# Patient Record
Sex: FEMALE | Race: WHITE | NOT HISPANIC OR LATINO | Employment: OTHER | URBAN - NONMETROPOLITAN AREA
[De-identification: names, ages, dates, MRNs, and addresses within clinical notes are randomized per-mention and may not be internally consistent; named-entity substitution may affect disease eponyms.]

---

## 2017-01-04 ENCOUNTER — HOSPITAL ENCOUNTER (OUTPATIENT)
Dept: EDUCATION SERVICES | Facility: HOSPITAL | Age: 82
Discharge: HOME | End: 2017-01-04
Attending: FAMILY MEDICINE | Admitting: FAMILY MEDICINE
Payer: MEDICARE

## 2017-01-04 VITALS
HEIGHT: 63 IN | WEIGHT: 171.2 LBS | DIASTOLIC BLOOD PRESSURE: 70 MMHG | BODY MASS INDEX: 30.33 KG/M2 | SYSTOLIC BLOOD PRESSURE: 116 MMHG | OXYGEN SATURATION: 97 % | HEART RATE: 96 BPM

## 2017-01-04 DIAGNOSIS — Z71.89 ACP (ADVANCE CARE PLANNING): Primary | Chronic | ICD-10-CM

## 2017-01-04 DIAGNOSIS — E11.9 TYPE 2 DIABETES MELLITUS WITHOUT COMPLICATION, WITHOUT LONG-TERM CURRENT USE OF INSULIN (H): ICD-10-CM

## 2017-01-04 PROCEDURE — 97803 MED NUTRITION INDIV SUBSEQ: CPT | Performed by: DIETITIAN, REGISTERED

## 2017-01-04 ASSESSMENT — PAIN SCALES - GENERAL: PAINLEVEL: NO PAIN (0)

## 2017-01-04 NOTE — IP AVS SNAPSHOT
MRN:[id number]                      After Visit Summary   1/4/2017    Annie Arguello    MRN: [id number]           Thank you!     Thank you for choosing [facility] for your care. Our goal is always to provide you with excellent care. Hearing back from our patients is one way we can continue to improve our services. Please take a few minutes to complete the written survey that you may receive in the mail after you visit with us. Thank you!        Patient Information     Date Of Birth          11/14/1933        About your hospital stay     You were admitted on:  January 4, 2017 You last received care in the:  [facility]    You were discharged on:  January 4, 2017       Who to Call     For medical emergencies, please call 911.  For non-urgent questions about your medical care, please call your primary care provider or clinic, [phone]          Attending Provider     Provider    DOC Cox MD       Primary Care Provider Office Phone # Fax #    DOC Cox [phone]       [facility] [place] [address] [place] [address]        Your next 10 appointments already scheduled     Dec 08, 2017 10:00 AM   Hearing Eval with JORDAN Escalera   [facility] ([facility])    [address] [place] [address] [phone]              Further instructions from your care team       -Keep trying to limit your portion sizes.    -Continue using your treadmill - work at adding more steps.    -Test 1x/day - alternate times like you have been.   -Target levels are fasting and before meals , 2 hours after meal less than 180.   -Weight today was 171.2# which is down 5#.    -Follow up in one month.  -Call with any questions - ROSEANNE Stewart, [phone]    Pending Results     No orders found from 1/3/2017 to 1/5/2017.            Admission Information        Provider Department Dept Phone    1/4/2017 DOC Cox MD [facility]  "Ed [phone]      Your Vitals Were     Blood Pressure Pulse Height Weight BMI (Body Mass Index) Pulse Oximetry    116/70 mmHg 96 1.588 m (5' 2.5\") 77.656 kg (171 lb 3.2 oz) 30.79 kg/m2 97%      MyChart Information     [facility] lets you send messages to your doctor, view your test results, renew your prescriptions, schedule appointments and more. To sign up, go to [email or web address] . Click on \"Log in\" on the left side of the screen, which will take you to the Welcome page. Then click on \"Sign up Now\" on the right side of the page.     You will be asked to enter the access code listed below, as well as some personal information. Please follow the directions to create your username and password.     Your access code is: [email or web address]  Expires: 2017  9:44 AM     Your access code will  in 90 days. If you need help or a new code, please call your [facility] clinic or [phone].        Care EveryWhere ID     This is your Care EveryWhere ID. This could be used by other organizations to access your [facility] medical records  [id number]           Review of your medicines      UNREVIEWED medicines. Ask your doctor about these medicines        Dose / Directions    alendronate 70 MG tablet   Commonly known as:  FOSAMAX   Used for:  Osteoarthritis        TAKE AS INSTRUCTED BY YOUR PRESCRIBER   Quantity:  12 tablet   Refills:  2       ALEVE 220 MG tablet   Generic drug:  naproxen sodium        Take one tablet (220mg) by oral route every 12 hours as needed   Refills:  0       aspirin 325 MG tablet        Take one tablet (325mg) by oral route every day   Refills:  0       DAILY MULTIVITAMIN PO        Take one tablet by oral route every day with food   Refills:  0       digoxin 125 MCG tablet   Commonly known as:  LANOXIN   Used for:  Chronic atrial fibrillation (H)        TAKE 1 TABLET DAILY IN THE EVENING   Quantity:  90 tablet   Refills:  1       fish oil-omega-3 fatty acids 1000 MG capsule        Take one by " oral route one times every day   Refills:  0       fluticasone-salmeterol 250-50 MCG/DOSE diskus inhaler   Commonly known as:  ADVAIR   Used for:  Dyspnea on exertion        Dose:  1 puff   Inhale 1 puff into the lungs 2 times daily   Quantity:  1 Inhaler   Refills:  3       metoprolol 25 MG 24 hr tablet   Commonly known as:  TOPROL-XL   Used for:  Atrial fibrillation (H)        TAKE 1 TABLET DAILY   Quantity:  90 tablet   Refills:  2       TUMS PO        Refills:  0         CONTINUE these medicines which have NOT CHANGED        Dose / Directions    blood glucose monitoring lancets   Used for:  Type 2 diabetes mellitus without complication, without long-term current use of insulin (H)        Use to test blood sugar 1 times daily.   Quantity:  1 Box   Refills:  3       blood glucose monitoring test strip   Commonly known as:  ONE TOUCH VERIO IQ   Used for:  Type 2 diabetes mellitus without complication, without long-term current use of insulin (H)        Use to test blood sugar 1 times daily.   Quantity:  100 each   Refills:  3                Protect others around you: Learn how to safely use, store and throw away your medicines at www.disposemymeds.org.             Medication List: This is a list of all your medications and when to take them. Check marks below indicate your daily home schedule. Keep this list as a reference.      Medications           Morning Afternoon Evening Bedtime As Needed    alendronate 70 MG tablet   Commonly known as:  FOSAMAX   TAKE AS INSTRUCTED BY YOUR PRESCRIBER                                ALEVE 220 MG tablet   Take one tablet (220mg) by oral route every 12 hours as needed   Generic drug:  naproxen sodium                                aspirin 325 MG tablet   Take one tablet (325mg) by oral route every day                                blood glucose monitoring lancets   Use to test blood sugar 1 times daily.                                blood glucose monitoring test strip    Commonly known as:  ONE TOUCH VERIO IQ   Use to test blood sugar 1 times daily.                                DAILY MULTIVITAMIN PO   Take one tablet by oral route every day with food                                digoxin 125 MCG tablet   Commonly known as:  LANOXIN   TAKE 1 TABLET DAILY IN THE EVENING                                fish oil-omega-3 fatty acids 1000 MG capsule   Take one by oral route one times every day                                fluticasone-salmeterol 250-50 MCG/DOSE diskus inhaler   Commonly known as:  ADVAIR   Inhale 1 puff into the lungs 2 times daily                                metoprolol 25 MG 24 hr tablet   Commonly known as:  TOPROL-XL   TAKE 1 TABLET DAILY                                TUMS PO

## 2017-01-04 NOTE — DISCHARGE INSTRUCTIONS
-Keep trying to limit your portion sizes.    -Continue using your treadmill - work at adding more steps.    -Test 1x/day - alternate times like you have been.   -Target levels are fasting and before meals , 2 hours after meal less than 180.   -Weight today was 171.2# which is down 5#.    -Follow up in one month.  -Call with any questions - ROSEANNE Stewart, -[phone]

## 2017-01-04 NOTE — IP AVS SNAPSHOT
[facility]    [address] [place] [address]    Phone:  [phone]    Fax:  [phone]                                       After Visit Summary   1/4/2017    Annie Arguello    MRN: [id number]           After Visit Summary Signature Page     I have received my discharge instructions, and my questions have been answered. I have discussed any challenges I see with this plan with the nurse or doctor.    ..........................................................................................................................................  Patient/Patient Representative Signature      ..........................................................................................................................................  Patient Representative Print Name and Relationship to Patient    ..................................................               ................................................  Date                                            Time    ..........................................................................................................................................  Reviewed by Signature/Title    ...................................................              ..............................................  Date                                                            Time

## 2017-01-05 ASSESSMENT — PATIENT HEALTH QUESTIONNAIRE - PHQ9: SUM OF ALL RESPONSES TO PHQ QUESTIONS 1-9: 0

## 2017-02-01 ENCOUNTER — HOSPITAL ENCOUNTER (OUTPATIENT)
Dept: EDUCATION SERVICES | Facility: HOSPITAL | Age: 82
Discharge: HOME | End: 2017-02-01
Attending: FAMILY MEDICINE | Admitting: FAMILY MEDICINE
Payer: MEDICARE

## 2017-02-01 VITALS
BODY MASS INDEX: 29.77 KG/M2 | HEART RATE: 73 BPM | WEIGHT: 168 LBS | SYSTOLIC BLOOD PRESSURE: 110 MMHG | DIASTOLIC BLOOD PRESSURE: 70 MMHG | OXYGEN SATURATION: 97 % | HEIGHT: 63 IN

## 2017-02-01 DIAGNOSIS — E11.65 TYPE 2 DIABETES MELLITUS WITH HYPERGLYCEMIA, WITHOUT LONG-TERM CURRENT USE OF INSULIN (H): Primary | ICD-10-CM

## 2017-02-01 PROCEDURE — G0108 DIAB MANAGE TRN  PER INDIV: HCPCS | Performed by: DIETITIAN, REGISTERED

## 2017-02-01 ASSESSMENT — PAIN SCALES - GENERAL: PAINLEVEL: NO PAIN (0)

## 2017-02-01 NOTE — IP AVS SNAPSHOT
[facility]    [address] [place] [address]    Phone:  [phone]    Fax:  [phone]                                       After Visit Summary   2/1/2017    Annie Arguello    MRN: [id number]           After Visit Summary Signature Page     I have received my discharge instructions, and my questions have been answered. I have discussed any challenges I see with this plan with the nurse or doctor.    ..........................................................................................................................................  Patient/Patient Representative Signature      ..........................................................................................................................................  Patient Representative Print Name and Relationship to Patient    ..................................................               ................................................  Date                                            Time    ..........................................................................................................................................  Reviewed by Signature/Title    ...................................................              ..............................................  Date                                                            Time

## 2017-02-01 NOTE — DISCHARGE INSTRUCTIONS
-Keep trying to limit carbohydrates in your diet.    -Keep walking on your treadmill as much as able.    -Test glucose 1x/day - alternate times - fasting, before supper, 2 hours after supper.    -Target levels - fasting and before meals , 2 hours after meal less than 180.   -Weight today is 168# which is down 8# from initial visit.    -We will do your A1c at next visit - you DO NOT need to be fasting.   -Call with any questions - ROSEANNE Stewart, -[phone]

## 2017-02-01 NOTE — IP AVS SNAPSHOT
MRN:[id number]                      After Visit Summary   2/1/2017    Annie Arguello    MRN: [id number]           Thank you!     Thank you for choosing [facility] for your care. Our goal is always to provide you with excellent care. Hearing back from our patients is one way we can continue to improve our services. Please take a few minutes to complete the written survey that you may receive in the mail after you visit with us. Thank you!        Patient Information     Date Of Birth          11/14/1933        About your hospital stay     You were admitted on:  February 1, 2017 You last received care in the:  [facility]    You were discharged on:  February 1, 2017       Who to Call     For medical emergencies, please call 911.  For non-urgent questions about your medical care, please call your primary care provider or clinic, [phone]          Attending Provider     Provider    DOC Cox MD       Primary Care Provider Office Phone # Fax #    DOC Cox [phone]       [facility] [place] [address] [place] [address]        Your next 10 appointments already scheduled     Dec 08, 2017 10:00 AM   Hearing Eval with JORDAN Escalera   [facility] [place] ([facility])    [address] [place] [address] [phone]              Further instructions from your care team       -Keep trying to limit carbohydrates in your diet.    -Keep walking on your treadmill as much as able.    -Test glucose 1x/day - alternate times - fasting, before supper, 2 hours after supper.    -Target levels - fasting and before meals , 2 hours after meal less than 180.   -Weight today is 168# which is down 8# from initial visit.    -We will do your A1c at next visit - you DO NOT need to be fasting.   -Call with any questions - ROSEANNE Stewart, -[phone]    Pending Results     No orders found from 1/31/2017 to 2/2/2017.           "  Admission Information        Provider Department Dept Phone    2017 DOC Cox MD [facility] [phone]      Your Vitals Were     Blood Pressure Pulse Height Weight BMI (Body Mass Index) Pulse Oximetry    110/70 mmHg 73 1.588 m (5' 2.5\") 76.204 kg (168 lb) 30.22 kg/m2 97%      MyChart Information     MyChart lets you send messages to your doctor, view your test results, renew your prescriptions, schedule appointments and more. To sign up, go to [email or web address] . Click on \"Log in\" on the left side of the screen, which will take you to the Welcome page. Then click on \"Sign up Now\" on the right side of the page.     You will be asked to enter the access code listed below, as well as some personal information. Please follow the directions to create your username and password.     Your access code is: [email or web address]  Expires: 2017  9:44 AM     Your access code will  in 90 days. If you need help or a new code, please call your [facility] clinic or [phone].        Care EveryWhere ID     This is your Care EveryWhere ID. This could be used by other organizations to access your [facility] medical records  [id number]           Review of your medicines      UNREVIEWED medicines. Ask your doctor about these medicines        Dose / Directions    alendronate 70 MG tablet   Commonly known as:  FOSAMAX   Used for:  Osteoarthritis        TAKE AS INSTRUCTED BY YOUR PRESCRIBER   Quantity:  12 tablet   Refills:  2       ALEVE 220 MG tablet   Generic drug:  naproxen sodium        Take one tablet (220mg) by oral route every 12 hours as needed   Refills:  0       aspirin 325 MG tablet        Take one tablet (325mg) by oral route every day   Refills:  0       DAILY MULTIVITAMIN PO        Take one tablet by oral route every day with food   Refills:  0       digoxin 125 MCG tablet   Commonly known as:  LANOXIN   Used for:  Chronic atrial fibrillation (H)        TAKE 1 TABLET DAILY IN THE EVENING   Quantity: "  90 tablet   Refills:  1       fish oil-omega-3 fatty acids 1000 MG capsule        Take one by oral route one times every day   Refills:  0       fluticasone-salmeterol 250-50 MCG/DOSE diskus inhaler   Commonly known as:  ADVAIR   Used for:  Dyspnea on exertion        Dose:  1 puff   Inhale 1 puff into the lungs 2 times daily   Quantity:  1 Inhaler   Refills:  3       metoprolol 25 MG 24 hr tablet   Commonly known as:  TOPROL-XL   Used for:  Atrial fibrillation (H)        TAKE 1 TABLET DAILY   Quantity:  90 tablet   Refills:  2       TUMS PO        Refills:  0         CONTINUE these medicines which have NOT CHANGED        Dose / Directions    blood glucose monitoring lancets   Used for:  Type 2 diabetes mellitus without complication, without long-term current use of insulin (H)        Use to test blood sugar 1 times daily.   Quantity:  1 Box   Refills:  3       blood glucose monitoring test strip   Commonly known as:  ONE TOUCH VERIO IQ   Used for:  Type 2 diabetes mellitus without complication, without long-term current use of insulin (H)        Use to test blood sugar 1 times daily.   Quantity:  100 each   Refills:  3                Protect others around you: Learn how to safely use, store and throw away your medicines at www.disposemymeds.org.             Medication List: This is a list of all your medications and when to take them. Check marks below indicate your daily home schedule. Keep this list as a reference.      Medications           Morning Afternoon Evening Bedtime As Needed    alendronate 70 MG tablet   Commonly known as:  FOSAMAX   TAKE AS INSTRUCTED BY YOUR PRESCRIBER                                ALEVE 220 MG tablet   Take one tablet (220mg) by oral route every 12 hours as needed   Generic drug:  naproxen sodium                                aspirin 325 MG tablet   Take one tablet (325mg) by oral route every day                                blood glucose monitoring lancets   Use to test blood  sugar 1 times daily.                                blood glucose monitoring test strip   Commonly known as:  ONE TOUCH VERIO IQ   Use to test blood sugar 1 times daily.                                DAILY MULTIVITAMIN PO   Take one tablet by oral route every day with food                                digoxin 125 MCG tablet   Commonly known as:  LANOXIN   TAKE 1 TABLET DAILY IN THE EVENING                                fish oil-omega-3 fatty acids 1000 MG capsule   Take one by oral route one times every day                                fluticasone-salmeterol 250-50 MCG/DOSE diskus inhaler   Commonly known as:  ADVAIR   Inhale 1 puff into the lungs 2 times daily                                metoprolol 25 MG 24 hr tablet   Commonly known as:  TOPROL-XL   TAKE 1 TABLET DAILY                                TUMS PO

## 2017-02-01 NOTE — PROGRESS NOTES
"Pt here for session 3.      BG readings as follows:   [id number]  Before [id number] with one at 191  2 hours after [id number] with one at 299 when tested too soon  Overall average is 122.     Blood pressure 110/70, pulse 73, height 1.588 m (5' 2.5\"), weight 76.204 kg (168 lb), SpO2 97 %.  Weight is down 3.2# from last visit and down 8# from initial visit.      Current diabetes medications: none.     Readiness to learn: willing.     Barriers to learning: none.      Pt continues to eat smaller portions and limit intake of sweets.  She is still walking 100 steps per day on her treadmill - hesitant to increase r/t back/hip pain.     Last noted lipid levels were done in 2015.  Pt is not on a statin.      Pt actively participated and demonstrated adequate understanding of topics discussed.  Topics reviewed include: BG self-test and A1c connection, evaluating records to better understand highs and lows, how diabetes and therapies will change over time, expectations of future primary care visits, prevention of complications, foot care skills, risk factors for heart disease,  importance of choosing unsaturated fats, blood pressure and lipid targets, and heart healthy guidelines.     Plan: Continue current meal planning and exercise efforts.  Test 1x/day at alternating times.  A1c at next visit.     Follow up: Session 4.     Total time spent with patient: 60 minutes.     ROSEANNE Stewart, CDE        "

## 2017-02-13 DIAGNOSIS — Z01.812 PRE-OPERATIVE LABORATORY EXAMINATION: Primary | ICD-10-CM

## 2017-02-13 DIAGNOSIS — Z01.812 BLOOD TESTS PRIOR TO TREATMENT OR PROCEDURE: Primary | ICD-10-CM

## 2017-02-14 ENCOUNTER — HOSPITAL ENCOUNTER (OUTPATIENT)
Dept: CT IMAGING | Facility: HOSPITAL | Age: 82
Discharge: HOME | End: 2017-02-14
Attending: FAMILY MEDICINE | Admitting: FAMILY MEDICINE
Payer: MEDICARE

## 2017-02-14 ENCOUNTER — TELEPHONE (OUTPATIENT)
Dept: FAMILY MEDICINE | Facility: OTHER | Age: 82
End: 2017-02-14

## 2017-02-14 DIAGNOSIS — I10 ESSENTIAL HYPERTENSION: Primary | ICD-10-CM

## 2017-02-14 DIAGNOSIS — I10 ESSENTIAL HYPERTENSION: ICD-10-CM

## 2017-02-14 LAB
CHOLEST SERPL-MCNC: 183 MG/DL
HDLC SERPL-MCNC: 56 MG/DL
LDLC SERPL CALC-MCNC: 107 MG/DL
NONHDLC SERPL-MCNC: 127 MG/DL
TRIGL SERPL-MCNC: 101 MG/DL

## 2017-02-14 PROCEDURE — 71260 CT THORAX DX C+: CPT | Mod: TC | Performed by: RADIOLOGY

## 2017-02-14 PROCEDURE — 80061 LIPID PANEL: CPT | Performed by: FAMILY MEDICINE

## 2017-02-14 PROCEDURE — 82565 ASSAY OF CREATININE: CPT | Performed by: FAMILY MEDICINE

## 2017-02-14 PROCEDURE — 36415 COLL VENOUS BLD VENIPUNCTURE: CPT | Performed by: FAMILY MEDICINE

## 2017-02-14 RX ORDER — IOPAMIDOL 612 MG/ML
100 INJECTION, SOLUTION INTRAVASCULAR ONCE
Status: COMPLETED | OUTPATIENT
Start: 2017-02-14 | End: 2017-02-14

## 2017-02-14 RX ADMIN — IOPAMIDOL 100 ML: 612 INJECTION, SOLUTION INTRAVASCULAR at 08:33

## 2017-02-14 NOTE — TELEPHONE ENCOUNTER
This patient just had a Creatinine drawn for a scan to be completed and she is requesting an order for lipids to be drawn as well since she is fasting. This is pended if you wish to order.

## 2017-02-21 LAB
CREAT SERPL-MCNC: 0.74 MG/DL (ref 0.52–1.04)
GFR SERPL CREATININE-BSD FRML MDRD: 74 ML/MIN/1.7M2

## 2017-02-28 ENCOUNTER — OFFICE VISIT (OUTPATIENT)
Dept: CHIROPRACTIC MEDICINE | Facility: OTHER | Age: 82
End: 2017-02-28
Attending: CHIROPRACTOR
Payer: MEDICARE

## 2017-02-28 DIAGNOSIS — M99.01 SEGMENTAL AND SOMATIC DYSFUNCTION OF CERVICAL REGION: ICD-10-CM

## 2017-02-28 DIAGNOSIS — M99.02 SEGMENTAL AND SOMATIC DYSFUNCTION OF THORACIC REGION: ICD-10-CM

## 2017-02-28 DIAGNOSIS — M54.50 ACUTE BILATERAL LOW BACK PAIN WITHOUT SCIATICA: ICD-10-CM

## 2017-02-28 DIAGNOSIS — M99.03 SEGMENTAL AND SOMATIC DYSFUNCTION OF LUMBAR REGION: Primary | ICD-10-CM

## 2017-02-28 PROCEDURE — 98941 CHIROPRACT MANJ 3-4 REGIONS: CPT | Mod: AT | Performed by: CHIROPRACTOR

## 2017-02-28 NOTE — MR AVS SNAPSHOT
After Visit Summary   2/28/2017    Annie Arguello    MRN: [id number]           Patient Information     Date Of Birth          11/14/1933        Visit Information        Provider Department      2/28/2017 9:20 AM Rashid Arboleda DC  [facility]        Today's Diagnoses     Segmental and somatic dysfunction of lumbar region    -  1    Acute bilateral low back pain without sciatica        Segmental and somatic dysfunction of thoracic region        Segmental and somatic dysfunction of cervical region           Follow-ups after your visit        Your next 10 appointments already scheduled     Mar 14, 2017 10:30 AM CDT   (Arrive by 10:15 AM)   Diabetic Education with Hayley Stone RD,  [facility]  [facility]   [facility] ([facility] )    [address] [place] [address] [phone]            Dec 08, 2017 10:00 AM CST   Hearing Eval with Monica Escalera   [facility] ([facility])    [address] [place] [address] [phone]              Who to contact     If you have questions or need follow up information about today's clinic visit or your schedule please contact  [facility] directly at [phone].  Normal or non-critical lab and imaging results will be communicated to you by MyChart, letter or phone within 4 business days after the clinic has received the results. If you do not hear from us within 7 days, please contact the clinic through MyChart or phone. If you have a critical or abnormal lab result, we will notify you by phone as soon as possible.  Submit refill requests through [email or web address] or call your pharmacy and they will forward the refill request to us. Please allow 3 business days for your refill to be completed.          Additional Information About Your Visit        MyChart Information     [email or web address] lets you send messages to your doctor, view your test results, renew your prescriptions, schedule  "appointments and more. To sign up, go to [email or web address] . Click on \"Log in\" on the left side of the screen, which will take you to the Welcome page. Then click on \"Sign up Now\" on the right side of the page.     You will be asked to enter the access code listed below, as well as some personal information. Please follow the directions to create your username and password.     Your access code is: [email or web address]  Expires: 2017 11:03 AM     Your access code will  in 90 days. If you need help or a new code, please call your [facility] clinic or [phone].        Care EveryWhere ID     This is your Care EveryWhere ID. This could be used by other organizations to access your [facility] medical records  [id number]         Blood Pressure from Last 3 Encounters:   17 110/70   17 116/70   16 132/86    Weight from Last 3 Encounters:   17 168 lb (76.2 kg)   17 171 lb 3.2 oz (77.7 kg)   16 176 lb 1.6 oz (79.9 kg)              We Performed the Following     CHIROPRAC MANIP,SPINAL,3-4 REGIONS        Primary Care Provider Office Phone # Fax #    R Oleksandr Cox [phone]       [place] [address]        Thank you!     [facility] you [facility]  [facility] your [facility]. Our goal is always to provide you with excellent care. Hearing back from our patients is one way we can continue to improve our services. Please take a few minutes to complete the written survey that you may receive in the mail after your visit with us. Thank you!             Your Updated Medication List - Protect others around you: Learn how to safely use, store and throw away your medicines at www.disposemymeds.org.          This list is accurate as of: 17 11:59 PM.  Always use your most recent med list.                   Brand Name Dispense Instructions for use    alendronate 70 MG tablet    FOSAMAX    12 tablet    TAKE AS INSTRUCTED " BY YOUR PRESCRIBER       ALEVE 220 MG tablet   Generic drug:  naproxen sodium      Take one tablet (220mg) by oral route every 12 hours as needed       aspirin 325 MG tablet      Take one tablet (325mg) by oral route every day       blood glucose monitoring lancets     1 Box    Use to test blood sugar 1 times daily.       blood glucose monitoring test strip    ONE TOUCH VERIO IQ    100 each    Use to test blood sugar 1 times daily.       DAILY MULTIVITAMIN PO      Take one tablet by oral route every day with food       digoxin 125 MCG tablet    LANOXIN    90 tablet    TAKE 1 TABLET DAILY IN THE EVENING       fish oil-omega-3 fatty acids 1000 MG capsule      Take one by oral route one times every day       fluticasone-salmeterol 250-50 MCG/DOSE diskus inhaler    ADVAIR    1 Inhaler    Inhale 1 puff into the lungs 2 times daily       metoprolol 25 MG 24 hr tablet    TOPROL-XL    90 tablet    TAKE 1 TABLET DAILY       TUMS PO

## 2017-03-01 NOTE — PROGRESS NOTES
Subjective Finding:    Chief compalint: Patient presents with:  Back Pain  Neck Pain  , Pain Scale: 6/10, Intensity: sharp and dull, Duration: 5 days, Change since last visit: same, Radiating: no.    Date of injury:     Activities that the pain restricts:   Home/household activities: no.  Work duties: no.  Hobbies/social: no.  Sleep: no.  Makes symptoms better: ice  and rest.  Makes symptoms worse: activity, cervical extension and cervical flexion.  Have you seen anyone else for the symptoms? No.  Work related: no.  Automobile related injury: no.    Objective and Assessment:    Posture Analysis:   High shoulder: right.  Head tilt: right.  High iliac crest: .  Head carriage: forward.  Thoracic Kyphosis: neutral.  Lumbar Lordosis: neutral.    Lumbar Range of Motion: flexion decreased.  Cervical Range of Motion: flexion decreased and extension decreased.  Thoracic Range of Motion: right lateral flexion decreased.  Extremity Range of Motion: .    Palpation:   T paraspinals: ache and dull pain, no  Traps: ache and dull pain, referred pain: no    Segmental dysfunction pre-treatment: c5-6  T3.  L4-5    Assessment post-treatment:  Cervical: ROM increased.  Thoracic: ROM increased.  Lumbar: ROM increased.    Comments: .      Complicating Factors: .    Plan / Procedure:    Expected release date: .  Treatment plan: PRN.  Instructed patient: ice 20 minutes every other hour as needed and rest.  Short term goals: reduce pain.  Long term goals: restore normal function.  Prognosis: excellent.

## 2017-03-03 ENCOUNTER — OFFICE VISIT (OUTPATIENT)
Dept: CHIROPRACTIC MEDICINE | Facility: OTHER | Age: 82
End: 2017-03-03
Attending: CHIROPRACTOR
Payer: MEDICARE

## 2017-03-03 DIAGNOSIS — M99.01 SEGMENTAL AND SOMATIC DYSFUNCTION OF CERVICAL REGION: ICD-10-CM

## 2017-03-03 DIAGNOSIS — M99.03 SEGMENTAL AND SOMATIC DYSFUNCTION OF LUMBAR REGION: Primary | ICD-10-CM

## 2017-03-03 DIAGNOSIS — M54.50 ACUTE BILATERAL LOW BACK PAIN WITHOUT SCIATICA: ICD-10-CM

## 2017-03-03 DIAGNOSIS — M99.02 SEGMENTAL AND SOMATIC DYSFUNCTION OF THORACIC REGION: ICD-10-CM

## 2017-03-03 PROCEDURE — 98941 CHIROPRACT MANJ 3-4 REGIONS: CPT | Mod: AT | Performed by: CHIROPRACTOR

## 2017-03-03 NOTE — MR AVS SNAPSHOT
After Visit Summary   3/3/2017    Annie Arguello    MRN: [id number]           Patient Information     Date Of Birth          11/14/1933        Visit Information        Provider Department      3/3/2017 10:40 AM Rashid Arboleda DC  [facility]        Today's Diagnoses     Segmental and somatic dysfunction of lumbar region    -  1    Acute bilateral low back pain without sciatica        Segmental and somatic dysfunction of thoracic region        Segmental and somatic dysfunction of cervical region           Follow-ups after your visit        Your next 10 appointments already scheduled     Mar 14, 2017 10:30 AM CDT   (Arrive by 10:15 AM)   Diabetic Education with Hayley Stone RD,  C[facility]  [address]   [facility] ([facility] )    [address] [place] [id number] [phone]            Dec 08, 2017 10:00 AM CST   Hearing Eval with Monica Escalera   [facility] ([facility])    [address] [place] [address] [phone]              Who to contact     If you have questions or need follow up information about today's clinic visit or your schedule please contact  [facility] directly at [phone].  Normal or non-critical lab and imaging results will be communicated to you by MyChart, letter or phone within 4 business days after the clinic has received the results. If you do not hear from us within 7 days, please contact the clinic through MyChart or phone. If you have a critical or abnormal lab result, we will notify you by phone as soon as possible.  Submit refill requests through [employer] or call your pharmacy and they will forward the refill request to us. Please allow 3 business days for your refill to be completed.          Additional Information About Your Visit        MyChart Information     [employer] lets you send messages to your doctor, view your test results, renew your prescriptions, schedule  "appointments and more. To sign up, go to [email or web address] . Click on \"Log in\" on the left side of the screen, which will take you to the Welcome page. Then click on \"Sign up Now\" on the right side of the page.     You will be asked to enter the access code listed below, as well as some personal information. Please follow the directions to create your username and password.     Your access code is: [email or web address]  Expires: 2017 11:03 AM     Your access code will  in 90 days. If you need help or a new code, please call your [place] clinic or [phone].        Care EveryWhere ID     This is your Care EveryWhere ID. This could be used by other organizations to access your [place] medical records  [id number]         Blood Pressure from Last 3 Encounters:   17 110/70   17 116/70   16 132/86    Weight from Last 3 Encounters:   17 168 lb (76.2 kg)   17 171 lb 3.2 oz (77.7 kg)   16 176 lb 1.6 oz (79.9 kg)              We Performed the Following     CHIROPRAC MANIP,SPINAL,3-4 REGIONS        Primary Care Provider Office Phone # Fax #    R Oleksandr Cox [phone]       [place] [address]        Thank you!     [facility] you [facility]  [facility] your [facility]. Our goal is always to provide you with excellent care. Hearing back from our patients is one way we can continue to improve our services. Please take a few minutes to complete the written survey that you may receive in the mail after your visit with us. Thank you!             Your Updated Medication List - Protect others around you: Learn how to safely use, store and throw away your medicines at www.disposemymeds.org.          This list is accurate as of: 3/3/17 11:59 PM.  Always use your most recent med list.                   Brand Name Dispense Instructions for use    alendronate 70 MG tablet    FOSAMAX    12 tablet    TAKE AS INSTRUCTED " BY YOUR PRESCRIBER       ALEVE 220 MG tablet   Generic drug:  naproxen sodium      Take one tablet (220mg) by oral route every 12 hours as needed       aspirin 325 MG tablet      Take one tablet (325mg) by oral route every day       blood glucose monitoring lancets     1 Box    Use to test blood sugar 1 times daily.       blood glucose monitoring test strip    ONE TOUCH VERIO IQ    100 each    Use to test blood sugar 1 times daily.       DAILY MULTIVITAMIN PO      Take one tablet by oral route every day with food       digoxin 125 MCG tablet    LANOXIN    90 tablet    TAKE 1 TABLET DAILY IN THE EVENING       fish oil-omega-3 fatty acids 1000 MG capsule      Take one by oral route one times every day       fluticasone-salmeterol 250-50 MCG/DOSE diskus inhaler    ADVAIR    1 Inhaler    Inhale 1 puff into the lungs 2 times daily       metoprolol 25 MG 24 hr tablet    TOPROL-XL    90 tablet    TAKE 1 TABLET DAILY       TUMS PO

## 2017-03-14 ENCOUNTER — HOSPITAL ENCOUNTER (OUTPATIENT)
Dept: EDUCATION SERVICES | Facility: HOSPITAL | Age: 82
Discharge: HOME | End: 2017-03-14
Attending: NURSE PRACTITIONER | Admitting: FAMILY MEDICINE
Payer: MEDICARE

## 2017-03-14 VITALS — WEIGHT: 166.9 LBS | DIASTOLIC BLOOD PRESSURE: 76 MMHG | SYSTOLIC BLOOD PRESSURE: 120 MMHG | BODY MASS INDEX: 30.04 KG/M2

## 2017-03-14 DIAGNOSIS — E11.9 TYPE 2 DIABETES MELLITUS WITHOUT COMPLICATION, WITHOUT LONG-TERM CURRENT USE OF INSULIN (H): Primary | ICD-10-CM

## 2017-03-14 LAB — HBA1C MFR BLD: 6.6 % (ref 0–5.7)

## 2017-03-14 PROCEDURE — 36416 COLLJ CAPILLARY BLOOD SPEC: CPT | Performed by: DIETITIAN, REGISTERED

## 2017-03-14 PROCEDURE — G0108 DIAB MANAGE TRN  PER INDIV: HCPCS | Performed by: DIETITIAN, REGISTERED

## 2017-03-14 PROCEDURE — 83036 HEMOGLOBIN GLYCOSYLATED A1C: CPT | Performed by: DIETITIAN, REGISTERED

## 2017-03-14 NOTE — IP AVS SNAPSHOT
[facility]    [address] [place] [address]    Phone:  [phone]    Fax:  [phone]                                       After Visit Summary   3/14/2017    Annie Arguello    MRN: [id number]           After Visit Summary Signature Page     I have received my discharge instructions, and my questions have been answered. I have discussed any challenges I see with this plan with the nurse or doctor.    ..........................................................................................................................................  Patient/Patient Representative Signature      ..........................................................................................................................................  Patient Representative Print Name and Relationship to Patient    ..................................................               ................................................  Date                                            Time    ..........................................................................................................................................  Reviewed by Signature/Title    ...................................................              ..............................................  Date                                                            Time

## 2017-03-14 NOTE — PROGRESS NOTES
Pt here for Session 4.     BG readings as follows:   [id number]  Before [id number]- with one at 181  After [id number]  Overall average is 128    Blood pressure 120/76, weight 75.7 kg (166 lb 14.4 oz).  Weight is down 9# from initial visit in December.     Current diabetes medications: none.    Readiness to learn: very willing.     Barriers to learning: none.      Pt reports that she has reduced portion sizes much and is trying to stay away from sweets.  She continues to walk on her treadmill 100-140 steps per day.      Pt actively participated in the session and continues to demonstrate motivation.     Topics covered: diabetes success plan, behavior change goal review, meal planning and importance of long term compliance, developing problem solving skills, stress and how it affects blood sugar, relationship between diabetes and depression along with symptoms of depression and how they affect diabetes self-care. Rationale for consistent self-care and regular diabetes care visits, identifying medical tests/exams needed for regular diabetes care and community resources for ongoing education and support.     Pts A1c tested today 6.6% improved from 7.5%.    PHQ-9 completed with score of 0.    Pt goals updated.      03/14/17 1000   OTHER   Date of initial Diabetes Education visit 12/06/16   Education Completed? Yes   BEHAVIORAL OUTCOMES / GOALS   GOAL: Healthy Eating Not chosen   GOAL: Physical Activity 100  (Met goal of exercise more often. )   GOAL: Monitoring Not chosen   GOAL: Medication Taking Not chosen   GOAL: Problem Solving Not chosen   GOAL: Reducing Risks 100  (Met goal of lose weight - down 9#. )   GOAL: Healthy Coping Not chosen   EXAMS   Dilated eye exam completed within past 12 months Yes   Date of Eye Exam 10/2016   Foot exam completed within past 12 months No   Dental exam completed within past 12 months Yes   Date of dental exam 4/2016   Additional Therapies   Do you smoke? No   Are you on ASA  therapy? Yes       Will follow annually and prn.     Total visit time: 30 minutes.      ROSEANNE Stewart, CDE

## 2017-03-14 NOTE — IP AVS SNAPSHOT
MRN:[id number]                      After Visit Summary   3/14/2017    Annie Arguello    MRN: [id number]           Thank you!     Thank you for choosing [facility] for your care. Our goal is always to provide you with excellent care. Hearing back from our patients is one way we can continue to improve our services. Please take a few minutes to complete the written survey that you may receive in the mail after you visit with us. Thank you!        Patient Information     Date Of Birth          11/14/1933        About your hospital stay     You were admitted on:  March 14, 2017 You last received care in the:  [facility]    You were discharged on:  March 14, 2017       Who to Call     For medical emergencies, please call 911.  For non-urgent questions about your medical care, please call your primary care provider or clinic, [phone]          Attending Provider     Provider Specialty    Linda Krause NP --       Primary Care Provider Office Phone # Fax #    R Oleksandr Cox [phone]       [facility] [place] [address] [place] [address]        Your next 10 appointments already scheduled     Dec 08, 2017 10:00 AM CST   Hearing Eval with Monica Escalera   [facility] ([facility])    [address] [place] [address] [phone]              Further instructions from your care team       -Weight today was 166.9# which is down 9# from initial visit in December.   -A1c today was 6.6% which is down from 7.5% - great job!  -Keep testing glucose levels 1x/day - alternate times - fasting, before supper 2 hours after supper.   -Target levels are fasting and before supper , 2 hours after supper less than 180.   -Keep up with walking for exercise.    -We will call you in one year for annual review.   -Call with any concerns - ROSEANNE Stewart, [phone]    Pending Results     No orders found from 3/12/2017 to  "3/15/2017.            Admission Information     Date & Time Provider Department Dept. Phone    3/14/2017 Linda Krause NP [facility] [phone]      [employer] Information     [employer] lets you send messages to your doctor, view your test results, renew your prescriptions, schedule appointments and more. To sign up, go to [email or web address] . Click on \"Log in\" on the left side of the screen, which will take you to the Welcome page. Then click on \"Sign up Now\" on the right side of the page.     You will be asked to enter the access code listed below, as well as some personal information. Please follow the directions to create your username and password.     Your access code is: [email or web address]  Expires: 2017 12:03 PM     Your access code will  in 90 days. If you need help or a new code, please call your [place] clinic or [phone].        Care EveryWhere ID     This is your Care EveryWhere ID. This could be used by other organizations to access your [place] medical records  [id number]           Review of your medicines      UNREVIEWED medicines. Ask your doctor about these medicines        Dose / Directions    alendronate 70 MG tablet   Commonly known as:  FOSAMAX   Used for:  Osteoarthritis        TAKE AS INSTRUCTED BY YOUR PRESCRIBER   Quantity:  12 tablet   Refills:  2       ALEVE 220 MG tablet   Generic drug:  naproxen sodium        Take one tablet (220mg) by oral route every 12 hours as needed   Refills:  0       aspirin 325 MG tablet        Take one tablet (325mg) by oral route every day   Refills:  0       DAILY MULTIVITAMIN PO        Take one tablet by oral route every day with food   Refills:  0       digoxin 125 MCG tablet   Commonly known as:  LANOXIN   Used for:  Chronic atrial fibrillation (H)        TAKE 1 TABLET DAILY IN THE EVENING   Quantity:  90 tablet   Refills:  1       fish oil-omega-3 fatty acids 1000 MG capsule        Take one by oral route one times every day "   Refills:  0       metoprolol 25 MG 24 hr tablet   Commonly known as:  TOPROL-XL   Used for:  Atrial fibrillation (H)        TAKE 1 TABLET DAILY   Quantity:  90 tablet   Refills:  2       TUMS PO        Refills:  0         CONTINUE these medicines which have NOT CHANGED        Dose / Directions    blood glucose monitoring lancets   Used for:  Type 2 diabetes mellitus without complication, without long-term current use of insulin (H)        Use to test blood sugar 1 times daily.   Quantity:  1 Box   Refills:  3       blood glucose monitoring test strip   Commonly known as:  ONE TOUCH VERIO IQ   Used for:  Type 2 diabetes mellitus without complication, without long-term current use of insulin (H)        Use to test blood sugar 1 times daily.   Quantity:  100 each   Refills:  3                Protect others around you: Learn how to safely use, store and throw away your medicines at www.disposemymeds.org.             Medication List: This is a list of all your medications and when to take them. Check marks below indicate your daily home schedule. Keep this list as a reference.      Medications           Morning Afternoon Evening Bedtime As Needed    alendronate 70 MG tablet   Commonly known as:  FOSAMAX   TAKE AS INSTRUCTED BY YOUR PRESCRIBER                                ALEVE 220 MG tablet   Take one tablet (220mg) by oral route every 12 hours as needed   Generic drug:  naproxen sodium                                aspirin 325 MG tablet   Take one tablet (325mg) by oral route every day                                blood glucose monitoring lancets   Use to test blood sugar 1 times daily.                                blood glucose monitoring test strip   Commonly known as:  ONE TOUCH VERIO IQ   Use to test blood sugar 1 times daily.                                DAILY MULTIVITAMIN PO   Take one tablet by oral route every day with food                                digoxin 125 MCG tablet   Commonly known as:   LANOXIN   TAKE 1 TABLET DAILY IN THE EVENING                                fish oil-omega-3 fatty acids 1000 MG capsule   Take one by oral route one times every day                                metoprolol 25 MG 24 hr tablet   Commonly known as:  TOPROL-XL   TAKE 1 TABLET DAILY                                TUMS PO

## 2017-03-14 NOTE — DISCHARGE INSTRUCTIONS
-Weight today was 166.9# which is down 9# from initial visit in December.   -A1c today was 6.6% which is down from 7.5% - great job!  -Keep testing glucose levels 1x/day - alternate times - fasting, before supper 2 hours after supper.   -Target levels are fasting and before supper , 2 hours after supper less than 180.   -Keep up with walking for exercise.    -We will call you in one year for annual review.   -Call with any concerns - ROSEANNE Stewart, -[phone]

## 2017-03-25 DIAGNOSIS — I48.91 ATRIAL FIBRILLATION (H): ICD-10-CM

## 2017-03-27 RX ORDER — METOPROLOL SUCCINATE 25 MG/1
TABLET, EXTENDED RELEASE ORAL
Qty: 90 TABLET | Refills: 1 | Status: SHIPPED | OUTPATIENT
Start: 2017-03-27 | End: 2017-09-22

## 2017-06-07 DIAGNOSIS — I48.20 CHRONIC ATRIAL FIBRILLATION (H): ICD-10-CM

## 2017-06-09 RX ORDER — DIGOXIN 125 MCG
TABLET ORAL
Qty: 90 TABLET | Refills: 0 | Status: SHIPPED | OUTPATIENT
Start: 2017-06-09 | End: 2017-09-06

## 2017-06-21 ENCOUNTER — OFFICE VISIT (OUTPATIENT)
Dept: CHIROPRACTIC MEDICINE | Facility: OTHER | Age: 82
End: 2017-06-21
Attending: CHIROPRACTOR
Payer: MEDICARE

## 2017-06-21 DIAGNOSIS — M99.02 SEGMENTAL AND SOMATIC DYSFUNCTION OF THORACIC REGION: ICD-10-CM

## 2017-06-21 DIAGNOSIS — M54.50 ACUTE BILATERAL LOW BACK PAIN WITHOUT SCIATICA: ICD-10-CM

## 2017-06-21 DIAGNOSIS — M99.01 SEGMENTAL AND SOMATIC DYSFUNCTION OF CERVICAL REGION: ICD-10-CM

## 2017-06-21 DIAGNOSIS — M99.03 SEGMENTAL AND SOMATIC DYSFUNCTION OF LUMBAR REGION: Primary | ICD-10-CM

## 2017-06-21 PROCEDURE — 98941 CHIROPRACT MANJ 3-4 REGIONS: CPT | Mod: AT | Performed by: CHIROPRACTOR

## 2017-06-21 NOTE — MR AVS SNAPSHOT
"              After Visit Summary   6/21/2017    Annie Arguello    MRN: [id number]           Patient Information     Date Of Birth          11/14/1933        Visit Information        Provider Department      6/21/2017 9:30 AM Rashid Arboleda DC  [facility]        Today's Diagnoses     Segmental and somatic dysfunction of lumbar region    -  1    Acute bilateral low back pain without sciatica        Segmental and somatic dysfunction of thoracic region        Segmental and somatic dysfunction of cervical region           Follow-ups after your visit        Your next 10 appointments already scheduled     Dec 08, 2017 10:00 AM CST   Hearing Eval with Monica Escalera   [facility] ([facility] )    [address]  [facility] [address]   [phone]              Who to contact     If you have questions or need follow up information about today's clinic visit or your schedule please contact  [facility] directly at [phone].  Normal or non-critical lab and imaging results will be communicated to you by MyChart, letter or phone within 4 business days after the clinic has received the results. If you do not hear from us within 7 days, please contact the clinic through MyChart or phone. If you have a critical or abnormal lab result, we will notify you by phone as soon as possible.  Submit refill requests through [employer] or call your pharmacy and they will forward the refill request to us. Please allow 3 business days for your refill to be completed.          Additional Information About Your Visit        [employer] Information     [employer] lets you send messages to your doctor, view your test results, renew your prescriptions, schedule appointments and more. To sign up, go to [email or web address] . Click on \"Log in\" on the left side of the screen, which will take you to the Welcome page. Then click on \"Sign up Now\" on the right side of the page.     You will " be asked to enter the access code listed below, as well as some personal information. Please follow the directions to create your username and password.     Your access code is: [email or web address]  Expires: 2017  9:00 AM     Your access code will  in 90 days. If you need help or a new code, please call your [place] clinic or [phone].        Care EveryWhere ID     This is your Care EveryWhere ID. This could be used by other organizations to access your [place] medical records  [id number]         Blood Pressure from Last 3 Encounters:   17 120/76   17 110/70   17 116/70    Weight from Last 3 Encounters:   17 166 lb 14.4 oz (75.7 kg)   17 168 lb (76.2 kg)   17 171 lb 3.2 oz (77.7 kg)              We Performed the Following     CHIROPRAC MANIP,SPINAL,3-4 REGIONS        Primary Care Provider Office Phone # Fax #    R Oleksandr Cox [phone]       [facility] [place] [address] [place] [address]        Equal Access to Services     [facility] BERNIE : Hadii aad ku hadasho Soomaali, waaxda luqadaha, qaybta kaalmada adeegyada, waxay holly razo . So [facility] [phone].    ATENCIÓN: Si habla español, tiene a bethea disposición servicios gratuitos de asistencia lingüística. [facility] [phone].    We comply with applicable federal civil rights laws and [place] laws. We do not discriminate on the basis of race, color, national origin, age, disability sex, sexual orientation or gender identity.            Thank you!     Thank you for choosing  [facility]  [facility] your care. Our goal is always to provide you with excellent care. Hearing back from our patients is one way we can continue to improve our services. Please take a few minutes to complete the written survey that you may receive in the mail after your visit with us. Thank you!             Your Updated Medication List - Protect others around you: Learn how to safely  use, store and throw away your medicines at www.disposemymeds.org.          This list is accurate as of: 6/21/17 11:59 PM.  Always use your most recent med list.                   Brand Name Dispense Instructions for use Diagnosis    alendronate 70 MG tablet    FOSAMAX    12 tablet    TAKE AS INSTRUCTED BY YOUR PRESCRIBER    Osteoarthritis       ALEVE 220 MG tablet   Generic drug:  naproxen sodium      Take one tablet (220mg) by oral route every 12 hours as needed        aspirin 325 MG tablet      Take one tablet (325mg) by oral route every day        blood glucose monitoring lancets     1 Box    Use to test blood sugar 1 times daily.    Type 2 diabetes mellitus without complication, without long-term current use of insulin (H)       blood glucose monitoring test strip    ONE TOUCH VERIO IQ    100 each    Use to test blood sugar 1 times daily.    Type 2 diabetes mellitus without complication, without long-term current use of insulin (H)       DAILY MULTIVITAMIN PO      Take one tablet by oral route every day with food        digoxin 125 MCG tablet    LANOXIN    90 tablet    TAKE 1 TABLET DAILY IN THE EVENING    Chronic atrial fibrillation (H)       fish oil-omega-3 fatty acids 1000 MG capsule      Take one by oral route one times every day        metoprolol 25 MG 24 hr tablet    TOPROL-XL    90 tablet    TAKE 1 TABLET DAILY    Atrial fibrillation (H)       TUMS PO

## 2017-07-11 DIAGNOSIS — M19.90 OSTEOARTHRITIS: ICD-10-CM

## 2017-07-13 RX ORDER — ALENDRONATE SODIUM 70 MG/1
TABLET ORAL
Qty: 12 TABLET | Refills: 1 | Status: SHIPPED | OUTPATIENT
Start: 2017-07-13 | End: 2017-12-12

## 2017-07-24 ENCOUNTER — OFFICE VISIT (OUTPATIENT)
Dept: CHIROPRACTIC MEDICINE | Facility: OTHER | Age: 82
End: 2017-07-24
Attending: CHIROPRACTOR
Payer: MEDICARE

## 2017-07-24 DIAGNOSIS — M99.03 SEGMENTAL AND SOMATIC DYSFUNCTION OF LUMBAR REGION: Primary | ICD-10-CM

## 2017-07-24 DIAGNOSIS — M54.50 ACUTE BILATERAL LOW BACK PAIN WITHOUT SCIATICA: ICD-10-CM

## 2017-07-24 DIAGNOSIS — M99.01 SEGMENTAL AND SOMATIC DYSFUNCTION OF CERVICAL REGION: ICD-10-CM

## 2017-07-24 DIAGNOSIS — M99.02 SEGMENTAL AND SOMATIC DYSFUNCTION OF THORACIC REGION: ICD-10-CM

## 2017-07-24 PROCEDURE — 98941 CHIROPRACT MANJ 3-4 REGIONS: CPT | Mod: AT | Performed by: CHIROPRACTOR

## 2017-07-24 NOTE — MR AVS SNAPSHOT
"              After Visit Summary   7/24/2017    Annie Arguello    MRN: [id number]           Patient Information     Date Of Birth          11/14/1933        Visit Information        Provider Department      7/24/2017 2:40 PM Rashid Arboleda DC  [facility] Kevin Cerda        Today's Diagnoses     Segmental and somatic dysfunction of lumbar region    -  1    Acute bilateral low back pain without sciatica        Segmental and somatic dysfunction of thoracic region        Segmental and somatic dysfunction of cervical region           Follow-ups after your visit        Your next 10 appointments already scheduled     Dec 08, 2017 10:00 AM CST   Hearing Eval with Monica Escalera   [facility] ([facility] )    [address]  [facility] [address]   [phone]              Who to contact     If you have questions or need follow up information about today's clinic visit or your schedule please contact  [facility] directly at [phone].  Normal or non-critical lab and imaging results will be communicated to you by MyChart, letter or phone within 4 business days after the clinic has received the results. If you do not hear from us within 7 days, please contact the clinic through MyChart or phone. If you have a critical or abnormal lab result, we will notify you by phone as soon as possible.  Submit refill requests through Angle or call your pharmacy and they will forward the refill request to us. Please allow 3 business days for your refill to be completed.          Additional Information About Your Visit        Lucid ColloidsharMobileHandshake Information     Angle lets you send messages to your doctor, view your test results, renew your prescriptions, schedule appointments and more. To sign up, go to [email or web address] . Click on \"Log in\" on the left side of the screen, which will take you to the Welcome page. Then click on \"Sign up Now\" on the right side of the page.     You will " be asked to enter the access code listed below, as well as some personal information. Please follow the directions to create your username and password.     Your access code is: [email or web address]  Expires: 2017  9:00 AM     Your access code will  in 90 days. If you need help or a new code, please call your [facility] clinic or [phone].        Care EveryWhere ID     This is your Care EveryWhere ID. This could be used by other organizations to access your [facility] medical records  [id number]         Blood Pressure from Last 3 Encounters:   17 120/76   17 110/70   17 116/70    Weight from Last 3 Encounters:   17 166 lb 14.4 oz (75.7 kg)   17 168 lb (76.2 kg)   17 171 lb 3.2 oz (77.7 kg)              We Performed the Following     CHIROPRAC MANIP,SPINAL,3-4 REGIONS        Primary Care Provider Office Phone # Fax #    R Oleksandr Cox [phone]       [facility] [place] [address] [place] [address]        Equal Access to Services     [facility] BERNIE : Hadii aad ku hadasho Soomaali, waaxda luqadaha, qaybta kaalmada adeegyada, waxay holly razo . So [facility] [phone].    ATENCIÓN: Si habla español, tiene a bethea disposición servicios gratuitos de asistencia lingüística. [facility] [phone].    We comply with applicable federal civil rights laws and [place] laws. We do not discriminate on the basis of race, color, national origin, age, disability sex, sexual orientation or gender identity.            Thank you!     [facility] you [facility]  [facility]  [facility] your [facility]. Our goal is always to provide you with excellent care. Hearing back from our patients is one way we can continue to improve our services. Please take a few minutes to complete the written survey that you may receive in the mail after your visit with us. Thank you!             Your Updated Medication List - Protect others around you: Learn how to safely  use, store and throw away your medicines at www.disposemymeds.org.          This list is accurate as of: 7/24/17 11:59 PM.  Always use your most recent med list.                   Brand Name Dispense Instructions for use Diagnosis    alendronate 70 MG tablet    FOSAMAX    12 tablet    TAKE AS INSTRUCTED BY YOUR PRESCRIBER    Osteoarthritis       ALEVE 220 MG tablet   Generic drug:  naproxen sodium      Take one tablet (220mg) by oral route every 12 hours as needed        aspirin 325 MG tablet      Take one tablet (325mg) by oral route every day        blood glucose monitoring lancets     1 Box    Use to test blood sugar 1 times daily.    Type 2 diabetes mellitus without complication, without long-term current use of insulin (H)       blood glucose monitoring test strip    ONE TOUCH VERIO IQ    100 each    Use to test blood sugar 1 times daily.    Type 2 diabetes mellitus without complication, without long-term current use of insulin (H)       DAILY MULTIVITAMIN PO      Take one tablet by oral route every day with food        digoxin 125 MCG tablet    LANOXIN    90 tablet    TAKE 1 TABLET DAILY IN THE EVENING    Chronic atrial fibrillation (H)       fish oil-omega-3 fatty acids 1000 MG capsule      Take one by oral route one times every day        metoprolol 25 MG 24 hr tablet    TOPROL-XL    90 tablet    TAKE 1 TABLET DAILY    Atrial fibrillation (H)       TUMS PO

## 2017-09-06 DIAGNOSIS — I48.20 CHRONIC ATRIAL FIBRILLATION (H): ICD-10-CM

## 2017-09-06 DIAGNOSIS — E11.9 TYPE 2 DIABETES MELLITUS WITHOUT COMPLICATION, WITHOUT LONG-TERM CURRENT USE OF INSULIN (H): ICD-10-CM

## 2017-09-06 NOTE — LETTER
[facility]  [address]   [place] [address]  [phone]          Annie Arguello  [address]  [facility] [address]          September 8, 2017       Dear Annie Arguello,    APPOINTMENT REMINDER:   Our records indicates that it is time for you to be seen for an annual preventative exam and medication follow up.     Your current medication request for Digoxin will be approved for one refill but you will need to be seen before any additional refills can be approved.  Taking care of your health is important to us, and ongoing visits with your provider are vital to your care.    We look forward to seeing you in the near future.  You may call our office at [phone] to schedule a visit.     Please disregard this notice if you have already made an appointment.    Sincerely,    Anshul Cox MD  Family Practice

## 2017-09-07 RX ORDER — BLOOD SUGAR DIAGNOSTIC
STRIP MISCELLANEOUS
Qty: 100 STRIP | Refills: 2 | Status: SHIPPED | OUTPATIENT
Start: 2017-09-07 | End: 2018-07-06

## 2017-09-07 NOTE — TELEPHONE ENCOUNTER
One touch verio  Last Written Prescription Date: 12/6/16  Last Fill Quantity: 100, # refills: 3  Last Office Visit with G, [facility] or [facility] prescribing provider:  3/14/17        BP Readings from Last 3 Encounters:   03/14/17 120/76   02/01/17 110/70   01/04/17 116/70     No results found for: MICROL  No results found for: UMALCR  Creatinine   Date Value Ref Range Status   02/14/2017 0.74 0.52 - 1.04 mg/dL Final   ]  GFR Estimate   Date Value Ref Range Status   02/14/2017 74 >60 mL/min/1.7m2 Final     Comment:     Non  GFR Calc   11/25/2016 72 >60 mL/min/1.7m2 Final     Comment:     Non  GFR Calc   05/14/2016 81 >60 mL/min/1.7m2 Final     Comment:     Non  GFR Calc     GFR Estimate If Black   Date Value Ref Range Status   02/14/2017 >90   GFR Calc   >60 mL/min/1.7m2 Final   11/25/2016 88 >60 mL/min/1.7m2 Final     Comment:      GFR Calc   05/14/2016 >90   GFR Calc   >60 mL/min/1.7m2 Final     Lab Results   Component Value Date    CHOL 183 02/14/2017     Lab Results   Component Value Date    HDL 56 02/14/2017     Lab Results   Component Value Date     02/14/2017     Lab Results   Component Value Date    TRIG 101 02/14/2017     Lab Results   Component Value Date    CHOLHDLRATIO 3.3 06/30/2015     Lab Results   Component Value Date    AST 31 11/25/2016     Lab Results   Component Value Date    ALT 40 11/25/2016     Lab Results   Component Value Date    A1C 6.6 03/14/2017    A1C 7.5 12/06/2016     Potassium   Date Value Ref Range Status   11/25/2016 4.2 3.4 - 5.3 mmol/L Final

## 2017-09-07 NOTE — TELEPHONE ENCOUNTER
Digoxin      Last Written Prescription Date: 6/9/17  Last Fill Quantity: 90, # refills: 0  Last Office Visit with [facility] or [facility] prescribing provider:  11/25/16        Creatinine   Date Value Ref Range Status   02/14/2017 0.74 0.52 - 1.04 mg/dL Final   ]    Digoxin Level:    Lab Results   Component Value Date    DIGOXIN 0.4 05/14/2016

## 2017-09-08 RX ORDER — DIGOXIN 125 MCG
TABLET ORAL
Qty: 30 TABLET | Refills: 0 | Status: SHIPPED | OUTPATIENT
Start: 2017-09-08 | End: 2017-11-10

## 2017-09-13 DIAGNOSIS — Z12.31 VISIT FOR SCREENING MAMMOGRAM: Primary | ICD-10-CM

## 2017-09-22 DIAGNOSIS — I48.91 ATRIAL FIBRILLATION (H): ICD-10-CM

## 2017-09-25 RX ORDER — METOPROLOL SUCCINATE 25 MG/1
TABLET, EXTENDED RELEASE ORAL
Qty: 90 TABLET | Refills: 0 | Status: SHIPPED | OUTPATIENT
Start: 2017-09-25 | End: 2017-12-24

## 2017-10-02 ENCOUNTER — TRANSFERRED RECORDS (OUTPATIENT)
Dept: HEALTH INFORMATION MANAGEMENT | Facility: HOSPITAL | Age: 82
End: 2017-10-02

## 2017-10-13 DIAGNOSIS — Z12.31 ENCOUNTER FOR SCREENING MAMMOGRAM FOR BREAST CANCER: Primary | ICD-10-CM

## 2017-10-17 ENCOUNTER — RADIANT APPOINTMENT (OUTPATIENT)
Dept: MAMMOGRAPHY | Facility: OTHER | Age: 82
End: 2017-10-17
Attending: FAMILY MEDICINE
Payer: MEDICARE

## 2017-10-17 DIAGNOSIS — Z12.31 ENCOUNTER FOR SCREENING MAMMOGRAM FOR BREAST CANCER: ICD-10-CM

## 2017-10-17 PROCEDURE — G0202 SCR MAMMO BI INCL CAD: HCPCS | Mod: TC

## 2017-10-24 ENCOUNTER — OFFICE VISIT (OUTPATIENT)
Dept: FAMILY MEDICINE | Facility: OTHER | Age: 82
End: 2017-10-24
Attending: FAMILY MEDICINE
Payer: MEDICARE

## 2017-10-24 VITALS
HEART RATE: 58 BPM | BODY MASS INDEX: 28.83 KG/M2 | WEIGHT: 160.2 LBS | DIASTOLIC BLOOD PRESSURE: 72 MMHG | OXYGEN SATURATION: 97 % | SYSTOLIC BLOOD PRESSURE: 122 MMHG | TEMPERATURE: 97.8 F

## 2017-10-24 DIAGNOSIS — R93.89 ABNORMAL CHEST CT: ICD-10-CM

## 2017-10-24 DIAGNOSIS — E11.9 CONTROLLED TYPE 2 DIABETES MELLITUS WITHOUT COMPLICATION, WITHOUT LONG-TERM CURRENT USE OF INSULIN (H): Primary | ICD-10-CM

## 2017-10-24 DIAGNOSIS — Z12.39 SCREENING BREAST EXAMINATION: ICD-10-CM

## 2017-10-24 DIAGNOSIS — I48.21 PERMANENT ATRIAL FIBRILLATION (H): ICD-10-CM

## 2017-10-24 LAB
ANION GAP SERPL CALCULATED.3IONS-SCNC: 8 MMOL/L (ref 3–14)
BASOPHILS # BLD AUTO: 0.1 10E9/L (ref 0–0.2)
BASOPHILS NFR BLD AUTO: 0.7 %
BUN SERPL-MCNC: 15 MG/DL (ref 7–30)
CALCIUM SERPL-MCNC: 9.1 MG/DL (ref 8.5–10.1)
CHLORIDE SERPL-SCNC: 107 MMOL/L (ref 94–109)
CHOLEST SERPL-MCNC: 193 MG/DL
CO2 SERPL-SCNC: 25 MMOL/L (ref 20–32)
CREAT SERPL-MCNC: 0.7 MG/DL (ref 0.52–1.04)
DIFFERENTIAL METHOD BLD: NORMAL
DIGOXIN SERPL-MCNC: 0.6 UG/L (ref 0.5–2)
EOSINOPHIL # BLD AUTO: 0.3 10E9/L (ref 0–0.7)
EOSINOPHIL NFR BLD AUTO: 3 %
ERYTHROCYTE [DISTWIDTH] IN BLOOD BY AUTOMATED COUNT: 13.9 % (ref 10–15)
EST. AVERAGE GLUCOSE BLD GHB EST-MCNC: 128 MG/DL
GFR SERPL CREATININE-BSD FRML MDRD: 79 ML/MIN/1.7M2
GLUCOSE SERPL-MCNC: 115 MG/DL (ref 70–99)
HBA1C MFR BLD: 6.1 % (ref 4.3–6)
HCT VFR BLD AUTO: 43.9 % (ref 35–47)
HDLC SERPL-MCNC: 56 MG/DL
HGB BLD-MCNC: 15.2 G/DL (ref 11.7–15.7)
IMM GRANULOCYTES # BLD: 0 10E9/L (ref 0–0.4)
IMM GRANULOCYTES NFR BLD: 0.3 %
LDLC SERPL CALC-MCNC: 115 MG/DL
LYMPHOCYTES # BLD AUTO: 2.7 10E9/L (ref 0.8–5.3)
LYMPHOCYTES NFR BLD AUTO: 27.6 %
MCH RBC QN AUTO: 29.7 PG (ref 26.5–33)
MCHC RBC AUTO-ENTMCNC: 34.6 G/DL (ref 31.5–36.5)
MCV RBC AUTO: 86 FL (ref 78–100)
MONOCYTES # BLD AUTO: 0.6 10E9/L (ref 0–1.3)
MONOCYTES NFR BLD AUTO: 6.2 %
NEUTROPHILS # BLD AUTO: 6 10E9/L (ref 1.6–8.3)
NEUTROPHILS NFR BLD AUTO: 62.2 %
NONHDLC SERPL-MCNC: 137 MG/DL
NRBC # BLD AUTO: 0 10*3/UL
NRBC BLD AUTO-RTO: 0 /100
PLATELET # BLD AUTO: 198 10E9/L (ref 150–450)
POTASSIUM SERPL-SCNC: 4.2 MMOL/L (ref 3.4–5.3)
RBC # BLD AUTO: 5.11 10E12/L (ref 3.8–5.2)
SODIUM SERPL-SCNC: 140 MMOL/L (ref 133–144)
TRIGL SERPL-MCNC: 112 MG/DL
WBC # BLD AUTO: 9.7 10E9/L (ref 4–11)

## 2017-10-24 PROCEDURE — 99214 OFFICE O/P EST MOD 30 MIN: CPT | Performed by: FAMILY MEDICINE

## 2017-10-24 PROCEDURE — 80061 LIPID PANEL: CPT | Mod: ZL | Performed by: FAMILY MEDICINE

## 2017-10-24 PROCEDURE — 80162 ASSAY OF DIGOXIN TOTAL: CPT | Mod: ZL | Performed by: FAMILY MEDICINE

## 2017-10-24 PROCEDURE — 83036 HEMOGLOBIN GLYCOSYLATED A1C: CPT | Mod: ZL | Performed by: FAMILY MEDICINE

## 2017-10-24 PROCEDURE — 40000788 ZZHCL STATISTIC ESTIMATED AVERAGE GLUCOSE: Mod: ZL | Performed by: FAMILY MEDICINE

## 2017-10-24 PROCEDURE — 99212 OFFICE O/P EST SF 10 MIN: CPT

## 2017-10-24 PROCEDURE — 85025 COMPLETE CBC W/AUTO DIFF WBC: CPT | Mod: ZL | Performed by: FAMILY MEDICINE

## 2017-10-24 PROCEDURE — 80048 BASIC METABOLIC PNL TOTAL CA: CPT | Mod: ZL | Performed by: FAMILY MEDICINE

## 2017-10-24 PROCEDURE — 36415 COLL VENOUS BLD VENIPUNCTURE: CPT | Mod: ZL | Performed by: FAMILY MEDICINE

## 2017-10-24 ASSESSMENT — ANXIETY QUESTIONNAIRES
5. BEING SO RESTLESS THAT IT IS HARD TO SIT STILL: NOT AT ALL
4. TROUBLE RELAXING: NOT AT ALL
1. FEELING NERVOUS, ANXIOUS, OR ON EDGE: NOT AT ALL
3. WORRYING TOO MUCH ABOUT DIFFERENT THINGS: NOT AT ALL
2. NOT BEING ABLE TO STOP OR CONTROL WORRYING: NOT AT ALL
IF YOU CHECKED OFF ANY PROBLEMS ON THIS QUESTIONNAIRE, HOW DIFFICULT HAVE THESE PROBLEMS MADE IT FOR YOU TO DO YOUR WORK, TAKE CARE OF THINGS AT HOME, OR GET ALONG WITH OTHER PEOPLE: SOMEWHAT DIFFICULT
6. BECOMING EASILY ANNOYED OR IRRITABLE: NOT AT ALL
GAD7 TOTAL SCORE: 0
7. FEELING AFRAID AS IF SOMETHING AWFUL MIGHT HAPPEN: NOT AT ALL

## 2017-10-24 ASSESSMENT — PAIN SCALES - GENERAL: PAINLEVEL: NO PAIN (0)

## 2017-10-24 ASSESSMENT — PATIENT HEALTH QUESTIONNAIRE - PHQ9: SUM OF ALL RESPONSES TO PHQ QUESTIONS 1-9: 2

## 2017-10-24 NOTE — NURSING NOTE
"Chief Complaint   Patient presents with     Atrial Fib     medication follow up        Initial /72 (BP Location: Left arm, Patient Position: Chair, Cuff Size: Adult Large)  Pulse 58  Temp 97.8  F (36.6  C) (Tympanic)  Wt 160 lb 3.2 oz (72.7 kg)  SpO2 97%  Breastfeeding? No  BMI 28.83 kg/m2 Estimated body mass index is 28.83 kg/(m^2) as calculated from the following:    Height as of 2/1/17: 5' 2.5\" (1.588 m).    Weight as of this encounter: 160 lb 3.2 oz (72.7 kg).  Medication Reconciliation: complete   Yessenia Lu CMA(AAMA)   "

## 2017-10-24 NOTE — PROGRESS NOTES
SUBJECTIVE:   Annie Arguello is a 83 year old female who presents to clinic today for the following health issues:        Artrial Fib follow up       Chest pain or pressure, left side neck or arm pain: No    Shortness of breath/increased sweats/nausea with exertion: No    Pain in calves walking 1-2 blocks: No    Worsened or new symptoms since last visit: No    Nitroglycerin use: no    Daily aspirin use: Yes        Amount of exercise or physical activity: 2-3 days/week for an average of 30-45 minutes    Problems taking medications regularly: No    Medication side effects: none    Diet: regular (no restrictions)        Patient just had a mammogram recently.  Needs a breast exam.  She had an abnormal chest CT about a year ago, needs a follow up  related to that abnormal CT.  She denies any chest pain or shortness of breath or cough.    Problem list and histories reviewed & adjusted, as indicated.  Additional history: as documented    Patient Active Problem List   Diagnosis     Advanced care planning/counseling discussion     Sciatica     Diverticulosis of large intestine     Osteoporosis     Obesity     Congenital cystic kidney disease     Myalgia and myositis     Calculus of kidney     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     ACP (advance care planning)     Controlled type 2 diabetes mellitus without complication, without long-term current use of insulin (H)     Permanent atrial fibrillation (H)     Past Surgical History:   Procedure Laterality Date     Breast biopsy      LT, benign      SECTION       CHOLECYSTECTOMY      lap     COLONOSCOPY       LUCILLE / BSO         Social History   Substance Use Topics     Smoking status: Never Smoker     Smokeless tobacco: Never Used      Comment: no passive exposure     Alcohol use No     Family History   Problem Relation Age of Onset     Other - See Comments Daughter      fibromyalgia     Thyroid Disease Sister      hypo     Thyroid Disease Sister       hypo     Thyroid Disease Sister      hypo     Myocardial Infarction Brother 63     myocardial infarction, cause of death     Myocardial Infarction Mother 59     myocardial infarction, cause of death         Current Outpatient Prescriptions   Medication Sig Dispense Refill     metoprolol (TOPROL-XL) 25 MG 24 hr tablet TAKE 1 TABLET DAILY 90 tablet 0     digoxin (LANOXIN) 125 MCG tablet TAKE 1 TABLET DAILY IN THE EVENING 30 tablet 0     ONE TOUCH VERIO IQ test strip USE TO TEST BLOOD SUGARS ONCE DAILY 100 strip 2     alendronate (FOSAMAX) 70 MG tablet TAKE AS INSTRUCTED BY YOUR PRESCRIBER 12 tablet 1     blood glucose monitoring (ONE TOUCH DELICA) lancets Use to test blood sugar 1 times daily. 1 Box 3     Calcium Carbonate Antacid (TUMS PO)        naproxen sodium (ALEVE) 220 MG tablet Take 220 mg by mouth 2 times daily (with meals) Take one tablet (220mg) by oral route every 12 hours as needed         aspirin 325 MG tablet Take 325 mg by mouth daily Take one tablet (325mg) by oral route every day        fish oil-omega-3 fatty acids (FISH OIL) 1000 MG capsule Take 1,000 mg by mouth daily Take one by oral route one times every day         Multiple Vitamin (DAILY MULTIVITAMIN PO) Take by mouth daily Take one tablet by oral route every day with food       Allergies   Allergen Reactions     Ampicillin      Desvenlafaxine Other (See Comments)     Desvenlafaxine Succinate  Pristiq       Sertraline Hcl Other (See Comments) and Diarrhea     Zoloft       Sulfa Drugs Other (See Comments)     Sulfa(Sulfonamide Antibiotics)     Recent Labs   Lab Test 03/14/17 02/14/17   0805 12/06/16 11/25/16   0947  05/23/16   0850  05/14/16   1325  06/30/15   1014  08/28/14   0936   A1C  6.6   --   7.5   --    --    --    --    --    LDL   --   107*   --    --    --    --   101  100   HDL   --   56   --    --    --    --   53  52   TRIG   --   101   --    --    --    --   105  80   ALT   --    --    --   40   --   36   --    --    CR   --    0.74   --   0.76   --   0.69  0.74  0.74   GFRESTIMATED   --   74   --   72   --   81  76  75   GFRESTBLACK   --   >90   GFR Calc     --   88   --   >90   GFR Calc    >90   GFR Calc    >90   GFR Calc     POTASSIUM   --    --    --   4.2   --   4.1  4.4  4.3   TSH   --    --    --    --   3.77   --   3.19  3.76            Reviewed and updated as needed this visit by clinical staffTobacco  Allergies  Meds       Reviewed and updated as needed this visit by Provider         ROS:  Constitutional, HEENT, cardiovascular, pulmonary, GI, , musculoskeletal, neuro, skin, endocrine and psych systems are negative, except as otherwise noted.      OBJECTIVE:   /72 (BP Location: Left arm, Patient Position: Chair, Cuff Size: Adult Large)  Pulse 58  Temp 97.8  F (36.6  C) (Tympanic)  Wt 160 lb 3.2 oz (72.7 kg)  SpO2 97%  Breastfeeding? No  BMI 28.83 kg/m2  Body mass index is 28.83 kg/(m^2).  GENERAL: healthy, alert and no distress  EYES: Eyes grossly normal to inspection, PERRL and conjunctivae and sclerae normal  HENT:nose and mouth without ulcers or lesions  NECK: no adenopathy, no asymmetry, masses, or scars and thyroid normal to palpation  RESP: lungs clear to auscultation - no rales, rhonchi or wheezes  BREAST: normal without masses, tenderness or nipple discharge and no palpable axillary masses or adenopathy  CV:irregularly irregular, normal S1 S2, no S3 or S4, no murmur, click or rub, no peripheral edema and peripheral pulses strong  ABDOMEN: soft, nontender, no hepatosplenomegaly, no masses and bowel sounds normal  MS: no gross musculoskeletal defects noted, no edema  SKIN: no suspicious lesions or rashes  NEURO: Normal strength and tone, mentation intact and speech normal  PSYCH: mentation appears normal, affect normal  FOOT: good pulses normal monofilament no foot sores    Diagnostic Test Results:pending  Date Exam Time Accession # Performing  Department Results    10/17/17  9:43 AM [id number] [facility] Mammography    Assessment Category   Negative [1]   [employer] Interactive Report and InfoRx   View the interactive report   PACS Images   Show images for MA Screen Bilateral w/Gibson   Study Result   EXAM: MA SCREENING BILATERAL W/ GIBSON     INDICATION: 10/11/2016     HISTORY: Screening mammogram.     COMPARISON: 11 2016, 10/5/2008 9/30/2014.     FINDINGS:  The breasts are almost entirely fatty. No new architectural  distortion, dominant masses or suspicious microcalcifications are  identified in either breast.         IMPRESSION:  No radiographic evidence of malignancy in either breast.     BI-RADS CODE:  1-Negative     RECOMMENDATION: Recommend continued routine mammographic screening in  one year.     Analysis by [employer] computer detection software version 9.3  was done.             ASSESSMENT/PLAN:               ICD-10-CM    1. Controlled type 2 diabetes mellitus without complication, without long-term current use of insulin (H) E11.9 Hemoglobin A1c, BMP, lipid CBC done.  We'll call with results.     Basic metabolic panel     Lipid Profile     CBC with platelets differential   2. Permanent atrial fibrillation (H) I48.2 Digoxin level will be drawn.  Her atrial fibrillation is controlled   3. Abnormal chest CT R93.8 CT Chest w Contrast will be ordered will call her with results   4. Screening breast examination Z12.31 Mammogram on  Oct 17 of this Year was normal           DOC Cox MD  [facility]

## 2017-10-24 NOTE — MR AVS SNAPSHOT
After Visit Summary   10/24/2017    Annie Arguello    MRN: [id number]           Patient Information     Date Of Birth          11/14/1933        Visit Information        Provider Department      10/24/2017 8:45 AM DOC Cox MD [facility]        Today's Diagnoses     Controlled type 2 diabetes mellitus without complication, without long-term current use of insulin (H)    -  1    Permanent atrial fibrillation (H)        Abnormal chest CT          Care Instructions    We will call with the labs.            Follow-ups after your visit        Your next 10 appointments already scheduled     Oct 24, 2017  8:45 AM CDT   (Arrive by 8:30 AM)   Office Visit with DOC Cox MD   [facility] ([facility]    [address]  [place] [address]   [phone]           Bring a current list of meds and any records pertaining to this visit.  For Physicals, please bring immunization records and any forms needing to be filled out.  Please arrive 15 minutes early to complete paperwork and register.            Dec 08, 2017 10:00 AM CST   Hearing Eval with Monica Escalera   [facility]    [address]  [place] [address]   [phone]              Future tests that were ordered for you today     Open Future Orders        Priority Expected Expires Ordered    CT Chest w Contrast Routine  10/24/2018 10/24/2017            Who to contact     If you have questions or need follow up information about today's clinic visit or your schedule please contact [facility] directly at [phone].  Normal or non-critical lab and imaging results will be communicated to you by MyChart, letter or phone within 4 business days after the clinic has received the results. If you do not hear from us within 7 days, please contact the clinic through MyChart or phone. If you have a critical or abnormal lab result, we  will notify you by phone as soon as possible.  Submit refill requests through Yamsafer or call your pharmacy and they will forward the refill request to us. Please allow 3 business days for your refill to be completed.          Additional Information About Your Visit        Care EveryWhere ID     This is your Care EveryWhere ID. This could be used by other organizations to access your [facility] medical records  [id number]        Your Vitals Were     Pulse Temperature Pulse Oximetry Breastfeeding? BMI (Body Mass Index)       58 97.8  F (36.6  C) (Tympanic) 97% No 28.83 kg/m2        Blood Pressure from Last 3 Encounters:   10/24/17 122/72   03/14/17 120/76   02/01/17 110/70    Weight from Last 3 Encounters:   10/24/17 160 lb 3.2 oz (72.7 kg)   03/14/17 166 lb 14.4 oz (75.7 kg)   02/01/17 168 lb (76.2 kg)              We Performed the Following     Basic metabolic panel     CBC with platelets differential     Digoxin level     Hemoglobin A1c     Lipid Profile        Primary Care Provider Office Phone # Fax #    R Oleksandr Cox [phone]       [facility] [place] [address] [place] [address]        Equal Access to Services     GABRIELLA GIBBS AH: Hadii aad ku hadasho Soomaali, waaxda luqadaha, qaybta kaalmada adeegyada, abhay mccauley. So [facility] [phone].    ATENCIÓN: Si habla español, tiene a bethea disposición servicios gratuitos de asistencia lingüística. [facility]ame al [phone].    We comply with applicable federal civil rights laws and [place] laws. We do not discriminate on the basis of race, color, national origin, age, disability, sex, sexual orientation, or gender identity.            Thank you!     Thank you for choosing [facility]  for your care. Our goal is always to provide you with excellent care. Hearing back from our patients is one way we can continue to improve our services. Please take a few minutes to complete the written survey that  you may receive in the mail after your visit with us. Thank you!             Your Updated Medication List - Protect others around you: Learn how to safely use, store and throw away your medicines at www.disposemymeds.org.          This list is accurate as of: 10/24/17  8:43 AM.  Always use your most recent med list.                   Brand Name Dispense Instructions for use Diagnosis    alendronate 70 MG tablet    FOSAMAX    12 tablet    TAKE AS INSTRUCTED BY YOUR PRESCRIBER    Osteoarthritis       ALEVE 220 MG tablet   Generic drug:  naproxen sodium      Take 220 mg by mouth 2 times daily (with meals) Take one tablet (220mg) by oral route every 12 hours as needed        aspirin 325 MG tablet      Take 325 mg by mouth daily Take one tablet (325mg) by oral route every day        blood glucose monitoring lancets     1 Box    Use to test blood sugar 1 times daily.    Type 2 diabetes mellitus without complication, without long-term current use of insulin (H)       DAILY MULTIVITAMIN PO      Take by mouth daily Take one tablet by oral route every day with food        digoxin 125 MCG tablet    LANOXIN    30 tablet    TAKE 1 TABLET DAILY IN THE EVENING    Chronic atrial fibrillation (H)       fish oil-omega-3 fatty acids 1000 MG capsule      Take 1,000 mg by mouth daily Take one by oral route one times every day        metoprolol 25 MG 24 hr tablet    TOPROL-XL    90 tablet    TAKE 1 TABLET DAILY    Atrial fibrillation (H)       ONE TOUCH VERIO IQ test strip   Generic drug:  blood glucose monitoring     100 strip    USE TO TEST BLOOD SUGARS ONCE DAILY    Type 2 diabetes mellitus without complication, without long-term current use of insulin (H)       TUMS PO

## 2017-10-25 ENCOUNTER — HOSPITAL ENCOUNTER (OUTPATIENT)
Dept: CT IMAGING | Facility: HOSPITAL | Age: 82
Discharge: HOME | End: 2017-10-25
Attending: FAMILY MEDICINE | Admitting: FAMILY MEDICINE
Payer: MEDICARE

## 2017-10-25 DIAGNOSIS — R93.89 ABNORMAL CHEST CT: ICD-10-CM

## 2017-10-25 PROCEDURE — 71250 CT THORAX DX C-: CPT | Mod: TC

## 2017-10-25 ASSESSMENT — ANXIETY QUESTIONNAIRES: GAD7 TOTAL SCORE: 0

## 2017-10-26 ENCOUNTER — TELEPHONE (OUTPATIENT)
Dept: FAMILY MEDICINE | Facility: OTHER | Age: 82
End: 2017-10-26

## 2017-10-26 DIAGNOSIS — R93.89 ABNORMAL CT OF THE CHEST: Primary | ICD-10-CM

## 2017-11-07 DIAGNOSIS — H91.93 DECREASED HEARING OF BOTH EARS: Primary | ICD-10-CM

## 2017-11-10 DIAGNOSIS — I48.20 CHRONIC ATRIAL FIBRILLATION (H): ICD-10-CM

## 2017-11-13 NOTE — TELEPHONE ENCOUNTER
Digoxin      Last Written Prescription Date: 9/8/17  Last Fill Quantity: 30,  # refills: 0   Last Office Visit with G, P or [facility] prescribing provider: 10/24/17

## 2017-11-15 RX ORDER — DIGOXIN 125 MCG
TABLET ORAL
Qty: 30 TABLET | Refills: 1 | Status: SHIPPED | OUTPATIENT
Start: 2017-11-15 | End: 2018-01-30

## 2017-12-07 DIAGNOSIS — E11.9 TYPE 2 DIABETES MELLITUS WITHOUT COMPLICATION, WITHOUT LONG-TERM CURRENT USE OF INSULIN (H): ICD-10-CM

## 2017-12-07 NOTE — TELEPHONE ENCOUNTER
Carlos Nelson      Last Written Prescription Date: 12/6/16  Last Fill Quantity: 1box,  # refills: 3   Last Office Visit with G, P or [facility] prescribing provider: 10/24/17

## 2017-12-08 ENCOUNTER — OFFICE VISIT (OUTPATIENT)
Dept: AUDIOLOGY | Facility: OTHER | Age: 82
End: 2017-12-08
Attending: AUDIOLOGIST
Payer: MEDICARE

## 2017-12-08 DIAGNOSIS — H90.3 SENSORINEURAL HEARING LOSS, BILATERAL: Primary | ICD-10-CM

## 2017-12-08 PROCEDURE — 92556 SPEECH AUDIOMETRY COMPLETE: CPT | Performed by: AUDIOLOGIST

## 2017-12-08 PROCEDURE — 92552 PURE TONE AUDIOMETRY AIR: CPT | Performed by: AUDIOLOGIST

## 2017-12-08 NOTE — PROGRESS NOTES
Audiology Evaluation Completed. Please refer SCANNED AUDIOGRAM and/or TYMPANOGRAM for BACKGROUND, RESULTS, RECOMMENDATIONS.    Savanna Iyer M.S., [facility]  Audiologist #[id number]

## 2017-12-08 NOTE — MR AVS SNAPSHOT
After Visit Summary   12/8/2017    Annie Arguello    MRN: [id number]           Patient Information     Date Of Birth          11/14/1933        Visit Information        Provider Department      12/8/2017 10:00 AM Savanna Iyer AuD [facility]        Today's Diagnoses     Sensorineural hearing loss, bilateral    -  1       Follow-ups after your visit        Who to contact     If you have questions or need follow up information about today's clinic visit or your schedule please contact [facility] directly at [phone].  Normal or non-critical lab and imaging results will be communicated to you by MyChart, letter or phone within 4 business days after the clinic has received the results. If you do not hear from us within 7 days, please contact the clinic through MyChart or phone. If you have a critical or abnormal lab result, we will notify you by phone as soon as possible.  Submit refill requests through [employer] or call your pharmacy and they will forward the refill request to us. Please allow 3 business days for your refill to be completed.          Additional Information About Your Visit        Care EveryWhere ID     This is your Care EveryWhere ID. This could be used by other organizations to access your [place] medical records  [id number]         Blood Pressure from Last 3 Encounters:   10/24/17 122/72   03/14/17 120/76   02/01/17 110/70    Weight from Last 3 Encounters:   10/24/17 160 lb 3.2 oz (72.7 kg)   03/14/17 166 lb 14.4 oz (75.7 kg)   02/01/17 168 lb (76.2 kg)              We Performed the Following     AUDIOGRAM/TYMPANOGRAM - INTERFACE        Primary Care Provider Office Phone # Fax #    R Oleksandr Cox [phone]       [facility] [place] [address] [place], [place] [address] [address]        Equal Access to Services     GABRIELLA GIBBS AH: Mary Keller, tammi green, lucio hammond, abhay bautista  dae jimenezaagiana ah. So [facility] [phone].    ATENCIÓN: Si selma burgess, tiene a bethea disposición servicios gratuitos de asistencia lingüística. Avtar álvarez [phone].    We comply with applicable federal civil rights laws and [place] laws. We do not discriminate on the basis of race, color, national origin, age, disability, sex, sexual orientation, or gender identity.            Thank you!     Thank you for choosing [facility] [place] [facility]  for your care. Our goal is always to provide you with excellent care. Hearing back from our patients is one way we can continue to improve our services. Please take a few minutes to complete the written survey that you may receive in the mail after your visit with us. Thank you!             Your Updated Medication List - Protect others around you: Learn how to safely use, store and throw away your medicines at www.disposemymeds.org.          This list is accurate as of: 12/8/17 10:08 AM.  Always use your most recent med list.                   Brand Name Dispense Instructions for use Diagnosis    alendronate 70 MG tablet    FOSAMAX    12 tablet    TAKE AS INSTRUCTED BY YOUR PRESCRIBER    Osteoarthritis       ALEVE 220 MG tablet   Generic drug:  naproxen sodium      Take 220 mg by mouth 2 times daily (with meals) Take one tablet (220mg) by oral route every 12 hours as needed        aspirin 325 MG tablet      Take 325 mg by mouth daily Take one tablet (325mg) by oral route every day        blood glucose monitoring lancets     1 Box    Use to test blood sugar 1 times daily.    Type 2 diabetes mellitus without complication, without long-term current use of insulin (H)       DAILY MULTIVITAMIN PO      Take by mouth daily Take one tablet by oral route every day with food        digoxin 125 MCG tablet    LANOXIN    30 tablet    TAKE 1 TABLET DAILY IN THE EVENING (DUE FOR OFFICE VISIT)    Chronic atrial fibrillation (H)       fish oil-omega-3 fatty acids 1000 MG capsule      Take 1,000  mg by mouth daily Take one by oral route one times every day        metoprolol 25 MG 24 hr tablet    TOPROL-XL    90 tablet    TAKE 1 TABLET DAILY    Atrial fibrillation (H)       ONETOUCH VERIO IQ test strip   Generic drug:  blood glucose monitoring     100 strip    USE TO TEST BLOOD SUGARS ONCE DAILY    Type 2 diabetes mellitus without complication, without long-term current use of insulin (H)       TUMS PO

## 2017-12-12 DIAGNOSIS — M19.90 OSTEOARTHRITIS, UNSPECIFIED OSTEOARTHRITIS TYPE, UNSPECIFIED SITE: Primary | ICD-10-CM

## 2017-12-12 DIAGNOSIS — M19.90 OSTEOARTHRITIS: ICD-10-CM

## 2017-12-13 NOTE — TELEPHONE ENCOUNTER
fosamax  Last Written Prescription Date: 7/13/17  Last Fill Quantity: 12,  # refills: 1   Last Office Visit with [facility], [facility] or [facility] prescribing provider: 10/24/17

## 2017-12-15 RX ORDER — ALENDRONATE SODIUM 70 MG/1
TABLET ORAL
Qty: 12 TABLET | Refills: 1 | Status: SHIPPED | OUTPATIENT
Start: 2017-12-15 | End: 2018-05-31

## 2018-01-23 ENCOUNTER — OFFICE VISIT (OUTPATIENT)
Dept: FAMILY MEDICINE | Facility: OTHER | Age: 83
End: 2018-01-23
Attending: FAMILY MEDICINE
Payer: MEDICARE

## 2018-01-23 VITALS
HEART RATE: 69 BPM | DIASTOLIC BLOOD PRESSURE: 72 MMHG | SYSTOLIC BLOOD PRESSURE: 122 MMHG | WEIGHT: 160 LBS | TEMPERATURE: 97.3 F | OXYGEN SATURATION: 99 % | BODY MASS INDEX: 28.8 KG/M2

## 2018-01-23 DIAGNOSIS — Z23 NEED FOR VACCINATION: ICD-10-CM

## 2018-01-23 DIAGNOSIS — I48.21 PERMANENT ATRIAL FIBRILLATION (H): ICD-10-CM

## 2018-01-23 DIAGNOSIS — K13.70 LESION OF BUCCAL MUCOSA: ICD-10-CM

## 2018-01-23 DIAGNOSIS — R60.9 EDEMA, UNSPECIFIED TYPE: Primary | ICD-10-CM

## 2018-01-23 DIAGNOSIS — E11.9 CONTROLLED TYPE 2 DIABETES MELLITUS WITHOUT COMPLICATION, WITHOUT LONG-TERM CURRENT USE OF INSULIN (H): ICD-10-CM

## 2018-01-23 LAB
CREAT UR-MCNC: 71 MG/DL
MICROALBUMIN UR-MCNC: 10 MG/L
MICROALBUMIN/CREAT UR: 14.25 MG/G CR (ref 0–25)
TSH SERPL DL<=0.005 MIU/L-ACNC: 2.66 MU/L (ref 0.4–4)

## 2018-01-23 PROCEDURE — 90670 PCV13 VACCINE IM: CPT | Performed by: FAMILY MEDICINE

## 2018-01-23 PROCEDURE — 99214 OFFICE O/P EST MOD 30 MIN: CPT | Performed by: FAMILY MEDICINE

## 2018-01-23 PROCEDURE — 90472 IMMUNIZATION ADMIN EACH ADD: CPT | Performed by: FAMILY MEDICINE

## 2018-01-23 PROCEDURE — G0009 ADMIN PNEUMOCOCCAL VACCINE: HCPCS | Performed by: FAMILY MEDICINE

## 2018-01-23 PROCEDURE — 84443 ASSAY THYROID STIM HORMONE: CPT | Mod: ZL | Performed by: FAMILY MEDICINE

## 2018-01-23 PROCEDURE — 36415 COLL VENOUS BLD VENIPUNCTURE: CPT | Mod: ZL | Performed by: FAMILY MEDICINE

## 2018-01-23 PROCEDURE — 82043 UR ALBUMIN QUANTITATIVE: CPT | Mod: ZL | Performed by: FAMILY MEDICINE

## 2018-01-23 PROCEDURE — G0463 HOSPITAL OUTPT CLINIC VISIT: HCPCS | Mod: 25

## 2018-01-23 ASSESSMENT — ANXIETY QUESTIONNAIRES
7. FEELING AFRAID AS IF SOMETHING AWFUL MIGHT HAPPEN: NOT AT ALL
1. FEELING NERVOUS, ANXIOUS, OR ON EDGE: NOT AT ALL
4. TROUBLE RELAXING: NOT AT ALL
GAD7 TOTAL SCORE: 0
5. BEING SO RESTLESS THAT IT IS HARD TO SIT STILL: NOT AT ALL
3. WORRYING TOO MUCH ABOUT DIFFERENT THINGS: NOT AT ALL
6. BECOMING EASILY ANNOYED OR IRRITABLE: NOT AT ALL
2. NOT BEING ABLE TO STOP OR CONTROL WORRYING: NOT AT ALL

## 2018-01-23 ASSESSMENT — PAIN SCALES - GENERAL: PAINLEVEL: NO PAIN (0)

## 2018-01-23 ASSESSMENT — PATIENT HEALTH QUESTIONNAIRE - PHQ9: SUM OF ALL RESPONSES TO PHQ QUESTIONS 1-9: 3

## 2018-01-23 NOTE — PROGRESS NOTES
SUBJECTIVE:                                                    Annie Arguello is a 84 year old female who presents to clinic today for the following health issues:    Establish Care  Pt previously seen by Dr. Cox. Would like a female provider. 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    Eye(s) Problem      Duration: 12-30-17    Description:  Location: bilateral  Pain: no   Redness: YES- at the time but not anymore  Discharge: no     Accompanying signs and symptoms: very red and blood shot and underneath was swollen.    History (Trauma, foreign body exposure,): None    Precipitating or alleviating factors (contact use): None    Therapies tried and outcome: went to the eye doctor and he mentioned possibility of thyroid issue. Wants thyroid checked.    Swelling resolved, but would like TSH rechecked as suggested by Optho. She has no other skin, hair or nail changes. Has intermittent constipation that is chronic. Has lost some weight over the past couple of years.     Scratchy throat      Duration: since around 12-30-17    Description (location/character/radiation): throat    Intensity:  mild    Accompanying signs and symptoms: raspy , dry throat. Not painful at all.    History (similar episodes/previous evaluation): None    Precipitating or alleviating factors: None    Therapies tried and outcome: None     Wonders if this is related to a thyroid issue as well. No cough, stomach pain, sour taste in her mouth. Not worse at certain times a day.     Sore in mouth      Duration: ? A year    Description (location/character/radiation): inside of mouth    Intensity:  mild    Accompanying signs and symptoms: none    History (similar episodes/previous evaluation): None    Precipitating or alleviating factors: None    Therapies tried and outcome: None     First noted after she bit the side of her mouth. Now feels like a  callus. Continues to bite the area as it is swollen. Wonders if she needs a mouth guard. Has been seen by her Dentist, but she did not mention this and he made no mention of it either.     Problem list and histories reviewed & adjusted, as indicated.  Additional history: as documented    Labs reviewed in EPIC    ROS:  Constitutional, HEENT, cardiovascular, pulmonary, gi and gu systems are negative, except as otherwise noted.      OBJECTIVE:   /72  Pulse 69  Temp 97.3  F (36.3  C) (Tympanic)  Wt 160 lb (72.6 kg)  SpO2 99%  BMI 28.8 kg/m2  Body mass index is 28.8 kg/(m^2).  GENERAL: healthy, alert and no distress  EYES: Eyes grossly normal to inspection, PERRL and conjunctivae and sclerae normal  NECK: no adenopathy, no asymmetry, masses, or scars and thyroid normal to palpation  MOUTH: There is a small 3mm area of thickened tissue in the left buccal mucosa. No surrounding erythema. Small 2mm ulcer just anterior to that as well, non tender. No other lesions. Dentition is adequate.   RESP: lungs clear to auscultation - no rales, rhonchi or wheezes  CV: regular rate and rhythm, normal S1 S2, no S3 or S4, no murmur, click or rub, no peripheral edema and peripheral pulses strong  ABDOMEN: soft, nontender, no hepatosplenomegaly, no masses and bowel sounds normal  MS: no gross musculoskeletal defects noted, no edema    Diagnostic Test Results:  Results for orders placed or performed in visit on 01/23/18 (from the past 24 hour(s))   TSH with free T4 reflex   Result Value Ref Range    TSH 2.66 0.40 - 4.00 mU/L   Albumin Random Urine Quantitative with Creat Ratio   Result Value Ref Range    Creatinine Urine 71 mg/dL    Albumin Urine mg/L 10 mg/L    Albumin Urine mg/g Cr 14.25 0 - 25 mg/g Cr       ASSESSMENT/PLAN:     1. Edema, unspecified type  Facial, resolved now. TSH per request given Optho concern. No other symptoms to suggest this in an issue.   - TSH with free T4 reflex    2. Controlled type 2 diabetes  mellitus without complication, without long-term current use of insulin (H)  Update urine screen. Needs foot exam, will do at physical/follow up.   - Albumin Random Urine Quantitative with Creat Ratio    3. Need for vaccination  Needs tdap still per our record. Will check into [facility].   - Pneumococcal vaccine 13 valent PCV13 IM (Prevnar) [18886]  - ADMIN PNEUMOCOCCAL VACCINE (For MEDICARE Patients ONLY) []  - Each additional admin.  (Right click and add QUANTITY)  [[id number]]    4. Permanent atrial fibrillation (H)  Rate controlled. Tolerating digoxin. CHADSVASC score is 4. Not on anticoagulation. Unclear on reasons why. Will review chart and discuss further at follow up.     5. Lesion of Buccal Mucosa  Chronic. Not enlarging. Started as a bite potentially. Could be simply a callus. Advised to review with Dentist again at her follow up appt. If persisting, will refer to ENT for eval and possible biopsy.       Radha Murray MD  [facility]

## 2018-01-23 NOTE — NURSING NOTE
"Chief Complaint   Patient presents with     Establish Care       Initial /72  Pulse 69  Temp 97.3  F (36.3  C) (Tympanic)  Wt 160 lb (72.6 kg)  SpO2 99%  BMI 28.8 kg/m2 Estimated body mass index is 28.8 kg/(m^2) as calculated from the following:    Height as of 2/1/17: 5' 2.5\" (1.588 m).    Weight as of this encounter: 160 lb (72.6 kg).  Medication Reconciliation: complete     Elisabeth Rodriguez    "

## 2018-01-23 NOTE — MR AVS SNAPSHOT
"              After Visit Summary   1/23/2018    Annie Arguello    MRN: [id number]           Patient Information     Date Of Birth          11/14/1933        Visit Information        Provider Department      1/23/2018 9:00 AM Radha Murray MD [facility]        Today's Diagnoses     Edema, unspecified type    -  1    Controlled type 2 diabetes mellitus without complication, without long-term current use of insulin (H)        Need for vaccination        Permanent atrial fibrillation (H)           Follow-ups after your visit        Follow-up notes from your care team     Return in about 2 months (around 3/23/2018).      Your next 10 appointments already scheduled     Dec 14, 2018 10:15 AM CST   (Arrive by 10:00 AM)   Hearing Eval with Monica sEcalera   [facility] ([facility] [place] [facility] )    [address] [place] [address]   [phone]              Who to contact     If you have questions or need follow up information about today's clinic visit or your schedule please contact [facility] directly at [phone].  Normal or non-critical lab and imaging results will be communicated to you by MyChart, letter or phone within 4 business days after the clinic has received the results. If you do not hear from us within 7 days, please contact the clinic through [employer] or phone. If you have a critical or abnormal lab result, we will notify you by phone as soon as possible.  Submit refill requests through [employer] or call your pharmacy and they will forward the refill request to us. Please allow 3 business days for your refill to be completed.          Additional Information About Your Visit        MyChart Information     [employer] lets you send messages to your doctor, view your test results, renew your prescriptions, schedule appointments and more. To sign up, go to [email or web address] . Click on \"Log in\" on the left side of the screen, which will take you " "to the Welcome page. Then click on \"Sign up Now\" on the right side of the page.     You will be asked to enter the access code listed below, as well as some personal information. Please follow the directions to create your username and password.     Your access code is: [email or web address]  Expires: 2018  9:23 AM     Your access code will  in 90 days. If you need help or a new code, please call your [place] clinic or [phone].        Care EveryWhere ID     This is your Care EveryWhere ID. This could be used by other organizations to access your [place] medical records  [id number]        Your Vitals Were     Pulse Temperature Pulse Oximetry BMI (Body Mass Index)          69 97.3  F (36.3  C) (Tympanic) 99% 28.8 kg/m2         Blood Pressure from Last 3 Encounters:   18 122/72   10/24/17 122/72   17 120/76    Weight from Last 3 Encounters:   18 160 lb (72.6 kg)   10/24/17 160 lb 3.2 oz (72.7 kg)   17 166 lb 14.4 oz (75.7 kg)              We Performed the Following     ADMIN PNEUMOCOCCAL VACCINE (For MEDICARE Patients ONLY) []     Albumin Random Urine Quantitative with Creat Ratio     Each additional admin.  (Right click and add QUANTITY)  [[id number]]     Pneumococcal vaccine 13 valent PCV13 IM (Prevnar) [[id number]]     TSH with free T4 reflex        Primary Care Provider Office Phone # Fax #    R Oleksandr Cox [phone]       [facility] [place] [address] [place] [address]  [place] [address]        Equal Access to Services     [facility] : Hadii nova Keller, tammi green, abhay tolentino. So [facility] [phone].    ATENCIÓN: Si habla español, tiene a bethea disposición servicios gratuitos de asistencia lingüística. Avtar al [phone].    We comply with applicable federal civil rights laws and [place] laws. We do not discriminate on the basis of race, color, national origin, age, disability, sex, sexual " orientation, or gender identity.            Thank you!     Thank you for choosing [facility] [place]  for your care. Our goal is always to provide you with excellent care. Hearing back from our patients is one way we can continue to improve our services. Please take a few minutes to complete the written survey that you may receive in the mail after your visit with us. Thank you!             Your Updated Medication List - Protect others around you: Learn how to safely use, store and throw away your medicines at www.disposemymeds.org.          This list is accurate as of: 1/23/18  9:23 AM.  Always use your most recent med list.                   Brand Name Dispense Instructions for use Diagnosis    alendronate 70 MG tablet    FOSAMAX    12 tablet    TAKE AS DIRECTED BY PRESCRIBER    Osteoarthritis, unspecified osteoarthritis type, unspecified site       ALEVE 220 MG tablet   Generic drug:  naproxen sodium      Take 220 mg by mouth 2 times daily (with meals) Take one tablet (220mg) by oral route every 12 hours as needed        aspirin 325 MG tablet      Take 325 mg by mouth daily Take one tablet (325mg) by oral route every day        blood glucose monitoring lancets     100 each    USE TO TEST BLOOD SUGARS ONCE A DAY    Type 2 diabetes mellitus without complication, without long-term current use of insulin (H)       DAILY MULTIVITAMIN PO      Take by mouth daily Take one tablet by oral route every day with food        digoxin 125 MCG tablet    LANOXIN    30 tablet    TAKE 1 TABLET DAILY IN THE EVENING (DUE FOR OFFICE VISIT)    Chronic atrial fibrillation (H)       fish oil-omega-3 fatty acids 1000 MG capsule      Take 1,000 mg by mouth daily Take one by oral route one times every day        metoprolol succinate 25 MG 24 hr tablet    TOPROL-XL    90 tablet    TAKE 1 TABLET DAILY    Atrial fibrillation (H)       ONETOUCH VERIO IQ test strip   Generic drug:  blood glucose monitoring     100 strip    USE TO TEST  BLOOD SUGARS ONCE DAILY    Type 2 diabetes mellitus without complication, without long-term current use of insulin (H)       TUMS PO

## 2018-01-24 ASSESSMENT — ANXIETY QUESTIONNAIRES: GAD7 TOTAL SCORE: 0

## 2018-01-30 DIAGNOSIS — I48.20 CHRONIC ATRIAL FIBRILLATION (H): ICD-10-CM

## 2018-01-31 ENCOUNTER — TELEPHONE (OUTPATIENT)
Dept: FAMILY MEDICINE | Facility: OTHER | Age: 83
End: 2018-01-31

## 2018-01-31 RX ORDER — DIGOXIN 125 MCG
TABLET ORAL
Qty: 30 TABLET | Refills: 4 | Status: SHIPPED | OUTPATIENT
Start: 2018-01-31 | End: 2018-06-29

## 2018-01-31 NOTE — TELEPHONE ENCOUNTER
8:38 AM    Reason for Call: Phone Call    Description: Pt called and wanted to clarify that Dr MARGARETTE Cox will still be her PCP, but she was going to Dr Murray for female/pelvic stuff. Dr Murray is listed as her new PCP. Pt would like to discuss this. Please call her back at [phone]    Was an appointment offered for this call? No  If yes : Appointment type              Date    Preferred method for responding to this message: Telephone Call  What is your phone number ?    If we cannot reach you directly, may we leave a detailed response at the number you provided? Yes    Can this message wait until your PCP/provider returns, if available today? YES, aware provider out today    Belén Garcia

## 2018-01-31 NOTE — TELEPHONE ENCOUNTER
Digoxin       Last Written Prescription Date:  11/15/2017  Last Fill Quantity: 30,   # refills: 1  Last Office Visit: 1/23/2018  Future Office visit:

## 2018-03-23 ENCOUNTER — OFFICE VISIT (OUTPATIENT)
Dept: FAMILY MEDICINE | Facility: OTHER | Age: 83
End: 2018-03-23
Attending: FAMILY MEDICINE
Payer: MEDICARE

## 2018-03-23 ENCOUNTER — TELEPHONE (OUTPATIENT)
Dept: FAMILY MEDICINE | Facility: OTHER | Age: 83
End: 2018-03-23

## 2018-03-23 VITALS
RESPIRATION RATE: 16 BRPM | BODY MASS INDEX: 30.21 KG/M2 | WEIGHT: 160 LBS | SYSTOLIC BLOOD PRESSURE: 120 MMHG | TEMPERATURE: 98.2 F | HEART RATE: 91 BPM | HEIGHT: 61 IN | OXYGEN SATURATION: 100 % | DIASTOLIC BLOOD PRESSURE: 70 MMHG

## 2018-03-23 DIAGNOSIS — J31.0 CHRONIC RHINITIS, UNSPECIFIED TYPE: ICD-10-CM

## 2018-03-23 DIAGNOSIS — N39.41 URGE INCONTINENCE: ICD-10-CM

## 2018-03-23 DIAGNOSIS — Q61.9 CONGENITAL CYSTIC KIDNEY DISEASE: ICD-10-CM

## 2018-03-23 DIAGNOSIS — E11.9 CONTROLLED TYPE 2 DIABETES MELLITUS WITHOUT COMPLICATION, WITHOUT LONG-TERM CURRENT USE OF INSULIN (H): ICD-10-CM

## 2018-03-23 DIAGNOSIS — I48.21 PERMANENT ATRIAL FIBRILLATION (H): Primary | ICD-10-CM

## 2018-03-23 LAB
ANION GAP SERPL CALCULATED.3IONS-SCNC: 4 MMOL/L (ref 3–14)
BUN SERPL-MCNC: 20 MG/DL (ref 7–30)
CALCIUM SERPL-MCNC: 9.2 MG/DL (ref 8.5–10.1)
CHLORIDE SERPL-SCNC: 106 MMOL/L (ref 94–109)
CO2 SERPL-SCNC: 30 MMOL/L (ref 20–32)
CREAT SERPL-MCNC: 0.81 MG/DL (ref 0.52–1.04)
DIGOXIN SERPL-MCNC: 0.5 UG/L (ref 0.5–2)
GFR SERPL CREATININE-BSD FRML MDRD: 68 ML/MIN/1.7M2
GLUCOSE SERPL-MCNC: 107 MG/DL (ref 70–99)
POTASSIUM SERPL-SCNC: 4.1 MMOL/L (ref 3.4–5.3)
SODIUM SERPL-SCNC: 140 MMOL/L (ref 133–144)

## 2018-03-23 PROCEDURE — G0463 HOSPITAL OUTPT CLINIC VISIT: HCPCS

## 2018-03-23 PROCEDURE — 80048 BASIC METABOLIC PNL TOTAL CA: CPT | Mod: ZL | Performed by: FAMILY MEDICINE

## 2018-03-23 PROCEDURE — 80162 ASSAY OF DIGOXIN TOTAL: CPT | Mod: ZL | Performed by: FAMILY MEDICINE

## 2018-03-23 PROCEDURE — 99214 OFFICE O/P EST MOD 30 MIN: CPT | Performed by: FAMILY MEDICINE

## 2018-03-23 PROCEDURE — 36415 COLL VENOUS BLD VENIPUNCTURE: CPT | Mod: ZL | Performed by: FAMILY MEDICINE

## 2018-03-23 RX ORDER — FLUTICASONE PROPIONATE 50 MCG
1-2 SPRAY, SUSPENSION (ML) NASAL DAILY
Qty: 1 BOTTLE | Refills: 11 | Status: SHIPPED | OUTPATIENT
Start: 2018-03-23 | End: 2018-10-30

## 2018-03-23 ASSESSMENT — PAIN SCALES - GENERAL: PAINLEVEL: NO PAIN (0)

## 2018-03-23 NOTE — PROGRESS NOTES
SUBJECTIVE:   Annie Arguello is a 84 year old female who presents to clinic today for the following health issues:      Scratchy throat      Duration: 12/30/17    Description (location/character/radiation): throat    Intensity:  mild    Accompanying signs and symptoms: dry throat    History (similar episodes/previous evaluation): None    Precipitating or alleviating factors: None    Therapies tried and outcome: None    Has been present through the winter. Seems to be improving. Occurs only when talking for a long period of time. Associated with rhinnorhea that is fairly mild. No fever, congestion, headache. Denies abd pain, sour/acid taste, bloating, burping, throat clearing. Throat is not sore. No cough.        Sore in mouth      Duration: a year    Description (location/character/radiation): inside of mouth, left side of cheek    Intensity:  mild    Accompanying signs and symptoms: none    History (similar episodes/previous evaluation): None    Precipitating or alleviating factors: None    Therapies tried and outcome: None    Improved. Noted at our last visit. Thinks she bit the side of her mouth. Not noted now.      Atrial Fibrillation  Wonders if there is labwork or testing that needs to be done for this today. Denies palpitations, chest pain, le swelling. No abd pain, nausea, bloating.     Bladder symptoms  Notes occasional leakage of urine when she needs to go to the bathroom. Occasionally the sensation to go is very intense. Notes some dysuria that is infrequent and currently not present as well. No fevers, chills, abd pain.     Diabetes Follow up  Last A1C was 6.1 in October of this year. She is not currently on any glucose medications. Takes ASA daily. BP is controlled. Not taking a statin, inclined not to if she has a choice. No numbness, tingling in her LE.       Problem list and histories reviewed & adjusted, as indicated.  Additional history: as documented    Labs reviewed in EPIC    Reviewed and  "updated as needed this visit by clinical staff  Tobacco  Allergies  Meds  Problems  Med Hx  Surg Hx  Fam Hx  Soc Hx        Reviewed and updated as needed this visit by Provider  Tobacco  Allergies  Meds  Problems  Med Hx  Fam Hx  Soc Hx        ROS:  Constitutional, HEENT, cardiovascular, pulmonary, gi and gu systems are negative, except as otherwise noted.    OBJECTIVE:     /70 (BP Location: Right arm, Patient Position: Chair, Cuff Size: Adult Regular)  Pulse 91  Temp 98.2  F (36.8  C) (Tympanic)  Resp 16  Ht 5' 0.5\" (1.537 m)  Wt 160 lb (72.6 kg)  SpO2 100%  BMI 30.73 kg/m2  Body mass index is 30.73 kg/(m^2).  GENERAL: healthy, alert and no distress  NECK: no adenopathy, no asymmetry, masses, or scars and thyroid normal to palpation  RESP: lungs clear to auscultation - no rales, rhonchi or wheezes  CV: regular rate and rhythm, normal S1 S2, no S3 or S4, no murmur, click or rub, no peripheral edema and peripheral pulses strong  ABDOMEN: soft, nontender, no hepatosplenomegaly, no masses and bowel sounds normal   (female): Labia resorbed, mucosa pale without evidence of sclerosis, mild erythema around urethra without pain on exam, gentle digital exam without obvious prolapse, cysto/rectocele.   MS: no gross musculoskeletal defects noted, no edema  FOOT: DP pulses are diminished, skin warm and well perfused. Mild callus formation. Monofilament testing wnl.     Diagnostic Test Results:  Results for orders placed or performed in visit on 03/23/18   Digoxin level   Result Value Ref Range    Digoxin Level 0.5 0.5 - 2.0 ug/L   Basic metabolic panel   Result Value Ref Range    Sodium 140 133 - 144 mmol/L    Potassium 4.1 3.4 - 5.3 mmol/L    Chloride 106 94 - 109 mmol/L    Carbon Dioxide 30 20 - 32 mmol/L    Anion Gap 4 3 - 14 mmol/L    Glucose 107 (H) 70 - 99 mg/dL    Urea Nitrogen 20 7 - 30 mg/dL    Creatinine 0.81 0.52 - 1.04 mg/dL    GFR Estimate 68 >60 mL/min/1.7m2    GFR Estimate If Black 82 " >60 mL/min/1.7m2    Calcium 9.2 8.5 - 10.1 mg/dL       ASSESSMENT/PLAN:       Permanent atrial fibrillation (H)  Rate controlled on bblker and digoxin. No obvious SE of the dig. She has been at a stable dose. Dig level checked and normal.   - Digoxin level    Controlled type 2 diabetes mellitus without complication, without long-term current use of insulin (H) / Congenital cystic kidney disease  Diet controlled. Not on statin. Reviewed cholesterol results with her and risk benefit of statin. She declines, which I think is reasonable. Foot exam done today without any neuropathy.   - Basic metabolic panel    Chronic rhinitis, unspecified type  Potentially the cause of the throat symptoms. Trial of flonase. If clearly not improving over the summer months, recommend follow up. She is agreeable.   - fluticasone (FLONASE) 50 MCG/ACT spray; Spray 1-2 sprays into both nostrils daily  Dispense: 1 Bottle; Refill: 11    Urge incontinence  Discussed symptoms, they are mild and intermittent. Kegel and behavioral strategies/bladder training for managing urgency reviewed.     Radha Murray MD  [facility]

## 2018-03-23 NOTE — TELEPHONE ENCOUNTER
1:29 PM    Reason for Call: Phone Call    Description: PT called and states that she would like a call back regarding her A1C levels on her bloodwork    Was an appointment offered for this call? No  If yes : Appointment type              Date    Preferred method for responding to this message: Telephone Call  What is your phone number ?[phone]    If we cannot reach you directly, may we leave a detailed response at the number you provided? Yes    Can this message wait until your PCP/provider returns, if available today? N/A    Shaina Sims

## 2018-03-23 NOTE — MR AVS SNAPSHOT
After Visit Summary   3/23/2018    Annie Arguello    MRN: [id number]           Patient Information     Date Of Birth          11/14/1933        Visit Information        Provider Department      3/23/2018 9:00 AM Radha Murray MD [facility]        Today's Diagnoses     Permanent atrial fibrillation (H)    -  1    Controlled type 2 diabetes mellitus without complication, without long-term current use of insulin (H)        Chronic rhinitis, unspecified type        Congenital cystic kidney disease        Urge incontinence          Care Instructions    Kegel Exercises  What are Kegel exercises?  Kegels are exercises that tighten and release the pelvic muscles. These muscles wrap around both the anus and urethra (the tube that carries urine out of the body).  To find these muscles, try to stop and start the flow of urine while using the toilet.  Kegel exercises may:    Give you greater bladder control (stop or prevent urine from leaking).    Give you greater bowel control.    Increase pleasure during sex.    Ease childbirth for pregnant women.    Help men regain bladder control after prostate cancer treatment.  How can I test my muscle strength?  As you urinate (pass water), try to stop your stream of urine: Tighten the muscle around your urethra. If you can stop the stream, then you have good muscle control.  To maintain good control, you need to exercise these muscles regularly.  If you cannot stop your stream, Kegels can help you improve your muscle control.  How do I do Kegel exercises?  1. Squeeze and lift the muscles around the urethra. (You should feel the muscles lift near the urethra or tighten in your rectum.)  2. Hold them tight as you count to 5 or 10.  3. Then, slowly relax these muscles as you count to 5 or 10.  4. Repeat five times.  Do these exercises three times a day: Five times in the morning, five times in the afternoon and five times at night.  Where to exercise  You  may do the exercises anywhere and anytime. For instance:    At red traffic lights.    During TV commercials.    During coffee breaks.    While waiting for the bus.    At the grocery store.    When brushing your teeth.    During sex (for women).  Common mistakes  Don't use the muscles in your stomach, legs or buttocks. Your leg and buttock muscles should not move during these exercises.  To check your stomach muscles, put your hand on your stomach when you do your Kegels. If you feel your stomach move, then you are using the wrong muscles.  Can these exercises hurt me?  No, these exercises cannot harm you in any way. You should find them relaxing and easy.  Back or stomach pain may mean you are using your stomach muscles.  If you get headaches and your chest muscles are tense, you are likely holding your breath. Try to breathe normally during your Kegels.  When will I notice a change?  If you have weak bladder control, you will notice a change after four to six weeks of daily exercise. After three months, you will see an even bigger difference.  Make these exercises a habit: Tighten the muscles when you walk, before you cough, as you stand up and on the way to the bathroom.  For informational purposes only. Not to replace the advice of your health care provider. Copyright   1981, 2009 [facility]. All rights reserved. [employer] 401206 - REV 06/16.            Follow-ups after your visit        Your next 10 appointments already scheduled     Dec 14, 2018 10:15 AM CST   (Arrive by 10:00 AM)   Hearing Eval with Monica Escalera   [facility] ([facility] [place] )    [address] [place] [address]   [phone]              Who to contact     If you have questions or need follow up information about today's clinic visit or your schedule please contact [facility] directly at [phone].  Normal or non-critical lab and imaging results will be communicated to  "you by MyChart, letter or phone within 4 business days after the clinic has received the results. If you do not hear from us within 7 days, please contact the clinic through [employer] or phone. If you have a critical or abnormal lab result, we will notify you by phone as soon as possible.  Submit refill requests through [employer] or call your pharmacy and they will forward the refill request to us. Please allow 3 business days for your refill to be completed.          Additional Information About Your Visit        [employer] Information     [employer] lets you send messages to your doctor, view your test results, renew your prescriptions, schedule appointments and more. To sign up, go to [email or web address] . Click on \"Log in\" on the left side of the screen, which will take you to the Welcome page. Then click on \"Sign up Now\" on the right side of the page.     You will be asked to enter the access code listed below, as well as some personal information. Please follow the directions to create your username and password.     Your access code is: [email or web address]  Expires: 2018 10:23 AM     Your access code will  in 90 days. If you need help or a new code, please call your [place] clinic or [phone].        Care EveryWhere ID     This is your Care EveryWhere ID. This could be used by other organizations to access your [place] medical records  [id number]        Your Vitals Were     Pulse Temperature Respirations Height Pulse Oximetry BMI (Body Mass Index)    91 98.2  F (36.8  C) (Tympanic) 16 5' 0.5\" (1.537 m) 100% 30.73 kg/m2       Blood Pressure from Last 3 Encounters:   18 120/70   18 122/72   10/24/17 122/72    Weight from Last 3 Encounters:   18 160 lb (72.6 kg)   18 160 lb (72.6 kg)   10/24/17 160 lb 3.2 oz (72.7 kg)              We Performed the Following     Basic metabolic panel     Digoxin level          Today's Medication Changes          These changes are accurate as of " 3/23/18  9:42 AM.  If you have any questions, ask your nurse or doctor.               Start taking these medicines.        Dose/Directions    fluticasone 50 MCG/ACT spray   Commonly known as:  FLONASE   Used for:  Chronic rhinitis, unspecified type   Started by:  Radha Murray MD        Dose:  1-2 spray   Spray 1-2 sprays into both nostrils daily   Quantity:  1 Bottle   Refills:  11            Where to get your medicines      These medications were sent to [facility] - [address] [address]     Phone:  [phone]     fluticasone 50 MCG/ACT spray                Primary Care Provider Office Phone # Fax #    R Oleksandr Cox [phone]       [facility] [address]        Equal Access to Services     [facility]: Hadii nova taylor hadasho Soomaali, waaxda luqadaha, qaybta kaalmada adeegyada, abhay barrera haybolivar razo . So [facility] [phone].    ATENCIÓN: Si habla español, tiene a bethea disposición servicios gratuitos de asistencia lingüística. Llame al [phone].    We comply with applicable federal civil rights laws and [place] laws. We do not discriminate on the basis of race, color, national origin, age, disability, sex, sexual orientation, or gender identity.            Thank you!     Thank you for choosing [facility]  for your care. Our goal is always to provide you with excellent care. Hearing back from our patients is one way we can continue to improve our services. Please take a few minutes to complete the written survey that you may receive in the mail after your visit with us. Thank you!             Your Updated Medication List - Protect others around you: Learn how to safely use, store and throw away your medicines at www.disposemymeds.org.          This list is accurate as of 3/23/18  9:42 AM.  Always use your most recent med list.                   Brand Name Dispense Instructions for  use Diagnosis    alendronate 70 MG tablet    FOSAMAX    12 tablet    TAKE AS DIRECTED BY PRESCRIBER    Osteoarthritis, unspecified osteoarthritis type, unspecified site       ALEVE 220 MG tablet   Generic drug:  naproxen sodium      Take 220 mg by mouth 2 times daily (with meals) Take one tablet (220mg) by oral route every 12 hours as needed        aspirin 325 MG tablet      Take 325 mg by mouth daily Take one tablet (325mg) by oral route every day        blood glucose monitoring lancets     100 each    USE TO TEST BLOOD SUGARS ONCE A DAY    Type 2 diabetes mellitus without complication, without long-term current use of insulin (H)       DAILY MULTIVITAMIN PO      Take by mouth daily Take one tablet by oral route every day with food        digoxin 125 MCG tablet    LANOXIN    30 tablet    TAKE 1 TABLET DAILY IN THE EVENING (DUE FOR OFFICE VISIT)    Chronic atrial fibrillation (H)       fish oil-omega-3 fatty acids 1000 MG capsule      Take 1,000 mg by mouth daily Take one by oral route one times every day        fluticasone 50 MCG/ACT spray    FLONASE    1 Bottle    Spray 1-2 sprays into both nostrils daily    Chronic rhinitis, unspecified type       metoprolol succinate 25 MG 24 hr tablet    TOPROL-XL    90 tablet    TAKE 1 TABLET DAILY    Atrial fibrillation (H)       ONETOUCH VERIO IQ test strip   Generic drug:  blood glucose monitoring     100 strip    USE TO TEST BLOOD SUGARS ONCE DAILY    Type 2 diabetes mellitus without complication, without long-term current use of insulin (H)       TUMS PO

## 2018-03-23 NOTE — PATIENT INSTRUCTIONS
Kegel Exercises  What are Kegel exercises?  Kegels are exercises that tighten and release the pelvic muscles. These muscles wrap around both the anus and urethra (the tube that carries urine out of the body).  To find these muscles, try to stop and start the flow of urine while using the toilet.  Kegel exercises may:    Give you greater bladder control (stop or prevent urine from leaking).    Give you greater bowel control.    Increase pleasure during sex.    Ease childbirth for pregnant women.    Help men regain bladder control after prostate cancer treatment.  How can I test my muscle strength?  As you urinate (pass water), try to stop your stream of urine: Tighten the muscle around your urethra. If you can stop the stream, then you have good muscle control.  To maintain good control, you need to exercise these muscles regularly.  If you cannot stop your stream, Kegels can help you improve your muscle control.  How do I do Kegel exercises?  1. Squeeze and lift the muscles around the urethra. (You should feel the muscles lift near the urethra or tighten in your rectum.)  2. Hold them tight as you count to 5 or 10.  3. Then, slowly relax these muscles as you count to 5 or 10.  4. Repeat five times.  Do these exercises three times a day: Five times in the morning, five times in the afternoon and five times at night.  Where to exercise  You may do the exercises anywhere and anytime. For instance:    At red traffic lights.    During TV commercials.    During coffee breaks.    While waiting for the bus.    At the grocery store.    When brushing your teeth.    During sex (for women).  Common mistakes  Don't use the muscles in your stomach, legs or buttocks. Your leg and buttock muscles should not move during these exercises.  To check your stomach muscles, put your hand on your stomach when you do your Kegels. If you feel your stomach move, then you are using the wrong muscles.  Can these exercises hurt me?  No, these  exercises cannot harm you in any way. You should find them relaxing and easy.  Back or stomach pain may mean you are using your stomach muscles.  If you get headaches and your chest muscles are tense, you are likely holding your breath. Try to breathe normally during your Kegels.  When will I notice a change?  If you have weak bladder control, you will notice a change after four to six weeks of daily exercise. After three months, you will see an even bigger difference.  Make these exercises a habit: Tighten the muscles when you walk, before you cough, as you stand up and on the way to the bathroom.  For informational purposes only. Not to replace the advice of your health care provider. Copyright   1981, 2009 [facility]. All rights reserved. "[employer]" 601933   REV 06/16.

## 2018-03-24 ASSESSMENT — PATIENT HEALTH QUESTIONNAIRE - PHQ9: SUM OF ALL RESPONSES TO PHQ QUESTIONS 1-9: 0

## 2018-04-13 ENCOUNTER — ALLIED HEALTH/NURSE VISIT (OUTPATIENT)
Dept: FAMILY MEDICINE | Facility: OTHER | Age: 83
End: 2018-04-13
Attending: FAMILY MEDICINE
Payer: MEDICARE

## 2018-04-13 DIAGNOSIS — M81.0 OSTEOPOROSIS: Primary | ICD-10-CM

## 2018-04-13 NOTE — PROGRESS NOTES
Patient here for nurse only for a medical clearance for exercise program. Paper filled out and signed by Dr. Murray.   DANETTE HEARN

## 2018-04-13 NOTE — MR AVS SNAPSHOT
"              After Visit Summary   4/13/2018    Annie Arguello    MRN: [id number]           Patient Information     Date Of Birth          11/14/1933        Visit Information        Provider Department      4/13/2018 9:30 AM HC FP NURSE [facility]        Today's Diagnoses     Osteoporosis    -  1       Follow-ups after your visit        Your next 10 appointments already scheduled     Dec 14, 2018 10:15 AM CST   (Arrive by 10:00 AM)   Hearing Eval with Monica Escalera   [facility] [place] ([facility] [place] )    [address]  [place] [address]   [phone]              Who to contact     If you have questions or need follow up information about today's clinic visit or your schedule please contact [facility] directly at [phone].  Normal or non-critical lab and imaging results will be communicated to you by MyChart, letter or phone within 4 business days after the clinic has received the results. If you do not hear from us within 7 days, please contact the clinic through MyChart or phone. If you have a critical or abnormal lab result, we will notify you by phone as soon as possible.  Submit refill requests through [employer] or call your pharmacy and they will forward the refill request to us. Please allow 3 business days for your refill to be completed.          Additional Information About Your Visit        MyChart Information     [employer] lets you send messages to your doctor, view your test results, renew your prescriptions, schedule appointments and more. To sign up, go to [email or web address] . Click on \"Log in\" on the left side of the screen, which will take you to the Welcome page. Then click on \"Sign up Now\" on the right side of the page.     You will be asked to enter the access code listed below, as well as some personal information. Please follow the directions to create your username and password.     Your access code is: " [email or web address]  Expires: 2018 10:23 AM     Your access code will  in 90 days. If you need help or a new code, please call your [place] clinic or [phone].        Care EveryWhere ID     This is your Care EveryWhere ID. This could be used by other organizations to access your [place] medical records  [id number]         Blood Pressure from Last 3 Encounters:   18 120/70   18 122/72   10/24/17 122/72    Weight from Last 3 Encounters:   18 160 lb (72.6 kg)   18 160 lb (72.6 kg)   10/24/17 160 lb 3.2 oz (72.7 kg)              Today, you had the following     No orders found for display       Primary Care Provider Office Phone # Fax #    R Oleksandr Cox [phone]       [facility] [place] [address]        Equal Access to Services     [facility] : Hadii aad ku hadasho Soomaali, waaxda luqadaha, qaybta kaalmada adeegyada, waxay idiin hayshelln michele razo . So [facility] [phone].    ATENCIÓN: Si habla español, tiene a bethea disposición servicios gratuitos de asistencia lingüística. Llame al [phone].    We comply with applicable federal civil rights laws and [place] laws. We do not discriminate on the basis of race, color, national origin, age, disability, sex, sexual orientation, or gender identity.            Thank you!     Thank you for choosing [facility]  for your care. Our goal is always to provide you with excellent care. Hearing back from our patients is one way we can continue to improve our services. Please take a few minutes to complete the written survey that you may receive in the mail after your visit with us. Thank you!             Your Updated Medication List - Protect others around you: Learn how to safely use, store and throw away your medicines at www.disposemymeds.org.          This list is accurate as of 18  9:50 AM.  Always use your most recent med list.                   Brand Name Dispense Instructions  for use Diagnosis    alendronate 70 MG tablet    FOSAMAX    12 tablet    TAKE AS DIRECTED BY PRESCRIBER    Osteoarthritis, unspecified osteoarthritis type, unspecified site       ALEVE 220 MG tablet   Generic drug:  naproxen sodium      Take 220 mg by mouth 2 times daily (with meals) Take one tablet (220mg) by oral route every 12 hours as needed        aspirin 325 MG tablet      Take 325 mg by mouth daily Take one tablet (325mg) by oral route every day        blood glucose monitoring lancets     100 each    USE TO TEST BLOOD SUGARS ONCE A DAY    Type 2 diabetes mellitus without complication, without long-term current use of insulin (H)       DAILY MULTIVITAMIN PO      Take by mouth daily Take one tablet by oral route every day with food        digoxin 125 MCG tablet    LANOXIN    30 tablet    TAKE 1 TABLET DAILY IN THE EVENING (DUE FOR OFFICE VISIT)    Chronic atrial fibrillation (H)       fish oil-omega-3 fatty acids 1000 MG capsule      Take 1,000 mg by mouth daily Take one by oral route one times every day        fluticasone 50 MCG/ACT spray    FLONASE    1 Bottle    Spray 1-2 sprays into both nostrils daily    Chronic rhinitis, unspecified type       metoprolol succinate 25 MG 24 hr tablet    TOPROL-XL    90 tablet    TAKE 1 TABLET DAILY    Atrial fibrillation (H)       ONETOUCH VERIO IQ test strip   Generic drug:  blood glucose monitoring     100 strip    USE TO TEST BLOOD SUGARS ONCE DAILY    Type 2 diabetes mellitus without complication, without long-term current use of insulin (H)       TUMS PO

## 2018-05-31 DIAGNOSIS — M19.90 OSTEOARTHRITIS, UNSPECIFIED OSTEOARTHRITIS TYPE, UNSPECIFIED SITE: ICD-10-CM

## 2018-06-01 NOTE — TELEPHONE ENCOUNTER
ALENDRONATE SOD TABS 4'S 70MG    Last Written Prescription Date:  12/15/17  Last Fill Quantity: 12,   # refills: 1  Last Office Visit: 10/24/17  Future Office visit:

## 2018-06-04 RX ORDER — ALENDRONATE SODIUM 70 MG/1
TABLET ORAL
Qty: 12 TABLET | Refills: 2 | Status: SHIPPED | OUTPATIENT
Start: 2018-06-04 | End: 2019-02-13

## 2018-06-04 NOTE — TELEPHONE ENCOUNTER
Left message for the patient to return phone call back to clinic at earliest convenience.  Yessenia Lu CMA([facility])

## 2018-06-29 DIAGNOSIS — I48.20 CHRONIC ATRIAL FIBRILLATION (H): ICD-10-CM

## 2018-07-02 RX ORDER — DIGOXIN 125 UG/1
TABLET ORAL
Qty: 30 TABLET | Refills: 3 | Status: SHIPPED | OUTPATIENT
Start: 2018-07-02 | End: 2018-10-13

## 2018-07-13 ENCOUNTER — OFFICE VISIT (OUTPATIENT)
Dept: CHIROPRACTIC MEDICINE | Facility: OTHER | Age: 83
End: 2018-07-13
Attending: CHIROPRACTOR
Payer: MEDICARE

## 2018-07-13 DIAGNOSIS — M99.03 SEGMENTAL AND SOMATIC DYSFUNCTION OF LUMBAR REGION: ICD-10-CM

## 2018-07-13 DIAGNOSIS — M54.2 CERVICALGIA: ICD-10-CM

## 2018-07-13 DIAGNOSIS — M99.01 SEGMENTAL AND SOMATIC DYSFUNCTION OF CERVICAL REGION: Primary | ICD-10-CM

## 2018-07-13 DIAGNOSIS — M99.02 SEGMENTAL AND SOMATIC DYSFUNCTION OF THORACIC REGION: ICD-10-CM

## 2018-07-13 PROCEDURE — 98941 CHIROPRACT MANJ 3-4 REGIONS: CPT | Mod: AT | Performed by: CHIROPRACTOR

## 2018-07-13 NOTE — MR AVS SNAPSHOT
"              After Visit Summary   7/13/2018    Annie Arguello    MRN: [id number]           Patient Information     Date Of Birth          11/14/1933        Visit Information        Provider Department      7/13/2018 11:40 AM Rashid Arboleda DC  [facility] Kevin Cerda        Today's Diagnoses     Segmental and somatic dysfunction of cervical region    -  1    Cervicalgia        Segmental and somatic dysfunction of lumbar region        Segmental and somatic dysfunction of thoracic region           Follow-ups after your visit        Your next 10 appointments already scheduled     Dec 14, 2018 10:15 AM CST   (Arrive by 10:00 AM)   Hearing Eval with Monica [facility]era   [facility] )    [address]  [facility] [address]   [phone]              Who to contact     If you have questions or need follow up information about today's clinic visit or your schedule please contact  [facility] KEVIN CERDA directly at [phone].  Normal or non-critical lab and imaging results will be communicated to you by MyChart, letter or phone within 4 business days after the clinic has received the results. If you do not hear from us within 7 days, please contact the clinic through TheWraphart or phone. If you have a critical or abnormal lab result, we will notify you by phone as soon as possible.  Submit refill requests through [employer] or call your pharmacy and they will forward the refill request to us. Please allow 3 business days for your refill to be completed.          Additional Information About Your Visit        TheWrapharWonderHowTo Information     [employer] lets you send messages to your doctor, view your test results, renew your prescriptions, schedule appointments and more. To sign up, go to [email or web address] . Click on \"Log in\" on the left side of the screen, which will take you to the Welcome page. Then click on \"Sign up Now\" on the right side of the page.     You will be asked to " enter the access code listed below, as well as some personal information. Please follow the directions to create your username and password.     Your access code is: [email or web address]  Expires: 10/11/2018 11:50 AM     Your access code will  in 90 days. If you need help or a new code, please call your [place] clinic or [phone].        Care EveryWhere ID     This is your Care EveryWhere ID. This could be used by other organizations to access your [place] medical records  [id number]         Blood Pressure from Last 3 Encounters:   18 120/70   18 122/72   10/24/17 122/72    Weight from Last 3 Encounters:   18 160 lb (72.6 kg)   18 160 lb (72.6 kg)   10/24/17 160 lb 3.2 oz (72.7 kg)              We Performed the Following     CHIROPRAC MANIP,SPINAL,3-4 REGIONS        Primary Care Provider Office Phone # Fax #    R Oleksandr Cox [phone]       [facility] [address] [place] [id number]        Equal Access to Services     [facility]: Hadii aad ku hadasho Soomaali, waaxda luqadaha, qaybta kaalmada adeegyada, abhay razo . So [facility] [phone].    ATENCIÓN: Si habla español, tiene a bethea disposición servicios gratuitos de asistencia lingüística. [facility] [phone].    We comply with applicable federal civil rights laws and [place] laws. We do not discriminate on the basis of race, color, national origin, age, disability, sex, sexual orientation, or gender identity.            Thank you!     Thank you for choosing  [facility]  for your care. Our goal is always to provide you with excellent care. Hearing back from our patients is one way we can continue to improve our services. Please take a few minutes to complete the written survey that you may receive in the mail after your visit with us. Thank you!             Your Updated Medication List - Protect others around you: Learn how to safely use, store and throw away your medicines at  www.disposemymeds.org.          This list is accurate as of 7/13/18 11:50 AM.  Always use your most recent med list.                   Brand Name Dispense Instructions for use Diagnosis    alendronate 70 MG tablet    FOSAMAX    12 tablet    TAKE AS DIRECTED BY PRESCRIBER    Osteoarthritis, unspecified osteoarthritis type, unspecified site       ALEVE 220 MG tablet   Generic drug:  naproxen sodium      Take 220 mg by mouth 2 times daily (with meals) Take one tablet (220mg) by oral route every 12 hours as needed        aspirin 325 MG tablet      Take 325 mg by mouth daily Take one tablet (325mg) by oral route every day        blood glucose monitoring lancets     100 each    USE TO TEST BLOOD SUGARS ONCE A DAY    Type 2 diabetes mellitus without complication, without long-term current use of insulin (H)       DAILY MULTIVITAMIN PO      Take by mouth daily Take one tablet by oral route every day with food        DIGOX 125 MCG tablet   Generic drug:  digoxin     30 tablet    TAKE 1 TABLET DAILY IN THE EVENING (DUE FOR OFFICE VISIT)    Chronic atrial fibrillation (H)       fish oil-omega-3 fatty acids 1000 MG capsule      Take 1,000 mg by mouth daily Take one by oral route one times every day        fluticasone 50 MCG/ACT spray    FLONASE    1 Bottle    Spray 1-2 sprays into both nostrils daily    Chronic rhinitis, unspecified type       metoprolol succinate 25 MG 24 hr tablet    TOPROL-XL    90 tablet    TAKE 1 TABLET DAILY    Atrial fibrillation (H)       ONETOUCH VERIO IQ test strip   Generic drug:  blood glucose monitoring     50 strip    USE TO TEST BLOOD SUGARS ONCE DAILY    Type 2 diabetes mellitus without complication, without long-term current use of insulin (H)       TUMS PO

## 2018-08-03 ENCOUNTER — OFFICE VISIT (OUTPATIENT)
Dept: OTOLARYNGOLOGY | Facility: OTHER | Age: 83
End: 2018-08-03
Attending: NURSE PRACTITIONER
Payer: MEDICARE

## 2018-08-03 VITALS
WEIGHT: 160 LBS | SYSTOLIC BLOOD PRESSURE: 128 MMHG | DIASTOLIC BLOOD PRESSURE: 70 MMHG | HEART RATE: 78 BPM | BODY MASS INDEX: 31.41 KG/M2 | HEIGHT: 60 IN | OXYGEN SATURATION: 98 %

## 2018-08-03 DIAGNOSIS — H61.302 EXTERNAL EAR CANAL STENOSIS, ACQUIRED, LEFT: ICD-10-CM

## 2018-08-03 DIAGNOSIS — H61.23 BILATERAL IMPACTED CERUMEN: Primary | ICD-10-CM

## 2018-08-03 DIAGNOSIS — H90.3 SENSORINEURAL HEARING LOSS (SNHL) OF BOTH EARS: ICD-10-CM

## 2018-08-03 DIAGNOSIS — Z97.4 WEARS HEARING AID: ICD-10-CM

## 2018-08-03 PROCEDURE — 99213 OFFICE O/P EST LOW 20 MIN: CPT | Mod: 25 | Performed by: NURSE PRACTITIONER

## 2018-08-03 PROCEDURE — 92504 EAR MICROSCOPY EXAMINATION: CPT | Performed by: NURSE PRACTITIONER

## 2018-08-03 PROCEDURE — G0463 HOSPITAL OUTPT CLINIC VISIT: HCPCS

## 2018-08-03 PROCEDURE — 69210 REMOVE IMPACTED EAR WAX UNI: CPT | Performed by: NURSE PRACTITIONER

## 2018-08-03 RX ORDER — OFLOXACIN 3 MG/ML
10 SOLUTION AURICULAR (OTIC) DAILY
Qty: 1 BOTTLE | Refills: 0 | Status: SHIPPED | OUTPATIENT
Start: 2018-08-03 | End: 2019-02-19

## 2018-08-03 ASSESSMENT — PAIN SCALES - GENERAL: PAINLEVEL: NO PAIN (0)

## 2018-08-03 NOTE — MR AVS SNAPSHOT
After Visit Summary   8/3/2018    Annie Arguello    MRN: [id number]           Patient Information     Date Of Birth          11/14/1933        Visit Information        Provider Department      8/3/2018 9:30 AM Cherri Sanders APRN CNP [facility]        Today's Diagnoses     Bilateral impacted cerumen    -  1      Care Instructions    Complete drops as directed. Follow up with ENT in 1-2 weeks for further ear debridement.  Complete annual audiogram in December as planned.    Thank you for allowing Cherri Sanders CNP and our ENT team to participate in your care.  If your medications are too expensive, please give the nurse a call.  We can possibly change this medication.  If you have a scheduling or an appointment question please contact [facility] Unit Coordinator at their direct line [phone].   ALL nursing questions or concerns can be directed to your ENT nurse at: [phone] - [place]            Follow-ups after your visit        Your next 10 appointments already scheduled     Dec 14, 2018 10:15 AM CST   (Arrive by 10:00 AM)   Hearing Eval with Monica Escalera   [facility] ([facility] )    [address]  [facility] [address]   [phone]              Who to contact     If you have questions or need follow up information about today's clinic visit or your schedule please contact [facility] directly at [phone].  Normal or non-critical lab and imaging results will be communicated to you by MyChart, letter or phone within 4 business days after the clinic has received the results. If you do not hear from us within 7 days, please contact the clinic through MyChart or phone. If you have a critical or abnormal lab result, we will notify you by phone as soon as possible.  Submit refill requests through [employer] or call your pharmacy and they will forward the refill request to us. Please allow 3 business days for your  "refill to be completed.          Additional Information About Your Visit        [facility]way [employer]ah[employer] Information     [employer] lets you send messages to your doctor, view your test results, renew your prescriptions, schedule appointments and more. To sign up, go to [email or web address] . Click on \"Log in\" on the left side of the screen, which will take you to the Welcome page. Then click on \"Sign up Now\" on the right side of the page.     You will be asked to enter the access code listed below, as well as some personal information. Please follow the directions to create your username and password.     Your access code is: [email or web address]  Expires: 10/11/2018 11:50 AM     Your access code will  in 90 days. If you need help or a new code, please call your [place] clinic or [phone].        Care EveryWhere ID     This is your Care EveryWhere ID. This could be used by other organizations to access your [place] medical records  [id number]        Your Vitals Were     Pulse Height Pulse Oximetry BMI (Body Mass Index)          78 5' (1.524 m) 98% 31.25 kg/m2         Blood Pressure from Last 3 Encounters:   18 128/70   18 120/70   18 122/72    Weight from Last 3 Encounters:   18 160 lb (72.6 kg)   18 160 lb (72.6 kg)   18 160 lb (72.6 kg)              Today, you had the following     No orders found for display         Today's Medication Changes          These changes are accurate as of 8/3/18  9:32 AM.  If you have any questions, ask your nurse or doctor.               Start taking these medicines.        Dose/Directions    ofloxacin 0.3 % otic solution   Commonly known as:  FLOXIN   Used for:  Bilateral impacted cerumen   Started by:  Cherri Sanders APRN CNP        Dose:  10 drop   Place 10 drops Into the left ear daily for 7 days   Quantity:  1 Bottle   Refills:  0            Where to get your medicines      These medications were sent to [facility] [address] [facility]  [address] [facility] [address]     Phone:  [phone]     ofloxacin 0.3 % otic solution                Primary Care Provider Office Phone # Fax #    R Oleksandr Cox [phone]       [address] [facility] [address]        Equal Access to Services     [facility] : Hadii aad ku hadasho Soomaali, waaxda luqadaha, qaybta kaalmada adeegyada, waxay idiin hayaan adeeg kharleysh lavidya . So [facility] [phone].    ATENCIÓN: Si habla español, tiene a bethea disposición servicios gratuitos de asistencia lingüística. Llame al [phone].    We comply with applicable federal civil rights laws and [place] laws. We do not discriminate on the basis of race, color, national origin, age, disability, sex, sexual orientation, or gender identity.            Thank you!     Thank you for choosing [facility]  for your care. Our goal is always to provide you with excellent care. Hearing back from our patients is one way we can continue to improve our services. Please take a few minutes to complete the written survey that you may receive in the mail after your visit with us. Thank you!             Your Updated Medication List - Protect others around you: Learn how to safely use, store and throw away your medicines at www.disposemymeds.org.          This list is accurate as of 8/3/18  9:32 AM.  Always use your most recent med list.                   Brand Name Dispense Instructions for use Diagnosis    alendronate 70 MG tablet    FOSAMAX    12 tablet    TAKE AS DIRECTED BY PRESCRIBER    Osteoarthritis, unspecified osteoarthritis type, unspecified site       ALEVE 220 MG tablet   Generic drug:  naproxen sodium      Take 220 mg by mouth 2 times daily (with meals) Take one tablet (220mg) by oral route every 12 hours as needed        aspirin 325 MG tablet      Take 325 mg by mouth daily Take one tablet (325mg) by oral route every day        blood glucose monitoring lancets     100 each     USE TO TEST BLOOD SUGARS ONCE A DAY    Type 2 diabetes mellitus without complication, without long-term current use of insulin (H)       DAILY MULTIVITAMIN PO      Take by mouth daily Take one tablet by oral route every day with food        DIGOX 125 MCG tablet   Generic drug:  digoxin     30 tablet    TAKE 1 TABLET DAILY IN THE EVENING (DUE FOR OFFICE VISIT)    Chronic atrial fibrillation (H)       fish oil-omega-3 fatty acids 1000 MG capsule      Take 1,000 mg by mouth daily Take one by oral route one times every day        fluticasone 50 MCG/ACT spray    FLONASE    1 Bottle    Spray 1-2 sprays into both nostrils daily    Chronic rhinitis, unspecified type       metoprolol succinate 25 MG 24 hr tablet    TOPROL-XL    90 tablet    TAKE 1 TABLET DAILY    Atrial fibrillation (H)       ofloxacin 0.3 % otic solution    FLOXIN    1 Bottle    Place 10 drops Into the left ear daily for 7 days    Bilateral impacted cerumen       ONETOUCH VERIO IQ test strip   Generic drug:  blood glucose monitoring     50 strip    USE TO TEST BLOOD SUGARS ONCE DAILY    Type 2 diabetes mellitus without complication, without long-term current use of insulin (H)       TUMS PO

## 2018-08-03 NOTE — PROGRESS NOTES
Otolaryngology Progress Note           Chief Complaint:     Patient presents with:  Ear Problem: left ear feels swollen, pt declined audio          History of Present Illness:     Annie Arguello is a 84 year old female here with complaints of left ear feeling swollen.     For the past 1 month, her hearing aide has not been fitting well in left ear. Feels like it is swelling. Denies otalgia/otorrrhea.   Denies changes in hearing. Denies fluctuating hearing loss.   Denies tinnitus/vertigo.  Denies facial paresthesias/dysphagia.   No trauma to the area.  She is due for next audiogram this December.     Cold symptoms just started this week.  History of seasonal allergies, feels this has been going on over the last month.     Audiogram 12/8/17: Tympanograms not measured. Thresholds are mild to severe SNHL. SRT 40 dB HL bilaterally. WRS 88% right and 92% left.          Review of Systems:     See HPI         Physical Exam:     /70 (BP Location: Right arm, Patient Position: Chair, Cuff Size: Adult Regular)  Pulse 78  Ht 5' (1.524 m)  Wt 160 lb (72.6 kg)  SpO2 98%  BMI 31.25 kg/m2  General - The patient is well nourished and well developed, and appears to have good nutritional status.  Alert and oriented to person and place, interactive.  Head and Face - Normocephalic and atraumatic, with no gross asymmetry noted of the contour of the facial features.  The facial nerve is intact, with strong symmetric movements.  Neck-no palpable lymphadenopathy or thyroid mass.  Trachea is midline.  Eyes - Extraocular movements intact.   Ears- External ears normal. No mastoid tenderness. Ears examined under microscope. Left EAC stenotic, small amount of cerumen medial canal and unable to remove due to difficulty with EAC stenosis. Cannot view TM. There is no erythema, lesions or otorrhea. No pain with otoscope insertion. Right EAC with cerumen, debrided with cupped forceps, TM intact with no effusion/retraction.   Nose - Nasal  mucosa is pink and moist with no abnormal mucus.  The septum was grossly midline and non-obstructive, turbinates of normal size and position.  No polyps, masses, or purulence noted on examination.  Mouth - Examination of the oral cavity shows pink, healthy, moist mucosa. Dentition in good condition.  No lesions or ulceration noted. The tongue is mobile and midline.    Throat - The walls of the oropharynx were smooth, pink, moist, symmetric, and had no lesions or ulcerations.  The tonsillar pillars and soft palate were symmetric.  The uvula was midline on elevation.  Tonsillar fossa clear.          Assessment and Plan:       ICD-10-CM    1. Bilateral impacted cerumen H61.23 ofloxacin (FLOXIN) 0.3 % otic solution   2. Sensorineural hearing loss (SNHL) of both ears H90.3    3. Wears hearing aid Z97.4    4. 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 Aural hygiene for the ear canals was discussed.  Avoidance of Q-tips was highly encouraged. The patient was told to avoid flushing the ear canal as there is a risk of perforating the ear drum.     Start floxin otic to left ear and return in 1-2 weeks for further debridement of left ear.    Audiogram due in December, sooner with any acute hearing changes.    She may need left hearing aide re-fitted for better adjustment due to left EAC stenosis.    Return sooner as needed.       Cherri Sanders NP  ENT  [facility], [place]  [phone]

## 2018-08-03 NOTE — LETTER
8/3/2018         RE: Annie Arguello  [address]  [place] [address]        Dear Colleague,    Thank you for referring your patient, Annie Arguello, to the [facility]. Please see a copy of my visit note below.    Otolaryngology Progress Note           Chief Complaint:     Patient presents with:  Ear Problem: left ear feels swollen, pt declined audio          History of Present Illness:     Annie Arguello is a 84 year old female here with complaints of left ear feeling swollen.     For the past 1 month, her hearing aide has not been fitting well in left ear. Feels like it is swelling. Denies otalgia/otorrrhea.   Denies changes in hearing. Denies fluctuating hearing loss.   Denies tinnitus/vertigo.  Denies facial paresthesias/dysphagia.   No trauma to the area.  She is due for next audiogram this December.     Cold symptoms just started this week.  History of seasonal allergies, feels this has been going on over the last month.     Audiogram 12/8/17: Tympanograms not measured. Thresholds are mild to severe SNHL. SRT 40 dB HL bilaterally. WRS 88% right and 92% left.          Review of Systems:     See HPI         Physical Exam:     /70 (BP Location: Right arm, Patient Position: Chair, Cuff Size: Adult Regular)  Pulse 78  Ht 5' (1.524 m)  Wt 160 lb (72.6 kg)  SpO2 98%  BMI 31.25 kg/m2  General - The patient is well nourished and well developed, and appears to have good nutritional status.  Alert and oriented to person and place, interactive.  Head and Face - Normocephalic and atraumatic, with no gross asymmetry noted of the contour of the facial features.  The facial nerve is intact, with strong symmetric movements.  Neck-no palpable lymphadenopathy or thyroid mass.  Trachea is midline.  Eyes - Extraocular movements intact.   Ears- External ears normal. No mastoid tenderness. Ears examined under microscope. Left EAC stenotic, small amount of cerumen medial canal and unable to remove due  to difficulty with EAC stenosis. Cannot view TM. There is no erythema, lesions or otorrhea. No pain with otoscope insertion. Right EAC with cerumen, debrided with cupped forceps, TM intact with no effusion/retraction.   Nose - Nasal mucosa is pink and moist with no abnormal mucus.  The septum was grossly midline and non-obstructive, turbinates of normal size and position.  No polyps, masses, or purulence noted on examination.  Mouth - Examination of the oral cavity shows pink, healthy, moist mucosa. Dentition in good condition.  No lesions or ulceration noted. The tongue is mobile and midline.    Throat - The walls of the oropharynx were smooth, pink, moist, symmetric, and had no lesions or ulcerations.  The tonsillar pillars and soft palate were symmetric.  The uvula was midline on elevation.  Tonsillar fossa clear.          Assessment and Plan:       ICD-10-CM    1. Bilateral impacted cerumen H61.23 ofloxacin (FLOXIN) 0.3 % otic solution   2. Sensorineural hearing loss (SNHL) of both ears H90.3    3. Wears hearing aid Z97.4    4. 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 Aural hygiene for the ear canals was discussed.  Avoidance of Q-tips was highly encouraged. The patient was told to avoid flushing the ear canal as there is a risk of perforating the ear drum.     Start floxin otic to left ear and return in 1-2 weeks for further debridement of left ear.    Audiogram due in December, sooner with any acute hearing changes.    She may need left hearing aide re-fitted for better adjustment due to left EAC stenosis.    Return sooner as needed.       Cherri Sanders, NP  ENT  [facility], [place]  [phone]      Again, thank you for allowing me to participate in the care of your patient.        Sincerely,        Cherri Sanders, APRN CNP

## 2018-08-03 NOTE — NURSING NOTE
Chief Complaint   Patient presents with     Ear Problem     left ear feels swollen, pt declined audio        Initial /70 (BP Location: Right arm, Patient Position: Chair, Cuff Size: Adult Regular)  Pulse 78  Ht 5' (1.524 m)  Wt 160 lb (72.6 kg)  SpO2 98%  BMI 31.25 kg/m2 Estimated body mass index is 31.25 kg/(m^2) as calculated from the following:    Height as of this encounter: 5' (1.524 m).    Weight as of this encounter: 160 lb (72.6 kg).  Medication Reconciliation: complete    Sugey Rodriguez LPN

## 2018-08-03 NOTE — PATIENT INSTRUCTIONS
Complete drops as directed. Follow up with ENT in 1-2 weeks for further ear debridement.  Complete annual audiogram in December as planned.    Thank you for allowing Cherri Sanders CNP and our ENT team to participate in your care.  If your medications are too expensive, please give the nurse a call.  We can possibly change this medication.  If you have a scheduling or an appointment question please contact [facility] Unit Coordinator at their direct line [phone].   ALL nursing questions or concerns can be directed to your ENT nurse at: [phone] - [place]

## 2018-08-22 ENCOUNTER — OFFICE VISIT (OUTPATIENT)
Dept: OTOLARYNGOLOGY | Facility: OTHER | Age: 83
End: 2018-08-22
Attending: NURSE PRACTITIONER
Payer: MEDICARE

## 2018-08-22 VITALS
SYSTOLIC BLOOD PRESSURE: 120 MMHG | BODY MASS INDEX: 31.41 KG/M2 | OXYGEN SATURATION: 98 % | WEIGHT: 160 LBS | HEART RATE: 82 BPM | DIASTOLIC BLOOD PRESSURE: 80 MMHG | HEIGHT: 60 IN | TEMPERATURE: 97.7 F

## 2018-08-22 DIAGNOSIS — H61.22 IMPACTED CERUMEN OF LEFT EAR: ICD-10-CM

## 2018-08-22 DIAGNOSIS — Z97.4 WEARS HEARING AID: ICD-10-CM

## 2018-08-22 DIAGNOSIS — H90.3 SENSORINEURAL HEARING LOSS (SNHL) OF BOTH EARS: Primary | ICD-10-CM

## 2018-08-22 DIAGNOSIS — H61.302 EXTERNAL EAR CANAL STENOSIS, ACQUIRED, LEFT: ICD-10-CM

## 2018-08-22 PROCEDURE — 99213 OFFICE O/P EST LOW 20 MIN: CPT | Mod: 25 | Performed by: NURSE PRACTITIONER

## 2018-08-22 PROCEDURE — 92504 EAR MICROSCOPY EXAMINATION: CPT | Performed by: NURSE PRACTITIONER

## 2018-08-22 PROCEDURE — G0463 HOSPITAL OUTPT CLINIC VISIT: HCPCS

## 2018-08-22 PROCEDURE — 69210 REMOVE IMPACTED EAR WAX UNI: CPT | Performed by: NURSE PRACTITIONER

## 2018-08-22 ASSESSMENT — PAIN SCALES - GENERAL: PAINLEVEL: NO PAIN (0)

## 2018-08-22 NOTE — MR AVS SNAPSHOT
After Visit Summary   8/22/2018    Annie Arguello    MRN: [id number]           Patient Information     Date Of Birth          11/14/1933        Visit Information        Provider Department      8/22/2018 8:45 AM Cherri Sanders APRN CNP [facility]        Today's Diagnoses     Sensorineural hearing loss (SNHL) of both ears    -  1    Wears hearing aid        External ear canal stenosis, acquired, left          Care Instructions    Annual audiograms, sooner with any acute hearing changes.    If left hearing aide continues to not feel right, recommend follow up with [facility] for evaluation of hearing aide fit.    Return to ENT as needed    Thank you for allowing Cherri Sanders CNP and our ENT team to participate in your care.  If your medications are too expensive, please give the nurse a call.  We can possibly change this medication.  If you have a scheduling or an appointment question please contact [facility] Coordinator at their direct line [phone].   ALL nursing questions or concerns can be directed to your ENT nurse at: [phone]- Jyjc            Follow-ups after your visit        Follow-up notes from your care team     Return if symptoms worsen or fail to improve.      Your next 10 appointments already scheduled     Aug 22, 2018  8:45 AM CDT   (Arrive by 8:30 AM)   PROCEDURE with NAHUN Pisano CNP   [facility] [place] ([facility] [place] [facility] [place] )    [address]  [place] [address]   [phone]            Dec 14, 2018 10:15 AM CST   (Arrive by 10:00 AM)   Hearing Eval with Monica Escalera   [facility] [place] ([facility] [place] [facility] [place] )    [address]  [place] [address]   [phone]              Who to contact     If you have questions or need follow up information about today's clinic visit or your schedule please contact [facility] directly at [phone].  Normal or non-critical lab and  imaging results will be communicated to you by MyChart, letter or phone within 4 business days after the clinic has received the results. If you do not hear from us within 7 days, please contact the clinic through MyChart or phone. If you have a critical or abnormal lab result, we will notify you by phone as soon as possible.  Submit refill requests through [employer] or call your pharmacy and they will forward the refill request to us. Please allow 3 business days for your refill to be completed.          Additional Information About Your Visit        Care EveryWhere ID     This is your Care EveryWhere ID. This could be used by other organizations to access your [facility] medical records  [id number]        Your Vitals Were     Pulse Temperature Height Pulse Oximetry BMI (Body Mass Index)       82 97.7  F (36.5  C) (Tympanic) 5' (1.524 m) 98% 31.25 kg/m2        Blood Pressure from Last 3 Encounters:   08/22/18 120/80   08/03/18 128/70   03/23/18 120/70    Weight from Last 3 Encounters:   08/22/18 160 lb (72.6 kg)   08/03/18 160 lb (72.6 kg)   03/23/18 160 lb (72.6 kg)              Today, you had the following     No orders found for display       Primary Care Provider Office Phone # Fax #    R Oleksandr Cox [phone]       [facility] [place] [address]        Equal Access to Services     [facility] : Hadii nova taylor hadasho Soagustina, waaxda luqadaha, qaybta kaalmada stephany, abhay razo . So [facility] [phone].    ATENCIÓN: Si habla español, tiene a bethea disposición servicios gratuitos de asistencia lingüística. [facility] al [phone].    We comply with applicable federal civil rights laws and [place] laws. We do not discriminate on the basis of race, color, national origin, age, disability, sex, sexual orientation, or gender identity.            Thank you!     Thank you for choosing [facility]  for your care. Our goal is always to provide you with  excellent care. Hearing back from our patients is one way we can continue to improve our services. Please take a few minutes to complete the written survey that you may receive in the mail after your visit with us. Thank you!             Your Updated Medication List - Protect others around you: Learn how to safely use, store and throw away your medicines at www.disposemymeds.org.          This list is accurate as of 8/22/18  8:42 AM.  Always use your most recent med list.                   Brand Name Dispense Instructions for use Diagnosis    alendronate 70 MG tablet    FOSAMAX    12 tablet    TAKE AS DIRECTED BY PRESCRIBER    Osteoarthritis, unspecified osteoarthritis type, unspecified site       ALEVE 220 MG tablet   Generic drug:  naproxen sodium      Take 220 mg by mouth 2 times daily (with meals) Take one tablet (220mg) by oral route every 12 hours as needed        aspirin 325 MG tablet      Take 325 mg by mouth daily Take one tablet (325mg) by oral route every day        blood glucose monitoring lancets     100 each    USE TO TEST BLOOD SUGARS ONCE A DAY    Type 2 diabetes mellitus without complication, without long-term current use of insulin (H)       DAILY MULTIVITAMIN PO      Take by mouth daily Take one tablet by oral route every day with food        DIGOX 125 MCG tablet   Generic drug:  digoxin     30 tablet    TAKE 1 TABLET DAILY IN THE EVENING (DUE FOR OFFICE VISIT)    Chronic atrial fibrillation (H)       fish oil-omega-3 fatty acids 1000 MG capsule      Take 1,000 mg by mouth daily Take one by oral route one times every day        fluticasone 50 MCG/ACT spray    FLONASE    1 Bottle    Spray 1-2 sprays into both nostrils daily    Chronic rhinitis, unspecified type       metoprolol succinate 25 MG 24 hr tablet    TOPROL-XL    90 tablet    TAKE 1 TABLET DAILY    Atrial fibrillation (H)       ONETOUCH VERIO IQ test strip   Generic drug:  blood glucose monitoring     50 strip    USE TO TEST BLOOD SUGARS  ONCE DAILY    Type 2 diabetes mellitus without complication, without long-term current use of insulin (H)       TUMS PO

## 2018-08-22 NOTE — NURSING NOTE
Chief Complaint   Patient presents with     Ear Problem     bilateral SNHL ear cleaning       Initial /80 (BP Location: Right arm, Cuff Size: Adult Regular)  Pulse 82  Temp 97.7  F (36.5  C) (Tympanic)  Ht 5' (1.524 m)  Wt 160 lb (72.6 kg)  SpO2 98%  BMI 31.25 kg/m2 Estimated body mass index is 31.25 kg/(m^2) as calculated from the following:    Height as of this encounter: 5' (1.524 m).    Weight as of this encounter: 160 lb (72.6 kg).  Medication Reconciliation: complete    Hazel Thrasher LPN

## 2018-08-22 NOTE — LETTER
8/22/2018         RE: Annie Arguello  [address]  [place] [address]        Dear Colleague,    Thank you for referring your patient, Annie Arguello, to the [facility]. Please see a copy of my visit note below.    Otolaryngology Progress Note           Chief Complaint:     Patient presents with:  Ear Problem: bilateral SNHL ear cleaning         History of Present Illness:     Annie Arguello is a 84 year old female her for remaining cerumen debridement of left ear. I initially saw her 8/3/18 with c/o feeling her left hearing aide was not fitting well. Physical examination showed left stenotic EAC, making it difficult to remove cerumen, so started her on 1 week of floxin otic. Her right EAC was debrided and TM was intact.    Today Annie reports she completed the otic drops to left ear as directed. Since she saw me last, she feels her left ear no longer feels swollen, feels her hearing aide is fitting better. Denies otalgia/otorrhea.     Audiogram 12/8/17: Tympanograms not measured. Thresholds are mild to severe SNHL. SRT 40 dB HL bilaterally. WRS 88% right and 92% left.          Review of Systems:     See HPI         Physical Exam:     /80 (BP Location: Right arm, Cuff Size: Adult Regular)  Pulse 82  Temp 97.7  F (36.5  C) (Tympanic)  Ht 5' (1.524 m)  Wt 160 lb (72.6 kg)  SpO2 98%  BMI 31.25 kg/m2  General - The patient is well nourished and well developed, and appears to have good nutritional status.  Alert and oriented to person and place, interactive.  Head and Face - Normocephalic and atraumatic, with no gross asymmetry noted of the contour of the facial features.  The facial nerve is intact, with strong symmetric movements.  Neck-no palpable lymphadenopathy or thyroid mass.  Trachea is midline.  Eyes - Extraocular movements intact.   Ears- External ears normal. Bilateral hearing aides in place. Ears examined under microscope. Right EAC patent, TM intact. Left EAC stenotic, used  small otoscope cover for visualization, which was still difficulty. With use of #3 and #5 suction, along with cupped forceps, was able to remove most of cerumen, but small amount still remains medial canal, but was difficulty as I could not see superior most aspect of EAC. Limited view of TM shows it is intact.   Nose - Nasal mucosa is pink and moist with no abnormal mucus.  The septum was grossly midline and non-obstructive, turbinates of normal size and position.  No polyps, masses, or purulence noted on examination.  Mouth - Examination of the oral cavity shows pink, healthy, moist mucosa. Dentition in good condition.  No lesions or ulceration noted. The tongue is mobile and midline.    Throat - The walls of the oropharynx were smooth, pink, moist, symmetric, and had no lesions or ulcerations.  The tonsillar pillars and soft palate were symmetric.  The uvula was midline on elevation.           Assessment and Plan:       ICD-10-CM    1. Sensorineural hearing loss (SNHL) of both ears H90.3    2. Wears hearing aid Z97.4    3. 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 Impacted cerumen of left ear H61.22      Left ear debrided today.  Unable to remove all of cerumen due to significant EAC stenosis of left ear.    She notes her hearing aides are fitting without any further issues.    Recommend monthly debrox drops into left ear to keep cerumen soft for easier removal.    Annual audiograms, sooner with any acute hearing changes    Cherri Sanders NP  ENT  [facility], [place]  [phone]      Again, thank you for allowing me to participate in the care of your patient.        Sincerely,        Cherri Sanders, NAHUN CNP

## 2018-08-22 NOTE — PROGRESS NOTES
Otolaryngology Progress Note           Chief Complaint:     Patient presents with:  Ear Problem: bilateral SNHL ear cleaning         History of Present Illness:     Annie Arguello is a 84 year old female her for remaining cerumen debridement of left ear. I initially saw her 8/3/18 with c/o feeling her left hearing aide was not fitting well. Physical examination showed left stenotic EAC, making it difficult to remove cerumen, so started her on 1 week of floxin otic. Her right EAC was debrided and TM was intact.    Today Annie reports she completed the otic drops to left ear as directed. Since she saw me last, she feels her left ear no longer feels swollen, feels her hearing aide is fitting better. Denies otalgia/otorrhea.     Audiogram 12/8/17: Tympanograms not measured. Thresholds are mild to severe SNHL. SRT 40 dB HL bilaterally. WRS 88% right and 92% left.          Review of Systems:     See HPI         Physical Exam:     /80 (BP Location: Right arm, Cuff Size: Adult Regular)  Pulse 82  Temp 97.7  F (36.5  C) (Tympanic)  Ht 5' (1.524 m)  Wt 160 lb (72.6 kg)  SpO2 98%  BMI 31.25 kg/m2  General - The patient is well nourished and well developed, and appears to have good nutritional status.  Alert and oriented to person and place, interactive.  Head and Face - Normocephalic and atraumatic, with no gross asymmetry noted of the contour of the facial features.  The facial nerve is intact, with strong symmetric movements.  Neck-no palpable lymphadenopathy or thyroid mass.  Trachea is midline.  Eyes - Extraocular movements intact.   Ears- External ears normal. Bilateral hearing aides in place. Ears examined under microscope. Right EAC patent, TM intact. Left EAC stenotic, used small otoscope cover for visualization, which was still difficulty. With use of #3 and #5 suction, along with cupped forceps, was able to remove most of cerumen, but small amount still remains medial canal, but was difficulty as I  could not see superior most aspect of EAC. Limited view of TM shows it is intact.   Nose - Nasal mucosa is pink and moist with no abnormal mucus.  The septum was grossly midline and non-obstructive, turbinates of normal size and position.  No polyps, masses, or purulence noted on examination.  Mouth - Examination of the oral cavity shows pink, healthy, moist mucosa. Dentition in good condition.  No lesions or ulceration noted. The tongue is mobile and midline.    Throat - The walls of the oropharynx were smooth, pink, moist, symmetric, and had no lesions or ulcerations.  The tonsillar pillars and soft palate were symmetric.  The uvula was midline on elevation.           Assessment and Plan:       ICD-10-CM    1. Sensorineural hearing loss (SNHL) of both ears H90.3    2. Wears hearing aid Z97.4    3. 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 Impacted cerumen of left ear H61.22      Left ear debrided today.  Unable to remove all of cerumen due to significant EAC stenosis of left ear.    She notes her hearing aides are fitting without any further issues.    Recommend monthly debrox drops into left ear to keep cerumen soft for easier removal.    Annual audiograms, sooner with any acute hearing changes    Cherri Sanders NP  ENT  [facility], [place]  [phone]

## 2018-08-22 NOTE — PATIENT INSTRUCTIONS
Annual audiograms, sooner with any acute hearing changes.    If left hearing aide continues to not feel right, recommend follow up with [facility] for evaluation of hearing aide fit.    Return to ENT as needed    Thank you for allowing Cherri Sanders CNP and our ENT team to participate in your care.  If your medications are too expensive, please give the nurse a call.  We can possibly change this medication.  If you have a scheduling or an appointment question please contact [facility] Coordinator at their direct line [phone].   ALL nursing questions or concerns can be directed to your ENT nurse at: [phone]- [phone]

## 2018-09-21 DIAGNOSIS — I48.91 ATRIAL FIBRILLATION (H): ICD-10-CM

## 2018-09-24 RX ORDER — METOPROLOL SUCCINATE 25 MG/1
TABLET, EXTENDED RELEASE ORAL
Qty: 90 TABLET | Refills: 1 | Status: SHIPPED | OUTPATIENT
Start: 2018-09-24 | End: 2019-02-19

## 2018-09-24 NOTE — TELEPHONE ENCOUNTER
metoprolol      Last Written Prescription Date:  12/26/17  Last Fill Quantity: 90,   # refills: 2  Last Office Visit: 3/23/18

## 2018-10-02 ENCOUNTER — TRANSFERRED RECORDS (OUTPATIENT)
Dept: HEALTH INFORMATION MANAGEMENT | Facility: CLINIC | Age: 83
End: 2018-10-02

## 2018-10-04 ENCOUNTER — OFFICE VISIT (OUTPATIENT)
Dept: CHIROPRACTIC MEDICINE | Facility: OTHER | Age: 83
End: 2018-10-04
Attending: CHIROPRACTOR
Payer: MEDICARE

## 2018-10-04 DIAGNOSIS — M99.02 SEGMENTAL AND SOMATIC DYSFUNCTION OF THORACIC REGION: ICD-10-CM

## 2018-10-04 DIAGNOSIS — M54.2 CERVICALGIA: ICD-10-CM

## 2018-10-04 DIAGNOSIS — M99.01 SEGMENTAL AND SOMATIC DYSFUNCTION OF CERVICAL REGION: Primary | ICD-10-CM

## 2018-10-04 DIAGNOSIS — M99.03 SEGMENTAL AND SOMATIC DYSFUNCTION OF LUMBAR REGION: ICD-10-CM

## 2018-10-04 PROCEDURE — 98941 CHIROPRACT MANJ 3-4 REGIONS: CPT | Mod: AT | Performed by: CHIROPRACTOR

## 2018-10-04 NOTE — MR AVS SNAPSHOT
After Visit Summary   10/4/2018    Annie Arguello    MRN: [id number]           Patient Information     Date Of Birth          11/14/1933        Visit Information        Provider Department      10/4/2018 9:10 AM [facility]id Arboleda [facility]        Today's Diagnoses     Segmental and somatic dysfunction of cervical region    -  1    Cervicalgia        Segmental and somatic dysfunction of lumbar region        Segmental and somatic dysfunction of thoracic region           Follow-ups after your visit        Your next 10 appointments already scheduled     Oct 22, 2018 10:30 AM CDT   (Arrive by 10:15 AM)   MA SCREENING BILATERAL W/ GIBSON with HCMA1   [facility] [place] ([facility] [place] )    [address]  [place] [address]   [phone]           How do I prepare for my exam? (Food and drink instructions) No Food and Drink Restrictions.  How do I prepare for my exam? (Other instructions) Do not use any powder, lotion or deodorant under your arms or on your breast. If you do, we will ask you to remove it before your exam.  What should I wear: Wear comfortable, two-piece clothing.  How long does the exam take: Most scans will take 15 minutes.  What should I bring: Bring any previous mammograms from other facilities or have them mailed to the breast center.  Do I need a :  No  is needed.  What do I need to tell my doctor: If you have any allergies, tell your care team.  What should I do after the exam: No restrictions, You may resume normal activities.  What is this test: This test is an x-ray of the breast to look for breast disease. The breast is pressed between two plates to flatten and spread the tissue. An X-ray is taken of the breast from different angles.  Who should I call with questions: If you have any questions, please call the Imaging Department where you will have your exam. Directions, parking instructions, and other information is  "available on our website, [email or web address].  Other information about my exam Three-dimensional (3D) mammograms are available at Center locations in [facility], [place], [place], [facility], [place], and [place].  Health locations include [place] and the [facility] in [place].  Benefits of 3D mammograms include: * Improved rate of cancer detection * Decreases your chance of having to go back for more tests, which means fewer: * \"False-positive\" results (This means that there is an abnormal area but it isn't cancer.) * Invasive testing procedures, such as a biopsy or surgery * Can provide clearer images of the breast if you have dense breast tissue.  *3D mammography is an optional exam that anyone can have with a 2D mammogram. It doesn't replace or take the place of a 2D mammogram. 2D mammograms remain an effective screening test for all women.  Not all insurance companies cover the cost of a 3D mammogram. Check with your insurance.            Oct 22, 2018 11:00 AM CDT   (Arrive by 10:15 AM)   CT CHEST W/O CONTRAST with HICT1   HI CT SCAN ([facility] )    [address] [place] [id number] [phone]           How do I prepare for my exam? (Food and drink instructions) No Food and Drink Restrictions.  How do I prepare for my exam? (Other instructions) You do not need to do anything special to prepare for this exam. For a sinus scan: Use your nose spray (nasal decongestant spray) as directed.  What should I wear: Please wear loose clothing, such as a sweat suit or jogging clothes. Avoid snaps, zippers and other metal. We may ask you to undress and put on a hospital gown.  How long does the exam take: Most scans take less than 20 minutes.  What should I bring: Please bring any scans or X-rays taken at other hospitals, if similar tests were done. Also bring a list of your medicines, including vitamins, minerals and over-the-counter drugs. It is safest " to leave personal items at home.  Do I need a : No  is needed.  What do I need to tell my doctor? Be sure to tell your doctor: * If you have any allergies. * If there s any chance you are pregnant. * If you are breastfeeding.  What should I do after the exam: No restrictions, You may resume normal activities.  What is this test: A CT (computed tomography) scan is a series of pictures that allows us to look inside your body. The scanner creates images of the body in cross sections, much like slices of bread. This helps us see any problems more clearly.  Who should I call with questions: If you have any questions, please call the Imaging Department where you will have your exam. Directions, parking instructions, and other information is available on our website, [email or web address].            Dec 14, 2018 10:15 AM CST   (Arrive by 10:00 AM)   Hearing Eval with Monica Escalera   [facility] ([facility] [place] )    [address]  [facility] [address]   [phone]              Who to contact     If you have questions or need follow up information about today's clinic visit or your schedule please contact  [facility] directly at [phone].  Normal or non-critical lab and imaging results will be communicated to you by MyChart, letter or phone within 4 business days after the clinic has received the results. If you do not hear from us within 7 days, please contact the clinic through MyChart or phone. If you have a critical or abnormal lab result, we will notify you by phone as soon as possible.  Submit refill requests through [employer] or call your pharmacy and they will forward the refill request to us. Please allow 3 business days for your refill to be completed.          Additional Information About Your Visit        Care EveryWhere ID     This is your Care EveryWhere ID. This could be used by other organizations to access your [facility]  records  [id number]         Blood Pressure from Last 3 Encounters:   08/22/18 120/80   08/03/18 128/70   03/23/18 120/70    Weight from Last 3 Encounters:   08/22/18 160 lb (72.6 kg)   08/03/18 160 lb (72.6 kg)   03/23/18 160 lb (72.6 kg)              We Performed the Following     CHIROPRAC MANIP,SPINAL,3-4 REGIONS        Primary Care Provider Office Phone # Fax #    R Oleksandr Cox [phone]       [facility] [place] [id number]        Equal Access to Services     [facility]: Hadii nova taylor hadasho Soagustina, waaxda luqadaha, qaybta kaalmada stephany, abhay razo . So [facility] [phone].    ATENCIÓN: Si habla español, tiene a bethea disposición servicios gratuitos de asistencia lingüística. [facility] [phone].    We comply with applicable federal civil rights laws and [place] laws. We do not discriminate on the basis of race, color, national origin, age, disability, sex, sexual orientation, or gender identity.            Thank you!     Thank you for choosing  [facility]  for your care. Our goal is always to provide you with excellent care. Hearing back from our patients is one way we can continue to improve our services. Please take a few minutes to complete the written survey that you may receive in the mail after your visit with us. Thank you!             Your Updated Medication List - Protect others around you: Learn how to safely use, store and throw away your medicines at www.disposemymeds.org.          This list is accurate as of 10/4/18  1:52 PM.  Always use your most recent med list.                   Brand Name Dispense Instructions for use Diagnosis    alendronate 70 MG tablet    FOSAMAX    12 tablet    TAKE AS DIRECTED BY PRESCRIBER    Osteoarthritis, unspecified osteoarthritis type, unspecified site       ALEVE 220 MG tablet   Generic drug:  naproxen sodium      Take 220 mg by mouth 2 times daily (with meals) Take one tablet (220mg)  by oral route every 12 hours as needed        aspirin 325 MG tablet      Take 325 mg by mouth daily Take one tablet (325mg) by oral route every day        blood glucose monitoring lancets     100 each    USE TO TEST BLOOD SUGARS ONCE A DAY    Type 2 diabetes mellitus without complication, without long-term current use of insulin (H)       DAILY MULTIVITAMIN PO      Take by mouth daily Take one tablet by oral route every day with food        DIGOX 125 MCG tablet   Generic drug:  digoxin     30 tablet    TAKE 1 TABLET DAILY IN THE EVENING (DUE FOR OFFICE VISIT)    Chronic atrial fibrillation (H)       fish oil-omega-3 fatty acids 1000 MG capsule      Take 1,000 mg by mouth daily Take one by oral route one times every day        fluticasone 50 MCG/ACT spray    FLONASE    1 Bottle    Spray 1-2 sprays into both nostrils daily    Chronic rhinitis, unspecified type       metoprolol succinate 25 MG 24 hr tablet    TOPROL-XL    90 tablet    TAKE 1 TABLET DAILY    Atrial fibrillation (H)       ONETOUCH VERIO IQ test strip   Generic drug:  blood glucose monitoring     50 strip    USE TO TEST BLOOD SUGARS ONCE DAILY    Type 2 diabetes mellitus without complication, without long-term current use of insulin (H)       TUMS PO

## 2018-10-04 NOTE — PROGRESS NOTES
Subjective Finding:    Chief compalint: Patient presents with:  Neck Pain  Back Pain  , Pain Scale: 6/10, Intensity: sharp and dull, Duration: 5 days, Change since last visit: same, Radiating: no.    Date of injury:     Activities that the pain restricts:   Home/household activities: no.  Work duties: no.  Hobbies/social: no.  Sleep: no.  Makes symptoms better: ice  and rest.  Makes symptoms worse: activity, cervical extension and cervical flexion.  Have you seen anyone else for the symptoms? No.  Work related: no.  Automobile related injury: no.    Objective and Assessment:    Posture Analysis:   High shoulder: right.  Head tilt: right.  High iliac crest: .  Head carriage: forward.  Thoracic Kyphosis: neutral.  Lumbar Lordosis: neutral.    Lumbar Range of Motion: flexion decreased.  Cervical Range of Motion: flexion decreased and extension decreased.  Thoracic Range of Motion: right lateral flexion decreased.  Extremity Range of Motion: .    Palpation:   T paraspinals: ache and dull pain, no  Traps: ache and dull pain, referred pain: no    Segmental dysfunction pre-treatment: c5-6  T3.  L4-5    Assessment post-treatment:  Cervical: ROM increased.  Thoracic: ROM increased.  Lumbar: ROM increased.    Comments: .      Complicating Factors: .    Plan / Procedure:    Expected release date: .  Treatment plan: PRN.  Instructed patient: ice 20 minutes every other hour as needed and rest.  Short term goals: reduce pain.  Long term goals: restore normal function.  Prognosis: excellent.

## 2018-10-13 DIAGNOSIS — I48.20 CHRONIC ATRIAL FIBRILLATION (H): ICD-10-CM

## 2018-10-16 RX ORDER — DIGOXIN 125 UG/1
TABLET ORAL
Qty: 30 TABLET | Refills: 3 | Status: SHIPPED | OUTPATIENT
Start: 2018-10-16 | End: 2019-02-12

## 2018-10-16 NOTE — TELEPHONE ENCOUNTER
Digoxin  Last visit: 3.23.18  Last refill: 7.2.18    Lab Results   Component Value Date    DIGOXIN 0.5 03/23/2018

## 2018-10-22 ENCOUNTER — HOSPITAL ENCOUNTER (OUTPATIENT)
Dept: CT IMAGING | Facility: HOSPITAL | Age: 83
Discharge: HOME | End: 2018-10-22
Attending: FAMILY MEDICINE | Admitting: FAMILY MEDICINE
Payer: MEDICARE

## 2018-10-22 ENCOUNTER — TELEPHONE (OUTPATIENT)
Dept: FAMILY MEDICINE | Facility: OTHER | Age: 83
End: 2018-10-22

## 2018-10-22 ENCOUNTER — RADIANT APPOINTMENT (OUTPATIENT)
Dept: MAMMOGRAPHY | Facility: OTHER | Age: 83
End: 2018-10-22
Attending: FAMILY MEDICINE
Payer: MEDICARE

## 2018-10-22 DIAGNOSIS — R93.89 ABNORMAL CT OF THE CHEST: ICD-10-CM

## 2018-10-22 DIAGNOSIS — Z12.39 SCREENING BREAST EXAMINATION: ICD-10-CM

## 2018-10-22 PROCEDURE — 71250 CT THORAX DX C-: CPT | Mod: TC

## 2018-10-22 PROCEDURE — 77067 SCR MAMMO BI INCL CAD: CPT | Mod: TC

## 2018-10-22 NOTE — TELEPHONE ENCOUNTER
1:57 PM    Reason for Call: Phone Call    Description: Pt called and states that she would a call back regarding some question she has on a scan she had done today she thought she was supposed to get a chest xray and never got one.    Was an appointment offered for this call? No  If yes : Appointment type              Date    Preferred method for responding to this message: Telephone Call  What is your phone number ?[phone]    If we cannot reach you directly, may we leave a detailed response at the number you provided? Yes    Can this message wait until your PCP/provider returns, if available today? Not applicable, pcp is [facility]

## 2018-10-22 NOTE — TELEPHONE ENCOUNTER
Discussed with patient, patient was due for 1 year ct follow up. Patient understands.  DANETTE HEARN

## 2018-10-25 NOTE — PROGRESS NOTES
SUBJECTIVE:   Annie Arguello is a 84 year old female who presents to clinic today for the following health issues:  Patient has had left fibula pain without a history of trauma.  Has occasional dysuria. No fevers. Has a history of Atrial fibrillation. No chest pain or palpitations.   Had a recent mammogram that was neg. Also had a follow up Chest CT that showed a stable nodule.  Diabetes Follow-up    Patient is checking blood sugars: once daily.  Results are as follows:         am - average 104.     Diabetic concerns: None     Symptoms of hypoglycemia (low blood sugar): none     Paresthesias (numbness or burning in feet) or sores: No     Date of last diabetic eye exam: 10-2-18    BP Readings from Last 2 Encounters:   18 120/80   18 128/70     Hemoglobin A1C (%)   Date Value   10/24/2017 6.1 (H)   2017 6.6     LDL Cholesterol Calculated (mg/dL)   Date Value   10/24/2017 115 (H)   2017 107 (H)       Diabetes Management Resources    Amount of exercise or physical activity: 4-5 days/week for an average of 30-45 minutes    Problems taking medications regularly: No    Medication side effects: none    Diet: regular (no restrictions)            Problem list and histories reviewed & adjusted, as indicated.  Additional history: as documented    Patient Active Problem List   Diagnosis     Advanced care planning/counseling discussion     Sciatica     Diverticulosis of large intestine     Osteoporosis     Obesity     Congenital cystic kidney disease     Myalgia and myositis     Calculus of kidney     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     ACP (advance care planning)     Controlled type 2 diabetes mellitus without complication, without long-term current use of insulin (H)     Permanent atrial fibrillation (H)     Lung nodule     Past Surgical History:   Procedure Laterality Date     Breast biopsy      LT, benign      SECTION       CHOLECYSTECTOMY      lap     COLONOSCOPY        LUCILLE / BSO         Social History   Substance Use Topics     Smoking status: Never Smoker     Smokeless tobacco: Never Used      Comment: no passive exposure     Alcohol use No     Family History   Problem Relation Age of Onset     Myocardial Infarction Mother 59     myocardial infarction, cause of death     Other - See Comments Daughter      fibromyalgia     Thyroid Disease Sister      hypo     Thyroid Disease Sister      hypo     Thyroid Disease Sister      hypo     Myocardial Infarction Brother 63     myocardial infarction, cause of death         Current Outpatient Prescriptions   Medication Sig Dispense Refill     alendronate (FOSAMAX) 70 MG tablet TAKE AS DIRECTED BY PRESCRIBER 12 tablet 2     aspirin 325 MG tablet Take 325 mg by mouth daily Take one tablet (325mg) by oral route every day        blood glucose monitoring (ONE TOUCH DELICA) lancets USE TO TEST BLOOD SUGARS ONCE A  each 1     Calcium Carbonate Antacid (TUMS PO)        Coenzyme Q10 (COQ-10 PO)        DIGOX 125 MCG tablet TAKE 1 TABLET DAILY IN THE EVENING (DUE FOR OFFICE VISIT) 30 tablet 3     metoprolol succinate (TOPROL-XL) 25 MG 24 hr tablet TAKE 1 TABLET DAILY 90 tablet 1     Multiple Vitamin (DAILY MULTIVITAMIN PO) Take by mouth daily Take one tablet by oral route every day with food       naproxen sodium (ALEVE) 220 MG tablet Take 220 mg by mouth 2 times daily (with meals) Take one tablet (220mg) by oral route every 12 hours as needed         ONETOUCH VERIO IQ test strip USE TO TEST BLOOD SUGARS ONCE DAILY 50 strip 10     Allergies   Allergen Reactions     Ampicillin      Desvenlafaxine Other (See Comments)     Desvenlafaxine Succinate  Pristiq       Sertraline Hcl Other (See Comments) and Diarrhea     Zoloft       Sulfa Drugs Other (See Comments)     Sulfa(Sulfonamide Antibiotics)     Recent Labs   Lab Test  10/30/18   0906  03/23/18   0945  01/23/18   0928  10/24/17   0845 03/14/17 02/14/17   0805 12/06/16 11/25/16   0947   05/23/16   0850  05/14/16   1325   A1C   --    --    --   6.1*  6.6   --   7.5   --    --    --    LDL  123*   --    --   115*   --   107*   --    --    --    --    HDL  51   --    --   56   --   56   --    --    --    --    TRIG  101   --    --   112   --   101   --    --    --    --    ALT   --    --    --    --    --    --    --   40   --   36   CR  0.93  0.81   --   0.70   --   0.74   --   0.76   --   0.69   GFRESTIMATED  57*  68   --   79   --   74   --   72   --   81   GFRESTBLACK  70  82   --   >90   --   >90   GFR Calc     --   88   --   >90   GFR Calc     POTASSIUM  4.5  4.1   --   4.2   --    --    --   4.2   --   4.1   TSH   --    --   2.66   --    --    --    --    --   3.77   --       BP Readings from Last 3 Encounters:   10/30/18 126/74   08/22/18 120/80   08/03/18 128/70    Wt Readings from Last 3 Encounters:   10/30/18 161 lb (73 kg)   08/22/18 160 lb (72.6 kg)   08/03/18 160 lb (72.6 kg)                    Reviewed and updated as needed this visit by clinical staff       Reviewed and updated as needed this visit by Provider        10/22/18 10:30 AM [id number] HI CT SCAN    [employer] Interactive Report and [employer]   View the interactive report   PACS Images   Show images for CT Chest w/o Contrast   Study Result   PROCEDURE: CT CHEST W/O CONTRAST 10/22/2018 10:30 AM     HISTORY: recheck in 1 year; Abnormal CT of the chest     COMPARISONS: October 25, 2017     Meds/Dose Given:     TECHNIQUE: CT scan of the chest without IV contrast. Sagittal and  coronal reconstructions were obtained.     FINDINGS: On axial image #68 a 3 mm diameter nodule seen in the left  lower lobe. There is some linear opacities seen in the right lung base  most likely representing scarring. There is interstitial thickening  seen in both lungs peripherally. Interstitial thickening is worse in  the lower abdomen the upper lobes. There is some apical scarring seen  on the right also unchanged. No hilar  or mediastinal masses  lymphadenopathy is noted heavy coronary artery calcifications are seen  in place the patient at increased risk of significant coronary artery  disease. The axillary and supraclavicular lymph nodes appear normal.  The upper portion of the liver and spleen appear normal. There is a  large benign cyst seen in the left kidney. Degenerative changes are  present in the thoracic and lumbar spine.          IMPRESSION: Stable appearance of the nodular density at the left lung  base from previous examination. The right basilar nodule was not seen  on today's examination. Interstitial thickening is seen in both lungs  stable from previous examination.     DOILIA CARO MD         ROS:  Constitutional, HEENT, cardiovascular, pulmonary, GI, , musculoskeletal, neuro, skin, endocrine and psych systems are negative, except as otherwise noted.    OBJECTIVE:     /74  Pulse 64  Temp 98.4  F (36.9  C) (Tympanic)  Wt 161 lb (73 kg)  SpO2 99%  BMI 31.44 kg/m2  Body mass index is 31.44 kg/(m^2).  GENERAL: healthy, alert and no distress  NECK: no adenopathy, no asymmetry, masses, or scars and thyroid normal to palpation  RESP: lungs clear to auscultation - no rales, rhonchi or wheezes  CV: Cardiac is irregularly irregular, no murmur, click or rub, no peripheral edema and peripheral pulses strong  ABDOMEN: soft, nontender, no hepatosplenomegaly, no masses and bowel sounds normal  MS: Has minimal tenderness left lower extremity near the proximal fibula, no edema    Diagnostic Test Results:  Results for orders placed or performed in visit on 10/30/18 (from the past 24 hour(s))   Lipid Profile (Chol, Trig, HDL, LDL calc)   Result Value Ref Range    Cholesterol 194 <200 mg/dL    Triglycerides 101 <150 mg/dL    HDL Cholesterol 51 >49 mg/dL    LDL Cholesterol Calculated 123 (H) <100 mg/dL    Non HDL Cholesterol 143 (H) <130 mg/dL   Basic metabolic panel   Result Value Ref Range    Sodium 139 133 - 144 mmol/L     Potassium 4.5 3.4 - 5.3 mmol/L    Chloride 109 94 - 109 mmol/L    Carbon Dioxide 24 20 - 32 mmol/L    Anion Gap 6 3 - 14 mmol/L    Glucose 122 (H) 70 - 99 mg/dL    Urea Nitrogen 19 7 - 30 mg/dL    Creatinine 0.93 0.52 - 1.04 mg/dL    GFR Estimate 57 (L) >60 mL/min/1.7m2    GFR Estimate If Black 70 >60 mL/min/1.7m2    Calcium 8.9 8.5 - 10.1 mg/dL   UA reflex to Microscopic and Culture   Result Value Ref Range    Color Urine Light Yellow     Appearance Urine Clear     Glucose Urine Negative NEG^Negative mg/dL    Bilirubin Urine Negative NEG^Negative    Ketones Urine Negative NEG^Negative mg/dL    Specific Gravity Urine 1.004 1.003 - 1.035    Blood Urine Negative NEG^Negative    pH Urine 5.5 4.7 - 8.0 pH    Protein Albumin Urine Negative NEG^Negative mg/dL    Urobilinogen mg/dL Normal 0.0 - 2.0 mg/dL    Nitrite Urine Negative NEG^Negative    Leukocyte Esterase Urine Small (A) NEG^Negative    Source Midstream Urine     RBC Urine <1 0 - 2 /HPF    WBC Urine 5 0 - 5 /HPF    Bacteria Urine None (A) NEG^Negative /HPF     10/30/18  9:20 AM [id number] [facility]    [employer] Interactive Report and [employer]   View the interactive report   PACS Images   Show images for XR Tibia & Fibula Left 2 Views   Study Result   PROCEDURE:  XR TIBIA & FIBULA LT 2 VW     HISTORY: ; Pain of left lower extremity     COMPARISON:  None.     TECHNIQUE:  2 views of the left tibia and fibula were obtained.     FINDINGS:  No fracture or dislocation is identified. The joint spaces  are preserved.           IMPRESSION: No acute fracture.       ODILIA CARO MD       ASSESSMENT/PLAN:     Diabetes Type II, A1c Controlled, non-insulin dependent   Associated with the following complications    Renal Complications:  None    Ophthalmologic Complications: None    Neurologic Complications: None    Peripheral Vascular Complications:  None    Other: None   Plan:  No changes in the patient's current treatment plan              ICD-10-CM     1. Controlled type 2 diabetes mellitus without complication, without long-term current use of insulin (H) E11.9 Lipid Profile (Chol, Trig, HDL, LDL calc)     Hemoglobin A1c   2. Permanent atrial fibrillation (H) I48.2 Basic metabolic panel   3. Pain of left lower extremity M79.605 XR Tibia & Fibula Left 2 Views   4. Dysuria R30.0 UA reflex to Microscopic and Culture   5. Lung nodule R91.1      Patient is diet controlled type 2 diabetes.  We will check lipids and A1c her BMP is done and that stable.  Has permanent atrial fibrillation she is asymptomatic with it.  Does have discomfort left lower extremity x-ray  is negative.  Has dysuria urine  is a little abnormal will treat with Cipro.  Does have a lung nodule follow-up showed no change she is not interested in pursuing an additional follow-up next year.    DOC Cox MD  [facility]

## 2018-10-30 ENCOUNTER — TELEPHONE (OUTPATIENT)
Dept: FAMILY MEDICINE | Facility: OTHER | Age: 83
End: 2018-10-30

## 2018-10-30 ENCOUNTER — OFFICE VISIT (OUTPATIENT)
Dept: FAMILY MEDICINE | Facility: OTHER | Age: 83
End: 2018-10-30
Attending: FAMILY MEDICINE
Payer: MEDICARE

## 2018-10-30 ENCOUNTER — RADIANT APPOINTMENT (OUTPATIENT)
Dept: GENERAL RADIOLOGY | Facility: OTHER | Age: 83
End: 2018-10-30
Attending: FAMILY MEDICINE
Payer: MEDICARE

## 2018-10-30 VITALS
BODY MASS INDEX: 31.44 KG/M2 | HEART RATE: 64 BPM | OXYGEN SATURATION: 99 % | WEIGHT: 161 LBS | DIASTOLIC BLOOD PRESSURE: 74 MMHG | TEMPERATURE: 98.4 F | SYSTOLIC BLOOD PRESSURE: 126 MMHG

## 2018-10-30 DIAGNOSIS — I48.21 PERMANENT ATRIAL FIBRILLATION (H): ICD-10-CM

## 2018-10-30 DIAGNOSIS — R91.1 LUNG NODULE: ICD-10-CM

## 2018-10-30 DIAGNOSIS — M79.605 PAIN OF LEFT LOWER EXTREMITY: ICD-10-CM

## 2018-10-30 DIAGNOSIS — E11.9 CONTROLLED TYPE 2 DIABETES MELLITUS WITHOUT COMPLICATION, WITHOUT LONG-TERM CURRENT USE OF INSULIN (H): Primary | ICD-10-CM

## 2018-10-30 DIAGNOSIS — R30.0 DYSURIA: ICD-10-CM

## 2018-10-30 DIAGNOSIS — R30.0 DYSURIA: Primary | ICD-10-CM

## 2018-10-30 LAB
ALBUMIN UR-MCNC: NEGATIVE MG/DL
ANION GAP SERPL CALCULATED.3IONS-SCNC: 6 MMOL/L (ref 3–14)
APPEARANCE UR: CLEAR
BACTERIA #/AREA URNS HPF: ABNORMAL /HPF
BILIRUB UR QL STRIP: NEGATIVE
BUN SERPL-MCNC: 19 MG/DL (ref 7–30)
CALCIUM SERPL-MCNC: 8.9 MG/DL (ref 8.5–10.1)
CHLORIDE SERPL-SCNC: 109 MMOL/L (ref 94–109)
CHOLEST SERPL-MCNC: 194 MG/DL
CO2 SERPL-SCNC: 24 MMOL/L (ref 20–32)
COLOR UR AUTO: ABNORMAL
CREAT SERPL-MCNC: 0.93 MG/DL (ref 0.52–1.04)
EST. AVERAGE GLUCOSE BLD GHB EST-MCNC: 134 MG/DL
GFR SERPL CREATININE-BSD FRML MDRD: 57 ML/MIN/1.7M2
GLUCOSE SERPL-MCNC: 122 MG/DL (ref 70–99)
GLUCOSE UR STRIP-MCNC: NEGATIVE MG/DL
HBA1C MFR BLD: 6.3 % (ref 0–5.6)
HDLC SERPL-MCNC: 51 MG/DL
HGB UR QL STRIP: NEGATIVE
KETONES UR STRIP-MCNC: NEGATIVE MG/DL
LDLC SERPL CALC-MCNC: 123 MG/DL
LEUKOCYTE ESTERASE UR QL STRIP: ABNORMAL
NITRATE UR QL: NEGATIVE
NONHDLC SERPL-MCNC: 143 MG/DL
PH UR STRIP: 5.5 PH (ref 4.7–8)
POTASSIUM SERPL-SCNC: 4.5 MMOL/L (ref 3.4–5.3)
RBC #/AREA URNS AUTO: <1 /HPF (ref 0–2)
SODIUM SERPL-SCNC: 139 MMOL/L (ref 133–144)
SOURCE: ABNORMAL
SP GR UR STRIP: 1 (ref 1–1.03)
TRIGL SERPL-MCNC: 101 MG/DL
UROBILINOGEN UR STRIP-MCNC: NORMAL MG/DL (ref 0–2)
WBC #/AREA URNS AUTO: 5 /HPF (ref 0–5)

## 2018-10-30 PROCEDURE — 80048 BASIC METABOLIC PNL TOTAL CA: CPT | Mod: ZL | Performed by: FAMILY MEDICINE

## 2018-10-30 PROCEDURE — 73590 X-RAY EXAM OF LOWER LEG: CPT | Mod: TC,LT

## 2018-10-30 PROCEDURE — 80061 LIPID PANEL: CPT | Mod: ZL | Performed by: FAMILY MEDICINE

## 2018-10-30 PROCEDURE — 40000788 ZZHCL STATISTIC ESTIMATED AVERAGE GLUCOSE: Mod: ZL | Performed by: FAMILY MEDICINE

## 2018-10-30 PROCEDURE — 99214 OFFICE O/P EST MOD 30 MIN: CPT | Performed by: FAMILY MEDICINE

## 2018-10-30 PROCEDURE — G0463 HOSPITAL OUTPT CLINIC VISIT: HCPCS

## 2018-10-30 PROCEDURE — 36415 COLL VENOUS BLD VENIPUNCTURE: CPT | Mod: ZL | Performed by: FAMILY MEDICINE

## 2018-10-30 PROCEDURE — 81001 URINALYSIS AUTO W/SCOPE: CPT | Mod: ZL | Performed by: FAMILY MEDICINE

## 2018-10-30 PROCEDURE — 83036 HEMOGLOBIN GLYCOSYLATED A1C: CPT | Mod: ZL | Performed by: FAMILY MEDICINE

## 2018-10-30 RX ORDER — CIPROFLOXACIN 250 MG/1
250 TABLET, FILM COATED ORAL 2 TIMES DAILY
Qty: 6 TABLET | Refills: 0 | Status: SHIPPED | OUTPATIENT
Start: 2018-10-30 | End: 2018-11-26

## 2018-10-30 ASSESSMENT — ANXIETY QUESTIONNAIRES
1. FEELING NERVOUS, ANXIOUS, OR ON EDGE: NOT AT ALL
5. BEING SO RESTLESS THAT IT IS HARD TO SIT STILL: NOT AT ALL
IF YOU CHECKED OFF ANY PROBLEMS ON THIS QUESTIONNAIRE, HOW DIFFICULT HAVE THESE PROBLEMS MADE IT FOR YOU TO DO YOUR WORK, TAKE CARE OF THINGS AT HOME, OR GET ALONG WITH OTHER PEOPLE: SOMEWHAT DIFFICULT
2. NOT BEING ABLE TO STOP OR CONTROL WORRYING: NOT AT ALL
6. BECOMING EASILY ANNOYED OR IRRITABLE: NOT AT ALL
7. FEELING AFRAID AS IF SOMETHING AWFUL MIGHT HAPPEN: NOT AT ALL
GAD7 TOTAL SCORE: 1
3. WORRYING TOO MUCH ABOUT DIFFERENT THINGS: SEVERAL DAYS
4. TROUBLE RELAXING: NOT AT ALL

## 2018-10-30 ASSESSMENT — PATIENT HEALTH QUESTIONNAIRE - PHQ9: SUM OF ALL RESPONSES TO PHQ QUESTIONS 1-9: 2

## 2018-10-30 ASSESSMENT — PAIN SCALES - GENERAL: PAINLEVEL: NO PAIN (0)

## 2018-10-30 NOTE — MR AVS SNAPSHOT
After Visit Summary   10/30/2018    Annie Arguello    MRN: [id number]           Patient Information     Date Of Birth          11/14/1933        Visit Information        Provider Department      10/30/2018 9:00 AM DOC Cox MD [facility]        Today's Diagnoses     Controlled type 2 diabetes mellitus without complication, without long-term current use of insulin (H)    -  1    Permanent atrial fibrillation (H)        Pain of left lower extremity        Dysuria        Lung nodule           Follow-ups after your visit        Your next 10 appointments already scheduled     Dec 14, 2018 10:15 AM CST   (Arrive by 10:00 AM)   Hearing Eval with Monica Escalera   [facility] ([facility] )    [address]  [place] [address]   [phone]              Who to contact     If you have questions or need follow up information about today's clinic visit or your schedule please contact [facility] directly at [phone].  Normal or non-critical lab and imaging results will be communicated to you by MyChart, letter or phone within 4 business days after the clinic has received the results. If you do not hear from us within 7 days, please contact the clinic through MyChart or phone. If you have a critical or abnormal lab result, we will notify you by phone as soon as possible.  Submit refill requests through [employer] or call your pharmacy and they will forward the refill request to us. Please allow 3 business days for your refill to be completed.          Additional Information About Your Visit        Care EveryWhere ID     This is your Care EveryWhere ID. This could be used by other organizations to access your [facility] medical records  [id number]        Your Vitals Were     Pulse Temperature Pulse Oximetry BMI (Body Mass Index)          64 98.4  F (36.9  C) (Tympanic) 99% 31.44 kg/m2         Blood Pressure from  Last 3 Encounters:   10/30/18 126/74   08/22/18 120/80   08/03/18 128/70    Weight from Last 3 Encounters:   10/30/18 161 lb (73 kg)   08/22/18 160 lb (72.6 kg)   08/03/18 160 lb (72.6 kg)              We Performed the Following     Basic metabolic panel     Hemoglobin A1c     Lipid Profile (Chol, Trig, HDL, LDL calc)     UA reflex to Microscopic and Culture        Primary Care Provider Office Phone # Fax #    R Oleksandr Cox [phone] [address] [facility] [address]        Equal Access to Services     [facility]: Hadii nova taylor hadasho Soagustina, waaxda luqadaha, qaybta kaalmada adeeugeneyamike, abhay razo . So [facility] [phone].    ATENCIÓN: Si habla español, tiene a bethea disposición servicios gratuitos de asistencia lingüística. Llame al [phone].    We comply with applicable federal civil rights laws and [place] laws. We do not discriminate on the basis of race, color, national origin, age, disability, sex, sexual orientation, or gender identity.            Thank you!     Thank you for choosing [facility]  for your care. Our goal is always to provide you with excellent care. Hearing back from our patients is one way we can continue to improve our services. Please take a few minutes to complete the written survey that you may receive in the mail after your visit with us. Thank you!             Your Updated Medication List - Protect others around you: Learn how to safely use, store and throw away your medicines at www.disposemymeds.org.          This list is accurate as of 10/30/18  9:32 AM.  Always use your most recent med list.                   Brand Name Dispense Instructions for use Diagnosis    alendronate 70 MG tablet    FOSAMAX    12 tablet    TAKE AS DIRECTED BY PRESCRIBER    Osteoarthritis, unspecified osteoarthritis type, unspecified site       ALEVE 220 MG tablet   Generic drug:  naproxen sodium      Take 220 mg by mouth  2 times daily (with meals) Take one tablet (220mg) by oral route every 12 hours as needed        aspirin 325 MG tablet      Take 325 mg by mouth daily Take one tablet (325mg) by oral route every day        blood glucose monitoring lancets     100 each    USE TO TEST BLOOD SUGARS ONCE A DAY    Type 2 diabetes mellitus without complication, without long-term current use of insulin (H)       COQ-10 PO           DAILY MULTIVITAMIN PO      Take by mouth daily Take one tablet by oral route every day with food        DIGOX 125 MCG tablet   Generic drug:  digoxin     30 tablet    TAKE 1 TABLET DAILY IN THE EVENING (DUE FOR OFFICE VISIT)    Chronic atrial fibrillation (H)       metoprolol succinate 25 MG 24 hr tablet    TOPROL-XL    90 tablet    TAKE 1 TABLET DAILY    Atrial fibrillation (H)       ONETOUCH VERIO IQ test strip   Generic drug:  blood glucose monitoring     50 strip    USE TO TEST BLOOD SUGARS ONCE DAILY    Type 2 diabetes mellitus without complication, without long-term current use of insulin (H)       TUMS PO

## 2018-10-30 NOTE — NURSING NOTE
Chief Complaint   Patient presents with     Diabetes       Initial /74  Pulse 64  Temp 98.4  F (36.9  C) (Tympanic)  Wt 161 lb (73 kg)  SpO2 99%  BMI 31.44 kg/m2 Estimated body mass index is 31.44 kg/(m^2) as calculated from the following:    Height as of 8/22/18: 5' (1.524 m).    Weight as of this encounter: 161 lb (73 kg).  Medication Reconciliation: complete    Elisabeth Rodriguez MA

## 2018-10-31 ASSESSMENT — ANXIETY QUESTIONNAIRES: GAD7 TOTAL SCORE: 1

## 2018-11-14 DIAGNOSIS — H91.93 DECREASED HEARING OF BOTH EARS: Primary | ICD-10-CM

## 2018-11-26 ENCOUNTER — APPOINTMENT (OUTPATIENT)
Dept: CT IMAGING | Facility: HOSPITAL | Age: 83
End: 2018-11-26
Attending: FAMILY MEDICINE
Payer: MEDICARE

## 2018-11-26 ENCOUNTER — HOSPITAL ENCOUNTER (EMERGENCY)
Facility: HOSPITAL | Age: 83
Discharge: ANOTHER HOSPITAL | End: 2018-11-26
Attending: FAMILY MEDICINE | Admitting: FAMILY MEDICINE
Payer: MEDICARE

## 2018-11-26 VITALS
RESPIRATION RATE: 18 BRPM | TEMPERATURE: 98.6 F | SYSTOLIC BLOOD PRESSURE: 124 MMHG | OXYGEN SATURATION: 98 % | DIASTOLIC BLOOD PRESSURE: 96 MMHG | BODY MASS INDEX: 30.14 KG/M2 | WEIGHT: 163.8 LBS | HEIGHT: 62 IN

## 2018-11-26 DIAGNOSIS — I48.21 PERMANENT ATRIAL FIBRILLATION (H): ICD-10-CM

## 2018-11-26 DIAGNOSIS — I63.311 CEREBROVASCULAR ACCIDENT (CVA) DUE TO THROMBOSIS OF RIGHT MIDDLE CEREBRAL ARTERY (H): ICD-10-CM

## 2018-11-26 LAB
ANION GAP SERPL CALCULATED.3IONS-SCNC: 9 MMOL/L (ref 3–14)
APTT PPP: 26 SEC (ref 24–37)
BASOPHILS # BLD AUTO: 0.1 10E9/L (ref 0–0.2)
BASOPHILS NFR BLD AUTO: 0.4 %
BUN SERPL-MCNC: 26 MG/DL (ref 7–30)
CALCIUM SERPL-MCNC: 8.9 MG/DL (ref 8.5–10.1)
CHLORIDE SERPL-SCNC: 108 MMOL/L (ref 94–109)
CO2 SERPL-SCNC: 22 MMOL/L (ref 20–32)
CREAT SERPL-MCNC: 0.79 MG/DL (ref 0.52–1.04)
DIFFERENTIAL METHOD BLD: ABNORMAL
EOSINOPHIL # BLD AUTO: 0 10E9/L (ref 0–0.7)
EOSINOPHIL NFR BLD AUTO: 0.3 %
ERYTHROCYTE [DISTWIDTH] IN BLOOD BY AUTOMATED COUNT: 13.5 % (ref 10–15)
GFR SERPL CREATININE-BSD FRML MDRD: 69 ML/MIN/1.7M2
GLUCOSE BLDC GLUCOMTR-MCNC: 141 MG/DL (ref 70–99)
GLUCOSE BLDC GLUCOMTR-MCNC: 145 MG/DL (ref 70–99)
GLUCOSE SERPL-MCNC: 148 MG/DL (ref 70–99)
HCT VFR BLD AUTO: 43.8 % (ref 35–47)
HGB BLD-MCNC: 15 G/DL (ref 11.7–15.7)
IMM GRANULOCYTES # BLD: 0.1 10E9/L (ref 0–0.4)
IMM GRANULOCYTES NFR BLD: 0.5 %
INR PPP: 1.13 (ref 0.8–1.2)
LYMPHOCYTES # BLD AUTO: 1.4 10E9/L (ref 0.8–5.3)
LYMPHOCYTES NFR BLD AUTO: 8.7 %
MCH RBC QN AUTO: 30.1 PG (ref 26.5–33)
MCHC RBC AUTO-ENTMCNC: 34.2 G/DL (ref 31.5–36.5)
MCV RBC AUTO: 88 FL (ref 78–100)
MONOCYTES # BLD AUTO: 0.6 10E9/L (ref 0–1.3)
MONOCYTES NFR BLD AUTO: 3.5 %
NEUTROPHILS # BLD AUTO: 13.7 10E9/L (ref 1.6–8.3)
NEUTROPHILS NFR BLD AUTO: 86.6 %
NRBC # BLD AUTO: 0 10*3/UL
NRBC BLD AUTO-RTO: 0 /100
PLATELET # BLD AUTO: 213 10E9/L (ref 150–450)
POTASSIUM SERPL-SCNC: 4 MMOL/L (ref 3.4–5.3)
RBC # BLD AUTO: 4.99 10E12/L (ref 3.8–5.2)
SODIUM SERPL-SCNC: 139 MMOL/L (ref 133–144)
TROPONIN I SERPL-MCNC: <0.015 UG/L (ref 0–0.04)
WBC # BLD AUTO: 15.8 10E9/L (ref 4–11)

## 2018-11-26 PROCEDURE — 85610 PROTHROMBIN TIME: CPT | Performed by: FAMILY MEDICINE

## 2018-11-26 PROCEDURE — 93010 ELECTROCARDIOGRAM REPORT: CPT | Performed by: INTERNAL MEDICINE

## 2018-11-26 PROCEDURE — 99285 EMERGENCY DEPT VISIT HI MDM: CPT | Mod: Z6 | Performed by: FAMILY MEDICINE

## 2018-11-26 PROCEDURE — 36415 COLL VENOUS BLD VENIPUNCTURE: CPT | Performed by: FAMILY MEDICINE

## 2018-11-26 PROCEDURE — 80048 BASIC METABOLIC PNL TOTAL CA: CPT | Performed by: FAMILY MEDICINE

## 2018-11-26 PROCEDURE — 85730 THROMBOPLASTIN TIME PARTIAL: CPT | Performed by: FAMILY MEDICINE

## 2018-11-26 PROCEDURE — 70450 CT HEAD/BRAIN W/O DYE: CPT | Mod: TC

## 2018-11-26 PROCEDURE — 96374 THER/PROPH/DIAG INJ IV PUSH: CPT

## 2018-11-26 PROCEDURE — 84484 ASSAY OF TROPONIN QUANT: CPT | Performed by: FAMILY MEDICINE

## 2018-11-26 PROCEDURE — 99285 EMERGENCY DEPT VISIT HI MDM: CPT | Mod: 25

## 2018-11-26 PROCEDURE — 85025 COMPLETE CBC W/AUTO DIFF WBC: CPT | Performed by: FAMILY MEDICINE

## 2018-11-26 PROCEDURE — 93005 ELECTROCARDIOGRAM TRACING: CPT

## 2018-11-26 PROCEDURE — 25000128 H RX IP 250 OP 636: Performed by: FAMILY MEDICINE

## 2018-11-26 PROCEDURE — 00000146 ZZHCL STATISTIC GLUCOSE BY METER IP

## 2018-11-26 RX ORDER — LABETALOL HYDROCHLORIDE 5 MG/ML
20 INJECTION, SOLUTION INTRAVENOUS ONCE
Status: COMPLETED | OUTPATIENT
Start: 2018-11-26 | End: 2018-11-26

## 2018-11-26 RX ADMIN — LABETALOL HYDROCHLORIDE 20 MG: 5 INJECTION, SOLUTION INTRAVENOUS at 07:59

## 2018-11-26 RX ADMIN — LABETALOL HYDROCHLORIDE 10 MG: 5 INJECTION, SOLUTION INTRAVENOUS at 07:31

## 2018-11-26 RX ADMIN — SODIUM CHLORIDE 500 ML: 9 INJECTION, SOLUTION INTRAVENOUS at 07:30

## 2018-11-26 NOTE — ED NOTES
"Patient to [facility] via ambulance.  Patient remains awake/alert and interactive.  Patient with left sided facial droop.  Speech remains garbled/slurred.  Patient is able to move her left side, but it is weaker than the right.  VSS.  Labetalol sent with the patient per Dr. Cavazos's verbal order.  IV x 2 remain intact.      Summary; patient was speaking on the phone with her family last night on the phone around 1900, the patient is able to state that she went to bed around 8066-8909.  Patient woke up around 0400 to void and said \"the world was all confused\" patient states she fell in the hallway.  She was unable to get.  States she did hear her phone ringing but was unable to answer it.  Patient reports feeling horrible that she had locked the door and they had to break it down to open it.  Also apologetic to prehospital staff that they had to \"lift\" her.  On arrival she was awake/alert O x 4.  Had a left sided facial droop, slurred/garbled speech and left sided body tremors: patient was unable to move her left leg or left arm and had no sensation.  When she came back from CT.  It was noted she was having some purposeful movement of her left leg and arm;  Patient continued to improve.  She did have a few episodes of left sided tremors only.  VS as documented.  Family was at bedside.  Patient was transferred to [facility] via [facility].  "

## 2018-11-26 NOTE — ED NOTES
IV labetalol given slow IV push; 10 mg of total dose given and then BP rechecked: /77 124 Irregular.  10 mg labetalol held: MD updated.  Will continue to monitor BP.

## 2018-11-26 NOTE — ED PROVIDER NOTES
eMERGENCY dEPARTMENT eNCOUnter        CHIEF COMPLAINT    Chief Complaint   Patient presents with     Cerebrovascular Accident       HPI    Annie Arguello is a 85 year old female who presents via EMS as a code stroke after she was discovered to have left-sided weakness this morning.  Last seen last night.  She has a history of atrial fibrillation.  Uses aspirin for this.  According to her daughter, she spoke with her last night and all seemed normal.  Daughter called her this morning and there was no answer daughter called the police who broke down the door Pat was discovered on the floor with left-sided weakness and facial droop.  She was transported here as a stroke activation.  According to her daughter she lives independently in her own home is very independent.  She has trouble believing that she has atrial fibrillation she is not anticoagulated beyond the aspirin.  Also has a history of diet-controlled diabetes.    REVIEW OF SYSTEMS    Neurologic: unknown LOC, No hearing loss  Cardiac: No Chest Pain, unknown syncope  Respiratory: No cough or difficulty breathing  GI: No Bloody Stool or Diarrhea  : No Dysuria or Hematuria  General: No Fever  All other systems reviewed and are negative.    PAST MEDICAL & SURGICAL HISTORY    Past Medical History:   Diagnosis Date     Atrial fibrillation (H) 2012     Calculus of kidney 2003     Controlled type 2 diabetes mellitus without complication, without long-term current use of insulin (H) 10/24/2017     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 2007     Diverticulosis 2011     Fibromyalgia 05/10/2011     Nonallopathic lesions of cervical region, not else 2001     Obesity 2011     Osteoporosis 2011     Other malaise and fatigue 2002     Renal cyst 2011     Sciatica 2007     Past Surgical History:   Procedure Laterality Date     Breast biopsy      LT, benign      SECTION       CHOLECYSTECTOMY       lap     COLONOSCOPY  2005     ULCILLE / BSO         CURRENT MEDICATIONS    Current Outpatient Rx   Medication Sig Dispense Refill     alendronate (FOSAMAX) 70 MG tablet TAKE AS DIRECTED BY PRESCRIBER 12 tablet 2     aspirin 325 MG tablet Take 325 mg by mouth daily Take one tablet (325mg) by oral route every day        blood glucose monitoring (ONE TOUCH DELICA) lancets USE TO TEST BLOOD SUGARS ONCE A  each 1     DIGOX 125 MCG tablet TAKE 1 TABLET DAILY IN THE EVENING (DUE FOR OFFICE VISIT) 30 tablet 3     metoprolol succinate (TOPROL-XL) 25 MG 24 hr tablet TAKE 1 TABLET DAILY 90 tablet 1     Multiple Vitamin (DAILY MULTIVITAMIN PO) Take by mouth daily Take one tablet by oral route every day with food       naproxen sodium (ALEVE) 220 MG tablet Take 220 mg by mouth 2 times daily (with meals) Take one tablet (220mg) by oral route every 12 hours as needed         ONETOUCH VERIO IQ test strip USE TO TEST BLOOD SUGARS ONCE DAILY 50 strip 10     Calcium Carbonate Antacid (TUMS PO)        Coenzyme Q10 (COQ-10 PO)          ALLERGIES    Allergies   Allergen Reactions     Ampicillin      Desvenlafaxine Other (See Comments)     Desvenlafaxine Succinate  Pristiq       Sertraline Hcl Other (See Comments) and Diarrhea     Zoloft       Sulfa Drugs Other (See Comments)     Sulfa(Sulfonamide Antibiotics)       SOCIAL & FAMILY HISTORY    Social History     Social History     Marital status:      Spouse name: N/A     Number of children: N/A     Years of education: N/A     Social History Main Topics     Smoking status: Never Smoker     Smokeless tobacco: Never Used      Comment: no passive exposure     Alcohol use No     Drug use: No     Sexual activity: No     Other Topics Concern     Blood Transfusions Yes     11/02/2012     Caffeine Concern Yes     coffee, 4 cups daily     Seat Belt Yes     Parent/Sibling W/ Cabg, Mi Or Angioplasty Before 65f 55m? Yes     Social History Narrative     Family History   Problem Relation Age  "of Onset     Myocardial Infarction Mother 59     myocardial infarction, cause of death     Other - See Comments Daughter      fibromyalgia     Thyroid Disease Sister      hypo     Thyroid Disease Sister      hypo     Thyroid Disease Sister      hypo     Myocardial Infarction Brother 63     myocardial infarction, cause of death       PHYSICAL EXAM    VITAL SIGNS: /96  Temp 98.6  F (37  C) (Tympanic)  Resp 18  Ht 1.575 m (5' 2\")  Wt 74.3 kg (163 lb 12.8 oz)  SpO2 98%  BMI 29.96 kg/m2   Constitutional: Elderly female, she was seen on arrival to the emergency department.  Obvious left side hemiparesis.  Appears to have left-sided neglect as well   She has eyes: Pupils equally round and react to light, sclera nonicteric right gaze deviation of  HENT:  Atraumatic, no trismus  Neck: supple, no JVD, no posterior neck tenderness  Respiratory:  Lungs Clear, no retractions   Cardiovascular:  irregular rate, tachycardic, no murmurs  GI:  Soft, nontender, normal bowel sounds  Musculoskeletal:  No edema, no bruising  Vascular:  All  pulses are 2+ equal bilaterally  Integument:  Well hydrated, no petechiae   Neurologic: GCS 14.  NIH 15 she responds to questions.  She is able to tell me her name   .  psych: Pleasant affect, no hallucinations    EKG    Shows a atrial fibrillation with rapid ventricular response at a rate of 140.  No ischemic changes are noted.    RADIOLOGY  (read by the radiologist)  Results for orders placed or performed during the hospital encounter of 11/26/18   Head CT w/o contrast    Narrative    PROCEDURE: CT HEAD W/O CONTRAST     HISTORY: left sided weakness; .    COMPARISON: None.    TECHNIQUE:  Helical images of the head from the foramen magnum to the  vertex were obtained without contrast.    FINDINGS: There is changes of an old infarct in the posterior temporal  parietal lobe on the right. There is extensive white matter  low-density both hemispheres consistent with small vessel " disease.  There is a subtle loss of gray-white differentiation in the frontal  temporal lobes on the right. MRI scan may be of benefit to confirm an  acute infarct. There are no masses or ventricular shifts. The  brainstem and cerebellum appear normal. Cranial vault is intact.  The  visualized paranasal sinuses are clear.      Impression    IMPRESSION: Evolving of middle cerebral artery infarct in the right  hemisphere.           ODILIA CARO MD         ED COURSE & MEDICAL DECISION MAKING    Pertinent Labs & Imaging studies reviewed and interpreted. (See chart for details)    See chart for details of medications given during the ED stay.    Vitals:    11/26/18 0800 11/26/18 0805 11/26/18 0810 11/26/18 0815   BP: (!) 144/104 134/91 139/74 124/96   Resp: 16 22 13 18   Temp:    98.6  F (37  C)   TempSrc:    Tympanic   SpO2:    98%   Weight:       Height:           National Institutes of Health Stroke Scale  Exam Interval: Baseline   Score    Level of consciousness: (1)   Not alert; arousable w/ minor stim to obey/answer/respond    LOC questions: (0)   Answers both questions correctly    LOC commands: (2)   Performs neither task correctly    Best gaze: (2)   Forced deviation    Visual: (0)   No visual loss    Facial palsy: (2)   Partial paralysis (total/near total of lower face)    Motor arm (left): (2)   Some effort against gravity    Motor arm (right): (0)   No drift    Motor leg (left): (2)   Some effort against gravity    Motor leg (right): (0)   No drift    Limb ataxia: (1)   Present in one limb    Sensory: (0)   Normal- no sensory loss    Best language: (1)   Mild to moderate aphasia    Dysarthria: (1)   Mild to moderate dysarthria    Extinction and inattention: 0        Total Score: 14         FINAL IMPRESSION    1. Cerebrovascular accident (CVA) due to thrombosis of right middle cerebral artery (H)    2. Permanent atrial fibrillation (H)        PLAN    Obviously is outside the window for thrombolytics.  She  was independent and functional prior to the stroke.  She is transferred to [facility] she was given labetalol for heart rate and blood pressure control.  She was showing some improvement of the left arm and left leg.  Family is here and condition discussed with them.        Nina Cavazos MD  11/26/18 1123

## 2018-11-26 NOTE — ED NOTES
Patient is back from CT.  Patient is awake/alert and interactive.  Left sided facial droop noted with slurring of words.  Patient is able to state name,  and date correctly.  Also able to identify daughter and son in law.  Patient is able to move her right side with good strength, patient is unable to squeeze with left hand and unable to move left leg.  Patient with intermittent periods of twitching on the left side of her body.  IV's placed x 2 and labs drawn.

## 2018-11-26 NOTE — ED NOTES
Patient again noted to have twitching; mild on left side of her body.  Patient remains awake/alert through out.  Seizure pads placed for precaution.

## 2018-11-29 ENCOUNTER — TRANSFERRED RECORDS (OUTPATIENT)
Dept: HEALTH INFORMATION MANAGEMENT | Facility: CLINIC | Age: 83
End: 2018-11-29

## 2018-11-30 ENCOUNTER — TRANSFERRED RECORDS (OUTPATIENT)
Dept: HEALTH INFORMATION MANAGEMENT | Facility: CLINIC | Age: 83
End: 2018-11-30

## 2018-12-18 ENCOUNTER — TELEPHONE (OUTPATIENT)
Dept: FAMILY MEDICINE | Facility: OTHER | Age: 83
End: 2018-12-18

## 2018-12-18 NOTE — TELEPHONE ENCOUNTER
2:48 PM    Reason for Call: Phone Call    Description: Ruth from [facility] called and was wondering if Dr. Cox could see pt during rounds on the 5th of Jan or if he wants to see her in office.    Was an appointment offered for this call? Yes  If yes : Appointment type              Date01/03/2019-not taken    Preferred method for responding to this message: Telephone Call  What is your phone number ?[phone]    If we cannot reach you directly, may we leave a detailed response at the number you provided? Yes    Can this message wait until your PCP/provider returns, if available today? Not applicable, provider is in    Alysia Lyons

## 2018-12-19 ENCOUNTER — MEDICAL CORRESPONDENCE (OUTPATIENT)
Dept: HEALTH INFORMATION MANAGEMENT | Facility: HOSPITAL | Age: 83
End: 2018-12-19

## 2018-12-21 DIAGNOSIS — R52 PAIN: Primary | ICD-10-CM

## 2018-12-21 RX ORDER — ACETAMINOPHEN 325 MG/1
325 TABLET ORAL EVERY 6 HOURS PRN
Qty: 100 TABLET | Refills: 1 | Status: SHIPPED | OUTPATIENT
Start: 2018-12-21 | End: 2019-04-29

## 2019-01-02 ENCOUNTER — NURSING HOME DICTATION (OUTPATIENT)
Dept: FAMILY MEDICINE | Facility: OTHER | Age: 84
End: 2019-01-02

## 2019-01-02 NOTE — PROGRESS NOTES
Visit Date:   2019      SUBJECTIVE:  The patient has a history of CVA with dysphagia, left-sided weakness, chronic atrial fibrillation, type 2 diabetes.  She has done well in therapy and is doing well eating and speech is much improved.  Ambulating significantly better, finishing up physical therapy.  Also had some depression, was started on Cymbalta and is doing well.      OBJECTIVE:   VITAL SIGNS:  On exam, blood pressure 142/58, sat is 95%, respirations 18.   CARDIAC:  Irregularly irregular.     LUNGS:  Clear.     GENERAL:  Speech is clear and moving all extremities well.      ASSESSMENT:     1.  Cerebrovascular accident.   2.  Dysphagia.   3.  Left-sided weakness.   4.  Chronic atrial fibrillation.   5.  Type 2 diabetes.   6.  Depression.        PLAN:  She signed papers for DNR today.  She is doing well in therapy.  Suspect in the next few weeks, she will be able to be discharged, probably to assisted living.  Chart and medications reviewed.  Continue with the current plans.         DOC VENTURA MD             D: 2019   T: 2019   MT: NIRMALA      Name:     RUDDY JOSE   MRN:      [id number]        Account:      [id number]   :      1933           Visit Date:   2019      Document: [id number]       cc: [facility]

## 2019-01-22 ENCOUNTER — TELEPHONE (OUTPATIENT)
Dept: FAMILY MEDICINE | Facility: OTHER | Age: 84
End: 2019-01-22

## 2019-01-22 DIAGNOSIS — M51.26 DISPLACEMENT OF LUMBAR INTERVERTEBRAL DISC WITHOUT MYELOPATHY: ICD-10-CM

## 2019-01-22 DIAGNOSIS — M81.8 OTHER OSTEOPOROSIS, UNSPECIFIED PATHOLOGICAL FRACTURE PRESENCE: Primary | ICD-10-CM

## 2019-01-22 NOTE — TELEPHONE ENCOUNTER
Received fax from [facility]. [facility] pending   DANETTE HEARN

## 2019-01-24 ENCOUNTER — TELEPHONE (OUTPATIENT)
Dept: FAMILY MEDICINE | Facility: OTHER | Age: 84
End: 2019-01-24

## 2019-01-24 NOTE — TELEPHONE ENCOUNTER
Lee Ann from [facility] called to ask for a current med list signed by PCP.  [facility] is moving from [facility] to [facility].  Please fax that paperwork to [phone].  Thank you.

## 2019-01-25 ENCOUNTER — MEDICAL CORRESPONDENCE (OUTPATIENT)
Dept: HEALTH INFORMATION MANAGEMENT | Facility: HOSPITAL | Age: 84
End: 2019-01-25

## 2019-01-25 ENCOUNTER — TELEPHONE (OUTPATIENT)
Dept: FAMILY MEDICINE | Facility: OTHER | Age: 84
End: 2019-01-25

## 2019-01-25 NOTE — TELEPHONE ENCOUNTER
Received call from [facility] requesting verbal orders for message below.  Advised of message below.

## 2019-01-25 NOTE — TELEPHONE ENCOUNTER
Annette from [facility] is calling for verbal orders to start skilled nursing services along with OT and PT.  Please call Annette at [phone].  If not available it is ok to leave a detailed message.  Thank you.

## 2019-01-28 ENCOUNTER — MEDICAL CORRESPONDENCE (OUTPATIENT)
Dept: HEALTH INFORMATION MANAGEMENT | Facility: CLINIC | Age: 84
End: 2019-01-28

## 2019-02-06 DIAGNOSIS — E11.9 CONTROLLED TYPE 2 DIABETES MELLITUS WITHOUT COMPLICATION, WITHOUT LONG-TERM CURRENT USE OF INSULIN (H): Primary | ICD-10-CM

## 2019-02-07 RX ORDER — ASPIRIN 81 MG/1
TABLET, CHEWABLE ORAL
Qty: 30 TABLET | Refills: 0 | Status: SHIPPED | OUTPATIENT
Start: 2019-02-07 | End: 2019-03-15

## 2019-02-07 RX ORDER — MULTIVITAMIN WITH FOLIC ACID 400 MCG
TABLET ORAL
Qty: 90 TABLET | Refills: 3 | Status: SHIPPED | OUTPATIENT
Start: 2019-02-07 | End: 2020-03-02

## 2019-02-07 NOTE — TELEPHONE ENCOUNTER
Med is historical.    Multiple Vitamin (DAILY MULTIVITAMIN PO)      Last Written Prescription Date:    Last Fill Quantity: ,   # refills:   Last Office Visit: 2/19/19  Future Office visit:    Next 5 appointments (look out 90 days)    Feb 19, 2019  1:00 PM CST  (Arrive by 12:45 PM)  SHORT with DOC Cox MD  [facility] [address] [facility] [address]  [phone]           Routing refill request to provider for review/approval because:  Medication is reported/historical    Med not on med list:    ASA 81mg      Last Written Prescription Date:    Last Fill Quantity: ,   # refills:   Last Office Visit:   Future Office visit:    Next 5 appointments (look out 90 days)    Feb 19, 2019  1:00 PM CST  (Arrive by 12:45 PM)  SHORT with DOC Cox MD  [facility] [address] [facility] [address]  [phone]           Routing refill request to provider for review/approval because:  Drug not on the [facility], [facility] or [facility] refill protocol or controlled substance

## 2019-02-08 DIAGNOSIS — R19.5 LOOSE STOOLS: Primary | ICD-10-CM

## 2019-02-08 NOTE — PROGRESS NOTES
Fax from [facility]. Pt requesting fiber supplement. This was sent to pharmacy. See orders.   Radha Murray MD

## 2019-02-12 DIAGNOSIS — R69 DIAGNOSIS UNKNOWN: ICD-10-CM

## 2019-02-12 DIAGNOSIS — Z53.9 PERSONS ENCOUNTERING HEALTH SERVICES FOR SPECIFIC PROCEDURES, NOT CARRIED OUT: Primary | ICD-10-CM

## 2019-02-12 DIAGNOSIS — K21.9 GASTROESOPHAGEAL REFLUX DISEASE, ESOPHAGITIS PRESENCE NOT SPECIFIED: ICD-10-CM

## 2019-02-12 DIAGNOSIS — I10 ESSENTIAL HYPERTENSION: ICD-10-CM

## 2019-02-12 DIAGNOSIS — I48.20 CHRONIC ATRIAL FIBRILLATION (H): ICD-10-CM

## 2019-02-12 DIAGNOSIS — E78.2 MIXED HYPERLIPIDEMIA: Primary | ICD-10-CM

## 2019-02-12 PROCEDURE — G0180 MD CERTIFICATION HHA PATIENT: HCPCS | Performed by: FAMILY MEDICINE

## 2019-02-13 RX ORDER — DIGOXIN 125 UG/1
TABLET ORAL
Qty: 30 TABLET | Refills: 0 | Status: SHIPPED | OUTPATIENT
Start: 2019-02-13 | End: 2019-03-22

## 2019-02-13 RX ORDER — DULOXETIN HYDROCHLORIDE 20 MG/1
CAPSULE, DELAYED RELEASE ORAL
Qty: 60 CAPSULE | Refills: 0 | Status: SHIPPED | OUTPATIENT
Start: 2019-02-13 | End: 2019-03-15

## 2019-02-13 RX ORDER — METOPROLOL TARTRATE 50 MG
TABLET ORAL
Qty: 60 TABLET | Refills: 0 | Status: SHIPPED | OUTPATIENT
Start: 2019-02-13 | End: 2019-03-25

## 2019-02-13 RX ORDER — ATORVASTATIN CALCIUM 40 MG/1
TABLET, FILM COATED ORAL
Qty: 30 TABLET | Refills: 0 | Status: SHIPPED | OUTPATIENT
Start: 2019-02-13 | End: 2019-03-22

## 2019-02-13 NOTE — TELEPHONE ENCOUNTER
Medications not on med list.   Last visit: 1.2.19 Nursing home dictation    Future appointments:   Next 5 appointments (look out 90 days)    Feb 19, 2019  1:00 PM CST  (Arrive by 12:45 PM)  SHORT with R Oleksandr Cox MD  [facility] [address]  [place] [address]  [phone]

## 2019-02-14 NOTE — PROGRESS NOTES
SUBJECTIVE:   Annie Arguello is a 85 year old female who presents to clinic today for the following health issues:      Patient is here to discuss all her medications- would like to know about them and why she is taking them   Diabetes Follow-up    Patient is checking blood sugars: once daily.  Results are as follows:     patients states they never check her blood sugar at [facility]     Diabetic concerns: None     Symptoms of hypoglycemia (low blood sugar): none     Paresthesias (numbness or burning in feet) or sores: No     Date of last diabetic eye exam: never    Diabetes Management Resources    Hyperlipidemia Follow-Up      Rate your low fat/cholesterol diet?: good    Taking statin?  Yes, no muscle aches from statin    Other lipid medications/supplements?:  none    Hypertension Follow-up      Outpatient blood pressures are being checked at home.    Low Salt Diet: not monitoring salt    BP Readings from Last 2 Encounters:   11/26/18 124/96   10/30/18 126/74     Hemoglobin A1C (%)   Date Value   10/30/2018 6.3 (H)   10/24/2017 6.1 (H)     LDL Cholesterol Calculated (mg/dL)   Date Value   10/30/2018 123 (H)   10/24/2017 115 (H)       [facility]    Annie Arguello, 85 year old, female presents with   Chief Complaint   Patient presents with     Diabetes     Hypertension     Lipids       PAST MEDICAL HISTORY:  Past Medical History:   Diagnosis Date     Atrial fibrillation (H) 09/06/2012     Calculus of kidney 03/14/2003     Controlled type 2 diabetes mellitus without complication, without long-term current use of insulin (H) 10/24/2017     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 05/16/2007     Diverticulosis 05/02/2011     Fibromyalgia 05/10/2011     Nonallopathic lesions of cervical region, not else 01/03/2001     Obesity 05/02/2011     Osteoporosis 05/02/2011     Other malaise and fatigue 01/24/2002     Renal cyst 05/02/2011     Sciatica 06/26/2007       PAST SURGICAL  HISTORY:  Past Surgical History:   Procedure Laterality Date     Breast biopsy      LT, benign      SECTION       CHOLECYSTECTOMY      lap     COLONOSCOPY       LUCILLE / BSO         MEDICATIONS:  Prior to Admission medications    Medication Sig Start Date End Date Taking? Authorizing Provider   acetaminophen (TYLENOL) 325 MG tablet Take 1 tablet (325 mg) by mouth every 6 hours as needed for mild pain 18 Yes DOC Cox MD   alendronate (FOSAMAX) 70 MG tablet TAKE AS DIRECTED BY PRESCRIBER 19  Yes Noemi Cox MD   aspirin (ASA) 81 MG chewable tablet CHEW 1 TABLET DAILY 19  Yes Rayo Barajas MD   atorvastatin (LIPITOR) 40 MG tablet TAKE 1 TABLET BY MOUTH DAILY 19  Yes Nitin Justin MD   blood glucose monitoring (ONE TOUCH DELICA) lancets USE TO TEST BLOOD SUGARS ONCE A DAY 17  Yes DOC Cox MD   calcium polycarbophil (FIBERCON) 625 MG tablet Take 1 tablet (625 mg) by mouth daily 19 Yes DOC Cox MD   DIGOX 125 MCG tablet TAKE 1 TABLET BY MOUTH DAILY 19  Yes Nitin Justin MD   docusate sodium (COLACE) 100 MG tablet Take 100 mg by mouth 2 times daily   Yes Reported, Patient   DULoxetine (CYMBALTA) 20 MG capsule TAKE 1 CAPSULE BY MOUTH 2 TIMES A DAY 19  Yes Nitin Justin MD   metoprolol tartrate (LOPRESSOR) 50 MG tablet TAKE 1 TABLET BY MOUTH 2 TIMES A DAY 19  Yes Nitin Justin MD   Multiple Vitamin (TAB-A-MARIUSZ) TABS TAKE 1 TABLET BY MOUTH DAILY 19  Yes Rayo Barajas MD   omeprazole (PRILOSEC) 20 MG DR capsule TAKE 1 CAPSULE BY MOUTH DAILY 19  Yes Nitin Justin MD   ONETOUCH VERIO IQ test strip USE TO TEST BLOOD SUGARS ONCE DAILY 18  Yes DOC Cox MD   psyllium (METAMUCIL/KONSYL) 0.52 g capsule Take 1 capsule (0.52 g) by mouth 2 times daily  Patient not taking: Reported on 2019  Radha Murray MD       ALLERGIES:     Allergies   Allergen Reactions     Ampicillin       Desvenlafaxine Other (See Comments)     Desvenlafaxine Succinate  Pristiq       Sertraline Hcl Other (See Comments) and Diarrhea     Zoloft       Sulfa Drugs Other (See Comments)     Sulfa(Sulfonamide Antibiotics)       ROS:  Constitutional, neuro, ENT, endocrine, pulmonary, cardiac, gastrointestinal, genitourinary, musculoskeletal, integument and psychiatric systems are negative, except as otherwise noted.      EXAM:  /84 (BP Location: Right arm, Patient Position: Chair, Cuff Size: Adult Regular)   Pulse 75   Temp 97.7  F (36.5  C) (Tympanic)   Wt 68.5 kg (151 lb)   SpO2 98%   BMI 27.62 kg/m   Body mass index is 27.62 kg/m .   GENERAL APPEARANCE: healthy, alert and no distress  EYES: Eyes grossly normal to inspection, PERRL and conjunctivae and sclerae normal  RESP: lungs clear to auscultation - no rales, rhonchi or wheezes  CV: Irregular rhythm with S1 S2, no S3 or S4, no murmur, click or rub  ABDOMEN: soft, nontender, without hepatosplenomegaly or masses and bowel sounds normal  NEURO: Normal strength and tone, mentation intact and speech normal  PSYCH: mentation appears normal and affect normal/bright  Lab/ X-ray  No results found for this or any previous visit (from the past 24 hour(s)).    ASSESSMENT/PLAN:    ICD-10-CM    1. Diarrhea, unspecified type R19.7 calcium polycarbophil (FIBERCON) 625 MG tablet   2. Permanent atrial fibrillation (H) I48.2    3. History of CVA (cerebrovascular accident) Z86.73    The diarrhea was almost daily at the nursing home and now she is at [facility] in the last 2 weeks is only had 2 days of loose stool.  No blood she is been on no recent antibiotics.  Will try a daily FiberCon capsule and see if that does not help.  She is here with her daughter today.  Patient is wondering if she should stop her Cymbalta and we discussed that she seems to be doing well and I would keep with that.  She was wondering about the Prilosec but we discussed she is  on the Eliquis and we needed for stomach protection.  We did take the Aleve off her list.  We will see her in a month for follow-up.  Here with her daughter both patient and her daughter are very happy with the progress.  Her speech is back to her baseline and has no focal weakness.  Walks with a wheeled walker just for safety measures but she can walk without it.      ONEAL Cox MD  February 19, 2019

## 2019-02-19 ENCOUNTER — OFFICE VISIT (OUTPATIENT)
Dept: FAMILY MEDICINE | Facility: OTHER | Age: 84
End: 2019-02-19
Attending: FAMILY MEDICINE
Payer: MEDICARE

## 2019-02-19 VITALS
DIASTOLIC BLOOD PRESSURE: 84 MMHG | BODY MASS INDEX: 27.62 KG/M2 | SYSTOLIC BLOOD PRESSURE: 116 MMHG | OXYGEN SATURATION: 98 % | WEIGHT: 151 LBS | HEART RATE: 75 BPM | TEMPERATURE: 97.7 F

## 2019-02-19 DIAGNOSIS — Z86.73 HISTORY OF CVA (CEREBROVASCULAR ACCIDENT): ICD-10-CM

## 2019-02-19 DIAGNOSIS — I48.21 PERMANENT ATRIAL FIBRILLATION (H): ICD-10-CM

## 2019-02-19 DIAGNOSIS — R19.7 DIARRHEA, UNSPECIFIED TYPE: Primary | ICD-10-CM

## 2019-02-19 PROCEDURE — 99214 OFFICE O/P EST MOD 30 MIN: CPT | Performed by: FAMILY MEDICINE

## 2019-02-19 PROCEDURE — G0463 HOSPITAL OUTPT CLINIC VISIT: HCPCS

## 2019-02-19 RX ORDER — ASPIRIN 81 MG
100 TABLET, DELAYED RELEASE (ENTERIC COATED) ORAL 2 TIMES DAILY
COMMUNITY
End: 2019-04-26

## 2019-02-19 RX ORDER — CALCIUM POLYCARBOPHIL 625 MG 625 MG/1
1 TABLET ORAL DAILY
Qty: 30 TABLET | Refills: 5 | Status: SHIPPED | OUTPATIENT
Start: 2019-02-19 | End: 2019-12-09

## 2019-02-19 ASSESSMENT — PAIN SCALES - GENERAL: PAINLEVEL: NO PAIN (0)

## 2019-02-19 NOTE — NURSING NOTE
"Chief Complaint   Patient presents with     Diabetes     Hypertension     Lipids       Initial /84 (BP Location: Right arm, Patient Position: Chair, Cuff Size: Adult Regular)   Pulse 75   Temp 97.7  F (36.5  C) (Tympanic)   Wt 68.5 kg (151 lb)   SpO2 98%   BMI 27.62 kg/m   Estimated body mass index is 27.62 kg/m  as calculated from the following:    Height as of 11/26/18: 1.575 m (5' 2\").    Weight as of this encounter: 68.5 kg (151 lb).  Medication Reconciliation: complete    DANETTE HEARN LPN  "

## 2019-02-20 ENCOUNTER — TELEPHONE (OUTPATIENT)
Dept: FAMILY MEDICINE | Facility: OTHER | Age: 84
End: 2019-02-20

## 2019-02-21 DIAGNOSIS — M19.90 OSTEOARTHRITIS, UNSPECIFIED OSTEOARTHRITIS TYPE, UNSPECIFIED SITE: ICD-10-CM

## 2019-02-21 NOTE — TELEPHONE ENCOUNTER
Fosamax      Last Written Prescription Date:  2/13/19  Last Fill Quantity: 12,   # refills: 1  Last Office Visit: 2/13/19  Future Office visit:    Next 5 appointments (look out 90 days)    Mar 19, 2019 10:30 AM CDT  (Arrive by 10:15 AM)  SHORT [facility] [address] [facility] [address]  [phone]           Eliquis      Last Written Prescription Date: Not on med list  Last Fill Quantity: 00,   # refills: 0  Last Office Visit: 2/19/19  Future Office visit:    Next 5 appointments (look out 90 days)    Mar 19, 2019 10:30 AM CDT  (Arrive by 10:15 AM)  SHORT with DOC Cox MD  [facility] [address] [facility] [address]  [phone]           Routing refill request to provider for review/approval because:  Drug not active on patient's medication list

## 2019-02-22 ENCOUNTER — ANTICOAGULATION THERAPY VISIT (OUTPATIENT)
Dept: ANTICOAGULATION | Facility: OTHER | Age: 84
End: 2019-02-22

## 2019-02-22 RX ORDER — APIXABAN 5 MG/1
TABLET, FILM COATED ORAL
Qty: 60 TABLET | Refills: 0 | Status: SHIPPED | OUTPATIENT
Start: 2019-02-22 | End: 2019-04-02

## 2019-02-22 RX ORDER — ALENDRONATE SODIUM 70 MG/1
TABLET ORAL
Qty: 4 TABLET | Refills: 0 | Status: SHIPPED | OUTPATIENT
Start: 2019-02-22 | End: 2019-05-29

## 2019-02-22 NOTE — TELEPHONE ENCOUNTER
Due for dex scan.   Bisphosphonates Failed   alendronate (FOSAMAX) 70 MG tablet [Pharmacy Med Name: ALENDRONATE SODIUM 70 MG TAB]   Rerun Protocol (2/21/2019 1:12 PM)      Dexa on file within past 2 years      Please review last Dexa result.         See last fill below. Eliquis not previously ordered.

## 2019-02-22 NOTE — PROGRESS NOTES
ANTICOAGULATION FOLLOW-UP CLINIC VISIT    Patient Name:  Annie Arguello  Date:  2/22/2019  Contact Type:  Telephone/ call placed to [facility]    SUBJECTIVE:     Patient Findings     Positives:   Initiation of therapy    Comments:   [facility]ed [facility] and spoke to Karen.  Asked if it would be a problem to set up a new patient appointment on 2/25/19 at 11 am and she stated it wouldn't.  Let her know that the appointment would be about 1 hour.  Also asked that she bring with her how many Eliquis she has left to the appointment and she verbalized understanding.           OBJECTIVE    INR   Date Value Ref Range Status   11/26/2018 1.13 0.80 - 1.20 Final       ASSESSMENT / PLAN  No question data found.        See the Encounter Report to view Anticoagulation Flowsheet and Dosing Calendar (Go to Encounters tab in chart review, and find the Anticoagulation Therapy Visit)      Desiree Faust RN

## 2019-02-27 ENCOUNTER — ANTICOAGULATION THERAPY VISIT (OUTPATIENT)
Dept: ANTICOAGULATION | Facility: OTHER | Age: 84
End: 2019-02-27

## 2019-02-27 NOTE — Clinical Note
Dr. Leggett,Patient did not show up for her warfarin clinic appointment today so called [facility] and was told that patient did want to take warfarin and is on Eliquis instead so no need to be seen by warfarin clinicKenzie Del Valle RNAnticoagulation clinic

## 2019-02-27 NOTE — PROGRESS NOTES
"ANTICOAGULATION FOLLOW-UP CLINIC VISIT    Patient Name:  Annie Arguello  Date:  2/27/2019  Contact Type:  Telephone/ spoke to staff at [facility]    SUBJECTIVE:     Patient Findings     Positives:   Change in medications    Comments:   Call placed to [facility] as li did not show up for her warfarin appointment. I called [facility] and according to \"Dianna\" patient did not want to take warfarin and is on Eliquis instead. She will not need to be seen by warfarin clinic nurse.           OBJECTIVE    INR   Date Value Ref Range Status   11/26/2018 1.13 0.80 - 1.20 Final       ASSESSMENT / PLAN  No question data found.        See the Encounter Report to view Anticoagulation Flowsheet and Dosing Calendar (Go to Encounters tab in chart review, and find the Anticoagulation Therapy Visit)        Kenzie Del Valle RN                 "

## 2019-03-13 NOTE — PROGRESS NOTES
SUBJECTIVE:   Annie Arguello is a 85 year old female who presents to clinic today for the following health issues:      Hyperlipidemia Follow-Up      Rate your low fat/cholesterol diet?: fair    Taking statin?  Yes, no muscle aches from statin    Other lipid medications/supplements?:  none    Hypertension Follow-up      Outpatient blood pressures are being checked at home.  Results are Unknown at this time.    Low Salt Diet: no added salt  She does eat meals at her facility and food is processed food and not a lot of fresh food items  Cerebrovascular Follow-up      Patient history: ischemic cerebrovascular incident    Residual symptoms: None    Worsened or new symptoms since last visit: No    Daily aspirin use: Yes    Hypertension controlled: Yes      Amount of exercise or physical activity: 2-3 days/week for an average of 15-30 minutes    Problems taking medications regularly: No    Medication side effects: none    Diet: regular (no restrictions)    She is here with 1 of her daughters.  Patient describes some loose stools but when talking with her they are formed there is no blood or mucus.  She also has a little growth on the side of her face she wants checked also has little tenderness in the top of her scalp no rashes.  A friend of hers  of an intracranial bleed and her daughter think she is is worried about that.  Patient is recovering from an ischemic stroke.         PAST MEDICAL HISTORY:  Past Medical History:   Diagnosis Date     Atrial fibrillation (H) 2012     Calculus of kidney 2003     Controlled type 2 diabetes mellitus without complication, without long-term current use of insulin (H) 10/24/2017     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 2007     Diverticulosis 2011     Fibromyalgia 05/10/2011     Nonallopathic lesions of cervical region, not else 2001     Obesity 2011     Osteoporosis 2011     Other malaise and fatigue 2002     Renal  cyst 2011     Sciatica 2007       PAST SURGICAL HISTORY:  Past Surgical History:   Procedure Laterality Date     Breast biopsy      LT, benign      SECTION       CHOLECYSTECTOMY      lap     COLONOSCOPY       LUCILLE / BSO         MEDICATIONS:  Prior to Admission medications    Medication Sig Start Date End Date Taking? Authorizing Provider   acetaminophen (TYLENOL) 325 MG tablet Take 1 tablet (325 mg) by mouth every 6 hours as needed for mild pain 18 Yes DOC Cox MD   alendronate (FOSAMAX) 70 MG tablet TAKE 1 TABLET BY MOUTH EVERY 19  Yes DOC Cox MD   ASPIRIN LOW DOSE 81 MG chewable tablet CHEW 1 TABLET DAILY 3/15/19  Yes Rayo Barajas MD   atorvastatin (LIPITOR) 40 MG tablet TAKE 1 TABLET BY MOUTH DAILY 19  Yes Nitin Justin MD   blood glucose monitoring (ONE TOUCH DELICA) lancets USE TO TEST BLOOD SUGARS ONCE A DAY 17  Yes DOC Cox MD   calcium polycarbophil (FIBERCON) 625 MG tablet Take 1 tablet (625 mg) by mouth daily 19 Yes DOC Cox MD   DIGOX 125 MCG tablet TAKE 1 TABLET BY MOUTH DAILY 19  Yes Nitin Justin MD   docusate sodium (COLACE) 100 MG tablet Take 100 mg by mouth 2 times daily   Yes Reported, Patient   DULoxetine (CYMBALTA) 20 MG capsule TAKE 1 CAPSULE BY MOUTH 2 TIMES A DAY 3/15/19  Yes Nitin Justin MD   ELIQUIS 5 MG tablet TAKE 1 TABLET BY MOUTH 2 TIMES A DAY 19  Yes DOC Cox MD   hydrochlorothiazide (HYDRODIURIL) 12.5 MG tablet Take 1 tablet (12.5 mg) by mouth daily 3/19/19  Yes DOC Cox MD   metoprolol tartrate (LOPRESSOR) 50 MG tablet TAKE 1 TABLET BY MOUTH 2 TIMES A DAY 19  Yes Nitin Justin MD   Multiple Vitamin (TAB-A-MARIUSZ) TABS TAKE 1 TABLET BY MOUTH DAILY 19  Yes Rayo Barajas MD   omeprazole (PRILOSEC) 20 MG DR capsule TAKE 1 CAPSULE BY MOUTH DAILY 19  Yes Nitin Justin MD   ONETOUCH VERIO IQ test strip USE TO TEST BLOOD  "SUGARS ONCE DAILY 7/6/18  Yes DOC Cox MD   psyllium (METAMUCIL/KONSYL) 0.52 g capsule Take 1 capsule (0.52 g) by mouth 2 times daily 2/8/19 2/8/20 Yes Radha Murray MD       ALLERGIES:     Allergies   Allergen Reactions     Ampicillin      Desvenlafaxine Other (See Comments)     Desvenlafaxine Succinate  Pristiq       Sertraline Hcl Other (See Comments) and Diarrhea     Zoloft       Sulfa Drugs Other (See Comments)     Sulfa(Sulfonamide Antibiotics)       ROS:  Constitutional, neuro, ENT, endocrine, pulmonary, cardiac, gastrointestinal, genitourinary, musculoskeletal, integument and psychiatric systems are negative, except as otherwise noted.      EXAM:  /80 (BP Location: Right arm, Patient Position: Sitting, Cuff Size: Adult Regular)   Pulse 84   Temp 97.4  F (36.3  C) (Tympanic)   Ht 1.575 m (5' 2\")   Wt 66.7 kg (147 lb)   SpO2 98%   BMI 26.89 kg/m   Body mass index is 26.89 kg/m .   GENERAL APPEARANCE: healthy, alert and no distress  EYES: Eyes grossly normal to inspection, PERRL and conjunctivae and sclerae normal  HENT:  nose and mouth without ulcers or lesions  NECK: no adenopathy, no asymmetry, masses, or scars and thyroid normal to palpation  RESP: lungs clear to auscultation - no rales, rhonchi or wheezes  CV: irregular S1 S2, no S3 or S4 and no murmur, click or rub, has trace lower extremity edema, no obvious JVD  SKIN: Just anterior to the left auricle on her face has a small fleshy papule, she has some discomfort in the top of her scalp there is no rash there.  NEURO: She did have a stroke and is recovering from dysarthria.  Speech is understandable but it is not as clear as her pre-stroke speech  Lab/ X-ray  No results found for this or any previous visit (from the past 24 hour(s)).    ASSESSMENT/PLAN:    ICD-10-CM    1. Essential hypertension I10 Basic metabolic panel     hydrochlorothiazide (HYDRODIURIL) 12.5 MG tablet   2. Pedal edema R60.0    3. Scalp pain R51    4. Papule of " skin R23.8    5. Permanent atrial fibrillation (H) I48.2    With the blood pressure up a little and trace pedal edema we will check a BMP and add hydrochlorothiazide.  Suspect she is getting more salt in her diet at this facility.  Sounds like she has soft stools but no diarrhea.  Her daughter does not feel that it is an issue.  I reassured her her scalp pain is not worrisome.  Patient was worried that it is due to a bleed in the head and 1 of her friends  of that.  Papule on the skin its small and benign offered cryotherapy and at this point they want to monitor it.  We will see her in 2 weeks to recheck blood pressure see how her pedal edema is doing and recheck BMP.  Has chronic atrial fibrillation is on Eliquis rate is controlled      ONEAL Cox MD  2019

## 2019-03-15 DIAGNOSIS — E11.9 CONTROLLED TYPE 2 DIABETES MELLITUS WITHOUT COMPLICATION, WITHOUT LONG-TERM CURRENT USE OF INSULIN (H): ICD-10-CM

## 2019-03-15 RX ORDER — ASPIRIN 81 MG
TABLET,CHEWABLE ORAL
Qty: 30 TABLET | Refills: 0 | Status: SHIPPED | OUTPATIENT
Start: 2019-03-15 | End: 2019-04-11

## 2019-03-15 NOTE — TELEPHONE ENCOUNTER
Aspirin  Last Written Prescription Date: 2/7/19  Last Fill Quantity: 30 # of Refills: 0  Last Office Visit: 2/19/19

## 2019-03-19 ENCOUNTER — TELEPHONE (OUTPATIENT)
Dept: FAMILY MEDICINE | Facility: OTHER | Age: 84
End: 2019-03-19

## 2019-03-19 ENCOUNTER — OFFICE VISIT (OUTPATIENT)
Dept: FAMILY MEDICINE | Facility: OTHER | Age: 84
End: 2019-03-19
Attending: FAMILY MEDICINE
Payer: MEDICARE

## 2019-03-19 VITALS
OXYGEN SATURATION: 98 % | TEMPERATURE: 97.4 F | WEIGHT: 147 LBS | SYSTOLIC BLOOD PRESSURE: 144 MMHG | HEIGHT: 62 IN | HEART RATE: 84 BPM | DIASTOLIC BLOOD PRESSURE: 80 MMHG | BODY MASS INDEX: 27.05 KG/M2

## 2019-03-19 DIAGNOSIS — I10 ESSENTIAL HYPERTENSION: Primary | ICD-10-CM

## 2019-03-19 DIAGNOSIS — R23.8 PAPULE OF SKIN: ICD-10-CM

## 2019-03-19 DIAGNOSIS — R60.0 PEDAL EDEMA: ICD-10-CM

## 2019-03-19 DIAGNOSIS — R51.9 SCALP PAIN: ICD-10-CM

## 2019-03-19 DIAGNOSIS — E87.6 HYPOKALEMIA: Primary | ICD-10-CM

## 2019-03-19 DIAGNOSIS — I48.21 PERMANENT ATRIAL FIBRILLATION (H): ICD-10-CM

## 2019-03-19 LAB
ANION GAP SERPL CALCULATED.3IONS-SCNC: 6 MMOL/L (ref 3–14)
BUN SERPL-MCNC: 10 MG/DL (ref 7–30)
CALCIUM SERPL-MCNC: 8.9 MG/DL (ref 8.5–10.1)
CHLORIDE SERPL-SCNC: 101 MMOL/L (ref 94–109)
CO2 SERPL-SCNC: 31 MMOL/L (ref 20–32)
CREAT SERPL-MCNC: 0.74 MG/DL (ref 0.52–1.04)
GFR SERPL CREATININE-BSD FRML MDRD: 74 ML/MIN/{1.73_M2}
GLUCOSE SERPL-MCNC: 148 MG/DL (ref 70–99)
POTASSIUM SERPL-SCNC: 2.9 MMOL/L (ref 3.4–5.3)
SODIUM SERPL-SCNC: 138 MMOL/L (ref 133–144)

## 2019-03-19 PROCEDURE — G0463 HOSPITAL OUTPT CLINIC VISIT: HCPCS

## 2019-03-19 PROCEDURE — 80048 BASIC METABOLIC PNL TOTAL CA: CPT | Mod: ZL | Performed by: FAMILY MEDICINE

## 2019-03-19 PROCEDURE — 99214 OFFICE O/P EST MOD 30 MIN: CPT | Performed by: FAMILY MEDICINE

## 2019-03-19 PROCEDURE — G0463 HOSPITAL OUTPT CLINIC VISIT: HCPCS | Mod: 25

## 2019-03-19 PROCEDURE — 36415 COLL VENOUS BLD VENIPUNCTURE: CPT | Mod: ZL | Performed by: FAMILY MEDICINE

## 2019-03-19 RX ORDER — HYDROCHLOROTHIAZIDE 12.5 MG/1
12.5 TABLET ORAL DAILY
Qty: 30 TABLET | Refills: 5 | Status: ON HOLD | OUTPATIENT
Start: 2019-03-19 | End: 2019-07-02

## 2019-03-19 RX ORDER — POTASSIUM CHLORIDE 750 MG/1
TABLET, EXTENDED RELEASE ORAL
Qty: 30 TABLET | Refills: 2 | Status: SHIPPED | OUTPATIENT
Start: 2019-03-19 | End: 2019-04-02

## 2019-03-19 ASSESSMENT — MIFFLIN-ST. JEOR: SCORE: 1065.04

## 2019-03-19 ASSESSMENT — PAIN SCALES - GENERAL: PAINLEVEL: NO PAIN (0)

## 2019-03-19 NOTE — TELEPHONE ENCOUNTER
DATE:  3/19/2019   TIME OF RECEIPT FROM LAB:  11:28  LAB TEST:  Potassium  LAB VALUE:  2.9  RESULTS GIVEN WITH READ-BACK TO (PROVIDER):  DOC VENTURA  TIME LAB VALUE REPORTED TO PROVIDER:   11:28  DANETTE HEARN

## 2019-03-19 NOTE — LETTER
March 19, 2019      Annie Conleyx  [address]    [place] [address]        To Whom It May Concern,       Results for orders placed or performed in visit on 03/19/19   Basic metabolic panel   Result Value Ref Range    Sodium 138 133 - 144 mmol/L    Potassium 2.9 (LL) 3.4 - 5.3 mmol/L    Chloride 101 94 - 109 mmol/L    Carbon Dioxide 31 20 - 32 mmol/L    Anion Gap 6 3 - 14 mmol/L    Glucose 148 (H) 70 - 99 mg/dL    Urea Nitrogen 10 7 - 30 mg/dL    Creatinine 0.74 0.52 - 1.04 mg/dL    GFR Estimate 74 >60 mL/min/[1.73_m2]    GFR Estimate If Black 86 >60 mL/min/[1.73_m2]    Calcium 8.9 8.5 - 10.1 mg/dL     Start Potassium- sent to [facility]    Sincerely,        DOC Cox MD

## 2019-03-19 NOTE — TELEPHONE ENCOUNTER
Her potassium is low at 2.9.  And we also just started her hydrochlorothiazide so we will need to get her potassium 10 mEq tablets take 2 twice a day for 2 days; then go to 1 daily #30 with 2 refills

## 2019-03-19 NOTE — NURSING NOTE
"Chief Complaint   Patient presents with     Lipids     Hypertension     CVA       Initial /80 (BP Location: Right arm, Patient Position: Sitting, Cuff Size: Adult Regular)   Pulse 84   Temp 97.4  F (36.3  C) (Tympanic)   Ht 1.575 m (5' 2\")   Wt 66.7 kg (147 lb)   SpO2 98%   BMI 26.89 kg/m   Estimated body mass index is 26.89 kg/m  as calculated from the following:    Height as of this encounter: 1.575 m (5' 2\").    Weight as of this encounter: 66.7 kg (147 lb).  Medication Reconciliation: complete    Silvia Portillo LPN  "

## 2019-03-22 DIAGNOSIS — I48.20 CHRONIC ATRIAL FIBRILLATION (H): ICD-10-CM

## 2019-03-22 DIAGNOSIS — I10 ESSENTIAL HYPERTENSION: ICD-10-CM

## 2019-03-22 DIAGNOSIS — E78.2 MIXED HYPERLIPIDEMIA: ICD-10-CM

## 2019-03-22 DIAGNOSIS — K21.9 GASTROESOPHAGEAL REFLUX DISEASE, ESOPHAGITIS PRESENCE NOT SPECIFIED: ICD-10-CM

## 2019-03-22 DIAGNOSIS — I48.91 ATRIAL FIBRILLATION (H): ICD-10-CM

## 2019-03-22 RX ORDER — METOPROLOL TARTRATE 50 MG
TABLET ORAL
Qty: 60 TABLET | Refills: 0 | Status: CANCELLED | OUTPATIENT
Start: 2019-03-22

## 2019-03-25 RX ORDER — METOPROLOL SUCCINATE 25 MG/1
TABLET, EXTENDED RELEASE ORAL
Qty: 90 TABLET | Refills: 1 | OUTPATIENT
Start: 2019-03-25

## 2019-03-25 RX ORDER — DIGOXIN 125 UG/1
TABLET ORAL
Qty: 30 TABLET | Refills: 0 | Status: SHIPPED | OUTPATIENT
Start: 2019-03-25 | End: 2019-04-19

## 2019-03-25 RX ORDER — METOPROLOL TARTRATE 50 MG
TABLET ORAL
Qty: 180 TABLET | Refills: 1 | Status: SHIPPED | OUTPATIENT
Start: 2019-03-25 | End: 2019-04-02

## 2019-03-25 RX ORDER — ATORVASTATIN CALCIUM 40 MG/1
TABLET, FILM COATED ORAL
Qty: 30 TABLET | Refills: 5 | Status: SHIPPED | OUTPATIENT
Start: 2019-03-25 | End: 2019-08-23

## 2019-03-25 NOTE — PROGRESS NOTES
SUBJECTIVE:   Annie Arguello is a 85 year old female who presents to clinic today for the following health issues:      Diabetes Follow-up      Patient is checking blood sugars: not at all    Diabetic concerns: None     Symptoms of hypoglycemia (low blood sugar): none     Paresthesias (numbness or burning in feet) or sores: No     Date of last diabetic eye exam: 10/2/18    Diabetes Management Resources    Hyperlipidemia Follow-Up      Rate your low fat/cholesterol diet?: fair    Taking statin?  Yes, no muscle aches from statin    Other lipid medications/supplements?:  none    Hypertension Follow-up      Outpatient blood pressures are being checked at living facility.    Low Salt Diet: no added salt  Diuretic was at his last visit no further ankle swelling she feels good  BP Readings from Last 2 Encounters:   19 144/80   19 116/84     Hemoglobin A1C (%)   Date Value   10/30/2018 6.3 (H)   10/24/2017 6.1 (H)     LDL Cholesterol Calculated (mg/dL)   Date Value   10/30/2018 123 (H)   10/24/2017 115 (H)       Amount of exercise or physical activity: None    Problems taking medications regularly: No    Medication side effects: none    Diet: regular (no restrictions)          PAST MEDICAL HISTORY:  Past Medical History:   Diagnosis Date     Atrial fibrillation (H) 2012     Calculus of kidney 2003     Controlled type 2 diabetes mellitus without complication, without long-term current use of insulin (H) 10/24/2017     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 2007     Diverticulosis 2011     Fibromyalgia 05/10/2011     Nonallopathic lesions of cervical region, not else 2001     Obesity 2011     Osteoporosis 2011     Other malaise and fatigue 2002     Renal cyst 2011     Sciatica 2007       PAST SURGICAL HISTORY:  Past Surgical History:   Procedure Laterality Date     Breast biopsy      LT, benign      SECTION       CHOLECYSTECTOMY       lap     COLONOSCOPY  2005     LUCILLE / BSO         MEDICATIONS:  Prior to Admission medications    Medication Sig Start Date End Date Taking? Authorizing Provider   acetaminophen (TYLENOL) 325 MG tablet Take 1 tablet (325 mg) by mouth every 6 hours as needed for mild pain 12/21/18 12/21/19 Yes DOC Cox MD   alendronate (FOSAMAX) 70 MG tablet TAKE 1 TABLET BY MOUTH EVERY WEDNESDAY 2/22/19  Yes DOC Cox MD   apixaban ANTICOAGULANT (ELIQUIS) 5 MG tablet TAKE 1 TABLET BY MOUTH 2 TIMES A DAY 4/2/19  Yes DOC Cox MD   ASPIRIN LOW DOSE 81 MG chewable tablet CHEW 1 TABLET DAILY 3/15/19  Yes Rayo Baraajs MD   atorvastatin (LIPITOR) 40 MG tablet TAKE 1 TABLET BY MOUTH DAILY 3/25/19  Yes Nitin Justin MD   blood glucose monitoring (ONE TOUCH DELICA) lancets USE TO TEST BLOOD SUGARS ONCE A DAY 12/8/17  Yes DOC Cox MD   calcium polycarbophil (FIBERCON) 625 MG tablet Take 1 tablet (625 mg) by mouth daily 2/19/19 8/18/19 Yes DOC Cox MD   DIGOX 125 MCG tablet TAKE 1 TABLET BY MOUTH DAILY 3/25/19  Yes Nitin Justin MD   docusate sodium (COLACE) 100 MG tablet Take 100 mg by mouth 2 times daily   Yes Reported, Patient   DULoxetine (CYMBALTA) 20 MG capsule TAKE 1 CAPSULE BY MOUTH 2 TIMES A DAY 3/15/19  Yes Nitin Justin MD   hydrochlorothiazide (HYDRODIURIL) 12.5 MG tablet Take 1 tablet (12.5 mg) by mouth daily 3/19/19  Yes DOC Cox MD   metoprolol tartrate (LOPRESSOR) 50 MG tablet TAKE 1 TABLET BY MOUTH 2 TIMES A DAY 4/2/19  Yes DOC Cox MD   Multiple Vitamin (TAB-A-MARIUSZ) TABS TAKE 1 TABLET BY MOUTH DAILY 2/7/19  Yes Rayo Barajas MD   omeprazole (PRILOSEC) 20 MG DR capsule TAKE 1 CAPSULE BY MOUTH DAILY 3/25/19  Yes Nitin Justin MD   ONETOUCH VERIO IQ test strip USE TO TEST BLOOD SUGARS ONCE DAILY 7/6/18  Yes DOC Cox MD   potassium chloride ER (K-DUR/KLOR-CON M) 10 MEQ CR tablet Take 2 tablets twice a day for 2 days then continue with 1 tablet daily.  3/19/19  Yes DOC Cox MD   psyllium (METAMUCIL/KONSYL) 0.52 g capsule Take 1 capsule (0.52 g) by mouth 2 times daily 2/8/19 2/8/20 Yes Radha Murray MD       ALLERGIES:     Allergies   Allergen Reactions     Ampicillin      Desvenlafaxine Other (See Comments)     Desvenlafaxine Succinate  Pristiq       Sertraline Hcl Other (See Comments) and Diarrhea     Zoloft       Sulfa Drugs Other (See Comments)     Sulfa(Sulfonamide Antibiotics)       ROS:  Constitutional, neuro, ENT, endocrine, pulmonary, cardiac, gastrointestinal, genitourinary, musculoskeletal, integument and psychiatric systems are negative, except as otherwise noted.      EXAM:  /86 (BP Location: Left arm, Patient Position: Chair, Cuff Size: Adult Regular)   Pulse 73   Temp 98.3  F (36.8  C) (Tympanic)   Wt 63 kg (139 lb)   SpO2 96%   BMI 25.42 kg/m   Body mass index is 25.42 kg/m .   GENERAL APPEARANCE: healthy, alert and no distress  EYES: Eyes grossly normal to inspection, PERRL and conjunctivae and sclerae normal  HENT:  nose and mouth without ulcers or lesions  NECK: no adenopathy, no asymmetry, masses, or scars and thyroid normal to palpation  RESP: lungs clear to auscultation - no rales, rhonchi or wheezes  CV: Irregularly irregular without S3-S4 murmur rub or click  ABDOMEN: soft, nontender, without hepatosplenomegaly or masses and bowel sounds normal  NEURO: She is alert normal thought content.  She ambulates with a walker and is doing well.  Speech is understandable.  DIABETIC FOOT EXAM: normal DP and PT pulses, no trophic changes or ulcerative lesions and normal sensory exam  PSYCH: mentation appears normal and affect normal/bright  Lab/ X-ray  Pending    ASSESSMENT/PLAN:    ICD-10-CM    1. Controlled type 2 diabetes mellitus without complication, without long-term current use of insulin (H) E11.9 Hemoglobin A1c     Albumin Random Urine Quantitative with Creat Ratio     Lipid Profile     AST     C FOOT EXAM  NO CHARGE   2.  Osteoarthritis, unspecified osteoarthritis type, unspecified site M19.90    3. Essential hypertension I10 metoprolol tartrate (LOPRESSOR) 50 MG tablet     Basic metabolic panel   4. Permanent atrial fibrillation (H) I48.2 apixaban ANTICOAGULANT (ELIQUIS) 5 MG tablet   's type 2 diabetes normal foot exam will check A1c urine albumin, lipid AST, hypertension is improved.  Lopressor was refilled.  We will check a BMP today because of the addition of the diuretic.  Note no ankle swelling today.  For atrial fibrillation the Eliquis was refilled.  We will see her in 3 months sooner if there are problems.  She is here with her daughter questions were answered.      ONEAL Cox MD  April 2, 2019

## 2019-03-29 DIAGNOSIS — I10 ESSENTIAL HYPERTENSION: ICD-10-CM

## 2019-03-29 RX ORDER — METOPROLOL TARTRATE 50 MG
TABLET ORAL
Qty: 60 TABLET | Refills: 0 | OUTPATIENT
Start: 2019-03-29

## 2019-03-29 NOTE — TELEPHONE ENCOUNTER
Metoprolol tartrate 50 mg     Last Written Prescription Date:  03/25/19  Last Fill Quantity: 180,   # refills: 1  Last Office Visit: 03/19/19  Future Office visit:    Next 5 appointments (look out 90 days)    Apr 02, 2019  9:00 AM CDT  (Arrive by 8:45 AM)  SHORT with DOC Cox MD  [facility] ([facility] ) [address]  [place] [address]  [phone]

## 2019-04-02 ENCOUNTER — TELEPHONE (OUTPATIENT)
Dept: FAMILY MEDICINE | Facility: OTHER | Age: 84
End: 2019-04-02

## 2019-04-02 ENCOUNTER — OFFICE VISIT (OUTPATIENT)
Dept: FAMILY MEDICINE | Facility: OTHER | Age: 84
End: 2019-04-02
Attending: FAMILY MEDICINE
Payer: MEDICARE

## 2019-04-02 VITALS
DIASTOLIC BLOOD PRESSURE: 86 MMHG | TEMPERATURE: 98.3 F | SYSTOLIC BLOOD PRESSURE: 126 MMHG | BODY MASS INDEX: 25.42 KG/M2 | HEART RATE: 73 BPM | WEIGHT: 139 LBS | OXYGEN SATURATION: 96 %

## 2019-04-02 DIAGNOSIS — I48.21 PERMANENT ATRIAL FIBRILLATION (H): ICD-10-CM

## 2019-04-02 DIAGNOSIS — M19.90 OSTEOARTHRITIS, UNSPECIFIED OSTEOARTHRITIS TYPE, UNSPECIFIED SITE: ICD-10-CM

## 2019-04-02 DIAGNOSIS — E87.6 HYPOKALEMIA: ICD-10-CM

## 2019-04-02 DIAGNOSIS — E11.9 CONTROLLED TYPE 2 DIABETES MELLITUS WITHOUT COMPLICATION, WITHOUT LONG-TERM CURRENT USE OF INSULIN (H): Primary | ICD-10-CM

## 2019-04-02 DIAGNOSIS — I10 ESSENTIAL HYPERTENSION: ICD-10-CM

## 2019-04-02 LAB
ANION GAP SERPL CALCULATED.3IONS-SCNC: 8 MMOL/L (ref 3–14)
AST SERPL W P-5'-P-CCNC: 24 U/L (ref 0–45)
BUN SERPL-MCNC: 15 MG/DL (ref 7–30)
CALCIUM SERPL-MCNC: 8.9 MG/DL (ref 8.5–10.1)
CHLORIDE SERPL-SCNC: 97 MMOL/L (ref 94–109)
CHOLEST SERPL-MCNC: 100 MG/DL
CO2 SERPL-SCNC: 32 MMOL/L (ref 20–32)
CREAT SERPL-MCNC: 0.69 MG/DL (ref 0.52–1.04)
CREAT UR-MCNC: 233 MG/DL
GFR SERPL CREATININE-BSD FRML MDRD: 79 ML/MIN/{1.73_M2}
GLUCOSE SERPL-MCNC: 169 MG/DL (ref 70–99)
HBA1C MFR BLD: 6.8 % (ref 0–5.6)
HDLC SERPL-MCNC: 39 MG/DL
LDLC SERPL CALC-MCNC: 38 MG/DL
MICROALBUMIN UR-MCNC: 59 MG/L
MICROALBUMIN/CREAT UR: 25.36 MG/G CR (ref 0–25)
NONHDLC SERPL-MCNC: 61 MG/DL
POTASSIUM SERPL-SCNC: 2.8 MMOL/L (ref 3.4–5.3)
SODIUM SERPL-SCNC: 137 MMOL/L (ref 133–144)
TRIGL SERPL-MCNC: 116 MG/DL

## 2019-04-02 PROCEDURE — 83036 HEMOGLOBIN GLYCOSYLATED A1C: CPT | Mod: ZL | Performed by: FAMILY MEDICINE

## 2019-04-02 PROCEDURE — 99214 OFFICE O/P EST MOD 30 MIN: CPT | Performed by: FAMILY MEDICINE

## 2019-04-02 PROCEDURE — 84450 TRANSFERASE (AST) (SGOT): CPT | Mod: ZL | Performed by: FAMILY MEDICINE

## 2019-04-02 PROCEDURE — 36415 COLL VENOUS BLD VENIPUNCTURE: CPT | Mod: ZL | Performed by: FAMILY MEDICINE

## 2019-04-02 PROCEDURE — 80061 LIPID PANEL: CPT | Mod: ZL | Performed by: FAMILY MEDICINE

## 2019-04-02 PROCEDURE — G0463 HOSPITAL OUTPT CLINIC VISIT: HCPCS

## 2019-04-02 PROCEDURE — 80048 BASIC METABOLIC PNL TOTAL CA: CPT | Mod: ZL | Performed by: FAMILY MEDICINE

## 2019-04-02 PROCEDURE — 82043 UR ALBUMIN QUANTITATIVE: CPT | Mod: ZL | Performed by: FAMILY MEDICINE

## 2019-04-02 RX ORDER — METOPROLOL TARTRATE 50 MG
TABLET ORAL
Qty: 180 TABLET | Refills: 1 | Status: SHIPPED | OUTPATIENT
Start: 2019-04-02 | End: 2019-08-23

## 2019-04-02 RX ORDER — POTASSIUM CHLORIDE 750 MG/1
TABLET, EXTENDED RELEASE ORAL
Qty: 60 TABLET | Refills: 5 | Status: SHIPPED | OUTPATIENT
Start: 2019-04-02 | End: 2019-04-29

## 2019-04-02 RX ORDER — POTASSIUM CHLORIDE 750 MG/1
TABLET, EXTENDED RELEASE ORAL
Qty: 30 TABLET | Refills: 5 | Status: SHIPPED | OUTPATIENT
Start: 2019-04-02 | End: 2019-04-02

## 2019-04-02 ASSESSMENT — PAIN SCALES - GENERAL: PAINLEVEL: NO PAIN (0)

## 2019-04-02 NOTE — NURSING NOTE
"Chief Complaint   Patient presents with     Hypertension     Diabetes     Lipids       Initial /86 (BP Location: Left arm, Patient Position: Chair, Cuff Size: Adult Regular)   Pulse 73   Temp 98.3  F (36.8  C) (Tympanic)   Wt 63 kg (139 lb)   SpO2 96%   BMI 25.42 kg/m   Estimated body mass index is 25.42 kg/m  as calculated from the following:    Height as of 3/19/19: 1.575 m (5' 2\").    Weight as of this encounter: 63 kg (139 lb).  Medication Reconciliation: complete    Della Conti LPN    "

## 2019-04-02 NOTE — LETTER
April 2, 2019      Annie Arguello  [address]    [place] [address]        Dear ,    We are writing to inform you of your test results.    Take potassium 10 mEq 2 tablets twice a day for 2 days then go to 1 tablet twice a day.  Recheck potassium in one week.  A1c is Stable.      Resulted Orders   Hemoglobin A1c   Result Value Ref Range    Hemoglobin A1C 6.8 (H) 0 - 5.6 %      Comment:      Normal <5.7% Prediabetes 5.7-6.4%  Diabetes 6.5% or higher - adopted from ADA   consensus guidelines.     Albumin Random Urine Quantitative with Creat Ratio   Result Value Ref Range    Creatinine Urine 233 mg/dL    Albumin Urine mg/L 59 mg/L    Albumin Urine mg/g Cr 25.36 (H) 0 - 25 mg/g Cr   Lipid Profile   Result Value Ref Range    Cholesterol 100 <200 mg/dL    Triglycerides 116 <150 mg/dL      Comment:      Fasting specimen    HDL Cholesterol 39 (L) >49 mg/dL    LDL Cholesterol Calculated 38 <100 mg/dL      Comment:      Desirable:       <100 mg/dl    Non HDL Cholesterol 61 <130 mg/dL   AST   Result Value Ref Range    AST 24 0 - 45 U/L   Basic metabolic panel   Result Value Ref Range    Sodium 137 133 - 144 mmol/L    Potassium 2.8 (LL) 3.4 - 5.3 mmol/L      Comment:      Critical Value called to and read back by  MARIANO AMAYA AT 1005 04/02/19 AJE      Chloride 97 94 - 109 mmol/L    Carbon Dioxide 32 20 - 32 mmol/L    Anion Gap 8 3 - 14 mmol/L    Glucose 169 (H) 70 - 99 mg/dL      Comment:      Fasting specimen    Urea Nitrogen 15 7 - 30 mg/dL    Creatinine 0.69 0.52 - 1.04 mg/dL    GFR Estimate 79 >60 mL/min/[1.73_m2]      Comment:      Non  GFR Calc  Starting 12/18/2018, serum creatinine based estimated GFR (eGFR) will be   calculated using the Chronic Kidney Disease Epidemiology Collaboration   (CKD-EPI) equation.      GFR Estimate If Black >90 >60 mL/min/[1.73_m2]      Comment:       GFR Calc  Starting 12/18/2018, serum creatinine based estimated GFR (eGFR) will be    calculated using the Chronic Kidney Disease Epidemiology Collaboration   (CKD-EPI) equation.      Calcium 8.9 8.5 - 10.1 mg/dL       If you have any questions or concerns, please call the clinic at the number listed above.       Sincerely,        DOC Cox MD

## 2019-04-02 NOTE — TELEPHONE ENCOUNTER
"10:47 AM    Reason for Call: Phone Call    Description: Lee Ann from [facility]arias called and is needing clarification on what patient should be taking for Potassium as result note for 4/2/19 states ,\"Take her potassium 10 mEq 2 tablets twice a day for 2 days then go to 1 tablet twice a day.  We can give her enough for a month and 5 refills but we will  recheck potassium in one week.\"    And the Potassium refill sent into pharmacy SIG reads,\"Take 2 tablets twice a day for 2 days then continue with 1 tablet daily.\"    [facility] is needing to know how often the patient should be taking the potassium and when to decrease. If new order needs to the faxed over to [facility] please fax to [phone].    Was an appointment offered for this call? No  If yes : Appointment type              Date    Preferred method for responding to this message: Telephone Call  What is your phone number ?[phone]    If we cannot reach you directly, may we leave a detailed response at the number you provided? Yes    Can this message wait until your PCP/provider returns, if available today? Not applicable    Yessenia Lu MA      "

## 2019-04-02 NOTE — TELEPHONE ENCOUNTER
Orders pending for refill on Potassium and labs for recheck.     Living facility aware of changes.     Della Conti LPN

## 2019-04-02 NOTE — TELEPHONE ENCOUNTER
Per Dr MARGARETTE Cox-   Take the potassium 10 mEq 2 tablets twice a day for 2 days then go to 1 tablet twice a day and will recheck a potassium in 1 week    Routing Comment

## 2019-04-02 NOTE — TELEPHONE ENCOUNTER
DATE:  4/2/2019   TIME OF RECEIPT FROM LAB:  10:05am  LAB TEST:  Potassium   LAB VALUE:  2.8   RESULTS GIVEN WITH READ-BACK TO (PROVIDER):  Dr. Oleksandr Cox   TIME LAB VALUE REPORTED TO PROVIDER:   10:09am    Della Conti LPN

## 2019-04-08 ENCOUNTER — OFFICE VISIT (OUTPATIENT)
Dept: AUDIOLOGY | Facility: OTHER | Age: 84
End: 2019-04-08
Attending: AUDIOLOGIST
Payer: MEDICARE

## 2019-04-08 ENCOUNTER — TELEPHONE (OUTPATIENT)
Dept: FAMILY MEDICINE | Facility: OTHER | Age: 84
End: 2019-04-08

## 2019-04-08 DIAGNOSIS — H90.3 SENSORINEURAL HEARING LOSS, BILATERAL: Primary | ICD-10-CM

## 2019-04-08 DIAGNOSIS — E87.6 HYPOKALEMIA: ICD-10-CM

## 2019-04-08 DIAGNOSIS — E87.6 HYPOKALEMIA: Primary | ICD-10-CM

## 2019-04-08 LAB — POTASSIUM SERPL-SCNC: 3.3 MMOL/L (ref 3.4–5.3)

## 2019-04-08 PROCEDURE — 92550 TYMPANOMETRY & REFLEX THRESH: CPT | Performed by: AUDIOLOGIST

## 2019-04-08 PROCEDURE — 84132 ASSAY OF SERUM POTASSIUM: CPT | Mod: ZL | Performed by: FAMILY MEDICINE

## 2019-04-08 PROCEDURE — 92557 COMPREHENSIVE HEARING TEST: CPT | Performed by: AUDIOLOGIST

## 2019-04-08 PROCEDURE — 36415 COLL VENOUS BLD VENIPUNCTURE: CPT | Mod: ZL | Performed by: FAMILY MEDICINE

## 2019-04-08 NOTE — PROGRESS NOTES
Audiology Evaluation Completed. Please refer SCANNED AUDIOGRAM and/or TYMPANOGRAM for BACKGROUND, RESULTS, RECOMMENDATIONS.      Savanna OTERO, [facility]  Audiologist #[id number]

## 2019-04-11 DIAGNOSIS — E11.9 CONTROLLED TYPE 2 DIABETES MELLITUS WITHOUT COMPLICATION, WITHOUT LONG-TERM CURRENT USE OF INSULIN (H): ICD-10-CM

## 2019-04-12 RX ORDER — ASPIRIN 81 MG
TABLET,CHEWABLE ORAL
Qty: 30 TABLET | Refills: 11 | Status: SHIPPED | OUTPATIENT
Start: 2019-04-12 | End: 2020-03-30

## 2019-04-19 DIAGNOSIS — I48.20 CHRONIC ATRIAL FIBRILLATION (H): ICD-10-CM

## 2019-04-22 NOTE — TELEPHONE ENCOUNTER
DIGOX 125 MCG tablet  Last Written Prescription Date:  3/25/19  Last Fill Quantity: 30,   # refills: 0  Last Office Visit: 4/2/19  Future Office visit:    Next 5 appointments (look out 90 days)    Jul 02, 2019 10:00 AM CDT  (Arrive by 9:45 AM)  SHORT with DOC Cox MD  [facility] ([facility] ) [address]  [place] [address]  [phone]

## 2019-04-23 RX ORDER — DIGOXIN 125 UG/1
TABLET ORAL
Qty: 30 TABLET | Refills: 0 | Status: SHIPPED | OUTPATIENT
Start: 2019-04-23 | End: 2019-04-30

## 2019-04-26 DIAGNOSIS — R52 PAIN: ICD-10-CM

## 2019-04-26 DIAGNOSIS — R69 DIAGNOSIS UNKNOWN: ICD-10-CM

## 2019-04-26 DIAGNOSIS — K59.00 CONSTIPATION, UNSPECIFIED CONSTIPATION TYPE: Primary | ICD-10-CM

## 2019-04-26 NOTE — TELEPHONE ENCOUNTER
Diclofenac sodium       Last Written Prescription Date:  Not on med list   Last Fill Quantity: 0,   # refills: 0  Last Office Visit: 4/2/19  Future Office visit:    Next 5 appointments (look out 90 days)    Jul 02, 2019 10:00 AM CDT  (Arrive by 9:45 AM)  SHORT with DOC Cox MD  [facility] ([facility] ) [address]  [place] [address]  [phone]       gavilax       Last Written Prescription Date:  Not on med list   Last Fill Quantity: 0,   # refills: 0  Last Office Visit: 4/2/19  Future Office visit:    Next 5 appointments (look out 90 days)    Jul 02, 2019 10:00 AM CDT  (Arrive by 9:45 AM)  SHORT with DOC Cox MD  [facility] ([facility] ) [address]  [place] [address]  [phone]       nystop       Last Written Prescription Date:  Not on med list   Last Fill Quantity: 0,   # refills: 0  Last Office Visit: 4/2/19  Future Office visit:    Next 5 appointments (look out 90 days)    Jul 02, 2019 10:00 AM CDT  (Arrive by 9:45 AM)  SHORT with DOC Cox MD  [facility] ([facility] ) [address]  [place] [address]  [phone]       docusate sodium       Last Written Prescription Date:  historical medication   Last Fill Quantity: 0,   # refills: 0  Last Office Visit: 4/2/19  Future Office visit:    Next 5 appointments (look out 90 days)    Jul 02, 2019 10:00 AM CDT  (Arrive by 9:45 AM)  SHORT with DOC Cox MD  [facility] ([facility] ) [address]  [place] [address]  [phone]         Tylenol       Last Written Prescription Date:  12/21/18  Last Fill Quantity: 100,   # refills: 1  Last Office Visit: 4/2/19  Future Office visit:    Next 5 appointments (look out 90 days)    Jul 02, 2019 10:00 AM CDT  (Arrive by 9:45 AM)  SHORT with DOC Cox MD  [facility] ([facility] [place] ) [address]  [place] [address]  [phone]

## 2019-04-29 ENCOUNTER — TELEPHONE (OUTPATIENT)
Dept: FAMILY MEDICINE | Facility: OTHER | Age: 84
End: 2019-04-29

## 2019-04-29 DIAGNOSIS — E87.6 HYPOKALEMIA: ICD-10-CM

## 2019-04-29 LAB — POTASSIUM SERPL-SCNC: 2.9 MMOL/L (ref 3.4–5.3)

## 2019-04-29 PROCEDURE — 36415 COLL VENOUS BLD VENIPUNCTURE: CPT | Mod: ZL | Performed by: FAMILY MEDICINE

## 2019-04-29 PROCEDURE — 84132 ASSAY OF SERUM POTASSIUM: CPT | Mod: ZL | Performed by: FAMILY MEDICINE

## 2019-04-29 RX ORDER — POLYETHYLENE GLYCOL 3350 17 G/17G
1 POWDER, FOR SOLUTION ORAL DAILY
Qty: 72 G | Refills: 3 | Status: SHIPPED | OUTPATIENT
Start: 2019-04-29 | End: 2020-09-15

## 2019-04-29 RX ORDER — ACETAMINOPHEN 325 MG/1
325 TABLET ORAL EVERY 6 HOURS PRN
Qty: 100 TABLET | Refills: 3 | Status: SHIPPED | OUTPATIENT
Start: 2019-04-29

## 2019-04-29 RX ORDER — NYSTATIN 100000 [USP'U]/G
POWDER TOPICAL 2 TIMES DAILY PRN
Qty: 30 G | Refills: 2 | Status: SHIPPED | OUTPATIENT
Start: 2019-04-29 | End: 2020-09-08

## 2019-04-29 RX ORDER — POTASSIUM CHLORIDE 750 MG/1
10 TABLET, EXTENDED RELEASE ORAL 3 TIMES DAILY
Qty: 30 TABLET | Refills: 1 | Status: SHIPPED | OUTPATIENT
Start: 2019-04-29 | End: 2019-05-03

## 2019-04-29 RX ORDER — POTASSIUM CHLORIDE 750 MG/1
10 TABLET, EXTENDED RELEASE ORAL 3 TIMES DAILY
Qty: 30 TABLET | Refills: 5 | Status: SHIPPED | OUTPATIENT
Start: 2019-04-29 | End: 2019-04-29

## 2019-04-29 RX ORDER — ASPIRIN 81 MG
100 TABLET, DELAYED RELEASE (ENTERIC COATED) ORAL 2 TIMES DAILY
Qty: 60 TABLET | Refills: 3 | Status: SHIPPED | OUTPATIENT
Start: 2019-04-29 | End: 2020-09-15

## 2019-04-29 NOTE — TELEPHONE ENCOUNTER
DATE:  4/29/2019   TIME OF RECEIPT FROM LAB:  11:17am  LAB TEST:  Potassium  LAB VALUE:  2.9  RESULTS GIVEN WITH READ-BACK TO (PROVIDER):  DOC VENTURA   TIME LAB VALUE REPORTED TO PROVIDER:   11:18am     Patient to take potassium 10 meq TID and recheck labs in Friday. Will notify staff member at [facility]   DANETTE HEARN

## 2019-04-29 NOTE — TELEPHONE ENCOUNTER
Staff notified, phone number given was a non working number for Hal. Received cell # from staff. LM with hal, please let her know fiber will be stopped. Also verify # and update demographics.    LM on # [phone]

## 2019-04-29 NOTE — TELEPHONE ENCOUNTER
Daughter notified.  [facility] can you please update demographics. Remove Daughter Lavinia's # and add cell- [phone].    Thank you  DANETTE HEARN

## 2019-04-29 NOTE — TELEPHONE ENCOUNTER
Pt's daughter called stating her mother is currently taking a fiber supplement and resides at [facility] and the facility will not stop giving her the supplement without a doctor order.  Pt has been having loose stools that have been increasing.  Should the pt make an appt to be seen?  Or can an order to discontinue fiber be sent and see if this helps resolve the issue.  YE PENA

## 2019-04-30 DIAGNOSIS — I48.20 CHRONIC ATRIAL FIBRILLATION (H): ICD-10-CM

## 2019-04-30 RX ORDER — DIGOXIN 125 MCG
125 TABLET ORAL DAILY
Qty: 30 TABLET | Refills: 0 | Status: SHIPPED | OUTPATIENT
Start: 2019-04-30 | End: 2019-05-29

## 2019-05-03 DIAGNOSIS — E87.6 HYPOKALEMIA: ICD-10-CM

## 2019-05-03 LAB — POTASSIUM SERPL-SCNC: 3.1 MMOL/L (ref 3.4–5.3)

## 2019-05-03 PROCEDURE — 36415 COLL VENOUS BLD VENIPUNCTURE: CPT | Mod: ZL | Performed by: FAMILY MEDICINE

## 2019-05-03 PROCEDURE — 84132 ASSAY OF SERUM POTASSIUM: CPT | Mod: ZL | Performed by: FAMILY MEDICINE

## 2019-05-03 RX ORDER — POTASSIUM CHLORIDE 750 MG/1
10 TABLET, EXTENDED RELEASE ORAL 2 TIMES DAILY
Qty: 60 TABLET | Refills: 1 | Status: SHIPPED | OUTPATIENT
Start: 2019-05-03 | End: 2019-06-25

## 2019-05-15 NOTE — TELEPHONE ENCOUNTER
2:06 PM    Reason for Call: Phone Call    Description: Patient's nurse has a question about one of her medications.    Was an appointment offered for this call? No  If yes : Appointment type              Date    Preferred method for responding to this message: Telephone Call  What is your phone number ?  Ttai [phone] Secondary call  Debbi- [phone]     If we cannot reach you directly, may we leave a detailed response at the number you provided? Yes    Can this message wait until your PCP/provider returns, if available today? YES, Provider not in today    Ivelisse Castro    
Patient is currently on eliquis- not a med list. With prior auth medication is over $200. Dr. Cox would like patient to see coumadin clinic for med change.  DANETTE HEARN    
supervision

## 2019-05-29 DIAGNOSIS — M19.90 OSTEOARTHRITIS, UNSPECIFIED OSTEOARTHRITIS TYPE, UNSPECIFIED SITE: ICD-10-CM

## 2019-05-29 DIAGNOSIS — I48.20 CHRONIC ATRIAL FIBRILLATION (H): ICD-10-CM

## 2019-05-30 NOTE — TELEPHONE ENCOUNTER
digox  Last Written Prescription Date: 4/30/19  Last Fill Quantity: 30 # of Refills: 0  Last Office Visit: 4/2/19

## 2019-05-31 RX ORDER — ALENDRONATE SODIUM 70 MG/1
TABLET ORAL
Qty: 4 TABLET | Refills: 0 | Status: SHIPPED | OUTPATIENT
Start: 2019-05-31 | End: 2019-07-30

## 2019-05-31 RX ORDER — DIGOXIN 125 UG/1
TABLET ORAL
Qty: 9 TABLET | Refills: 0 | Status: SHIPPED | OUTPATIENT
Start: 2019-05-31 | End: 2019-06-25

## 2019-05-31 NOTE — TELEPHONE ENCOUNTER
Patient failed protocol due to outstanding digoxin lab value.  Please advise if you wish to refill.

## 2019-06-25 DIAGNOSIS — I48.20 CHRONIC ATRIAL FIBRILLATION (H): ICD-10-CM

## 2019-06-25 DIAGNOSIS — E87.6 HYPOKALEMIA: ICD-10-CM

## 2019-06-26 RX ORDER — POTASSIUM CHLORIDE 750 MG/1
TABLET, EXTENDED RELEASE ORAL
Qty: 56 TABLET | Refills: 1 | Status: SHIPPED | OUTPATIENT
Start: 2019-06-26 | End: 2019-08-23

## 2019-06-26 NOTE — TELEPHONE ENCOUNTER
DIGOX 125 MCG tablet      Last Written Prescription Date:  5/31/19  Last Fill Quantity: 9,   # refills: 0  Last Office Visit: 4/2/19  Future Office visit:    Next 5 appointments (look out 90 days)    Jul 02, 2019 10:00 AM CDT  (Arrive by 9:45 AM)  [facility] with DOC Cox MD  [facility] [address]  [place] [address]  [phone]           Routing refill request to provider for review/approval because:   Normal digoxin level on file in past 12 mos     Last filled for only 9 tabs, unsure why. Please advise. Thank you!

## 2019-06-27 RX ORDER — DIGOXIN 125 UG/1
TABLET ORAL
Qty: 28 TABLET | Refills: 5 | Status: SHIPPED | OUTPATIENT
Start: 2019-06-27 | End: 2019-08-23

## 2019-06-29 ENCOUNTER — APPOINTMENT (OUTPATIENT)
Dept: CT IMAGING | Facility: HOSPITAL | Age: 84
DRG: 683 | End: 2019-06-29
Attending: EMERGENCY MEDICINE
Payer: MEDICARE

## 2019-06-29 ENCOUNTER — APPOINTMENT (OUTPATIENT)
Dept: GENERAL RADIOLOGY | Facility: HOSPITAL | Age: 84
DRG: 683 | End: 2019-06-29
Attending: EMERGENCY MEDICINE
Payer: MEDICARE

## 2019-06-29 ENCOUNTER — HOSPITAL ENCOUNTER (INPATIENT)
Facility: HOSPITAL | Age: 84
LOS: 3 days | Discharge: NURSING, REHAB OR LONG-TERM CARE | DRG: 683 | End: 2019-07-02
Attending: EMERGENCY MEDICINE | Admitting: HOSPITALIST
Payer: MEDICARE

## 2019-06-29 ENCOUNTER — MEDICAL CORRESPONDENCE (OUTPATIENT)
Dept: HEALTH INFORMATION MANAGEMENT | Facility: CLINIC | Age: 84
End: 2019-06-29

## 2019-06-29 DIAGNOSIS — I48.91 ATRIAL FIBRILLATION WITH RVR (H): ICD-10-CM

## 2019-06-29 DIAGNOSIS — I63.511: ICD-10-CM

## 2019-06-29 DIAGNOSIS — E86.1 HYPOTENSION DUE TO HYPOVOLEMIA: ICD-10-CM

## 2019-06-29 DIAGNOSIS — A41.9 SEPSIS, DUE TO UNSPECIFIED ORGANISM: Primary | ICD-10-CM

## 2019-06-29 DIAGNOSIS — A41.9 SEPSIS (H): ICD-10-CM

## 2019-06-29 DIAGNOSIS — M62.81 GENERALIZED MUSCLE WEAKNESS: ICD-10-CM

## 2019-06-29 LAB
ALBUMIN SERPL-MCNC: 2.9 G/DL (ref 3.4–5)
ALBUMIN UR-MCNC: 30 MG/DL
ALP SERPL-CCNC: 66 U/L (ref 40–150)
ALT SERPL W P-5'-P-CCNC: 29 U/L (ref 0–50)
ANION GAP SERPL CALCULATED.3IONS-SCNC: 8 MMOL/L (ref 3–14)
APPEARANCE UR: ABNORMAL
AST SERPL W P-5'-P-CCNC: 26 U/L (ref 0–45)
BACTERIA #/AREA URNS HPF: ABNORMAL /HPF
BASOPHILS # BLD AUTO: 0.1 10E9/L (ref 0–0.2)
BASOPHILS NFR BLD AUTO: 0.4 %
BILIRUB SERPL-MCNC: 1 MG/DL (ref 0.2–1.3)
BILIRUB UR QL STRIP: NEGATIVE
BUN SERPL-MCNC: 23 MG/DL (ref 7–30)
CALCIUM SERPL-MCNC: 8.8 MG/DL (ref 8.5–10.1)
CHLORIDE SERPL-SCNC: 106 MMOL/L (ref 94–109)
CO2 SERPL-SCNC: 26 MMOL/L (ref 20–32)
COLOR UR AUTO: YELLOW
CREAT SERPL-MCNC: 1.43 MG/DL (ref 0.52–1.04)
D DIMER PPP DDU-MCNC: <200 NG/ML D-DU (ref 0–300)
DIFFERENTIAL METHOD BLD: ABNORMAL
EOSINOPHIL # BLD AUTO: 0.1 10E9/L (ref 0–0.7)
EOSINOPHIL NFR BLD AUTO: 0.6 %
ERYTHROCYTE [DISTWIDTH] IN BLOOD BY AUTOMATED COUNT: 14.1 % (ref 10–15)
GFR SERPL CREATININE-BSD FRML MDRD: 33 ML/MIN/{1.73_M2}
GLUCOSE BLDC GLUCOMTR-MCNC: 205 MG/DL (ref 70–99)
GLUCOSE SERPL-MCNC: 228 MG/DL (ref 70–99)
GLUCOSE UR STRIP-MCNC: NEGATIVE MG/DL
HCT VFR BLD AUTO: 36 % (ref 35–47)
HGB BLD-MCNC: 11.9 G/DL (ref 11.7–15.7)
HGB UR QL STRIP: ABNORMAL
HYALINE CASTS #/AREA URNS LPF: 23 /LPF
IMM GRANULOCYTES # BLD: 0.1 10E9/L (ref 0–0.4)
IMM GRANULOCYTES NFR BLD: 0.7 %
KETONES UR STRIP-MCNC: NEGATIVE MG/DL
LACTATE BLD-SCNC: 2.8 MMOL/L (ref 0.7–2)
LACTATE BLD-SCNC: 2.9 MMOL/L (ref 0.7–2)
LACTATE BLD-SCNC: 3.3 MMOL/L (ref 0.7–2)
LACTATE BLD-SCNC: 3.5 MMOL/L (ref 0.7–2)
LEUKOCYTE ESTERASE UR QL STRIP: NEGATIVE
LYMPHOCYTES # BLD AUTO: 1.5 10E9/L (ref 0.8–5.3)
LYMPHOCYTES NFR BLD AUTO: 11.9 %
MCH RBC QN AUTO: 29.4 PG (ref 26.5–33)
MCHC RBC AUTO-ENTMCNC: 33.1 G/DL (ref 31.5–36.5)
MCV RBC AUTO: 89 FL (ref 78–100)
MONOCYTES # BLD AUTO: 0.7 10E9/L (ref 0–1.3)
MONOCYTES NFR BLD AUTO: 5.9 %
MUCOUS THREADS #/AREA URNS LPF: PRESENT /LPF
NEUTROPHILS # BLD AUTO: 9.9 10E9/L (ref 1.6–8.3)
NEUTROPHILS NFR BLD AUTO: 80.5 %
NITRATE UR QL: NEGATIVE
NRBC # BLD AUTO: 0 10*3/UL
NRBC BLD AUTO-RTO: 0 /100
PH UR STRIP: 5.5 PH (ref 4.7–8)
PLATELET # BLD AUTO: 207 10E9/L (ref 150–450)
POTASSIUM SERPL-SCNC: 3.5 MMOL/L (ref 3.4–5.3)
PROCALCITONIN SERPL-MCNC: <0.05 NG/ML
PROT SERPL-MCNC: 6.5 G/DL (ref 6.8–8.8)
RBC # BLD AUTO: 4.05 10E12/L (ref 3.8–5.2)
RBC #/AREA URNS AUTO: >182 /HPF (ref 0–2)
SODIUM SERPL-SCNC: 140 MMOL/L (ref 133–144)
SOURCE: ABNORMAL
SP GR UR STRIP: 1.02 (ref 1–1.03)
SQUAMOUS #/AREA URNS AUTO: 1 /HPF (ref 0–1)
TROPONIN I SERPL-MCNC: <0.015 UG/L (ref 0–0.04)
TSH SERPL DL<=0.005 MIU/L-ACNC: 6.04 MU/L (ref 0.4–4)
UROBILINOGEN UR STRIP-MCNC: NORMAL MG/DL (ref 0–2)
WBC # BLD AUTO: 12.3 10E9/L (ref 4–11)
WBC #/AREA URNS AUTO: 5 /HPF (ref 0–5)

## 2019-06-29 PROCEDURE — 85025 COMPLETE CBC W/AUTO DIFF WBC: CPT | Performed by: EMERGENCY MEDICINE

## 2019-06-29 PROCEDURE — 81001 URINALYSIS AUTO W/SCOPE: CPT | Performed by: EMERGENCY MEDICINE

## 2019-06-29 PROCEDURE — 00000146 ZZHCL STATISTIC GLUCOSE BY METER IP

## 2019-06-29 PROCEDURE — 25800030 ZZH RX IP 258 OP 636: Performed by: EMERGENCY MEDICINE

## 2019-06-29 PROCEDURE — 40000786 ZZHCL STATISTIC ACTIVE MRSA SURVEILLANCE CULTURE: Performed by: HOSPITALIST

## 2019-06-29 PROCEDURE — 85379 FIBRIN DEGRADATION QUANT: CPT | Performed by: EMERGENCY MEDICINE

## 2019-06-29 PROCEDURE — 84145 PROCALCITONIN (PCT): CPT | Performed by: HOSPITALIST

## 2019-06-29 PROCEDURE — 25000132 ZZH RX MED GY IP 250 OP 250 PS 637: Mod: GY | Performed by: HOSPITALIST

## 2019-06-29 PROCEDURE — 99223 1ST HOSP IP/OBS HIGH 75: CPT | Mod: AI | Performed by: HOSPITALIST

## 2019-06-29 PROCEDURE — 99285 EMERGENCY DEPT VISIT HI MDM: CPT | Mod: 25

## 2019-06-29 PROCEDURE — 25800030 ZZH RX IP 258 OP 636

## 2019-06-29 PROCEDURE — 36415 COLL VENOUS BLD VENIPUNCTURE: CPT | Performed by: EMERGENCY MEDICINE

## 2019-06-29 PROCEDURE — 70450 CT HEAD/BRAIN W/O DYE: CPT | Mod: TC

## 2019-06-29 PROCEDURE — 93005 ELECTROCARDIOGRAM TRACING: CPT

## 2019-06-29 PROCEDURE — 87040 BLOOD CULTURE FOR BACTERIA: CPT | Performed by: EMERGENCY MEDICINE

## 2019-06-29 PROCEDURE — 36415 COLL VENOUS BLD VENIPUNCTURE: CPT | Performed by: HOSPITALIST

## 2019-06-29 PROCEDURE — 74176 CT ABD & PELVIS W/O CONTRAST: CPT | Mod: TC

## 2019-06-29 PROCEDURE — 25000128 H RX IP 250 OP 636: Performed by: HOSPITALIST

## 2019-06-29 PROCEDURE — 83605 ASSAY OF LACTIC ACID: CPT | Performed by: HOSPITALIST

## 2019-06-29 PROCEDURE — 80053 COMPREHEN METABOLIC PANEL: CPT | Performed by: EMERGENCY MEDICINE

## 2019-06-29 PROCEDURE — 96361 HYDRATE IV INFUSION ADD-ON: CPT

## 2019-06-29 PROCEDURE — 25000128 H RX IP 250 OP 636: Performed by: EMERGENCY MEDICINE

## 2019-06-29 PROCEDURE — 71045 X-RAY EXAM CHEST 1 VIEW: CPT | Mod: TC

## 2019-06-29 PROCEDURE — 84484 ASSAY OF TROPONIN QUANT: CPT | Performed by: EMERGENCY MEDICINE

## 2019-06-29 PROCEDURE — 83605 ASSAY OF LACTIC ACID: CPT | Performed by: EMERGENCY MEDICINE

## 2019-06-29 PROCEDURE — 84443 ASSAY THYROID STIM HORMONE: CPT | Performed by: EMERGENCY MEDICINE

## 2019-06-29 PROCEDURE — 25800030 ZZH RX IP 258 OP 636: Performed by: HOSPITALIST

## 2019-06-29 PROCEDURE — 12000000 ZZH R&B MED SURG/OB

## 2019-06-29 PROCEDURE — 96365 THER/PROPH/DIAG IV INF INIT: CPT

## 2019-06-29 PROCEDURE — 99285 EMERGENCY DEPT VISIT HI MDM: CPT | Mod: Z6 | Performed by: EMERGENCY MEDICINE

## 2019-06-29 PROCEDURE — 93010 ELECTROCARDIOGRAM REPORT: CPT | Performed by: INTERNAL MEDICINE

## 2019-06-29 RX ORDER — LEVOFLOXACIN 5 MG/ML
500 INJECTION, SOLUTION INTRAVENOUS ONCE
Status: COMPLETED | OUTPATIENT
Start: 2019-06-29 | End: 2019-06-29

## 2019-06-29 RX ORDER — SODIUM CHLORIDE 9 MG/ML
1000 INJECTION, SOLUTION INTRAVENOUS CONTINUOUS
Status: DISCONTINUED | OUTPATIENT
Start: 2019-06-29 | End: 2019-06-29

## 2019-06-29 RX ORDER — ONDANSETRON 2 MG/ML
4 INJECTION INTRAMUSCULAR; INTRAVENOUS EVERY 6 HOURS PRN
Status: DISCONTINUED | OUTPATIENT
Start: 2019-06-29 | End: 2019-07-02 | Stop reason: HOSPADM

## 2019-06-29 RX ORDER — MAGNESIUM SULFATE HEPTAHYDRATE 40 MG/ML
4 INJECTION, SOLUTION INTRAVENOUS EVERY 4 HOURS PRN
Status: DISCONTINUED | OUTPATIENT
Start: 2019-06-29 | End: 2019-07-02 | Stop reason: HOSPADM

## 2019-06-29 RX ORDER — AMOXICILLIN 250 MG
1 CAPSULE ORAL 2 TIMES DAILY PRN
Status: DISCONTINUED | OUTPATIENT
Start: 2019-06-29 | End: 2019-07-02 | Stop reason: HOSPADM

## 2019-06-29 RX ORDER — POTASSIUM CHLORIDE 1500 MG/1
20-40 TABLET, EXTENDED RELEASE ORAL
Status: DISCONTINUED | OUTPATIENT
Start: 2019-06-29 | End: 2019-07-02 | Stop reason: HOSPADM

## 2019-06-29 RX ORDER — SODIUM CHLORIDE 9 MG/ML
INJECTION, SOLUTION INTRAVENOUS
Status: COMPLETED
Start: 2019-06-29 | End: 2019-06-29

## 2019-06-29 RX ORDER — ASPIRIN 81 MG/1
81 TABLET, CHEWABLE ORAL DAILY
Status: DISCONTINUED | OUTPATIENT
Start: 2019-06-30 | End: 2019-07-02 | Stop reason: HOSPADM

## 2019-06-29 RX ORDER — DEXTROSE MONOHYDRATE 25 G/50ML
25-50 INJECTION, SOLUTION INTRAVENOUS
Status: DISCONTINUED | OUTPATIENT
Start: 2019-06-29 | End: 2019-07-02 | Stop reason: HOSPADM

## 2019-06-29 RX ORDER — POTASSIUM CHLORIDE 1.5 G/1.58G
20-40 POWDER, FOR SOLUTION ORAL
Status: DISCONTINUED | OUTPATIENT
Start: 2019-06-29 | End: 2019-07-02 | Stop reason: HOSPADM

## 2019-06-29 RX ORDER — ACETAMINOPHEN 325 MG/1
650 TABLET ORAL EVERY 4 HOURS PRN
Status: DISCONTINUED | OUTPATIENT
Start: 2019-06-29 | End: 2019-07-02 | Stop reason: HOSPADM

## 2019-06-29 RX ORDER — AMOXICILLIN 250 MG
2 CAPSULE ORAL 2 TIMES DAILY PRN
Status: DISCONTINUED | OUTPATIENT
Start: 2019-06-29 | End: 2019-07-02 | Stop reason: HOSPADM

## 2019-06-29 RX ORDER — CEFEPIME HYDROCHLORIDE 1 G/1
INJECTION, POWDER, FOR SOLUTION INTRAMUSCULAR; INTRAVENOUS
Status: DISPENSED
Start: 2019-06-29 | End: 2019-06-30

## 2019-06-29 RX ORDER — CEFAZOLIN SODIUM 1 G/50ML
1250 SOLUTION INTRAVENOUS
Status: DISCONTINUED | OUTPATIENT
Start: 2019-06-29 | End: 2019-06-30

## 2019-06-29 RX ORDER — ATORVASTATIN CALCIUM 40 MG/1
40 TABLET, FILM COATED ORAL DAILY
Status: DISCONTINUED | OUTPATIENT
Start: 2019-06-30 | End: 2019-07-02 | Stop reason: HOSPADM

## 2019-06-29 RX ORDER — SODIUM CHLORIDE, SODIUM LACTATE, POTASSIUM CHLORIDE, CALCIUM CHLORIDE 600; 310; 30; 20 MG/100ML; MG/100ML; MG/100ML; MG/100ML
INJECTION, SOLUTION INTRAVENOUS CONTINUOUS
Status: DISCONTINUED | OUTPATIENT
Start: 2019-06-29 | End: 2019-07-01

## 2019-06-29 RX ORDER — LIDOCAINE 40 MG/G
CREAM TOPICAL
Status: DISCONTINUED | OUTPATIENT
Start: 2019-06-29 | End: 2019-07-02 | Stop reason: HOSPADM

## 2019-06-29 RX ORDER — POTASSIUM CL/LIDO/0.9 % NACL 10MEQ/0.1L
10 INTRAVENOUS SOLUTION, PIGGYBACK (ML) INTRAVENOUS
Status: DISCONTINUED | OUTPATIENT
Start: 2019-06-29 | End: 2019-07-02 | Stop reason: HOSPADM

## 2019-06-29 RX ORDER — DIGOXIN 125 MCG
125 TABLET ORAL DAILY
Status: DISCONTINUED | OUTPATIENT
Start: 2019-06-29 | End: 2019-07-02 | Stop reason: HOSPADM

## 2019-06-29 RX ORDER — POTASSIUM CHLORIDE 7.45 MG/ML
10 INJECTION INTRAVENOUS
Status: DISCONTINUED | OUTPATIENT
Start: 2019-06-29 | End: 2019-07-02 | Stop reason: HOSPADM

## 2019-06-29 RX ORDER — VANCOMYCIN HYDROCHLORIDE 1 G/20ML
INJECTION, POWDER, LYOPHILIZED, FOR SOLUTION INTRAVENOUS
Status: DISPENSED
Start: 2019-06-29 | End: 2019-06-30

## 2019-06-29 RX ORDER — NICOTINE POLACRILEX 4 MG
15-30 LOZENGE BUCCAL
Status: DISCONTINUED | OUTPATIENT
Start: 2019-06-29 | End: 2019-07-02 | Stop reason: HOSPADM

## 2019-06-29 RX ORDER — OXYCODONE HYDROCHLORIDE 5 MG/1
5 TABLET ORAL EVERY 6 HOURS PRN
Status: DISCONTINUED | OUTPATIENT
Start: 2019-06-29 | End: 2019-07-02 | Stop reason: HOSPADM

## 2019-06-29 RX ORDER — NALOXONE HYDROCHLORIDE 0.4 MG/ML
.1-.4 INJECTION, SOLUTION INTRAMUSCULAR; INTRAVENOUS; SUBCUTANEOUS
Status: DISCONTINUED | OUTPATIENT
Start: 2019-06-29 | End: 2019-07-02 | Stop reason: HOSPADM

## 2019-06-29 RX ORDER — ONDANSETRON 4 MG/1
4 TABLET, ORALLY DISINTEGRATING ORAL EVERY 6 HOURS PRN
Status: DISCONTINUED | OUTPATIENT
Start: 2019-06-29 | End: 2019-07-02 | Stop reason: HOSPADM

## 2019-06-29 RX ORDER — DULOXETIN HYDROCHLORIDE 20 MG/1
20 CAPSULE, DELAYED RELEASE ORAL 2 TIMES DAILY
Status: DISCONTINUED | OUTPATIENT
Start: 2019-06-30 | End: 2019-07-02 | Stop reason: HOSPADM

## 2019-06-29 RX ADMIN — APIXABAN 5 MG: 5 TABLET, FILM COATED ORAL at 21:37

## 2019-06-29 RX ADMIN — VANCOMYCIN HYDROCHLORIDE 1250 MG: 10 INJECTION, POWDER, LYOPHILIZED, FOR SOLUTION INTRAVENOUS at 22:35

## 2019-06-29 RX ADMIN — SODIUM CHLORIDE: 900 INJECTION INTRAVENOUS at 21:43

## 2019-06-29 RX ADMIN — LEVOFLOXACIN 500 MG: 5 INJECTION, SOLUTION INTRAVENOUS at 19:33

## 2019-06-29 RX ADMIN — SODIUM CHLORIDE 1000 ML: 9 INJECTION, SOLUTION INTRAVENOUS at 14:42

## 2019-06-29 RX ADMIN — DIGOXIN 125 MCG: 125 TABLET ORAL at 21:37

## 2019-06-29 RX ADMIN — SODIUM CHLORIDE 1000 ML: 9 INJECTION, SOLUTION INTRAVENOUS at 16:26

## 2019-06-29 RX ADMIN — SODIUM CHLORIDE, POTASSIUM CHLORIDE, SODIUM LACTATE AND CALCIUM CHLORIDE: 600; 310; 30; 20 INJECTION, SOLUTION INTRAVENOUS at 20:43

## 2019-06-29 RX ADMIN — SODIUM CHLORIDE 1000 ML: 9 INJECTION, SOLUTION INTRAVENOUS at 15:24

## 2019-06-29 RX ADMIN — CEFEPIME HYDROCHLORIDE 2 G: 2 INJECTION, POWDER, FOR SOLUTION INTRAVENOUS at 21:42

## 2019-06-29 RX ADMIN — SODIUM CHLORIDE 1000 ML: 9 INJECTION, SOLUTION INTRAVENOUS at 17:14

## 2019-06-29 ASSESSMENT — ENCOUNTER SYMPTOMS
DIFFICULTY URINATING: 0
CONFUSION: 0
EYE REDNESS: 0
COLOR CHANGE: 0
HEADACHES: 0
NECK STIFFNESS: 0
FEVER: 0
SHORTNESS OF BREATH: 0
ARTHRALGIAS: 0
ABDOMINAL PAIN: 0

## 2019-06-29 ASSESSMENT — ACTIVITIES OF DAILY LIVING (ADL): ADLS_ACUITY_SCORE: 17

## 2019-06-29 ASSESSMENT — MIFFLIN-ST. JEOR: SCORE: 1087.25

## 2019-06-29 NOTE — ED NOTES
DATE:  6/29/2019   TIME OF RECEIPT FROM LAB:  0640  LAB TEST:  Lactic acid  LAB VALUE:  3.5  RESULTS GIVEN WITH READ-BACK TO (PROVIDER): Dr. Gonzalez  TIME LAB VALUE REPORTED TO PROVIDER:  1047

## 2019-06-29 NOTE — ED NOTES
DATE:  6/29/2019   TIME OF RECEIPT FROM LAB:  1803  LAB TEST:  Lactic acid  LAB VALUE:  2.9  RESULTS GIVEN WITH READ-BACK TO (PROVIDER):  Dr. Gonzalez  TIME LAB VALUE REPORTED TO PROVIDER:   1774

## 2019-06-29 NOTE — ED NOTES
DATE:  6/29/2019   TIME OF RECEIPT FROM LAB:  3308  LAB TEST:  Lactic acid  LAB VALUE:  2.8  RESULTS GIVEN WITH READ-BACK TO [facility]  TIME LAB VALUE REPORTED TO PROVIDER:   6180

## 2019-06-29 NOTE — ED NOTES
The patient arrives via [facility] from [facility] with report of general weakness and hypotension. The patient was noted by EMS to have a BP of 79/49 and cardiac rhythm of atrial fibrillation, arriving with a VR in the 's, no ectopy noted. The patient is alert and oriented and denies pain or shortness of breath. She reports she was in the chair waiting to have lunch and when she went to get up to use her walker she was weak and fell.

## 2019-06-29 NOTE — ED NOTES
The patient was assisted to the commode with two staff and was unsteady on her feet and stated weakness. Her BP was within parameters but her heart rate increased to 110-130 with the activity. She was unable to maintain her balance independently. She was unable to void at this time. Dr. Gonzalez notified. The patient's daughter Lavinia is at the bedside and updated on the patient's status. The patient denies pain or discomfort and moves all her extremities equally.

## 2019-06-29 NOTE — ED NOTES
IRIS Cash at [facility] updated on the patient's status. To call her with the plan of care and disposition at [phone]. Per report the patient's daughter Lavinia was notified of the patient's ED visit.

## 2019-06-29 NOTE — ED PROVIDER NOTES
History     Chief Complaint   Patient presents with     Generalized Weakness     General weakness and hypotension. Arrives from [facility].     HPI  Annie Arguello is a 85 year old female who presented to the emergency department via EMS.  Patient was noted to have a low blood pressure at the assisted living facility with a systolic blood pressure of 70 mmHg.  His heart rate was also noted to be fluctuating from 40 to 100 bpm.  Patient denies any pain and does not know why she is in the emergency department.  Patient recently had an ischemic stroke in Nov 2018 and has residual left-sided weakness.  She is feeling so weak today that she has been unable to use her walker.  History is very limited and was obtained from the nursing staff.    Allergies:  Allergies   Allergen Reactions     Ampicillin      Desvenlafaxine Other (See Comments)     Desvenlafaxine Succinate  Pristiq       Sertraline Hcl Other (See Comments) and Diarrhea     Zoloft       Sulfa Drugs Other (See Comments)     Sulfa(Sulfonamide Antibiotics)       Problem List:    Patient Active Problem List    Diagnosis Date Noted     Lung nodule 10/30/2018     Priority: Medium     Controlled type 2 diabetes mellitus without complication, without long-term current use of insulin (H) 10/24/2017     Priority: Medium     Permanent atrial fibrillation (H) 10/24/2017     Priority: Medium     ACP (advance care planning) 01/04/2017     Priority: Medium     Advance Care Planning 1/4/2017: ACP Review of Chart / Resources Provided:  Reviewed chart for advance care plan.  Annie Arguello has no plan or code status on file. Discussed available resources and provided with information. Confirmed code status reflects current choices pending further ACP discussions.  Confirmed/documented legally designated decision makers.  Added by Kenzie Mancilla           Advanced care planning/counseling discussion 11/02/2012     Priority: Medium     Myalgia and  myositis 05/10/2011     Priority: Medium     Problem list name updated by automated process. Provider to review       Diverticulosis of large intestine 2011     Priority: Medium     Problem list name updated by automated process. Provider to review       Osteoporosis 2011     Priority: Medium     Problem list name updated by automated process. Provider to review       Obesity 2011     Priority: Medium     Problem list name updated by automated process. Provider to review       Congenital cystic kidney disease 2011     Priority: Medium     Problem list name updated by automated process. Provider to review       Sciatica 2007     Priority: Medium     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 2007     Priority: Medium     Calculus of kidney 2003     Priority: Medium        Past Medical History:    Past Medical History:   Diagnosis Date     Atrial fibrillation (H) 2012     Calculus of kidney 2003     Controlled type 2 diabetes mellitus without complication, without long-term current use of insulin (H) 10/24/2017     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 2007     Diverticulosis 2011     Fibromyalgia 05/10/2011     Nonallopathic lesions of cervical region, not else 2001     Obesity 2011     Osteoporosis 2011     Other malaise and fatigue 2002     Renal cyst 2011     Sciatica 2007       Past Surgical History:    Past Surgical History:   Procedure Laterality Date     Breast biopsy      LT, benign      SECTION       CHOLECYSTECTOMY      lap     COLONOSCOPY       LUCILLE / BSO         Family History:    Family History   Problem Relation Age of Onset     Myocardial Infarction Mother 59        myocardial infarction, cause of death     Other - See Comments Daughter         fibromyalgia     Thyroid Disease Sister         hypo     Thyroid Disease Sister         hypo     Thyroid Disease Sister          hypo     Myocardial Infarction Brother 63        myocardial infarction, cause of death       Social History:  Marital Status:   [5]  Social History     Tobacco Use     Smoking status: Never Smoker     Smokeless tobacco: Never Used     Tobacco comment: no passive exposure   Substance Use Topics     Alcohol use: No     Drug use: No        Medications:      alendronate (FOSAMAX) 70 MG tablet   apixaban ANTICOAGULANT (ELIQUIS) 5 MG tablet   ASPIRIN LOW DOSE 81 MG chewable tablet   atorvastatin (LIPITOR) 40 MG tablet   blood glucose monitoring (ONE TOUCH DELICA) lancets   diclofenac (VOLTAREN) 1 % topical gel   DIGOX 125 MCG tablet   docusate sodium (COLACE) 100 MG tablet   DULoxetine (CYMBALTA) 20 MG capsule   hydrochlorothiazide (HYDRODIURIL) 12.5 MG tablet   metoprolol tartrate (LOPRESSOR) 50 MG tablet   Multiple Vitamin (TAB-A-MARIUSZ) TABS   nystatin (MYCOSTATIN) 490194 UNIT/GM external powder   omeprazole (PRILOSEC) 20 MG DR capsule   ONETOUCH VERIO IQ test strip   polyethylene glycol (MIRALAX/GLYCOLAX) packet   potassium chloride ER (K-DUR/KLOR-CON M) 10 MEQ CR tablet   psyllium (METAMUCIL/KONSYL) 0.52 g capsule   acetaminophen (TYLENOL) 325 MG tablet   calcium polycarbophil (FIBERCON) 625 MG tablet         Review of Systems   Constitutional: Negative for fever.   HENT: Negative for congestion.    Eyes: Negative for redness.   Respiratory: Negative for shortness of breath.    Cardiovascular: Negative for chest pain.   Gastrointestinal: Negative for abdominal pain.   Genitourinary: Negative for difficulty urinating.   Musculoskeletal: Negative for arthralgias and neck stiffness.   Skin: Negative for color change.   Neurological: Negative for headaches.   Psychiatric/Behavioral: Negative for confusion.   All other systems reviewed and are negative.      Physical Exam   BP: (!) 82/56  Pulse: 116  Heart Rate: 115  Temp: 97.7  F (36.5  C)  Resp: 20  SpO2: 96 %      Physical Exam   Constitutional: She is oriented to  person, place, and time. She appears well-developed and well-nourished. No distress.   HENT:   Head: Normocephalic and atraumatic.   Mouth/Throat: Oropharynx is clear and moist. No oropharyngeal exudate.   Eyes: Pupils are equal, round, and reactive to light. EOM are normal. No scleral icterus.   Neck: Normal range of motion. Neck supple.   Cardiovascular: Regular rhythm, normal heart sounds and intact distal pulses.   Pulmonary/Chest: Breath sounds normal. No respiratory distress. She exhibits no tenderness.   Abdominal: Soft. Bowel sounds are normal. There is no tenderness.   Musculoskeletal: Normal range of motion. She exhibits no edema or tenderness.   Neurological: She is alert and oriented to person, place, and time.   Skin: Skin is warm and dry. No abrasion, no laceration and no rash noted. She is not diaphoretic. No erythema. No pallor.       ED Course       Procedures       EKG Interpretation:      Interpreted by Hermann Gonzalez  Time reviewed: 2:39 PM  Symptoms at time of EKG: hypotension   Rhythm: Atrial fibrillation  Rate: Atrial fibrillation with RVR.  Axis: normal  Ectopy: none  Conduction: normal  ST Segments/ T Waves: No ST-T wave changes  Q Waves: none  Comparison to prior: No old EKG available    Clinical Impression: Atrial fibrillation with RVR      Results for orders placed or performed during the hospital encounter of 06/29/19 (from the past 24 hour(s))   CBC with platelets differential   Result Value Ref Range    WBC 12.3 (H) 4.0 - 11.0 10e9/L    RBC Count 4.05 3.8 - 5.2 10e12/L    Hemoglobin 11.9 11.7 - 15.7 g/dL    Hematocrit 36.0 35.0 - 47.0 %    MCV 89 78 - 100 fl    MCH 29.4 26.5 - 33.0 pg    MCHC 33.1 31.5 - 36.5 g/dL    RDW 14.1 10.0 - 15.0 %    Platelet Count 207 150 - 450 10e9/L    Diff Method Automated Method     % Neutrophils 80.5 %    % Lymphocytes 11.9 %    % Monocytes 5.9 %    % Eosinophils 0.6 %    % Basophils 0.4 %    % Immature Granulocytes 0.7 %    Nucleated RBCs 0 0 /100     Absolute Neutrophil 9.9 (H) 1.6 - 8.3 10e9/L    Absolute Lymphocytes 1.5 0.8 - 5.3 10e9/L    Absolute Monocytes 0.7 0.0 - 1.3 10e9/L    Absolute Eosinophils 0.1 0.0 - 0.7 10e9/L    Absolute Basophils 0.1 0.0 - 0.2 10e9/L    Abs Immature Granulocytes 0.1 0 - 0.4 10e9/L    Absolute Nucleated RBC 0.0    Comprehensive metabolic panel   Result Value Ref Range    Sodium 140 133 - 144 mmol/L    Potassium 3.5 3.4 - 5.3 mmol/L    Chloride 106 94 - 109 mmol/L    Carbon Dioxide 26 20 - 32 mmol/L    Anion Gap 8 3 - 14 mmol/L    Glucose 228 (H) 70 - 99 mg/dL    Urea Nitrogen 23 7 - 30 mg/dL    Creatinine 1.43 (H) 0.52 - 1.04 mg/dL    GFR Estimate 33 (L) >60 mL/min/[1.73_m2]    GFR Estimate If Black 38 (L) >60 mL/min/[1.73_m2]    Calcium 8.8 8.5 - 10.1 mg/dL    Bilirubin Total 1.0 0.2 - 1.3 mg/dL    Albumin 2.9 (L) 3.4 - 5.0 g/dL    Protein Total 6.5 (L) 6.8 - 8.8 g/dL    Alkaline Phosphatase 66 40 - 150 U/L    ALT 29 0 - 50 U/L    AST 26 0 - 45 U/L   D-Dimer (HI,GH)   Result Value Ref Range    D-Dimer ng/mL <200 0 - 300 ng/ml D-DU   Lactic acid whole blood   Result Value Ref Range    Lactic Acid 3.5 (H) 0.7 - 2.0 mmol/L   Troponin I   Result Value Ref Range    Troponin I ES <0.015 0.000 - 0.045 ug/L   TSH   Result Value Ref Range    TSH 6.04 (H) 0.40 - 4.00 mU/L   XR Chest Port 1 View    Narrative    PROCEDURE:  XR CHEST PORT 1 VW    HISTORY:  Hypotension.     COMPARISON:  2016    FINDINGS:   The cardiac silhouette is normal in size. The pulmonary vasculature is  normal.  The lungs are clear. No pleural effusion or pneumothorax.      Impression    IMPRESSION:  No acute cardiopulmonary disease.      ODILIA CARO MD   UA reflex to Microscopic and Culture   Result Value Ref Range    Color Urine Yellow     Appearance Urine Slightly Cloudy     Glucose Urine Negative NEG^Negative mg/dL    Bilirubin Urine Negative NEG^Negative    Ketones Urine Negative NEG^Negative mg/dL    Specific Gravity Urine 1.022 1.003 - 1.035    Blood Urine  Moderate (A) NEG^Negative    pH Urine 5.5 4.7 - 8.0 pH    Protein Albumin Urine 30 (A) NEG^Negative mg/dL    Urobilinogen mg/dL Normal 0.0 - 2.0 mg/dL    Nitrite Urine Negative NEG^Negative    Leukocyte Esterase Urine Negative NEG^Negative    Source Catheterized Urine     RBC Urine >182 (H) 0 - 2 /HPF    WBC Urine 5 0 - 5 /HPF    Bacteria Urine None (A) NEG^Negative /HPF    Squamous Epithelial /HPF Urine 1 0 - 1 /HPF    Mucous Urine Present (A) NEG^Negative /LPF    Hyaline Casts 23 (A) OTO2^O - 2 /LPF   Lactic acid whole blood   Result Value Ref Range    Lactic Acid 2.8 (H) 0.7 - 2.0 mmol/L   Lactic acid whole blood   Result Value Ref Range    Lactic Acid 2.9 (H) 0.7 - 2.0 mmol/L   CT Abdomen Pelvis w/o Contrast    Narrative    EXAMINATION: CT ABDOMEN PELVIS W/O CONTRAST, 6/29/2019 6:25 PM    TECHNIQUE:  Helical CT images from the lung bases through the  symphysis pubis were obtained  without IV contrast. Contrast dose:    COMPARISON: November 2016    HISTORY: Hematuria, unknown cause; ; Osiel hematuria and weakness    FINDINGS:    There is dependent atelectasis at the lung bases. There is a 3 mm  diameter nodule seen on axial image 6 which is stable from 2016.    The liver is free of masses. The gallbladder has been removed. The  bile ducts are normal for postcholecystectomy patient.    The the spleen appears normal. The pancreatic duct is mildly dilated  stable from 2016    The adrenal glands are normal.    There are benign-appearing cysts seen in both kidneys. There is a 3 mm  diameter calculus in the midportion of the right kidney.    The periaortic lymph nodes are normal in caliber.    No intraperitoneal masses or inflammatory changes are noted.    In the pelvis the bladder and rectum appear normal.    Scoliosis and degenerative changes are present in the lumbar spine.      Impression    IMPRESSION: No change in the abdomen and pelvis from previous  examination in 2016     OSIEL CARO MD   CT Head w/o  Contrast    Narrative    PROCEDURE: CT HEAD W/O CONTRAST     HISTORY: general weakness.    COMPARISON: November 2018    TECHNIQUE:  Helical images of the head from the foramen magnum to the  vertex were obtained without contrast.    FINDINGS: There is an old middle cerebral artery distribution infarct  in the right hemisphere. There is some compensatory enlargement of the  right lateral ventricle. The brainstem and cerebellum appear normal.  The cranial vault is intact.  The visualized paranasal sinuses are  clear.      Impression    IMPRESSION: Old right hemisphere infarct  . The infarct has evolved to  be significantly larger than on previous examination in November 2018    ODILIA CARO MD       Medications   0.9% sodium chloride BOLUS (0 mLs Intravenous Stopped 6/29/19 1524)     Followed by   sodium chloride 0.9% infusion (0 mLs Intravenous ED Infusing on Admission/transfer 6/29/19 1943)   0.9% sodium chloride BOLUS (0 mLs Intravenous Stopped 6/29/19 1626)   0.9% sodium chloride BOLUS (0 mLs Intravenous Stopped 6/29/19 1822)   levofloxacin (LEVAQUIN) infusion 500 mg (0 mg Intravenous ED Infusing on Admission/transfer 6/29/19 1943)       Assessments & Plan (with Medical Decision Making)   Sepsis: Unknown source  Generalized muscle weakness: Secondary to sepsis and recent CVA  Hypotension with tachycardia: Secondary to sepsis  Atrial fib with RVR: Secondary to sepsis and hypovolemia  Evolution of right middle cerebral artery infarction: Subacute rather than acute per neurologist  Patient presented to the ED with very limited history from the assisted living facility of weakness.  Evaluated and noted to be hypotensive with a systolic blood pressure of 80 mmHg.  Started on aggressive IV fluid hydration and received at least 3 L of IV fluid in the emergency department and the blood pressure normalized with heart rate going below 100.  Patient was also noted to have slight leukocytosis with a WBC count of 12.4.  In  view of the hypotension, tachycardia and leukocytosis, and elevated lactic acid patient was started on IV Levaquin and admitted with sepsis.  Other test done today CT abdomen and pelvis did not show any acute changes.  Urinalysis showed gross hematuria but no stones/masses on CT scan.  Normal  d-dimer, CMP.  Lactic acid was elevated to 3.5 but reduced to 2.8 after 3 L of IV normal saline.  CT scan showed evolution of the stroke but this was discussed with Dr. Smith (stroke neurologist on[facility]call at [facility]).  I reviewed the CT scan and suggested that the evolution most likely occurred more than 2 months ago and not new.  Patient was very weak even to use her walker.  Admitted inpatient under care of Dr. Scott.  Continue IV fluid hydration and IV antibiotics pending blood culture results.  Will need nursing home placement for continued physical therapy discharge.    I have reviewed the nursing notes.    I have reviewed the findings, diagnosis, plan and need for follow up with the patient.       Medication List      There are no discharge medications for this visit.         Final diagnoses:   Atrial fibrillation with RVR (H)   Hypotension due to hypovolemia   Cerebral infarction involving middle cerebral artery, right (H)   Generalized muscle weakness   Sepsis, due to unspecified organism (H)       6/29/2019   [facility]     Hermann Gonzalez MD  06/29/19 1950

## 2019-06-29 NOTE — ED NOTES
The patient was assisted to the commode with one staff. More steady than the previous activity but still remains with unsteady balance and weakness at this time. The patient was unable to void at this time.

## 2019-06-29 NOTE — ED NOTES
The patient is eating a snack of juice, applesauce, crackers, and pudding without difficulty swallowing. She is talkative and visiting with her daughter.

## 2019-06-30 LAB
ALBUMIN UR-MCNC: NEGATIVE MG/DL
ANION GAP SERPL CALCULATED.3IONS-SCNC: 7 MMOL/L (ref 3–14)
APPEARANCE UR: CLEAR
BACTERIA #/AREA URNS HPF: ABNORMAL /HPF
BILIRUB UR QL STRIP: NEGATIVE
BUN SERPL-MCNC: 19 MG/DL (ref 7–30)
C DIFF TOX B STL QL: NEGATIVE
CALCIUM SERPL-MCNC: 8 MG/DL (ref 8.5–10.1)
CHLORIDE SERPL-SCNC: 111 MMOL/L (ref 94–109)
CO2 SERPL-SCNC: 25 MMOL/L (ref 20–32)
COLOR UR AUTO: ABNORMAL
CREAT SERPL-MCNC: 0.98 MG/DL (ref 0.52–1.04)
ERYTHROCYTE [DISTWIDTH] IN BLOOD BY AUTOMATED COUNT: 14.1 % (ref 10–15)
GFR SERPL CREATININE-BSD FRML MDRD: 53 ML/MIN/{1.73_M2}
GLUCOSE BLDC GLUCOMTR-MCNC: 156 MG/DL (ref 70–99)
GLUCOSE BLDC GLUCOMTR-MCNC: 168 MG/DL (ref 70–99)
GLUCOSE BLDC GLUCOMTR-MCNC: 172 MG/DL (ref 70–99)
GLUCOSE BLDC GLUCOMTR-MCNC: 173 MG/DL (ref 70–99)
GLUCOSE BLDC GLUCOMTR-MCNC: 201 MG/DL (ref 70–99)
GLUCOSE SERPL-MCNC: 188 MG/DL (ref 70–99)
GLUCOSE UR STRIP-MCNC: NEGATIVE MG/DL
HCT VFR BLD AUTO: 33.4 % (ref 35–47)
HGB BLD-MCNC: 11.2 G/DL (ref 11.7–15.7)
HGB UR QL STRIP: ABNORMAL
KETONES UR STRIP-MCNC: NEGATIVE MG/DL
LACTATE BLD-SCNC: 2.2 MMOL/L (ref 0.7–2)
LACTATE BLD-SCNC: 2.2 MMOL/L (ref 0.7–2)
LEUKOCYTE ESTERASE UR QL STRIP: NEGATIVE
MAGNESIUM SERPL-MCNC: 1.4 MG/DL (ref 1.6–2.3)
MAGNESIUM SERPL-MCNC: 2.8 MG/DL (ref 1.6–2.3)
MCH RBC QN AUTO: 29.9 PG (ref 26.5–33)
MCHC RBC AUTO-ENTMCNC: 33.5 G/DL (ref 31.5–36.5)
MCV RBC AUTO: 89 FL (ref 78–100)
MUCOUS THREADS #/AREA URNS LPF: PRESENT /LPF
NITRATE UR QL: NEGATIVE
PH UR STRIP: 5.5 PH (ref 4.7–8)
PLATELET # BLD AUTO: 172 10E9/L (ref 150–450)
POTASSIUM SERPL-SCNC: 3 MMOL/L (ref 3.4–5.3)
POTASSIUM SERPL-SCNC: 3.2 MMOL/L (ref 3.4–5.3)
POTASSIUM SERPL-SCNC: 4.2 MMOL/L (ref 3.4–5.3)
RBC # BLD AUTO: 3.74 10E12/L (ref 3.8–5.2)
RBC #/AREA URNS AUTO: 3 /HPF (ref 0–2)
SODIUM SERPL-SCNC: 143 MMOL/L (ref 133–144)
SOURCE: ABNORMAL
SP GR UR STRIP: 1.01 (ref 1–1.03)
SPECIMEN SOURCE: NORMAL
T4 FREE SERPL-MCNC: 0.92 NG/DL (ref 0.76–1.46)
UROBILINOGEN UR STRIP-MCNC: NORMAL MG/DL (ref 0–2)
WBC # BLD AUTO: 12.6 10E9/L (ref 4–11)
WBC #/AREA URNS AUTO: 3 /HPF (ref 0–5)

## 2019-06-30 PROCEDURE — 81001 URINALYSIS AUTO W/SCOPE: CPT | Performed by: INTERNAL MEDICINE

## 2019-06-30 PROCEDURE — 83735 ASSAY OF MAGNESIUM: CPT | Performed by: HOSPITALIST

## 2019-06-30 PROCEDURE — 84439 ASSAY OF FREE THYROXINE: CPT | Performed by: HOSPITALIST

## 2019-06-30 PROCEDURE — 84132 ASSAY OF SERUM POTASSIUM: CPT | Performed by: INTERNAL MEDICINE

## 2019-06-30 PROCEDURE — 25000131 ZZH RX MED GY IP 250 OP 636 PS 637: Mod: GY | Performed by: HOSPITALIST

## 2019-06-30 PROCEDURE — 83735 ASSAY OF MAGNESIUM: CPT | Performed by: INTERNAL MEDICINE

## 2019-06-30 PROCEDURE — 36415 COLL VENOUS BLD VENIPUNCTURE: CPT | Performed by: INTERNAL MEDICINE

## 2019-06-30 PROCEDURE — 87493 C DIFF AMPLIFIED PROBE: CPT | Performed by: HOSPITALIST

## 2019-06-30 PROCEDURE — 99232 SBSQ HOSP IP/OBS MODERATE 35: CPT | Performed by: INTERNAL MEDICINE

## 2019-06-30 PROCEDURE — 00000146 ZZHCL STATISTIC GLUCOSE BY METER IP

## 2019-06-30 PROCEDURE — 25000128 H RX IP 250 OP 636

## 2019-06-30 PROCEDURE — 25800030 ZZH RX IP 258 OP 636: Performed by: HOSPITALIST

## 2019-06-30 PROCEDURE — 85027 COMPLETE CBC AUTOMATED: CPT | Performed by: HOSPITALIST

## 2019-06-30 PROCEDURE — 12000000 ZZH R&B MED SURG/OB

## 2019-06-30 PROCEDURE — 83605 ASSAY OF LACTIC ACID: CPT | Performed by: HOSPITALIST

## 2019-06-30 PROCEDURE — 25000132 ZZH RX MED GY IP 250 OP 250 PS 637: Mod: GY | Performed by: HOSPITALIST

## 2019-06-30 PROCEDURE — 25000132 ZZH RX MED GY IP 250 OP 250 PS 637: Mod: GY | Performed by: INTERNAL MEDICINE

## 2019-06-30 PROCEDURE — 80048 BASIC METABOLIC PNL TOTAL CA: CPT | Performed by: HOSPITALIST

## 2019-06-30 PROCEDURE — 36415 COLL VENOUS BLD VENIPUNCTURE: CPT | Performed by: HOSPITALIST

## 2019-06-30 RX ORDER — METOPROLOL TARTRATE 50 MG
50 TABLET ORAL 2 TIMES DAILY
Status: DISCONTINUED | OUTPATIENT
Start: 2019-06-30 | End: 2019-07-02 | Stop reason: HOSPADM

## 2019-06-30 RX ORDER — MAGNESIUM SULFATE HEPTAHYDRATE 40 MG/ML
INJECTION, SOLUTION INTRAVENOUS
Status: COMPLETED
Start: 2019-06-30 | End: 2019-06-30

## 2019-06-30 RX ADMIN — INSULIN ASPART 1 UNITS: 100 INJECTION, SOLUTION INTRAVENOUS; SUBCUTANEOUS at 16:49

## 2019-06-30 RX ADMIN — INSULIN ASPART 1 UNITS: 100 INJECTION, SOLUTION INTRAVENOUS; SUBCUTANEOUS at 08:54

## 2019-06-30 RX ADMIN — SODIUM CHLORIDE, POTASSIUM CHLORIDE, SODIUM LACTATE AND CALCIUM CHLORIDE 1000 ML: 600; 310; 30; 20 INJECTION, SOLUTION INTRAVENOUS at 00:20

## 2019-06-30 RX ADMIN — METOPROLOL TARTRATE 50 MG: 50 TABLET, FILM COATED ORAL at 08:54

## 2019-06-30 RX ADMIN — SODIUM CHLORIDE, POTASSIUM CHLORIDE, SODIUM LACTATE AND CALCIUM CHLORIDE: 600; 310; 30; 20 INJECTION, SOLUTION INTRAVENOUS at 02:24

## 2019-06-30 RX ADMIN — MAGNESIUM SULFATE IN WATER 4 G: 40 INJECTION, SOLUTION INTRAVENOUS at 06:19

## 2019-06-30 RX ADMIN — POTASSIUM CHLORIDE 20 MEQ: 1.5 POWDER, FOR SOLUTION ORAL at 15:33

## 2019-06-30 RX ADMIN — ATORVASTATIN CALCIUM 40 MG: 40 TABLET, FILM COATED ORAL at 08:54

## 2019-06-30 RX ADMIN — POTASSIUM CHLORIDE 40 MEQ: 1500 TABLET, EXTENDED RELEASE ORAL at 05:45

## 2019-06-30 RX ADMIN — APIXABAN 5 MG: 5 TABLET, FILM COATED ORAL at 08:55

## 2019-06-30 RX ADMIN — APIXABAN 5 MG: 5 TABLET, FILM COATED ORAL at 21:23

## 2019-06-30 RX ADMIN — DULOXETINE HYDROCHLORIDE 20 MG: 20 CAPSULE, DELAYED RELEASE ORAL at 08:55

## 2019-06-30 RX ADMIN — DULOXETINE HYDROCHLORIDE 20 MG: 20 CAPSULE, DELAYED RELEASE ORAL at 22:17

## 2019-06-30 RX ADMIN — ASPIRIN 81 MG 81 MG: 81 TABLET ORAL at 08:54

## 2019-06-30 RX ADMIN — POTASSIUM CHLORIDE 20 MEQ: 1500 TABLET, EXTENDED RELEASE ORAL at 08:54

## 2019-06-30 RX ADMIN — METOPROLOL TARTRATE 50 MG: 50 TABLET, FILM COATED ORAL at 21:23

## 2019-06-30 RX ADMIN — POTASSIUM CHLORIDE 40 MEQ: 1.5 POWDER, FOR SOLUTION ORAL at 12:35

## 2019-06-30 RX ADMIN — INSULIN ASPART 2 UNITS: 100 INJECTION, SOLUTION INTRAVENOUS; SUBCUTANEOUS at 11:58

## 2019-06-30 RX ADMIN — DIGOXIN 125 MCG: 125 TABLET ORAL at 08:54

## 2019-06-30 ASSESSMENT — ACTIVITIES OF DAILY LIVING (ADL)
ADLS_ACUITY_SCORE: 22
ADLS_ACUITY_SCORE: 21
ADLS_ACUITY_SCORE: 22
ADLS_ACUITY_SCORE: 20

## 2019-06-30 ASSESSMENT — MIFFLIN-ST. JEOR: SCORE: 1093.25

## 2019-06-30 NOTE — PROGRESS NOTES
"Assessment completed see flowsheet.    LOC: alert and oriented, very pleasant and conversational. She was able to provide accurate details of her residence, care needs, and past medical issues/treatments  Others present: Patient alone    Dx: sepsis  Chronic Disease Management: Diabetes, A Fib, Hx CVA    Lives with: assisted living residents and staff  Living at:  [facility]  Transportation: YES Most often by her kids    Primary PCP: DOC Cox  Insurance:  Medicare and Blue Cross Blue Shield  Medicare IM letter reviewed with her this morning, she opted to verbally acknowledge and accepted a copy of the letter.    Support System:  Family; has 4 girls and 1 son. Son living in [place], her dtrs are in [facility], and 2 in [place].   Homecare/PCA: none  /[facility] Services:   none  [place]: NO      VA Referral line called: na    Health Care Directive: YES she has a POLST on file  Guardian: not asked  POA: not asked    [facility]acy: [facility]  Meds management: YES staff manages her meds for her    Adequate Resources for needs (housing, utilities, food/med): YES; but says she worries about bills if she stays sick too long  Household chores: done by staff  Work/community/social activity: YES with help    ADLs: independent; has either her dtr or staff present while she baths in case she falls  Ambulation:independent, uses a walker  Falls: just this fall  Nutrition: provided by staff; enjoys eating in the dining room saying \"then I never have to eat alone\"; says that after her 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 but with rehab that has improved and she denies any difficulty chewing/swallowing, takes her pills with apple sauce  Sleep: has slept in a chair at night for years due to her arthritic back pain    Equipment used: none      Oxygen supplier: no      Does the supplier have valid oxygen orders: na    Mental health: denies concern  Substance abuse: no use  Exposure to violence/abuse: denies  Stressors: " none voiced    Able to Return to Prior Living Arrangements: to be determined    Choice of Vendor: na    Barriers: none known    JOHN: average    Plan: she hopes to return to [facility], ideally via one of her kids

## 2019-06-30 NOTE — PROGRESS NOTES
[facility]    Hospitalist Progress Note      Assessment & Plan   Annie Arguello is a 85 year old female who was admitted on 6/29/2019.      1.  Hypotension: Patient presented after having fallen at assisted living when she stood up.  When she got here she was relatively hypotensive with pressures in the 70s to 80s.  Has chronic A. fib with rates in the 100-120 range.  She did respond nicely to IV fluids she got over 3 L.  Initial concern was possibly for sepsis however there is no evidence of sepsis after further evaluation.  Patient has had some loose stools.  After IV fluids she has been doing well with pressures 1/10/1930 range.  If anything I think she probably was on the significantly dry side of things.  Her BUN/creatinine were elevated over her baseline.  We will discontinue the IV antibiotics at this point.  She is getting IV fluids sats are great on room air.  No peripheral edema whatsoever.  We will restart her beta-blocker for her A. fib as her heart rate has been greater than 100.  She is also on digoxin.    2.  Diarrhea: Apparently she has had some loose stools for the last several days.  Likely cause her to get somewhat volume depleted.  C. difficile was negative.  If this continues we will give her a little bit of Imodium.    3.  Atrial fibrillation: Chronic long-standing is on apixaban for stroke prophylaxis.  For rate control she is on the metoprolol and digoxin.  Starting both of those again at this time.    4.  History of CVA: Patient has had some intermittent slurred speech and is also had chronic left-sided weakness.  Per previous reports her daughter states she has been having some worsening of this lately last couple of weeks.  CT scan shows very large right hemispheric CVA radiology feels that her repeat CT scan probably has shown some extension of her previous stroke nothing appears to be acute however.    5.  Hematuria: Did have significant amount of red cells on her  initially.  We will repeat this currently she is been well hydrated.  No signs of obvious infection she had no significant white cells or bacteria noted.    6.  Fall: No evidence of any trauma.  Think is just mainly because she was hypotensive on the dry side.  We will see how she is today get her up in the chair physical therapy is not available but will have them evaluate her tomorrow if she is stable enough perhaps she can ambulate or try with staff today.      Diet: Consistent Carbohydrate Diet [id number] Calories: Moderate Consistent CHO (4-6 CHO units/meal)  Fluids: Lactated Ringer's 100 cc an hour    DVT Prophylaxis: Apixaban  Code Status: DNR/DNI    Disposition: Expected discharge in 1-3 days once stable hemodynamically.    Mckay Acosta    Interval History   Patient is very alert and appropriate this morning.  Remembers me from previous clinic visits.  Currently denies any chest pain shortness of breath nausea vomiting.  States she just felt very weak the other day when she fell does not have any pain or problems since her fall.  Has had some loose stools.  No abdominal pain no nausea vomiting.  Vital signs are stable no fevers.  There is no obvious signs of sepsis at this time.  All of her antibiotics have been discontinued.    -Data reviewed today: I reviewed all new labs and imaging results over the last 24 hours. I personally reviewed no images or EKG's today.    Physical Exam   Temp: 97.6  F (36.4  C) Temp src: Temporal BP: 118/64 Pulse: 92 Heart Rate: 90 Resp: 14 SpO2: 98 % O2 Device: None (Room air)    Vitals:    06/29/19 2028 06/30/19 0552   Weight: 68.9 kg (151 lb 14.4 oz) 69.5 kg (153 lb 3.5 oz)     Vital Signs with Ranges  Temp:  [97.3  F (36.3  C)-98.7  F (37.1  C)] 97.6  F (36.4  C)  Pulse:  [] 92  Heart Rate:  [] 90  Resp:  [12-21] 14  BP: ()/(47-87) 118/64  SpO2:  [92 %-100 %] 98 %  I/O last 3 completed shifts:  In: 4247 [P.O.:300; I.V.:3947]  Out: 300  [Urine:300]    Peripheral IV 06/29/19 Left Wrist (Active)   Site Assessment WDL 6/30/2019  7:00 AM   Line Status Infusing 6/30/2019  7:00 AM   Phlebitis Scale 0-->no symptoms 6/30/2019  7:00 AM   Infiltration Scale 0 6/30/2019  7:00 AM   Extravasation? No 6/30/2019 12:20 AM   Dressing Intervention New dressing  6/29/2019  8:11 PM   Number of days: 1     No line/device    Constitutional: Alert and oriented x3. No distress    HEENT: Normocephalic/atraumatic, PERRL, EOMI, mouth moist, neck supple, no LN.     Cardiovascular:IRRR. no Murmur, no  rubs, or gallops.  JVD flat.  Bruits no.  Pulses 2+    Respiratory: Clear to auscultation bilaterally    Abdomen: Soft, nontender, nondistended, no organomegaly. Bowel sounds present    Extremities: Warm/dry. No edema    Neuro:   Some weakness on left side Non focal, cranial nerves intact, Moves all extremities.  Medications     lactated ringers 100 mL/hr at 06/30/19 0224       apixaban ANTICOAGULANT  5 mg Oral BID     aspirin  81 mg Oral Daily     atorvastatin  40 mg Oral Daily     ceFEPIme         digoxin  125 mcg Oral Daily     DULoxetine  20 mg Oral BID     insulin aspart         insulin aspart  1-7 Units Subcutaneous TID AC     insulin aspart  1-5 Units Subcutaneous At Bedtime     metoprolol tartrate  50 mg Oral BID     omeprazole  20 mg Oral QAM AC     sodium chloride (PF)  3 mL Intracatheter Q8H     vancomycin           Data   Recent Labs   Lab 06/30/19  0507 06/29/19  1459   WBC 12.6* 12.3*   HGB 11.2* 11.9   MCV 89 89    207    140   POTASSIUM 3.0* 3.5   CHLORIDE 111* 106   CO2 25 26   BUN 19 23   CR 0.98 1.43*   ANIONGAP 7 8   MALIHA 8.0* 8.8   * 228*   ALBUMIN  --  2.9*   PROTTOTAL  --  6.5*   BILITOTAL  --  1.0   ALKPHOS  --  66   ALT  --  29   AST  --  26   TROPI  --  <0.015       Recent Results (from the past 24 hour(s))   XR Chest Port 1 View    Narrative    PROCEDURE:  XR CHEST PORT 1 VW    HISTORY:  Hypotension.     COMPARISON:  2016    FINDINGS:    The cardiac silhouette is normal in size. The pulmonary vasculature is  normal.  The lungs are clear. No pleural effusion or pneumothorax.      Impression    IMPRESSION:  No acute cardiopulmonary disease.      OSIEL CARO MD   CT Abdomen Pelvis w/o Contrast    Narrative    EXAMINATION: CT ABDOMEN PELVIS W/O CONTRAST, 6/29/2019 6:25 PM    TECHNIQUE:  Helical CT images from the lung bases through the  symphysis pubis were obtained  without IV contrast. Contrast dose:    COMPARISON: November 2016    HISTORY: Hematuria, unknown cause; ; Osiel hematuria and weakness    FINDINGS:    There is dependent atelectasis at the lung bases. There is a 3 mm  diameter nodule seen on axial image 6 which is stable from 2016.    The liver is free of masses. The gallbladder has been removed. The  bile ducts are normal for postcholecystectomy patient.    The the spleen appears normal. The pancreatic duct is mildly dilated  stable from 2016    The adrenal glands are normal.    There are benign-appearing cysts seen in both kidneys. There is a 3 mm  diameter calculus in the midportion of the right kidney.    The periaortic lymph nodes are normal in caliber.    No intraperitoneal masses or inflammatory changes are noted.    In the pelvis the bladder and rectum appear normal.    Scoliosis and degenerative changes are present in the lumbar spine.      Impression    IMPRESSION: No change in the abdomen and pelvis from previous  examination in 2016     OSIEL CARO MD   CT Head w/o Contrast    Narrative    PROCEDURE: CT HEAD W/O CONTRAST     HISTORY: general weakness.    COMPARISON: November 2018    TECHNIQUE:  Helical images of the head from the foramen magnum to the  vertex were obtained without contrast.    FINDINGS: There is an old middle cerebral artery distribution infarct  in the right hemisphere. There is some compensatory enlargement of the  right lateral ventricle. The brainstem and cerebellum appear normal.  The cranial  vault is intact.  The visualized paranasal sinuses are  clear.      Impression    IMPRESSION: Old right hemisphere infarct  . The infarct has evolved to  be significantly larger than on previous examination in November 2018    ODILIA CARO MD

## 2019-06-30 NOTE — PLAN OF CARE
Pt alert and orientated.  Sitting in chair.  Has visitors.  Denies pain or nausea.  IV infusing.

## 2019-06-30 NOTE — ED NOTES
The patient's daughter Lavinia accompanies her up to room 3216 with the nursing assistant and patient on the stretcher.

## 2019-06-30 NOTE — ED NOTES
Second blood culture drawn by lab. The patient was brought a box lunch meal and wants to bring it up to her admission room with her at this time.The patient is ready for admission to room 3216. IRIS Cash the on-call charge nurse at [facility] updated on the patient's admission status. Her number is [phone].

## 2019-06-30 NOTE — PROVIDER NOTIFICATION
DATE:  6/30/2019   TIME OF RECEIPT FROM LAB:  0516  LAB TEST:  Lactic  LAB VALUE:  2.2  RESULTS GIVEN WITH READ-BACK TO (PROVIDER):  Brian Scott MD  TIME LAB VALUE REPORTED TO PROVIDER:   0518

## 2019-06-30 NOTE — PLAN OF CARE
"/64   Pulse 92   Temp 97.6  F (36.4  C) (Temporal)   Resp 14   Ht 1.575 m (5' 2\")   Wt 69.5 kg (153 lb 3.5 oz)   SpO2 98%   BMI 28.02 kg/m      Pt alert and oriented upon initial assessment. Denies pain. Lungs clear. BS active. Some weakness in BLE per pt, however pt very steady on her feet with walker. No loose stools this shift. Potassium replaced 2x this shift, recheck after first round 3.2. Protocol restarted. Per ICU tele report  Afib 70-100s. No falls or injuries this shift. Will continue to monitor.     Face to face report given with opportunity to observe patient.    Report given to IRIS Kilpatrick   6/30/2019  3:38 PM      "

## 2019-06-30 NOTE — PLAN OF CARE
Pt alert and orientated.  Ate well for supper. No problems with swallowing.  Voided x1 with mixed watery stool.  300cc total.  No bruised from fall at assist of living place.  No pain.  Up to commode with assist of 1 person.  Telemetry on. HR- 90's to 140 with activity.  HR irregular with murmur.  IV site adequate. Face to face report given with opportunity to observe patient.    Report given to Shyanne Mckinney   6/29/2019  11:41 PM

## 2019-06-30 NOTE — PLAN OF CARE
Pt is A&O, Ax1 w/ gait belt, CHO diet.  VS /72 HR 94 RR 16 T 97.8 O2 97% on RA, denies pain.  Lungs are dim in bases, otherwise clear.  Bowels hyperactive x4, passing flatus.  Pt continues w/ frequent loose, brown stools and was incontinent of stool during night/ depends placed. See note about MD notification requesting c-diff spec order, results negative and pt taken off of enteric precautions.  Abdomen is rounded, soft and tender.  Per ICU tele report pt is A-fib -130.      Mg 1.4 w/ lab, replacement protocol initiated via IVPB and is currently infusing, re-draw scheduled for 1000.  K+ 3.0 w/ lab, oral replacement protocol initiated at approx 0600, next dose 0800.  Day RN to order re-draw, nurse aware.    Brakes locked, alarms on, call light within reach.  Frequent rounding done to assess needs, free from falls.    Face to face report given with opportunity to observe patient.    Report given to IRIS Chavarria   6/30/2019  7:46 AM

## 2019-06-30 NOTE — PROVIDER NOTIFICATION
MD notified by this writer about pt c/o having watery, brown stools for a few days.  Loose stools pesist, pt has been incontinent.  Stool odor possibly c-diff.  Requested order for stool sample, order received.  Pt placed on enteric precautions until sample collected and c-diff ruled out.

## 2019-06-30 NOTE — PLAN OF CARE
[facility] Inpatient Admission Note:    Patient admitted to 3216/3216-1 at approximately 2000  via cart accompanied by transport tech from emergency room . Report received from Darlene encinas in SBAR format at 1936 via telephone. Patient transferred to bed via slide board.. Patient is alert and oriented X 3, denies pain; rates at 0 on 0-10 scale.  Patient oriented to room, unit, hourly rounding, and plan of care. Explained admission packet and patient handbook with patient bill of rights brochure. Will continue to monitor and document as needed.     Inpatient Nursing criteria listed below was met:    Health care directives status obtained and documented: Yes    Care Everywhere authorization obtained Yes    MRSA swab completed for patient 65 years and older: Yes    Patient identifies a surrogate decision maker: Yes If yes, who:ryan Contact Information:see facesheet    Core Measure diagnosis present:No.      If initial lactic acid >2.0, repeat lactic acid drawn within one hour of arrival to unit: NA. If no, state reason: na    Vaccination assessment and education completed: Yes   Vaccinations received prior to admission: Pneumovax no  Influenza(seasonal)  NO   Vaccination(s) ordered: patient declines    Clergy visit ordered if patient requests: Yes    Skin issues/needs documented: N/A    Isolation Patient: no Education given, correct sign in place and documentation row added to PCS:  No    Fall Prevention Yes: Care plan updated, education given and documented, sticker and magnet in place: Yes    Care Plan initiated: Yes    Education Documented (including assessment): Yes    Patient has discharge needs : No If yes, please explain:na

## 2019-06-30 NOTE — ED NOTES
Dr. Gonzalez updated the patient and her two daughters on test results and the plan of care for admission.

## 2019-06-30 NOTE — H&P
[facility]    History and Physical - Hospitalist Service       Date of Admission:  6/29/2019    Assessment & Plan   Annie Arguello is a 85 year old female admitted on 6/29/2019.    1. Presumed early sepsis, source unclear, presenting with tachycardia, hypotension, leukocytosis, evidence of end organ hypoperfusion/GINNY; source unclear. Started levaquin in ED. CT AP showed No change in the abdomen and pelvis from previous examination in 2016; cxr No acute cardiopulmonary disease    -broaden antibiotics to vanco+zosyn   -check Procal   -IVF   -hold antihypertensives   -f/u Cx   -serial lactate    2. Hx of afib; now in afib w/RVR   -continue digoxin   -likely resume metoprolol in AM    -continue eliquis    3. Fall, generalized weakness   -PT, OT, SW   -elevated TSH; check FT4    4. Hx of Type II DM   -sliding scale insulin for tonight    5. Hx of GERD   -continue PPI     6. GINNY; likely secondary to sepsis; Cr 1.43   -IVF   -bmp in AM     7. Hx of CVA; CT head showing  Old right hemisphere infarct  . The infarct has evolved to  be significantly larger than on previous examination in November 2018; per family slurred speech over several weeks; do not want aggressive workup   -continue aspirin and statin   -consider MRI Brain     Diet: regular  DVT Prophylaxis: eliquis  Segovia Catheter: not present  Code Status: DNR/DNI    Disposition Plan   Expected discharge: 2 - 3 days, recommended to transitional care unit once antibiotic plan established.  Entered: Brian Scott MD 06/29/2019, 7:12 PM     The patient's care was discussed with the Patient and Patient's Family.    Brian Scott MD  [facility]    ______________________________________________________________________    Chief Complaint   Fall and weakness    Unable to obtain a history from the patient due to confusion and hx obtained primarily from patient's daughter    History of Present Illness   Annie Arguello is a 85 year old female  with past medical history noted below presenting from assisted living for evaluation of fall from chair. Patient was found on the floor at AL by staff. EMS was called, the patient was noted to be tachycardic and hypotensive. In the ED the patient was in Afib w/RVR, she was afebrile. SBP was initially in 70s but responded to IVF. WBC was elevated at 12.3, lactate was elevated at 3.5. She was given IVF and started on levaquin. The patient is unable to provide hx. Per daughter's has had slurred speech intermittently for several weeks and general decline in overall health.     Review of Systems    Review of systems not obtained due to patient factors - confusion    Past Medical History    I have reviewed this patient's medical history and updated it with pertinent information if needed.   Past Medical History:   Diagnosis Date     Atrial fibrillation (H) 2012     Calculus of kidney 2003     Controlled type 2 diabetes mellitus without complication, without long-term current use of insulin (H) 10/24/2017     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 2007     Diverticulosis 2011     Fibromyalgia 05/10/2011     Nonallopathic lesions of cervical region, not else 2001     Obesity 2011     Osteoporosis 2011     Other malaise and fatigue 2002     Renal cyst 2011     Sciatica 2007       Past Surgical History   I have reviewed this patient's surgical history and updated it with pertinent information if needed.  Past Surgical History:   Procedure Laterality Date     Breast biopsy      LT, benign      SECTION       CHOLECYSTECTOMY      lap     COLONOSCOPY       LUCILLE / BSO         Social History   I have reviewed this patient's social history and updated it with pertinent information if needed.  Social History     Tobacco Use     Smoking status: Never Smoker     Smokeless tobacco: Never Used     Tobacco comment: no passive exposure   Substance Use Topics      Alcohol use: No     Drug use: No       Family History   I have reviewed this patient's family history and updated it with pertinent information if needed.   Family History   Problem Relation Age of Onset     Myocardial Infarction Mother 59        myocardial infarction, cause of death     Other - See Comments Daughter         fibromyalgia     Thyroid Disease Sister         hypo     Thyroid Disease Sister         hypo     Thyroid Disease Sister         hypo     Myocardial Infarction Brother 63        myocardial infarction, cause of death       Prior to Admission Medications   Prior to Admission Medications   Prescriptions Last Dose Informant Patient Reported? Taking?   ASPIRIN LOW DOSE 81 MG chewable tablet 6/29/2019 at Unknown time  No Yes   Sig: CHEW 1 TABLET DAILY   DIGOX 125 MCG tablet 6/28/2019 at Unknown time  No Yes   Sig: TAKE 1 TABLET BY MOUTH ONCE DAILY.   DULoxetine (CYMBALTA) 20 MG capsule 6/29/2019 at Unknown time Self No Yes   Sig: TAKE 1 CAPSULE BY MOUTH 2 TIMES A DAY   Multiple Vitamin (TAB-A-MARIUSZ) TABS 6/29/2019 at Unknown time Self No Yes   Sig: TAKE 1 TABLET BY MOUTH DAILY   ONETOUCH VERIO IQ test strip  Self No Yes   Sig: USE TO TEST BLOOD SUGARS ONCE DAILY   acetaminophen (TYLENOL) 325 MG tablet   No No   Sig: Take 1 tablet (325 mg) by mouth every 6 hours as needed for mild pain   alendronate (FOSAMAX) 70 MG tablet Past Week at Unknown time  No Yes   Sig: TAKE 1 TABLET ON WEDNESDAY AS DIRECTED   apixaban ANTICOAGULANT (ELIQUIS) 5 MG tablet 6/29/2019 at Unknown time  No Yes   Sig: TAKE 1 TABLET BY MOUTH 2 TIMES A DAY   atorvastatin (LIPITOR) 40 MG tablet 6/28/2019 at Unknown time Self No Yes   Sig: TAKE 1 TABLET BY MOUTH DAILY   blood glucose monitoring (ONE TOUCH DELICA) lancets  Self No Yes   Sig: USE TO TEST BLOOD SUGARS ONCE A DAY   calcium polycarbophil (FIBERCON) 625 MG tablet  Self No No   Sig: Take 1 tablet (625 mg) by mouth daily   diclofenac (VOLTAREN) 1 % topical gel   No Yes   Sig:  Place 2 g onto the skin 2 times daily as needed for moderate pain   docusate sodium (COLACE) 100 MG tablet   No Yes   Sig: Take 1 tablet (100 mg) by mouth 2 times daily   hydrochlorothiazide (HYDRODIURIL) 12.5 MG tablet 6/29/2019 at Unknown time Self No Yes   Sig: Take 1 tablet (12.5 mg) by mouth daily   metoprolol tartrate (LOPRESSOR) 50 MG tablet 6/29/2019 at Unknown time  No Yes   Sig: TAKE 1 TABLET BY MOUTH 2 TIMES A DAY   nystatin (MYCOSTATIN) 255694 UNIT/GM external powder   No Yes   Sig: Apply topically 2 times daily as needed   omeprazole (PRILOSEC) 20 MG DR capsule 6/29/2019 at Unknown time Self No Yes   Sig: TAKE 1 CAPSULE BY MOUTH DAILY   polyethylene glycol (MIRALAX/GLYCOLAX) packet   No Yes   Sig: Take 17 g by mouth daily   potassium chloride ER (K-DUR/KLOR-CON M) 10 MEQ CR tablet 6/29/2019 at Unknown time  No Yes   Sig: TAKE (1) TABLET BY MOUTH TWICE A DAY.   psyllium (METAMUCIL/KONSYL) 0.52 g capsule  Self No Yes   Sig: Take 1 capsule (0.52 g) by mouth 2 times daily      Facility-Administered Medications: None     Allergies   Allergies   Allergen Reactions     Ampicillin      Desvenlafaxine Other (See Comments)     Desvenlafaxine Succinate  Pristiq       Sertraline Hcl Other (See Comments) and Diarrhea     Zoloft       Sulfa Drugs Other (See Comments)     Sulfa(Sulfonamide Antibiotics)       Physical Exam   Vital Signs: Temp: 98.1  F (36.7  C) Temp src: Oral BP: 104/75 Pulse: 116 Heart Rate: 94 Resp: 18 SpO2: 97 % O2 Device: None (Room air)    Weight: 0 lbs 0 oz    Gen: no acute distress  HEENT: NCAT EOMI mmm  Neck: supple  CV:tachycardic; irregularly irregular normal s1 s2  Lungs: ctab  Abd: soft, nt, nd,   Neuro:alert and oriented to person, place, but not time; moves extremities; speech is slurred (baseline/chronic per daughters)  Skin: warm, dry, no rash on face;  MSK: age appropriate muscle mass    Data   Data reviewed today: I reviewed all medications, new labs and imaging results over the last  24 hours. I personally reviewed today;s lab  EKG-Afib w/RVR        Recent Labs   Lab 06/29/19  1459   WBC 12.3*   HGB 11.9   MCV 89         POTASSIUM 3.5   CHLORIDE 106   CO2 26   BUN 23   CR 1.43*   ANIONGAP 8   MALIHA 8.8   *   ALBUMIN 2.9*   PROTTOTAL 6.5*   BILITOTAL 1.0   ALKPHOS 66   ALT 29   AST 26   TROPI <0.015     Recent Results (from the past 24 hour(s))   XR Chest Port 1 View    Narrative    PROCEDURE:  XR CHEST PORT 1 VW    HISTORY:  Hypotension.     COMPARISON:  2016    FINDINGS:   The cardiac silhouette is normal in size. The pulmonary vasculature is  normal.  The lungs are clear. No pleural effusion or pneumothorax.      Impression    IMPRESSION:  No acute cardiopulmonary disease.      OSIEL CARO MD   CT Abdomen Pelvis w/o Contrast    Narrative    EXAMINATION: CT ABDOMEN PELVIS W/O CONTRAST, 6/29/2019 6:25 PM    TECHNIQUE:  Helical CT images from the lung bases through the  symphysis pubis were obtained  without IV contrast. Contrast dose:    COMPARISON: November 2016    HISTORY: Hematuria, unknown cause; ; Osiel hematuria and weakness    FINDINGS:    There is dependent atelectasis at the lung bases. There is a 3 mm  diameter nodule seen on axial image 6 which is stable from 2016.    The liver is free of masses. The gallbladder has been removed. The  bile ducts are normal for postcholecystectomy patient.    The the spleen appears normal. The pancreatic duct is mildly dilated  stable from 2016    The adrenal glands are normal.    There are benign-appearing cysts seen in both kidneys. There is a 3 mm  diameter calculus in the midportion of the right kidney.    The periaortic lymph nodes are normal in caliber.    No intraperitoneal masses or inflammatory changes are noted.    In the pelvis the bladder and rectum appear normal.    Scoliosis and degenerative changes are present in the lumbar spine.      Impression    IMPRESSION: No change in the abdomen and pelvis from  previous  examination in 2016     ODILIA CARO MD   CT Head w/o Contrast    Narrative    PROCEDURE: CT HEAD W/O CONTRAST     HISTORY: general weakness.    COMPARISON: November 2018    TECHNIQUE:  Helical images of the head from the foramen magnum to the  vertex were obtained without contrast.    FINDINGS: There is an old middle cerebral artery distribution infarct  in the right hemisphere. There is some compensatory enlargement of the  right lateral ventricle. The brainstem and cerebellum appear normal.  The cranial vault is intact.  The visualized paranasal sinuses are  clear.      Impression    IMPRESSION: Old right hemisphere infarct  . The infarct has evolved to  be significantly larger than on previous examination in November 2018    ODILIA CARO MD

## 2019-06-30 NOTE — PHARMACY-VANCOMYCIN DOSING SERVICE
Pharmacy Vancomycin Initial Note  Date of Service 2019  Patient's  1933  85 year old, female    Indication: Sepsis    Current estimated CrCl = Estimated Creatinine Clearance: 25.1 mL/min (A) (based on SCr of 1.43 mg/dL (H)).    Creatinine for last 3 days  2019:  2:59 PM Creatinine 1.43 mg/dL    Recent Vancomycin Level(s) for last 3 days  No results found for requested labs within last 72 hours.      Vancomycin IV Administrations (past 72 hours)      No vancomycin orders with administrations in past 72 hours.                Nephrotoxins and other renal medications (From now, onward)    Start     Dose/Rate Route Frequency Ordered Stop    19  vancomycin (VANCOCIN) 1,250 mg in sodium chloride 0.9 % 250 mL intermittent infusion      1,250 mg  over 90 Minutes Intravenous EVERY 48 HOURS 19            Contrast Orders - past 72 hours (72h ago, onward)    None                Plan:  1.  Start vancomycin  1250 mg IV q48h.   2.  Goal Trough Level: 15-20 mg/L   3.  Pharmacy will check trough levels as appropriate in 3-5 Days.    4. Serum creatinine levels will be ordered daily for the first week of therapy and at least twice weekly for subsequent weeks.    5. [place] method utilized to dose vancomycin therapy: Method 2    Rachid Castillo, PharmD  2019

## 2019-07-01 ENCOUNTER — APPOINTMENT (OUTPATIENT)
Dept: PHYSICAL THERAPY | Facility: HOSPITAL | Age: 84
DRG: 683 | End: 2019-07-01
Attending: HOSPITALIST
Payer: MEDICARE

## 2019-07-01 ENCOUNTER — APPOINTMENT (OUTPATIENT)
Dept: OCCUPATIONAL THERAPY | Facility: HOSPITAL | Age: 84
DRG: 683 | End: 2019-07-01
Attending: HOSPITALIST
Payer: MEDICARE

## 2019-07-01 LAB
ANION GAP SERPL CALCULATED.3IONS-SCNC: 4 MMOL/L (ref 3–14)
BACTERIA SPEC CULT: NORMAL
BUN SERPL-MCNC: 11 MG/DL (ref 7–30)
CALCIUM SERPL-MCNC: 8.6 MG/DL (ref 8.5–10.1)
CHLORIDE SERPL-SCNC: 111 MMOL/L (ref 94–109)
CO2 SERPL-SCNC: 26 MMOL/L (ref 20–32)
CREAT SERPL-MCNC: 0.71 MG/DL (ref 0.52–1.04)
ERYTHROCYTE [DISTWIDTH] IN BLOOD BY AUTOMATED COUNT: 14 % (ref 10–15)
GFR SERPL CREATININE-BSD FRML MDRD: 78 ML/MIN/{1.73_M2}
GLUCOSE BLDC GLUCOMTR-MCNC: 113 MG/DL (ref 70–99)
GLUCOSE BLDC GLUCOMTR-MCNC: 142 MG/DL (ref 70–99)
GLUCOSE BLDC GLUCOMTR-MCNC: 147 MG/DL (ref 70–99)
GLUCOSE BLDC GLUCOMTR-MCNC: 154 MG/DL (ref 70–99)
GLUCOSE SERPL-MCNC: 152 MG/DL (ref 70–99)
HCT VFR BLD AUTO: 34.5 % (ref 35–47)
HGB BLD-MCNC: 11.5 G/DL (ref 11.7–15.7)
MCH RBC QN AUTO: 29.8 PG (ref 26.5–33)
MCHC RBC AUTO-ENTMCNC: 33.3 G/DL (ref 31.5–36.5)
MCV RBC AUTO: 89 FL (ref 78–100)
PLATELET # BLD AUTO: 180 10E9/L (ref 150–450)
POTASSIUM SERPL-SCNC: 4.1 MMOL/L (ref 3.4–5.3)
RBC # BLD AUTO: 3.86 10E12/L (ref 3.8–5.2)
SODIUM SERPL-SCNC: 141 MMOL/L (ref 133–144)
SPECIMEN SOURCE: NORMAL
WBC # BLD AUTO: 10.5 10E9/L (ref 4–11)

## 2019-07-01 PROCEDURE — 25800030 ZZH RX IP 258 OP 636: Performed by: HOSPITALIST

## 2019-07-01 PROCEDURE — 25000132 ZZH RX MED GY IP 250 OP 250 PS 637: Mod: GY | Performed by: INTERNAL MEDICINE

## 2019-07-01 PROCEDURE — 36415 COLL VENOUS BLD VENIPUNCTURE: CPT | Performed by: INTERNAL MEDICINE

## 2019-07-01 PROCEDURE — 85027 COMPLETE CBC AUTOMATED: CPT | Performed by: INTERNAL MEDICINE

## 2019-07-01 PROCEDURE — 99232 SBSQ HOSP IP/OBS MODERATE 35: CPT | Performed by: INTERNAL MEDICINE

## 2019-07-01 PROCEDURE — 12000000 ZZH R&B MED SURG/OB

## 2019-07-01 PROCEDURE — 00000146 ZZHCL STATISTIC GLUCOSE BY METER IP

## 2019-07-01 PROCEDURE — 25800030 ZZH RX IP 258 OP 636: Performed by: INTERNAL MEDICINE

## 2019-07-01 PROCEDURE — 97530 THERAPEUTIC ACTIVITIES: CPT | Mod: GO

## 2019-07-01 PROCEDURE — 80048 BASIC METABOLIC PNL TOTAL CA: CPT | Performed by: INTERNAL MEDICINE

## 2019-07-01 PROCEDURE — 25000132 ZZH RX MED GY IP 250 OP 250 PS 637: Mod: GY | Performed by: HOSPITALIST

## 2019-07-01 PROCEDURE — 97161 PT EVAL LOW COMPLEX 20 MIN: CPT | Mod: GP | Performed by: PHYSICAL THERAPIST

## 2019-07-01 PROCEDURE — 97165 OT EVAL LOW COMPLEX 30 MIN: CPT | Mod: GO

## 2019-07-01 RX ADMIN — DULOXETINE HYDROCHLORIDE 20 MG: 20 CAPSULE, DELAYED RELEASE ORAL at 08:03

## 2019-07-01 RX ADMIN — SODIUM CHLORIDE, POTASSIUM CHLORIDE, SODIUM LACTATE AND CALCIUM CHLORIDE 1000 ML: 600; 310; 30; 20 INJECTION, SOLUTION INTRAVENOUS at 00:22

## 2019-07-01 RX ADMIN — DIGOXIN 125 MCG: 125 TABLET ORAL at 08:03

## 2019-07-01 RX ADMIN — METOPROLOL TARTRATE 50 MG: 50 TABLET, FILM COATED ORAL at 21:17

## 2019-07-01 RX ADMIN — ACETAMINOPHEN 650 MG: 325 TABLET, FILM COATED ORAL at 23:48

## 2019-07-01 RX ADMIN — OMEPRAZOLE 20 MG: 20 CAPSULE, DELAYED RELEASE ORAL at 06:59

## 2019-07-01 RX ADMIN — APIXABAN 5 MG: 5 TABLET, FILM COATED ORAL at 08:03

## 2019-07-01 RX ADMIN — METOPROLOL TARTRATE 50 MG: 50 TABLET, FILM COATED ORAL at 08:03

## 2019-07-01 RX ADMIN — APIXABAN 5 MG: 5 TABLET, FILM COATED ORAL at 21:17

## 2019-07-01 RX ADMIN — INSULIN ASPART 1 UNITS: 100 INJECTION, SOLUTION INTRAVENOUS; SUBCUTANEOUS at 08:03

## 2019-07-01 RX ADMIN — SODIUM CHLORIDE, POTASSIUM CHLORIDE, SODIUM LACTATE AND CALCIUM CHLORIDE: 600; 310; 30; 20 INJECTION, SOLUTION INTRAVENOUS at 02:22

## 2019-07-01 RX ADMIN — DULOXETINE HYDROCHLORIDE 20 MG: 20 CAPSULE, DELAYED RELEASE ORAL at 21:17

## 2019-07-01 RX ADMIN — ATORVASTATIN CALCIUM 40 MG: 40 TABLET, FILM COATED ORAL at 08:03

## 2019-07-01 RX ADMIN — ASPIRIN 81 MG 81 MG: 81 TABLET ORAL at 08:03

## 2019-07-01 ASSESSMENT — ACTIVITIES OF DAILY LIVING (ADL)
ADLS_ACUITY_SCORE: 22
ADLS_ACUITY_SCORE: 21
ADLS_ACUITY_SCORE: 21
ADLS_ACUITY_SCORE: 22
ADLS_ACUITY_SCORE: 22
ADLS_ACUITY_SCORE: 21

## 2019-07-01 ASSESSMENT — MIFFLIN-ST. JEOR: SCORE: 1098.25

## 2019-07-01 NOTE — PROGRESS NOTES
[facility] has accepted Annie for admission there tomorrow. Dtr Lavinia updated and will transport, plans to pick Annie up about 10am Tuesday.     Attempted to update Annie who is already in bed. She is confused at this time and did not seem to comprehend the plan proposed (for Lavinia to transport her to [facility] in the morning); Annie was confused as to where she is now, what therapy meant, and thinks Lavinia is out of town.

## 2019-07-01 NOTE — PROGRESS NOTES
[facility]    Hospitalist Progress Note      Assessment & Plan   Annie Arguello is a 85 year old female who was admitted on 6/29/2019.       1.  Hypotension: This has for the most part now resolved.  She was rather hypotensive when she came in pressures in the 70-80 systolic range.  Does have her underlying chronic A. fib.  She is low more tachycardic.  Has been given significant amount of fluids.  No signs of sepsis at all.  I think her diarrhea and the use of a diuretic probably had something to do with this presentation.  Currently she is eating well now.  I am going to stop her IV fluids and see how she does today.  Still is having some loose stools.  She was C. difficile negative.  If pressures stay fine is stable to keep up with things I would anticipate discharge tomorrow.    2.  Diarrhea: Loose stools are improving.  C. difficile was negative.    3.  A. fib: Chronic long-standing on apixaban for CVA prophylaxis.  Restarted her metoprolol and digoxin.  Heart rates been better 82/100 range.    4.  CVA: Seems to be doing relatively well.  Her CT scan did show what appeared to be a progression of her previous large right sided stroke.  Surprisingly she has minimal residual.  Some confusion no real slurred speech today.  Slight weakness on that left side.  We will have physical therapy see her today see how each he does ambulating.  If does not do well likely will not need structured nursing facility placement as I think she will need more care than her current assisted living situation will allow.    5.  Hematuria: Initial UA had significant hematuria repeat is gone at this time there is no obvious signs of infection.    6.  She status post fall: No evidence of any trauma.  PT and OT will see the patient today.  Anticipate she will require nursing home placement.    Diet: Consistent Carbohydrate Diet [id number] Calories: Moderate Consistent CHO (4-6 CHO units/meal)  Fluids: IV lock    DVT  Prophylaxis: Apixaban  Code Status: DNR/DNI    Disposition: Expected discharge in 1-2 days once ballon current meds and no IV fluids.    Mckay Acosta    Interval History   Patient is very alert this morning.  She is is a little confused at times.  But has no real complaints.  Still has some loose stools.  No dizziness or lightheadedness.  Denies chest pains or shortness of breath.  Vital signs have been good pressures 1 40-1 50 systolic.  Sats 99% room air heart rates been  range.  Stopping IV fluids today.  If does well with physical therapy and blood pressure through today likely discharge tomorrow.    -Data reviewed today: I reviewed all new labs and imaging results over the last 24 hours. I personally reviewed no images or EKG's today.    Physical Exam   Temp: 97.9  F (36.6  C) Temp src: Tympanic BP: 153/85   Heart Rate: 94 Resp: 14 SpO2: 98 % O2 Device: None (Room air)    Vitals:    06/29/19 2028 06/30/19 0552 07/01/19 0500   Weight: 68.9 kg (151 lb 14.4 oz) 69.5 kg (153 lb 3.5 oz) 70 kg (154 lb 5.2 oz)     Vital Signs with Ranges  Temp:  [97.1  F (36.2  C)-98.6  F (37  C)] 97.9  F (36.6  C)  Heart Rate:  [] 94  Resp:  [14-16] 14  BP: (118-157)/(64-89) 153/85  SpO2:  [97 %-99 %] 98 %  I/O last 3 completed shifts:  In: 2550 [P.O.:480; I.V.:2070]  Out: 1700 [Urine:1200; Other:500]    Peripheral IV 06/29/19 Left Wrist (Active)   Site Assessment WDL 7/1/2019 12:24 AM   Line Status Infusing 7/1/2019 12:24 AM   Phlebitis Scale 0-->no symptoms 7/1/2019 12:24 AM   Infiltration Scale 0 7/1/2019 12:24 AM   Extravasation? No 7/1/2019 12:24 AM   Dressing Intervention New dressing  6/29/2019  8:11 PM   Number of days: 2     No line/device    Constitutional: Alert and oriented x3. No distress mild confusion.  HEENT: Normocephalic/atraumatic, PERRL, EOMI, mouth moist, neck supple, no LN.     Cardiovascular:IRRR. no Murmur, no  rubs, or gallops.  JVD falt.  Bruits no.  Pulses 2+    Respiratory: Clear to  auscultation bilaterally    Abdomen: Soft, nontender, nondistended, no organomegaly. Bowel sounds present    Extremities: Warm/dry. No edema    Neuro:     Some left sided paresis minimalNon focal, cranial nerves intact, Moves all extremities.  Medications     lactated ringers 50 mL/hr at 07/01/19 0222       apixaban ANTICOAGULANT  5 mg Oral BID     aspirin  81 mg Oral Daily     atorvastatin  40 mg Oral Daily     digoxin  125 mcg Oral Daily     DULoxetine  20 mg Oral BID     insulin aspart  1-7 Units Subcutaneous TID AC     insulin aspart  1-5 Units Subcutaneous At Bedtime     metoprolol tartrate  50 mg Oral BID     omeprazole  20 mg Oral QAM AC     sodium chloride (PF)  3 mL Intracatheter Q8H       Data   Recent Labs   Lab 07/01/19  0513 06/30/19  1744 06/30/19  1021 06/30/19  0507 06/29/19  1459   WBC 10.5  --   --  12.6* 12.3*   HGB 11.5*  --   --  11.2* 11.9   MCV 89  --   --  89 89     --   --  172 207     --   --  143 140   POTASSIUM 4.1 4.2 3.2* 3.0* 3.5   CHLORIDE 111*  --   --  111* 106   CO2 26  --   --  25 26   BUN 11  --   --  19 23   CR 0.71  --   --  0.98 1.43*   ANIONGAP 4  --   --  7 8   MALIHA 8.6  --   --  8.0* 8.8   *  --   --  188* 228*   ALBUMIN  --   --   --   --  2.9*   PROTTOTAL  --   --   --   --  6.5*   BILITOTAL  --   --   --   --  1.0   ALKPHOS  --   --   --   --  66   ALT  --   --   --   --  29   AST  --   --   --   --  26   TROPI  --   --   --   --  <0.015       No results found for this or any previous visit (from the past 24 hour(s)).

## 2019-07-01 NOTE — PLAN OF CARE
Pt is A&O, SBA w/ gait belt and walker, CHO diet.  VS /76 HR 84 RR 16 T 98.6 O2 97% on RA, denies pain.  Lungs are dim in bases, otherwise clear.  Bowels active x4, passing flatus.  Pt's stools are starting to form and not be as watery.  BG at 0200 was 142, 152 w/ lab this morning. CMS intact, no edema noted. Pt rec'd 1 L Bolus at beginning of shift for sepsis BPA that fired before night shift.  IVF infusing at 50/hr.  Brakes locked, call light within reach, alarms on. Frequent rounding done to assess needs, free from falls this shift.    Face to face report given with opportunity to observe patient.    Report given to IRIS Goldman   7/1/2019  7:12 AM

## 2019-07-01 NOTE — PROVIDER NOTIFICATION
DATE:  6/30/2019   TIME OF RECEIPT FROM LAB:  4226  LAB TEST:  Lactic Acid  LAB VALUE:  2.2  RESULTS GIVEN WITH READ-BACK TO (PROVIDER):  De[facility]range  TIME LAB VALUE REPORTED TO PROVIDER:   7503

## 2019-07-01 NOTE — PLAN OF CARE
"Reason for hospital stay:  dehydration  Most recent vitals: /85   Pulse 92   Temp 97.7  F (36.5  C) (Temporal)   Resp 16   Ht 1.575 m (5' 2\")   Wt 70 kg (154 lb 5.2 oz)   SpO2 99%   BMI 28.23 kg/m    Pain Management:  denies discomfort  Orientation:  pleasantly confused, forgetful to situation, does not remember to call for assistance.  Easily redirectable.   Cardiac:  rate controlled- chronic afib pulse in 80's  tele intact.   Respiratory: clear denies shortness of breath.   GI:  has had 2 loose bowel movements this shift- denies abdominal discomfort.   :  voiding without difficulty   Nutrition:  consistent carb diet- accu checks before meals with sliding scale coverage.  Good appetite, no nausea.   Skin Issues:  scattered bruises.   IVF:  changed to saline lock this morning.   ABX:  none.    Mobility:  stand by assist with walker and gait belt for safety- steady gait.   Safety: does not use call light for assistance, alarms in place and she will set those off but usually stops and sits back down when she hears them.  Did unclip chair alarm this shift and was found walk out to nursing station- no falls/injuries this shift; is in good visualization from   Nursing station.    Comments:  Family in agreement with patient going to nursing home.  screening completed today for [facility], potential for discharge to SNF tomorrow.     7/1/2019  3:58 PM  Susi Ware    Face to face report given with opportunity to observe patient.    Report given to  Lee Ann Ware   7/1/2019  4:06 PM      "

## 2019-07-01 NOTE — PLAN OF CARE
Face to face report given with opportunity to observe patient.    Report given to Shyanne Mckinney   6/30/2019  11:36 PM

## 2019-07-01 NOTE — PROGRESS NOTES
Talked with Annie and her son Qamar Arguello and her dtr Lavinia Stroud. They are requesting STR at a SNF with their first choice being [facility]. Our discussion included the potential that she may be found to be functioning too well to be appropriate for a SNF, also talked about details of payment depending on discharge today vs tomorrow, Medicare relative to a past SNF stay this year, and the possibility of needing to pay privately (which they would be willing to do if needed).     Referral faxed and called to [facility].

## 2019-07-01 NOTE — PLAN OF CARE
Discharge Planner PT   Patient plan for discharge: Return to AL  Current status: Patient performs sit to stand transfers with SBA to FWW. Ambulated 80ft with SBA with use of FWW safely without rest breaks.  Barriers to return to prior living situation: Feeling unsteady, slight weakness  Recommendations for discharge:Return to AL with PT sevices at home  Rationale for recommendations: Due to residual weakness with dehydration leading to recent hospitalization       Entered by: Megan Ham 07/01/2019 9:24 AM

## 2019-07-01 NOTE — PROGRESS NOTES
Inpatient Physical Therapy Evaluation    Name: Annie Arguello MRN# [id number]   Age: 85 year old YOB: 1933     Date of Consultation: 2019  Consultation is requested by:  Dr Felix  Specific Consultation Request:  Falls, determine safe discharge recommendation  Primary care provider: [facility]    General Information:   Onset Date: 2019    History of Current Problem/Admission: Generalized muscle weakness [M62.81]  Atrial fibrillation with RVR (H) [I48.91]  Sepsis, due to unspecified organism (H) [A41.9]  Cerebral infarction involving middle cerebral artery, right (H) [I63.511]  Hypotension due to hypovolemia [I95.89, E86.1]    Prior Level of Function: IND at AL with assistance for bathing to ensure safety  Ambulation: 1 - Assistive Equipment   Transferrin - Assistive Equipment   Toiletin - Assistive Equipment    Bathin - Assistive Equipment   Dressin - Independent   Eatin - Independent   Communication: 0 - Understands/communicates without difficulty  Swallowin - swallows foods/liquids without difficulty - 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     Diverticulosis 2011     Fibromyalgia 05/10/2011     Nonallopathic lesions of cervical region, not else 2001     Obesity 2011     Osteoporosis 2011     Other malaise  and fatigue 2002     Renal cyst 2011     Sciatica 2007       Past Surgical History:  Past Surgical History:   Procedure Laterality Date     Breast biopsy      LT, benign      SECTION       CHOLECYSTECTOMY      lap     COLONOSCOPY       LUCILLE / BSO         Medications:   Current Facility-Administered Medications   Medication     acetaminophen (TYLENOL) tablet 650 mg     apixaban ANTICOAGULANT (ELIQUIS) tablet 5 mg     aspirin (ASA) chewable tablet 81 mg     atorvastatin (LIPITOR) tablet 40 mg     glucose gel 15-30 g    Or     dextrose 50 % injection 25-50 mL    Or     glucagon injection 1 mg     digoxin (LANOXIN) tablet 125 mcg     DULoxetine (CYMBALTA) DR capsule 20 mg     insulin aspart (NovoLOG) inj (RAPID ACTING)     insulin aspart (NovoLOG) inj (RAPID ACTING)     lidocaine (LMX4) kit     lidocaine 1 % 0.1-1 mL     magnesium sulfate 4 g in 100 mL sterile water (premade)     melatonin tablet 1 mg     metoprolol tartrate (LOPRESSOR) tablet 50 mg     naloxone (NARCAN) injection 0.1-0.4 mg     omeprazole (priLOSEC) CR capsule 20 mg     ondansetron (ZOFRAN-ODT) ODT tab 4 mg    Or     ondansetron (ZOFRAN) injection 4 mg     oxyCODONE (ROXICODONE) tablet 5 mg     potassium chloride (KLOR-CON) Packet 20-40 mEq     potassium chloride 10 mEq in 100 mL intermittent infusion with 10 mg lidocaine     potassium chloride 10 mEq in 100 mL sterile water intermittent infusion (premix)     potassium chloride ER (K-DUR/KLOR-CON M) CR tablet 20-40 mEq     senna-docusate (SENOKOT-S/PERICOLACE) 8.6-50 MG per tablet 1 tablet    Or     senna-docusate (SENOKOT-S/PERICOLACE) 8.6-50 MG per tablet 2 tablet     sodium chloride (PF) 0.9% PF flush 3 mL     sodium chloride (PF) 0.9% PF flush 3 mL       Weight Bearing Status: FWB     Cognitive Status Examination:  Orientation: awake and alert  Level of Consciousness: alert  Follows Commands and Answers Questions: 100% of the time  Personal Safety and Judgement:  Intact  Memory: some confusion about time and place, but safe and aware of surroundings  Comments: NA    Pain:   Currently in pain? No  Pain Location? NA      Physical Therapy Evaluation:   Integumentary/Edema: WNL  ROM: Functional  Strength: Functional with mild deficits in knee flexion 4/5 B  Bed Mobility: NT as patient was in chair upon eval  Transfers: SBA to FWW sit to stand  Gait: SBA x80ft with FWW without rest break  Stairs: NT  Balance: NT  Sensory: WNL  Coordination: NT    Physical Therapy Interventions: Gait Training , Strengthening and Progressive Activity/Exercise     Clinical Impressions:  Criteria for Skilled Therapeutic Intervention Met: Yes, treatment indicated  PT Diagnosis: weakness with associated recent fall  Influenced by the following impairments: weakness  Functional limitations due to impairment: difficulty walking and mobility as previous  Clinical presentation: Stable/Uncomplicated  Clinical presentation rationale: due to lack of additional comorbidities  Clinical Decision making (complexity): Low Complexity  Frequency: Daily  Predicted Duration of Therapy Intervention (days/wks): 5 days  Anticipated Discharge Disposition: Back to assisted living with PT services  Anticipated Equipment Needs at Discharge: None  Risks and Benefits of therapy have been explained: Yes  Patient, Family & other staff in agreement with plan of care: Yes  Clinical Impression Comments: Presentation is consistent with weakness due to recent fall that is expected to improve with skilled PT intervention with discharge disposition back to AL with therapy services in her home    Total Eval Time: 20

## 2019-07-01 NOTE — PLAN OF CARE
Discharge Planner OT   Patient plan for discharge: Pt couldn't verbalize with OT.   Current status: SBA for ADLs and functional mobility   Barriers to return to prior living situation: Confusion   Recommendations for discharge: Return to [facility] with previous assistance level  Rationale for recommendations: Pt appears to be near her baseline with ADLs and functional mobility, only requiring SBA today. No concerns with ADLs upon discharge. No concerns with current home accessibility. Pt has good support from family and staff at [facility] who assist with IADLs. No further skilled OT needs identified at this time. Will complete OT order.        Entered by: Dianna Walker 07/01/2019 11:03 AM

## 2019-07-01 NOTE — PLAN OF CARE
Pt alert and orientated. Ate well for supper. Up in chair for supper.  Voided OK.  Uses walker to bathroom- steady gait.  IV infusing. Site adequate.

## 2019-07-01 NOTE — PROGRESS NOTES
"Inpatient Occupational Therapy Evaluation    Name: Annie Arguello MRN# [id number]   Age: 85 year old YOB: 1933     Date of Consultation: 2019  Consultation is requested by:  Dr. Scott  Specific Consultation Request:  Fall  Primary care provider: DOC Cox    General Information:   Onset Date: 19    History of Current Problem/Admission: Generalized muscle weakness [M62.81]  Atrial fibrillation with RVR (H) [I48.91]  Sepsis, due to unspecified organism (H) [A41.9]  Cerebral infarction involving middle cerebral artery, right (H) [I63.511]  Hypotension due to hypovolemia [I95.89, E86.1]    Prior Level of Function: Pt previously independent in ADLs aside from bathing; pt is supposed to have someone close by incase she needs assistance. Pt reports she has showered by herself, though.   Ambulation: 1 - Assistive Equipment   Transferrin - Assistive Equipment   Toiletin - Assistive Equipment    Bathin - Assistive Equipment   Dressin - Independent   Eatin - Independent   Communication: 0 - Understands/communicates without difficulty  Swallowin - swallows foods/liquids without difficulty  Cognition: 1 - attention or memory deficits and 2 - difficulty with organizing thoughts    Fall history within the last 6 months: Yes    Current Living Situation: Patient lives at [facility]. Bathroom has a regular height toilet with grab bars and a walk in shower with grab bars and a bench.     Current Equipment Used at Home: FWW, shower bench, grab bars in shower and near toilet     Patient & Family Goals: Pt's family is requesting STR at SNF however pt said \"I don't know\". She reports she just wants to stay well.     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     Diverticulosis 2011     Fibromyalgia 05/10/2011     Nonallopathic lesions of cervical region, not else 2001     Obesity 2011     Osteoporosis 2011     Other malaise and fatigue 2002     Renal cyst 2011     Sciatica 2007       Past Surgical History:  Past Surgical History:   Procedure Laterality Date     Breast biopsy      LT, benign      SECTION       CHOLECYSTECTOMY      lap     COLONOSCOPY       LUCILLE / BSO         Medications:   Current Facility-Administered Medications   Medication     acetaminophen (TYLENOL) tablet 650 mg     apixaban ANTICOAGULANT (ELIQUIS) tablet 5 mg     aspirin (ASA) chewable tablet 81 mg     atorvastatin (LIPITOR) tablet 40 mg     glucose gel 15-30 g    Or     dextrose 50 % injection 25-50 mL    Or     glucagon injection 1 mg     digoxin (LANOXIN) tablet 125 mcg     DULoxetine (CYMBALTA) DR capsule 20 mg     insulin aspart (NovoLOG) inj (RAPID ACTING)     insulin aspart (NovoLOG) inj (RAPID ACTING)     lidocaine (LMX4) kit     lidocaine 1 % 0.1-1 mL     magnesium sulfate 4 g in 100 mL sterile water (premade)     melatonin tablet 1 mg     metoprolol tartrate (LOPRESSOR) tablet 50 mg     naloxone (NARCAN) injection 0.1-0.4 mg     omeprazole (priLOSEC) CR capsule 20 mg     ondansetron (ZOFRAN-ODT) ODT tab 4 mg    Or     ondansetron (ZOFRAN) injection 4 mg     oxyCODONE (ROXICODONE) tablet 5 mg     potassium chloride (KLOR-CON) Packet 20-40 mEq     potassium chloride 10 mEq in 100 mL intermittent infusion with 10 mg lidocaine     potassium chloride 10 mEq in 100 mL sterile water intermittent infusion (premix)     potassium chloride ER (K-DUR/KLOR-CON M) CR tablet 20-40 mEq     senna-docusate (SENOKOT-S/PERICOLACE) 8.6-50 MG per tablet 1 tablet    Or     senna-docusate (SENOKOT-S/PERICOLACE) 8.6-50 MG per tablet 2 tablet     sodium chloride (PF) 0.9% PF flush 3 mL     sodium  chloride (PF) 0.9% PF flush 3 mL       Weight Bearing Status: no restrictions      Cognitive Status Examination:  Orientation: oriented to person, city, and year. Pt stated she was at [place] and it was    Level of Consciousness: alert and confused  Follows Commands and Answers Questions: 100% of the time  Personal Safety and Judgement: Impulsive  Memory: Immediate recall intact  Comments: Pt was a bit impulsive while clinician was trying to explain directions. Pt also was concerned about a bag being missing from her walker, stating that the walker is her personal walker (however believe it is our facilities). Attempted to re-direct but this was unsuccessful.     Pain:   Currently in pain? No  Pain Location?   Pain Ratin (No pain)    Occupational Therapy Evaluation:   Integumentary/Edema: no significant findings    Range of Motion: BUE's WFL   Strength: BUE's grossly 4 to 4+/5 (R slightly > than L)   Hand Strength: Bilateral gross grasp good   Muscle Tone Assessment: no deficits  Coordination: normal     Mobility:   Transfer Skills: SBA sit<>stand   Toilet Transfer: SBA   Balance: no LOB  Pt ambulated to/from the BR using FWW with SBA     ADLs:   Lower Body Dressing: Independent   Toileting: did not have to go with OT  Grooming: SBA standing at the sink to wash hands and comb hair    IADLs:   Previous Responsibilities of the Patient: Staff completes IADLs  Comments: Daughter assists pt with finances     Activities of Daily Living Analysis:   Impairments Contributing to Impaired Activities of Daily Living: Cognition impaired  and slight weakness  Comments:     Occupational Therapy Interventions: Evaluation + 1 treatment session only     Clinical Impressions:  Criteria for Skilled Therapeutic Intervention Met: Evaluation Only, No problems identified which require skilled intervention and Current level of function same as previous level of function  OT Diagnosis: Slight weakness  Influenced by the following  impairments: Generalized weakness  Functional limitations due to impairment: None  Clinical presentation: Stable/Uncomplicated  Clinical presentation rationale: clinical judgement   Clinical Decision making (complexity): Low Complexity  Frequency: today  Predicted Duration of Therapy Intervention (days/wks): today    Anticipated Discharge Disposition: Home with previous assistance level  Anticipated Equipment Needs at Discharge:   Risks and Benefits of therapy have been explained: Yes  Patient, Family & other staff in agreement with plan of care: Yes  Clinical Impression Comments: Pt appears to be near her baseline with ADLs and functional mobility, only requiring SBA today. No concerns with ADLs upon discharge. No concerns with current home accessibility. Pt has good support from family and staff at [facility] who assist with IADLs. No further skilled OT needs identified at this time. Will complete OT order.     Total Eval Time: 12

## 2019-07-02 ENCOUNTER — MEDICAL CORRESPONDENCE (OUTPATIENT)
Dept: HEALTH INFORMATION MANAGEMENT | Facility: CLINIC | Age: 84
End: 2019-07-02

## 2019-07-02 VITALS
DIASTOLIC BLOOD PRESSURE: 67 MMHG | OXYGEN SATURATION: 98 % | BODY MASS INDEX: 27.79 KG/M2 | SYSTOLIC BLOOD PRESSURE: 138 MMHG | WEIGHT: 151.01 LBS | HEIGHT: 62 IN | HEART RATE: 81 BPM | RESPIRATION RATE: 24 BRPM | TEMPERATURE: 98.3 F

## 2019-07-02 PROBLEM — E86.1 HYPOTENSION DUE TO HYPOVOLEMIA: Status: ACTIVE | Noted: 2019-07-02

## 2019-07-02 PROBLEM — M62.81 GENERALIZED MUSCLE WEAKNESS: Status: ACTIVE | Noted: 2019-07-02

## 2019-07-02 LAB
ANION GAP SERPL CALCULATED.3IONS-SCNC: 5 MMOL/L (ref 3–14)
BUN SERPL-MCNC: 13 MG/DL (ref 7–30)
CALCIUM SERPL-MCNC: 8.3 MG/DL (ref 8.5–10.1)
CHLORIDE SERPL-SCNC: 110 MMOL/L (ref 94–109)
CO2 SERPL-SCNC: 27 MMOL/L (ref 20–32)
CREAT SERPL-MCNC: 0.72 MG/DL (ref 0.52–1.04)
ERYTHROCYTE [DISTWIDTH] IN BLOOD BY AUTOMATED COUNT: 13.7 % (ref 10–15)
GFR SERPL CREATININE-BSD FRML MDRD: 76 ML/MIN/{1.73_M2}
GLUCOSE BLDC GLUCOMTR-MCNC: 114 MG/DL (ref 70–99)
GLUCOSE SERPL-MCNC: 118 MG/DL (ref 70–99)
HCT VFR BLD AUTO: 31.7 % (ref 35–47)
HGB BLD-MCNC: 10.6 G/DL (ref 11.7–15.7)
MAGNESIUM SERPL-MCNC: 1.8 MG/DL (ref 1.6–2.3)
MCH RBC QN AUTO: 29.5 PG (ref 26.5–33)
MCHC RBC AUTO-ENTMCNC: 33.4 G/DL (ref 31.5–36.5)
MCV RBC AUTO: 88 FL (ref 78–100)
PLATELET # BLD AUTO: 173 10E9/L (ref 150–450)
POTASSIUM SERPL-SCNC: 3.4 MMOL/L (ref 3.4–5.3)
RBC # BLD AUTO: 3.59 10E12/L (ref 3.8–5.2)
SODIUM SERPL-SCNC: 142 MMOL/L (ref 133–144)
WBC # BLD AUTO: 8.5 10E9/L (ref 4–11)

## 2019-07-02 PROCEDURE — 25000132 ZZH RX MED GY IP 250 OP 250 PS 637: Mod: GY | Performed by: HOSPITALIST

## 2019-07-02 PROCEDURE — 00000146 ZZHCL STATISTIC GLUCOSE BY METER IP

## 2019-07-02 PROCEDURE — 99238 HOSP IP/OBS DSCHRG MGMT 30/<: CPT | Performed by: INTERNAL MEDICINE

## 2019-07-02 PROCEDURE — 83735 ASSAY OF MAGNESIUM: CPT | Performed by: INTERNAL MEDICINE

## 2019-07-02 PROCEDURE — 85027 COMPLETE CBC AUTOMATED: CPT | Performed by: INTERNAL MEDICINE

## 2019-07-02 PROCEDURE — 36415 COLL VENOUS BLD VENIPUNCTURE: CPT | Performed by: INTERNAL MEDICINE

## 2019-07-02 PROCEDURE — 25000132 ZZH RX MED GY IP 250 OP 250 PS 637: Mod: GY | Performed by: INTERNAL MEDICINE

## 2019-07-02 PROCEDURE — 80048 BASIC METABOLIC PNL TOTAL CA: CPT | Performed by: INTERNAL MEDICINE

## 2019-07-02 RX ADMIN — DULOXETINE HYDROCHLORIDE 20 MG: 20 CAPSULE, DELAYED RELEASE ORAL at 09:39

## 2019-07-02 RX ADMIN — APIXABAN 5 MG: 5 TABLET, FILM COATED ORAL at 09:40

## 2019-07-02 RX ADMIN — METOPROLOL TARTRATE 50 MG: 50 TABLET, FILM COATED ORAL at 09:40

## 2019-07-02 RX ADMIN — ASPIRIN 81 MG 81 MG: 81 TABLET ORAL at 09:40

## 2019-07-02 RX ADMIN — OMEPRAZOLE 20 MG: 20 CAPSULE, DELAYED RELEASE ORAL at 06:52

## 2019-07-02 RX ADMIN — ATORVASTATIN CALCIUM 40 MG: 40 TABLET, FILM COATED ORAL at 09:40

## 2019-07-02 RX ADMIN — DIGOXIN 125 MCG: 125 TABLET ORAL at 09:40

## 2019-07-02 ASSESSMENT — ACTIVITIES OF DAILY LIVING (ADL)
ADLS_ACUITY_SCORE: 22

## 2019-07-02 ASSESSMENT — MIFFLIN-ST. JEOR: SCORE: 1083.25

## 2019-07-02 NOTE — PLAN OF CARE
Alert, conversation appropriate after awaking.  Ate breakfast, assisted with sponge bath.  Daughter arrived to transport to [facility].    Patient discharged at 11:00 AM via wheel chair accompanied by daughter and staff. Prescriptions - None ordered for discharge. All belongings sent with patient.     Discharge instructions reviewed with patient & daughter. Listed belongings gathered and returned to patient. done    Patient discharged to [facility].   Report called to: message left to call for nurse to nurse report.  Not returned.    Core Measures and Vaccines  Core Measures applicable during stay: Yes. If yes, state diagnosis: Sepsis  Pneumonia and Influenza given prior to discharge, if indicated: N/A    Surgical Patient   Surgical Procedures during stay:NA  Did patient receive discharge instruction on wound care and recognition of infection symptoms? N/A    MISC  Follow up appointment made:  No  Home and hospital aquired medications returned to patient: N/A  Patient reports pain was well managed at discharge: Yes

## 2019-07-02 NOTE — PLAN OF CARE
Pt is A&O w/ some forgetfulness, diabetic diet, SBA.  VS /72 HR 73 RR 14 T 97.4 O2 96% on RA, denies pain.  Lungs are dim in bases, otherwise clear.  Bowels active x4, passing flatus.  Stools are soft and brown now.  Per ICU tele report pt is a-fib 60-80.  BG at 0200 was 114.  IV is SL, flushes well.  Brakes locked, alarms on, call light within reach.  Frequent rounding done to assess needs, free from falls.      Face to face report given with opportunity to observe patient.    Report given to IRIS Braswell   7/2/2019  7:32 AM

## 2019-07-02 NOTE — PLAN OF CARE
VSS. No c/o pain. BG checks as charted. No sliding scale insulin administered due to order parameters not met.     Mentation: pt alert/oriented and aware of where she is. However, pt exhibited confusion and forgetfulness. Towards end of shift, pt was asking when she will have to go to work. Writer reoriented pt to situation. Lungs: clear. Abd: bowel sounds active and audible. Soft and nontender. Pt tolerated CHO diet. Pt SBA with walker.     PIV saline-locked. Call light within reach. Alarms activated and audible.

## 2019-07-02 NOTE — DISCHARGE SUMMARY
Admit Date:     06/29/2019   Discharge Date:     07/02/2019      PRIMARY CARE PHYSICIAN:  Oleksandr Cxo MD      DATE OF ADMISSION:  06/29/2019      DATE OF DISCHARGE:  07/02/2019      DISCHARGE DIAGNOSES:   1. Hypotension, likely due to hypovolemia.   2. Status post fall secondary to hypotension.   3. Atrial fibrillation with rapid ventricular response, chronic.   4. Generalized muscle weakness.   5. Acute on chronic renal injury.   6. 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      PROCEDURES:  None.      COMPLICATIONS AND HOSPITAL COURSE:  Annie is an 85-year-old female who resides at [facility].  She was brought to the ER the day of admission after having fallen at assisted living when she stood up.  When the paramedics got there, she was relatively hypotensive with blood pressure in the 70s-80s.  She has chronic atrial fibrillation.  Her heart rates were up in the 100-120 range.  She was given IV fluids.  There was some concern about possible sepsis causing all this, but really no evidence of any infection at all.  Lactic acid was up somewhat, but as she got fluids, everything seemed to turn around quite nicely.  The issues that were dealt with were as follows:   1. Hypotension.  I think this is due to hypovolemia.  She has had some looser stools and has just not been eating and drinking as well the last couple of days.  Definitely, I think when she stood up she became intensive and fell to the ground, as there is no evidence of any injury at all.  So she got fluids in the ER, she got over about 3 liters.  Her pressures remained stable and she has had no further troubles with her hypotension since that time.  To confirm this also, her BUN and creatinine were elevated over baseline at 23 and 1.43.  With IV fluids quickly the next day they were down to 19 and 0.98, so I think that this confirms that was the probable source of this.  No signs of any infection.  Procalcitonin  is unremarkable.   2. Acute renal injury.  As above, 23 and 1.43 where her BUN and creatinine.  They have been stable after getting IV fluids, which has subsequently been stopped.  Her last BUN and creatinine were 13 and 0.72 respectively.   3. Atrial fibrillation, chronic.  She was a little bit faster when she came in due to her volume depletion, but once we got her back on her digoxin and her metoprolol, her rates have been much better.  She has been in the 60-70 range.  Blood pressures have been 120-130 systolic.   4. Weakness.  Generally she was pretty weak when she came in, I think just due to her volume depleted state.  By the time she left, she was able to ambulate with physical therapy quite well.  However, we felt that at least a temporary stay at nursing would be reasonable to get her back on her feet and make sure everything continues to stay well and get some physical therapy while she is there.  She is also on anticoagulation for her stroke prophylaxis.  She does have a significant history of a previous right hemispheric CVA.  She did have a repeat CT scan when she came in and the radiologist did mention that the stroke certainly appears to be subacutely worse than previously.  However, on neurological exam, she has some mild weakness on the left side; otherwise, nothing is grossly obvious or changed.  We did not feel that any further interventions were warranted and given her age and her current status, she did not want any other interventions.      At this time, the patient is alert and oriented, hemodynamically stable as above and ready for discharge to [facility].  She is DNR/DNI status.      DISCHARGE MEDICATIONS:  As follows:   1. Tylenol 325 every 6 hours p.r.n. pain.   2. Fosamax 70 mg every Wednesday.   3. Apixaban 5 mg b.i.d.   4. Aspirin 81 mg daily.   5. Atorvastatin 40 mg daily.   6. Digoxin 125 mcg daily.   7. Docusate sodium 100 mg b.i.d.   8. Cymbalta 20 mg b.i.d.   9. Metoprolol  tartrate 50 mg b.i.d.   10. Omeprazole 20 mg daily.   11. Potassium chloride 10 mEq b.i.d.   12. Psyllium.   13. Multivitamin daily.      DISCHARGE DIET:  As tolerated.      DISCHARGE ACTIVITY:  Up with assistance.  She will have PT and OT evaluation while she is there.      She is DNR/DNI, as noted above.            RUBY QUIÑONEZ MD             D: 2019   T: 2019   MT: KHADAR      Name:     RUDDY JOSE   MRN:      [id number]        Account:        [id number]   :      1933           Admit Date:     2019                                  Discharge Date: 2019      Document: [id number]

## 2019-07-02 NOTE — PROGRESS NOTES
Name: Annie Arguello    MRN#: [id number]    Reason for Hospitalization: Generalized muscle weakness [M62.81]  Atrial fibrillation with RVR (H) [I48.91]  Sepsis, due to unspecified organism (H) [A41.9]  Cerebral infarction involving middle cerebral artery, right (H) [I63.511]  Hypotension due to hypovolemia [I95.89, E86.1]    JOHN: average    Discharge Date: 7/2/2019    Medicare IM letter reviewed with her last on 6/30    Patient / Family response to discharge plan: they understand and agree    Follow-Up Appt: No future appointments.    Other Providers (Care Coordinator, County Services, PCA services etc): Yes: orders faxed and message left at [facility]    Discharge Disposition: [facility] short-term care facility via family    Per DHS regulation, CTS team completed and submitted PAS-RR to [place] [employer] Direct [employer] via the [employer]. CTS team advised SNF and they are aware a PAS-RR has been submitted.     CTS team reviewed with pt or health care agent that they may be contacted for a follow up appointment within 10 days of hospital discharge if SNF stay is <30 days. Contact information for [employer] was also provided.     Pt or health care agent verbalized understanding.     PAS-RR #[id number]         Marva Germain RN

## 2019-07-02 NOTE — PLAN OF CARE
Face to face report given with opportunity to observe patient.    Report given to Shyanne SHAFFER RN.    Lee Ann Burnham   7/1/2019  11:32 PM

## 2019-07-05 LAB
BACTERIA SPEC CULT: NORMAL
BACTERIA SPEC CULT: NORMAL
Lab: NORMAL
Lab: NORMAL
SPECIMEN SOURCE: NORMAL
SPECIMEN SOURCE: NORMAL

## 2019-07-08 ENCOUNTER — MEDICAL CORRESPONDENCE (OUTPATIENT)
Dept: HEALTH INFORMATION MANAGEMENT | Facility: CLINIC | Age: 84
End: 2019-07-08

## 2019-07-17 ENCOUNTER — TRANSFERRED RECORDS (OUTPATIENT)
Dept: HEALTH INFORMATION MANAGEMENT | Facility: CLINIC | Age: 84
End: 2019-07-17

## 2019-07-17 LAB
AST SERPL-CCNC: 23 IU/L (ref 10–40)
CHOLEST SERPL-MCNC: 104 MG/DL (ref 114–200)
CREAT SERPL-MCNC: 0.72 MG/DL (ref 0.4–1)
GFR SERPL CREATININE-BSD FRML MDRD: >60 ML/MIN/1.73M2
GLUCOSE SERPL-MCNC: 153 MG/DL (ref 70–99)
HDLC SERPL-MCNC: 38 MG/DL (ref 40–60)
LDLC SERPL CALC-MCNC: 53 MG/DL
POTASSIUM SERPL-SCNC: 3.3 MEQ/L (ref 3.4–5.1)
TRIGL SERPL-MCNC: 65 MG/DL (ref 10–200)

## 2019-07-29 ENCOUNTER — TELEPHONE (OUTPATIENT)
Dept: FAMILY MEDICINE | Facility: OTHER | Age: 84
End: 2019-07-29

## 2019-07-29 NOTE — TELEPHONE ENCOUNTER
[facility] in [place] called and states that pt was admitted in their facility today.  She will need a current history and physical sent over to [phone]

## 2019-07-29 NOTE — TELEPHONE ENCOUNTER
Patient is getting discharged from [facility] today and they faxed over the paperwork to be signed. Please sign and fax back ASAP.

## 2019-07-30 DIAGNOSIS — M19.90 OSTEOARTHRITIS, UNSPECIFIED OSTEOARTHRITIS TYPE, UNSPECIFIED SITE: ICD-10-CM

## 2019-07-31 RX ORDER — ALENDRONATE SODIUM 70 MG/1
TABLET ORAL
Qty: 4 TABLET | Refills: 0 | Status: SHIPPED | OUTPATIENT
Start: 2019-07-31 | End: 2020-02-18

## 2019-09-16 DIAGNOSIS — E87.6 HYPOKALEMIA: ICD-10-CM

## 2019-09-16 DIAGNOSIS — I48.21 PERMANENT ATRIAL FIBRILLATION (H): ICD-10-CM

## 2019-09-16 DIAGNOSIS — M19.90 OSTEOARTHRITIS, UNSPECIFIED OSTEOARTHRITIS TYPE, UNSPECIFIED SITE: ICD-10-CM

## 2019-09-17 RX ORDER — APIXABAN 5 MG/1
TABLET, FILM COATED ORAL
Qty: 60 TABLET | Refills: 1 | Status: SHIPPED | OUTPATIENT
Start: 2019-09-17 | End: 2019-12-09

## 2019-09-17 RX ORDER — ALENDRONATE SODIUM 70 MG/1
TABLET ORAL
Qty: 4 TABLET | Refills: 0 | Status: SHIPPED | OUTPATIENT
Start: 2019-09-17 | End: 2019-11-11

## 2019-09-17 RX ORDER — POTASSIUM CHLORIDE 750 MG/1
TABLET, EXTENDED RELEASE ORAL
Qty: 68 TABLET | Refills: 0 | Status: SHIPPED | OUTPATIENT
Start: 2019-09-17 | End: 2019-11-11

## 2019-09-17 NOTE — TELEPHONE ENCOUNTER
Potassium chloride ER 10meq      Last Written Prescription Date:  8/27/2019  Last Fill Quantity: 60,   # refills: 1  Last Office Visit: 4/2/2019  Future Office visit:       Routing refill request to provider for review/approval because:  Failed protocol due to lab of potassium 3.3 drawn on 7/17/2019      Alendronate 70mg      Last Written Prescription Date:  7/31/2019  Last Fill Quantity: 4,   # refills: 0  Last Office Visit: 4/2/2019  Future Office visit:       Routing refill request to provider for review/approval because:  Failed protocol due to no Dexa scan on file in last 2 years Please advise

## 2019-12-09 DIAGNOSIS — E78.2 MIXED HYPERLIPIDEMIA: ICD-10-CM

## 2019-12-09 DIAGNOSIS — E11.9 TYPE 2 DIABETES MELLITUS WITHOUT COMPLICATION, WITHOUT LONG-TERM CURRENT USE OF INSULIN (H): Primary | ICD-10-CM

## 2019-12-10 NOTE — TELEPHONE ENCOUNTER
atorvastatin (LIPITOR) 40 MG tablet  Last Written Prescription Date:  08/27/2019  Last Fill Quantity: 30,   # refills: 5  Last Office Visit: 04/02/2019  Future Office visit:       Routing refill request to provider for review/approval because:

## 2019-12-11 RX ORDER — ATORVASTATIN CALCIUM 40 MG/1
TABLET, FILM COATED ORAL
Qty: 90 TABLET | Refills: 0 | Status: SHIPPED | OUTPATIENT
Start: 2019-12-11 | End: 2020-02-18

## 2020-01-07 DIAGNOSIS — R19.7 DIARRHEA, UNSPECIFIED TYPE: ICD-10-CM

## 2020-01-09 RX ORDER — CALCIUM POLYCARBOPHIL 625 MG/1
TABLET, FILM COATED ORAL
Qty: 28 TABLET | Refills: 5 | Status: SHIPPED | OUTPATIENT
Start: 2020-01-09 | End: 2020-07-27

## 2020-01-27 ENCOUNTER — TELEPHONE (OUTPATIENT)
Dept: FAMILY MEDICINE | Facility: OTHER | Age: 85
End: 2020-01-27

## 2020-01-27 DIAGNOSIS — E11.9 TYPE 2 DIABETES MELLITUS WITHOUT COMPLICATION, WITHOUT LONG-TERM CURRENT USE OF INSULIN (H): Primary | ICD-10-CM

## 2020-01-27 RX ORDER — BLOOD-GLUCOSE METER
1 KIT MISCELLANEOUS DAILY
Qty: 1 KIT | Refills: 0 | Status: SHIPPED | OUTPATIENT
Start: 2020-01-27 | End: 2020-05-20

## 2020-02-05 DIAGNOSIS — I10 ESSENTIAL HYPERTENSION: ICD-10-CM

## 2020-02-05 DIAGNOSIS — I48.21 PERMANENT ATRIAL FIBRILLATION (H): ICD-10-CM

## 2020-02-07 RX ORDER — METOPROLOL TARTRATE 50 MG
TABLET ORAL
Qty: 56 TABLET | Refills: 1 | Status: SHIPPED | OUTPATIENT
Start: 2020-02-07 | End: 2020-02-18

## 2020-02-07 RX ORDER — APIXABAN 5 MG/1
TABLET, FILM COATED ORAL
Qty: 56 TABLET | Refills: 0 | Status: SHIPPED | OUTPATIENT
Start: 2020-02-07 | End: 2020-03-04

## 2020-02-07 NOTE — TELEPHONE ENCOUNTER
Eliquis failed protocol due to    Patient is 18-79 years of age    Recent (6 mo) or future (30 days) visit within the authorizing provider's specialty     Please advise, thank you.

## 2020-02-07 NOTE — TELEPHONE ENCOUNTER
ELIQUIS      Last Written Prescription Date:  12-  Last Fill Quantity: 60,   # refills: 0  Last Office Visit: 7-2-2019  Future Office visit:       Routing refill request to provider for review/approval because:      METOPROLOL-  -Last Written Prescription Date:  8-  Last Fill Quantity: 180,   # refills: 0  Last Office Visit: 7-2-2019  Future Office visit:       Routing refill request to provider for review/approval because:

## 2020-02-11 NOTE — PROGRESS NOTES
Subjective     Annie Arguello is a 86 year old female who presents to clinic today for the following health issues:    HPI   Patient started feeling ill couple weeks ago had some stomach nausea and that seems to have gotten a little worse no vomiting or diarrhea.  At times with activities feels a little short of breath denies orthopnea.  Denies chest pain with activity   She has had occasional cough.  She is here now with her daughter and they are  concerned because the last few nights she has had night sweats.  Denies any blood in the stool.  No productive cough. Feels her legs are weak.  The nausea seems to be a little better she is keeping food down.  Voiding okay no blood in the urine she has a history of atrial fibrillation is on Eliquis Lanoxin and a beta-blocker      Onset: 1 week    Description (location/character/radiation/duration): Sternum area    Intensity:  moderate    Accompanying signs and symptoms:        Shortness of breath: YES       Sweating: YES       Nausea/vomitting: YES, Nausea       Palpitations: no        Other (fevers/chills/cough/heartburn/lightheadedness): YES- fever, sick to stomach    History (similar episodes/previous evaluation): None    Precipitating or alleviating factors:       Worse with exertion: YES       Worse with breathing: no        Related to eating: no        Better with burping: no     Therapies tried and outcome: None          PAST MEDICAL HISTORY:  Past Medical History:   Diagnosis Date     Atrial fibrillation (H) 09/06/2012     Calculus of kidney 03/14/2003     Controlled type 2 diabetes mellitus without complication, without long-term current use of insulin (H) 10/24/2017     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 05/16/2007     Diverticulosis 05/02/2011     Fibromyalgia 05/10/2011     Nonallopathic lesions of cervical region, not else 01/03/2001     Obesity 05/02/2011     Osteoporosis 05/02/2011     Other malaise and fatigue 01/24/2002     Renal cyst  2011     Sciatica 2007       PAST SURGICAL HISTORY:  Past Surgical History:   Procedure Laterality Date     Breast biopsy      LT, benign      SECTION       CHOLECYSTECTOMY      lap     COLONOSCOPY       LUCILLE / BSO         MEDICATIONS:  Prior to Admission medications    Medication Sig Start Date End Date Taking? Authorizing Provider   acetaminophen (TYLENOL) 325 MG tablet Take 1 tablet (325 mg) by mouth every 6 hours as needed for mild pain 19   DOC Cox MD   alendronate (FOSAMAX) 70 MG tablet TAKE 1 TABLET BY MOUTH WEEKLY AS DIRECTED. 19   DOC Cox MD   alendronate (FOSAMAX) 70 MG tablet TAKE AS DIRECTED BY PRESCRIBER 19   Noemi Cox MD   ASPIRIN LOW DOSE 81 MG chewable tablet CHEW 1 TABLET DAILY 19   Rayo Barajas MD   atorvastatin (LIPITOR) 40 MG tablet TAKE 1 TABLET BY MOUTH ONCE DAILY. 19   Nitin Justin MD   atorvastatin (LIPITOR) 40 MG tablet TAKE 1 TAB BY MOUTH IN THE EVENING 19   DOC Cox MD   blood glucose (GLUCOCARD VITAL TEST) test strip Use to test blood sugar 1 times daily or as directed. 20   DOC Cox MD   Blood Glucose Monitoring Suppl (GLUCOCARD VITAL MONITOR) w/Device KIT 1 kit daily Test blood Sugars 1 times daily 20   DOC Cox MD   digoxin (LANOXIN) 125 MCG tablet TAKE 1 TAB BY MOUTH ONCE DAILY 19   Noemi Cox MD   docusate sodium (COLACE) 100 MG tablet Take 1 tablet (100 mg) by mouth 2 times daily 19   DOC Cox MD   DULoxetine (CYMBALTA) 20 MG capsule TAKE (1) CAPSULE BY MOUTH TWICE DAILY. 19   Nitin Justin MD   ELIQUIS ANTICOAGULANT 5 MG tablet TAKE (1) TABLET BY MOUTH TWICE A DAY. 20   DOC Cox MD   FIBER LAXATIVE 625 MG tablet TAKE 1 TABLET BY MOUTH ONCE DAILY. 20   DOC Cox MD   metoprolol tartrate (LOPRESSOR) 50 MG tablet TAKE (1) TABLET BY MOUTH TWICE A DAY. 20   DOC Cox MD   Multiple Vitamin (TAB-A-MARIUSZ) TABS  "TAKE 1 TABLET BY MOUTH DAILY 2/7/19   Rayo Barajas MD   nystatin (MYCOSTATIN) 399237 UNIT/GM external powder Apply topically 2 times daily as needed 4/29/19   DOC Cox MD   omeprazole (PRILOSEC) 20 MG DR capsule TAKE 1 CAPSULE BY MOUTH DAILY 11/13/19   Nitin Justin MD   polyethylene glycol (MIRALAX/GLYCOLAX) packet Take 17 g by mouth daily 4/29/19   DOC Cox MD   potassium chloride ER (K-DUR/KLOR-CON M) 10 MEQ CR tablet TAKE (1) TABLET BY MOUTH TWICE A DAY. 11/13/19   DOC Cox MD   potassium chloride ER (K-DUR/KLOR-CON M) 10 MEQ CR tablet TAKE 1 TABLET BY MOUTH TWICE A DAY 8/27/19   DOC Cox MD       ALLERGIES:     Allergies   Allergen Reactions     Ampicillin      Desvenlafaxine Other (See Comments)     Desvenlafaxine Succinate  Pristiq       Sertraline Hcl Other (See Comments) and Diarrhea     Zoloft       Sulfa Drugs Other (See Comments)     Sulfa(Sulfonamide Antibiotics)       ROS:  Constitutional, neuro, ENT, endocrine, pulmonary, cardiac, gastrointestinal, genitourinary, musculoskeletal, integument and psychiatric systems are negative, except as otherwise noted.      EXAM:  BP (!) 142/78   Pulse 105   Temp 97.7  F (36.5  C) (Tympanic)   Ht 1.575 m (5' 2\")   Wt 68 kg (150 lb)   SpO2 95%   BMI 27.44 kg/m   Body mass index is 27.44 kg/m .   GENERAL APPEARANCE: Pleasant elderly female walks with a walker is alert speech is clear and she is moving air comfortably able to talk in complete sentences without catching her breath.  EYES: Eyes grossly normal to inspection, PERRL and conjunctivae and sclerae normal  HENT: nose and mouth without ulcers or lesions, oropharynx is moist  NECK: no adenopathy, no asymmetry, masses, or scars and thyroid normal to palpation, no JVD  RESP: lungs clear to auscultation - no rales, rhonchi or wheezes  CV: Irregularly irregular without S3-S4 murmur, 1-2+ leg edema  ABDOMEN: soft, nontender, without hepatosplenomegaly or masses and bowel " sounds normal  NEURO: No focal weakness she walks with a wheeled walker but her gait is steady.  Speech is understandable, leg muscles tested and no focal weakness noted  PSYCH: Does not seem agitated no delusional thoughts  Lab/ X-ray  Results for orders placed or performed in visit on 02/13/20   XR Chest 2 Views     Status: None    Narrative    PROCEDURE: XR CHEST 2 VW 2/13/2020 2:03 PM    HISTORY: Diaphoresis    COMPARISONS: 6/29/2019.    TECHNIQUE: 2 views.    FINDINGS: Heart is enlarged. There is mild vascular congestion and  interstitial edema. There is blunting of costophrenic angles  posteriorly consistent with small effusions. No large confluent  infiltrate is seen. Atherosclerotic changes seen in the aortic knob.    There is an S-shaped scoliosis. There is been a cholecystectomy.         Impression    IMPRESSION: Cardiomegaly with probable mild congestive heart failure.    ARRON PAREKH MD   Results for orders placed or performed in visit on 02/13/20   CBC with platelets differential     Status: Abnormal   Result Value Ref Range    WBC 11.0 4.0 - 11.0 10e9/L    RBC Count 4.54 3.8 - 5.2 10e12/L    Hemoglobin 12.9 11.7 - 15.7 g/dL    Hematocrit 40.5 35.0 - 47.0 %    MCV 89 78 - 100 fl    MCH 28.4 26.5 - 33.0 pg    MCHC 31.9 31.5 - 36.5 g/dL    RDW 15.9 (H) 10.0 - 15.0 %    Platelet Count 203 150 - 450 10e9/L    Diff Method Automated Method     % Neutrophils 70.5 %    % Lymphocytes 18.4 %    % Monocytes 8.7 %    % Eosinophils 1.4 %    % Basophils 0.4 %    % Immature Granulocytes 0.6 %    Nucleated RBCs 0 0 /100    Absolute Neutrophil 7.8 1.6 - 8.3 10e9/L    Absolute Lymphocytes 2.0 0.8 - 5.3 10e9/L    Absolute Monocytes 1.0 0.0 - 1.3 10e9/L    Absolute Eosinophils 0.2 0.0 - 0.7 10e9/L    Absolute Basophils 0.0 0.0 - 0.2 10e9/L    Abs Immature Granulocytes 0.1 0 - 0.4 10e9/L    Absolute Nucleated RBC 0.0    Comprehensive metabolic panel     Status: Abnormal   Result Value Ref Range    Sodium 142 133 - 144  mmol/L    Potassium 3.8 3.4 - 5.3 mmol/L    Chloride 109 94 - 109 mmol/L    Carbon Dioxide 25 20 - 32 mmol/L    Anion Gap 8 3 - 14 mmol/L    Glucose 186 (H) 70 - 99 mg/dL    Urea Nitrogen 17 7 - 30 mg/dL    Creatinine 0.95 0.52 - 1.04 mg/dL    GFR Estimate 54 (L) >60 mL/min/[1.73_m2]    GFR Estimate If Black 63 >60 mL/min/[1.73_m2]    Calcium 8.8 8.5 - 10.1 mg/dL    Bilirubin Total 1.3 0.2 - 1.3 mg/dL    Albumin 3.1 (L) 3.4 - 5.0 g/dL    Protein Total 6.6 (L) 6.8 - 8.8 g/dL    Alkaline Phosphatase 92 40 - 150 U/L    ALT 42 0 - 50 U/L    AST 34 0 - 45 U/L   TSH with free T4 reflex     Status: None   Result Value Ref Range    TSH 3.99 0.40 - 4.00 mU/L   Erythrocyte sedimentation rate auto     Status: None   Result Value Ref Range    Sed Rate 8 0 - 30 mm/h   CRP inflammation     Status: None   Result Value Ref Range    CRP Inflammation 4.2 0.0 - 8.0 mg/L   N terminal pro BNP outpatient     Status: Abnormal   Result Value Ref Range    N-Terminal Pro Bnp 4,833 (H) 0 - 450 pg/mL     EKG notes her atrial fibrillation.  She admitted that when she was lying on the table she got very nervous worrying about her heart and her heart rate did go up at that point.  Had some T changes that probably related to her Lanoxin.    ASSESSMENT/PLAN:    ICD-10-CM    1. Diaphoresis R61 CBC with platelets differential     Comprehensive metabolic panel     TSH with free T4 reflex     Erythrocyte sedimentation rate auto     CRP inflammation     N terminal pro BNP outpatient     XR Chest 2 Views     EKG 12-lead complete w/read - Clinics   2. Shortness of breath  R06.02 N terminal pro BNP outpatient   3. Permanent atrial fibrillation I48.21     Chest x-ray reveals heart failure and BNP is 4833.  She was able to walk to lab and back with her walker and sats after that activity were 96.    Will treat with Lasix 20 mg twice daily along with the addition of lisinopril 2.5 mg daily and will see her back in 5 days to recheck.  Discussed with patient  and her daughter that if patient has difficulties with breathing or develops angina symptoms or other acute changes she should go to the emergency room.  Discussed ordering an echo and at this point she was not interested we did discuss other causes of diaphoresis with looking for occult malignancy and she was not interested in pursuing that.  We did check thyroid status to rule out hyperthyroid and that was normal.  She is DNR.  Daughter is here and is aware of her status and they are both comfortable with that status and her condition.  Discussed she should try to avoid salt but as she brought up it is hard to follow  a low-salt diet  at assisted living.  I told her to not add extra salt and avoid foods that have obvious salt on them or processed meats.       ONEAL Cox MD  February 13, 2020

## 2020-02-13 ENCOUNTER — ANCILLARY PROCEDURE (OUTPATIENT)
Dept: GENERAL RADIOLOGY | Facility: OTHER | Age: 85
End: 2020-02-13
Attending: FAMILY MEDICINE
Payer: MEDICARE

## 2020-02-13 ENCOUNTER — OFFICE VISIT (OUTPATIENT)
Dept: FAMILY MEDICINE | Facility: OTHER | Age: 85
End: 2020-02-13
Attending: FAMILY MEDICINE
Payer: MEDICARE

## 2020-02-13 VITALS
HEIGHT: 62 IN | OXYGEN SATURATION: 96 % | TEMPERATURE: 97.7 F | DIASTOLIC BLOOD PRESSURE: 78 MMHG | SYSTOLIC BLOOD PRESSURE: 142 MMHG | WEIGHT: 150 LBS | HEART RATE: 86 BPM | BODY MASS INDEX: 27.6 KG/M2

## 2020-02-13 DIAGNOSIS — R61 DIAPHORESIS: Primary | ICD-10-CM

## 2020-02-13 DIAGNOSIS — I48.21 PERMANENT ATRIAL FIBRILLATION (H): ICD-10-CM

## 2020-02-13 DIAGNOSIS — I50.9 CONGESTIVE HEART FAILURE, UNSPECIFIED HF CHRONICITY, UNSPECIFIED HEART FAILURE TYPE (H): ICD-10-CM

## 2020-02-13 DIAGNOSIS — R06.02 SHORTNESS OF BREATH: ICD-10-CM

## 2020-02-13 DIAGNOSIS — R61 DIAPHORESIS: ICD-10-CM

## 2020-02-13 LAB
ALBUMIN SERPL-MCNC: 3.1 G/DL (ref 3.4–5)
ALP SERPL-CCNC: 92 U/L (ref 40–150)
ALT SERPL W P-5'-P-CCNC: 42 U/L (ref 0–50)
ANION GAP SERPL CALCULATED.3IONS-SCNC: 8 MMOL/L (ref 3–14)
AST SERPL W P-5'-P-CCNC: 34 U/L (ref 0–45)
BASOPHILS # BLD AUTO: 0 10E9/L (ref 0–0.2)
BASOPHILS NFR BLD AUTO: 0.4 %
BILIRUB SERPL-MCNC: 1.3 MG/DL (ref 0.2–1.3)
BUN SERPL-MCNC: 17 MG/DL (ref 7–30)
CALCIUM SERPL-MCNC: 8.8 MG/DL (ref 8.5–10.1)
CHLORIDE SERPL-SCNC: 109 MMOL/L (ref 94–109)
CO2 SERPL-SCNC: 25 MMOL/L (ref 20–32)
CREAT SERPL-MCNC: 0.95 MG/DL (ref 0.52–1.04)
CRP SERPL-MCNC: 4.2 MG/L (ref 0–8)
DIFFERENTIAL METHOD BLD: ABNORMAL
EOSINOPHIL # BLD AUTO: 0.2 10E9/L (ref 0–0.7)
EOSINOPHIL NFR BLD AUTO: 1.4 %
ERYTHROCYTE [DISTWIDTH] IN BLOOD BY AUTOMATED COUNT: 15.9 % (ref 10–15)
ERYTHROCYTE [SEDIMENTATION RATE] IN BLOOD BY WESTERGREN METHOD: 8 MM/H (ref 0–30)
GFR SERPL CREATININE-BSD FRML MDRD: 54 ML/MIN/{1.73_M2}
GLUCOSE SERPL-MCNC: 186 MG/DL (ref 70–99)
HCT VFR BLD AUTO: 40.5 % (ref 35–47)
HGB BLD-MCNC: 12.9 G/DL (ref 11.7–15.7)
IMM GRANULOCYTES # BLD: 0.1 10E9/L (ref 0–0.4)
IMM GRANULOCYTES NFR BLD: 0.6 %
LYMPHOCYTES # BLD AUTO: 2 10E9/L (ref 0.8–5.3)
LYMPHOCYTES NFR BLD AUTO: 18.4 %
MCH RBC QN AUTO: 28.4 PG (ref 26.5–33)
MCHC RBC AUTO-ENTMCNC: 31.9 G/DL (ref 31.5–36.5)
MCV RBC AUTO: 89 FL (ref 78–100)
MONOCYTES # BLD AUTO: 1 10E9/L (ref 0–1.3)
MONOCYTES NFR BLD AUTO: 8.7 %
NEUTROPHILS # BLD AUTO: 7.8 10E9/L (ref 1.6–8.3)
NEUTROPHILS NFR BLD AUTO: 70.5 %
NRBC # BLD AUTO: 0 10*3/UL
NRBC BLD AUTO-RTO: 0 /100
NT-PROBNP SERPL-MCNC: 4833 PG/ML (ref 0–450)
PLATELET # BLD AUTO: 203 10E9/L (ref 150–450)
POTASSIUM SERPL-SCNC: 3.8 MMOL/L (ref 3.4–5.3)
PROT SERPL-MCNC: 6.6 G/DL (ref 6.8–8.8)
RBC # BLD AUTO: 4.54 10E12/L (ref 3.8–5.2)
SODIUM SERPL-SCNC: 142 MMOL/L (ref 133–144)
TSH SERPL DL<=0.005 MIU/L-ACNC: 3.99 MU/L (ref 0.4–4)
WBC # BLD AUTO: 11 10E9/L (ref 4–11)

## 2020-02-13 PROCEDURE — G0463 HOSPITAL OUTPT CLINIC VISIT: HCPCS | Mod: 25

## 2020-02-13 PROCEDURE — 71046 X-RAY EXAM CHEST 2 VIEWS: CPT | Mod: TC

## 2020-02-13 PROCEDURE — 86140 C-REACTIVE PROTEIN: CPT | Mod: ZL | Performed by: FAMILY MEDICINE

## 2020-02-13 PROCEDURE — 85025 COMPLETE CBC W/AUTO DIFF WBC: CPT | Mod: ZL | Performed by: FAMILY MEDICINE

## 2020-02-13 PROCEDURE — G0463 HOSPITAL OUTPT CLINIC VISIT: HCPCS

## 2020-02-13 PROCEDURE — 99214 OFFICE O/P EST MOD 30 MIN: CPT | Performed by: FAMILY MEDICINE

## 2020-02-13 PROCEDURE — 93010 ELECTROCARDIOGRAM REPORT: CPT | Performed by: INTERNAL MEDICINE

## 2020-02-13 PROCEDURE — 36415 COLL VENOUS BLD VENIPUNCTURE: CPT | Mod: ZL | Performed by: FAMILY MEDICINE

## 2020-02-13 PROCEDURE — 80053 COMPREHEN METABOLIC PANEL: CPT | Mod: ZL | Performed by: FAMILY MEDICINE

## 2020-02-13 PROCEDURE — 83880 ASSAY OF NATRIURETIC PEPTIDE: CPT | Mod: ZL | Performed by: FAMILY MEDICINE

## 2020-02-13 PROCEDURE — 85652 RBC SED RATE AUTOMATED: CPT | Mod: ZL | Performed by: FAMILY MEDICINE

## 2020-02-13 PROCEDURE — 84443 ASSAY THYROID STIM HORMONE: CPT | Mod: ZL | Performed by: FAMILY MEDICINE

## 2020-02-13 RX ORDER — LISINOPRIL 2.5 MG/1
2.5 TABLET ORAL DAILY
Qty: 30 TABLET | Refills: 3 | Status: SHIPPED | OUTPATIENT
Start: 2020-02-13 | End: 2020-06-02

## 2020-02-13 RX ORDER — FUROSEMIDE 20 MG
20 TABLET ORAL 2 TIMES DAILY
Qty: 60 TABLET | Refills: 1 | Status: SHIPPED | OUTPATIENT
Start: 2020-02-13 | End: 2020-02-18

## 2020-02-13 ASSESSMENT — PAIN SCALES - GENERAL: PAINLEVEL: NO PAIN (0)

## 2020-02-13 ASSESSMENT — MIFFLIN-ST. JEOR: SCORE: 1073.65

## 2020-02-13 NOTE — PROGRESS NOTES
Subjective     Annie Arguello is a 86 year old female who presents to clinic today for the following health issues:    HPI   At her last visit had congestive heart failure with an chest x-ray significant for cardiomegaly and a BNP in the 4000 range.  He was started on an ACE inhibitor and Lasix.  She feels her breathing is better denies any chest pain or shortness of breath now just feels tired.  Sounds like she has not been getting her Lanoxin has a history of atrial fib.  Here with her daughter today  Hypertension Follow-up      Do you check your blood pressure regularly outside of the clinic? Yes     Are you following a low salt diet? Yes    Are your blood pressures ever more than 140 on the top number (systolic) OR more   than 90 on the bottom number (diastolic), for example 140/90? No      How many servings of fruits and vegetables do you eat daily?  2-3    On average, how many sweetened beverages do you drink each day (Examples: soda, juice, sweet tea, etc.  Do NOT count diet or artificially sweetened beverages)?   1    How many days per week do you exercise enough to make your heart beat faster? 3 or less    How many minutes a day do you exercise enough to make your heart beat faster? 9 or less    How many days per week do you miss taking your medication? 0        PAST MEDICAL HISTORY:  Past Medical History:   Diagnosis Date     Atrial fibrillation (H) 09/06/2012     Calculus of kidney 03/14/2003     Controlled type 2 diabetes mellitus without complication, without long-term current use of insulin (H) 10/24/2017     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 05/16/2007     Diverticulosis 05/02/2011     Fibromyalgia 05/10/2011     Nonallopathic lesions of cervical region, not else 01/03/2001     Obesity 05/02/2011     Osteoporosis 05/02/2011     Other malaise and fatigue 01/24/2002     Renal cyst 05/02/2011     Sciatica 06/26/2007       PAST SURGICAL HISTORY:  Past Surgical History:   Procedure  Laterality Date     Breast biopsy      LT, benign      SECTION       CHOLECYSTECTOMY      lap     COLONOSCOPY       LUCILLE / BSO         MEDICATIONS:  Prior to Admission medications    Medication Sig Start Date End Date Taking? Authorizing Provider   acetaminophen (TYLENOL) 325 MG tablet Take 1 tablet (325 mg) by mouth every 6 hours as needed for mild pain 19  Yes DOC Cox MD   alendronate (FOSAMAX) 70 MG tablet TAKE 1 TABLET BY MOUTH WEEKLY AS DIRECTED. 19  Yes DOC Cox MD   ASPIRIN LOW DOSE 81 MG chewable tablet CHEW 1 TABLET DAILY 19  Yes Rayo Barajas MD   atorvastatin (LIPITOR) 40 MG tablet TAKE 1 TAB BY MOUTH IN THE EVENING 19  Yes DOC Cox MD   blood glucose (GLUCOCARD VITAL TEST) test strip Use to test blood sugar 1 times daily or as directed. 20  Yes DOC Cox MD   Blood Glucose Monitoring Suppl (GLUCOCARD VITAL MONITOR) w/Device KIT 1 kit daily Test blood Sugars 1 times daily 20  Yes DOC Cox MD   chlorthalidone (HYGROTON) 25 MG tablet Take 1 tablet (25 mg) by mouth daily 20  Yes DOC Cox MD   docusate sodium (COLACE) 100 MG tablet Take 1 tablet (100 mg) by mouth 2 times daily 19  Yes DOC Cox MD   DULoxetine (CYMBALTA) 20 MG capsule TAKE (1) CAPSULE BY MOUTH TWICE DAILY. 19  Yes Nitin Justin MD   ELIQUIS ANTICOAGULANT 5 MG tablet TAKE (1) TABLET BY MOUTH TWICE A DAY. 20  Yes DOC Cox MD   FIBER LAXATIVE 625 MG tablet TAKE 1 TABLET BY MOUTH ONCE DAILY. 20  Yes DOC Cox MD   lisinopril (PRINIVIL/ZESTRIL) 2.5 MG tablet Take 1 tablet (2.5 mg) by mouth daily 20  Yes DOC Cox MD   metoprolol tartrate (LOPRESSOR) 25 MG tablet Take 1 tablet (25 mg) by mouth 2 times daily TAKE (1) TABLET BY MOUTH TWICE A DAY. 20  Yes DOC Cox MD   Multiple Vitamin (TAB-A-MARIUSZ) TABS TAKE 1 TABLET BY MOUTH DAILY 19  Yes Rayo Barajas MD   omeprazole (PRILOSEC) 20 MG DR  "capsule TAKE 1 CAPSULE BY MOUTH DAILY 11/13/19  Yes Nitin Justin MD   potassium chloride ER (K-DUR/KLOR-CON M) 10 MEQ CR tablet TAKE 1 TABLET BY MOUTH TWICE A DAY 8/27/19  Yes DOC Cox MD   nystatin (MYCOSTATIN) 474118 UNIT/GM external powder Apply topically 2 times daily as needed  Patient not taking: Reported on 2/18/2020 4/29/19   DOC Cox MD   polyethylene glycol (MIRALAX/GLYCOLAX) packet Take 17 g by mouth daily  Patient not taking: Reported on 2/18/2020 4/29/19   DOC Cox MD       ALLERGIES:     Allergies   Allergen Reactions     Ampicillin      Desvenlafaxine Other (See Comments)     Desvenlafaxine Succinate  Pristiq       Sertraline Hcl Other (See Comments) and Diarrhea     Zoloft       Sulfa Drugs Other (See Comments)     Sulfa(Sulfonamide Antibiotics)       ROS:  Constitutional, neuro, ENT, endocrine, pulmonary, cardiac, gastrointestinal, genitourinary, musculoskeletal, integument and psychiatric systems are negative, except as otherwise noted.      EXAM:  /68 (BP Location: Right arm, Patient Position: Chair, Cuff Size: Adult Large)   Pulse 76   Resp 18   Ht 1.575 m (5' 2\")   Wt 69.9 kg (154 lb)   SpO2 96%   BMI 28.17 kg/m   Body mass index is 28.17 kg/m .   GENERAL APPEARANCE: alert and no distress  EYES: Eyes grossly normal to inspection, PERRL and conjunctivae and sclerae normal  HENT: nose and mouth without ulcers or lesions  NECK: no adenopathy, no asymmetry, masses, or scars and thyroid normal to palpation  RESP: lungs clear to auscultation - no rales, rhonchi or wheezes  CV: Irregularly irregular without S3-S4 if she has just 1+ lower extremity edema  NEURO: Normal strength and tone, mentation intact and speech normal  PSYCH: mentation appears normal and affect normal  Lab/ X-ray  Pending    ASSESSMENT/PLAN:    ICD-10-CM    1. Congestive heart failure, unspecified HF chronicity, unspecified heart failure type (H) I50.9 chlorthalidone (HYGROTON) 25 MG tablet     " Basic metabolic panel   2. Essential hypertension I10 metoprolol tartrate (LOPRESSOR) 25 MG tablet     Patient has known CHF and atrial fib and hypertension.  Last week we added a low-dose ACE inhibitor and her blood pressure is 104 we will drop her Lopressor from 50 twice daily to 25 twice daily and it sounds like she is not been getting her Lanoxin so we will just discontinue that.  We will DC the Lasix and changed to [place] 25 mg daily.  I will see her in 1 month for recheck.  She is here with her daughter and had a nice discussion with daughter and patient and patient wishes to be DNR does not want aggressive evaluation and treatments.  I did did mention to patient and her daughter that we could get an echo and have her see cardiology but patient states she is breathing comfortably feels tired but not having any chest pain or pain anywhere.  Did mention that her   peacefully and she wishes for that.    ONEAL Cox MD  2020

## 2020-02-13 NOTE — NURSING NOTE
"Chief Complaint   Patient presents with     Breathing Problem       Initial BP (!) 142/78   Pulse 105   Temp 97.7  F (36.5  C) (Tympanic)   Ht 1.575 m (5' 2\")   Wt 68 kg (150 lb)   SpO2 95%   BMI 27.44 kg/m   Estimated body mass index is 27.44 kg/m  as calculated from the following:    Height as of this encounter: 1.575 m (5' 2\").    Weight as of this encounter: 68 kg (150 lb).  Medication Reconciliation: complete  Sue Campbell LPN  "

## 2020-02-18 ENCOUNTER — OFFICE VISIT (OUTPATIENT)
Dept: FAMILY MEDICINE | Facility: OTHER | Age: 85
End: 2020-02-18
Attending: FAMILY MEDICINE
Payer: MEDICARE

## 2020-02-18 VITALS
DIASTOLIC BLOOD PRESSURE: 68 MMHG | HEIGHT: 62 IN | RESPIRATION RATE: 18 BRPM | BODY MASS INDEX: 28.34 KG/M2 | SYSTOLIC BLOOD PRESSURE: 104 MMHG | WEIGHT: 154 LBS | OXYGEN SATURATION: 96 % | HEART RATE: 76 BPM

## 2020-02-18 DIAGNOSIS — I10 ESSENTIAL HYPERTENSION: ICD-10-CM

## 2020-02-18 DIAGNOSIS — I50.9 CONGESTIVE HEART FAILURE, UNSPECIFIED HF CHRONICITY, UNSPECIFIED HEART FAILURE TYPE (H): Primary | ICD-10-CM

## 2020-02-18 LAB
ANION GAP SERPL CALCULATED.3IONS-SCNC: 6 MMOL/L (ref 3–14)
BUN SERPL-MCNC: 15 MG/DL (ref 7–30)
CALCIUM SERPL-MCNC: 8.8 MG/DL (ref 8.5–10.1)
CHLORIDE SERPL-SCNC: 106 MMOL/L (ref 94–109)
CO2 SERPL-SCNC: 28 MMOL/L (ref 20–32)
CREAT SERPL-MCNC: 0.83 MG/DL (ref 0.52–1.04)
GFR SERPL CREATININE-BSD FRML MDRD: 64 ML/MIN/{1.73_M2}
GLUCOSE SERPL-MCNC: 159 MG/DL (ref 70–99)
POTASSIUM SERPL-SCNC: 3.7 MMOL/L (ref 3.4–5.3)
SODIUM SERPL-SCNC: 140 MMOL/L (ref 133–144)

## 2020-02-18 PROCEDURE — 36415 COLL VENOUS BLD VENIPUNCTURE: CPT | Mod: ZL | Performed by: FAMILY MEDICINE

## 2020-02-18 PROCEDURE — G0463 HOSPITAL OUTPT CLINIC VISIT: HCPCS

## 2020-02-18 PROCEDURE — 99214 OFFICE O/P EST MOD 30 MIN: CPT | Performed by: FAMILY MEDICINE

## 2020-02-18 PROCEDURE — 80048 BASIC METABOLIC PNL TOTAL CA: CPT | Mod: ZL | Performed by: FAMILY MEDICINE

## 2020-02-18 RX ORDER — METOPROLOL TARTRATE 25 MG/1
25 TABLET, FILM COATED ORAL 2 TIMES DAILY
Qty: 60 TABLET | Refills: 5 | Status: SHIPPED | OUTPATIENT
Start: 2020-02-18 | End: 2020-08-27

## 2020-02-18 RX ORDER — CHLORTHALIDONE 25 MG/1
25 TABLET ORAL DAILY
Qty: 30 TABLET | Refills: 5 | Status: SHIPPED | OUTPATIENT
Start: 2020-02-18 | End: 2020-07-27

## 2020-02-18 ASSESSMENT — PAIN SCALES - GENERAL: PAINLEVEL: NO PAIN (0)

## 2020-02-18 ASSESSMENT — MIFFLIN-ST. JEOR: SCORE: 1091.79

## 2020-02-18 NOTE — NURSING NOTE
"Chief Complaint   Patient presents with     Hypertension       Initial /68 (BP Location: Right arm, Patient Position: Chair, Cuff Size: Adult Large)   Pulse 76   Resp 18   Ht 1.575 m (5' 2\")   Wt 69.9 kg (154 lb)   SpO2 96%   BMI 28.17 kg/m   Estimated body mass index is 28.17 kg/m  as calculated from the following:    Height as of this encounter: 1.575 m (5' 2\").    Weight as of this encounter: 69.9 kg (154 lb).  Medication Reconciliation: complete  Pamela M. Lechevalier, LPN    "

## 2020-02-28 DIAGNOSIS — K21.9 GASTROESOPHAGEAL REFLUX DISEASE, ESOPHAGITIS PRESENCE NOT SPECIFIED: ICD-10-CM

## 2020-02-28 DIAGNOSIS — I48.21 PERMANENT ATRIAL FIBRILLATION (H): ICD-10-CM

## 2020-02-28 DIAGNOSIS — E78.2 MIXED HYPERLIPIDEMIA: ICD-10-CM

## 2020-02-28 DIAGNOSIS — E87.6 HYPOKALEMIA: ICD-10-CM

## 2020-02-28 DIAGNOSIS — M19.90 OSTEOARTHRITIS, UNSPECIFIED OSTEOARTHRITIS TYPE, UNSPECIFIED SITE: ICD-10-CM

## 2020-02-28 DIAGNOSIS — R69 DIAGNOSIS UNKNOWN: ICD-10-CM

## 2020-02-28 DIAGNOSIS — E11.9 CONTROLLED TYPE 2 DIABETES MELLITUS WITHOUT COMPLICATION, WITHOUT LONG-TERM CURRENT USE OF INSULIN (H): ICD-10-CM

## 2020-02-28 NOTE — TELEPHONE ENCOUNTER
atorvastatin (LIPITOR) 40 MG tablet  Last visit date with prescribing provider: 2-  Last refill date: 8-  Quantity: 30, Refills: 5    omeprazole (PRILOSEC) 20 MG DR capsule  Last visit date with prescribing provider: 2-  Last refill date: 11-  Quantity: 28, Refills: 3    DULoxetine (CYMBALTA) 20 MG capsule  Last visit date with prescribing provider: 2-  Last refill date: 11-  Quantity: 56, Refills: 3    Noemi Lozano LPN      Next 5 appointments (look out 90 days)    Mar 19, 2020 11:00 AM CDT  (Arrive by 10:45 AM)  SHORT with DOC Cox MD  [facility] [place] ([facility] [address]  [place] [address]  [phone]            Next 5 appointments (look out 90 days)    Mar 19, 2020 11:00 AM CDT  (Arrive by 10:45 AM)  SHORT with DOC Cox MD  [facility] [place] ([facility] [place] ) [address]  [place] [address]  [phone]            Next 5 appointments (look out 90 days)    Mar 19, 2020 11:00 AM CDT  (Arrive by 10:45 AM)  SHORT with DOC Cox MD  [facility] [place] ([facility] [place] ) [address]  [place] [address]  [phone]

## 2020-03-02 ENCOUNTER — HEALTH MAINTENANCE LETTER (OUTPATIENT)
Age: 85
End: 2020-03-02

## 2020-03-02 RX ORDER — MULTIVITAMIN WITH FOLIC ACID 400 MCG
TABLET ORAL
Qty: 28 TABLET | Refills: 11 | Status: SHIPPED | OUTPATIENT
Start: 2020-03-02 | End: 2021-01-12

## 2020-03-02 NOTE — TELEPHONE ENCOUNTER
eliquis      Last Written Prescription Date:  2/7/20  Last Fill Quantity: 56,   # refills: 0  Last Office Visit: 2/18/20  Future Office visit:    Next 5 appointments (look out 90 days)    Mar 19, 2020 11:00 AM CDT  (Arrive by 10:45 AM)  SHORT with DOC Cox MD  [facility] [place] ([facility] [place] ) [address]  [place] [address]  [phone]         fosamax      Last Written Prescription Date:  11/13/19  Last Fill Quantity: 4,   # refills: 3  Last Office Visit: 2/18/20  Future Office visit:    Next 5 appointments (look out 90 days)    Mar 19, 2020 11:00 AM CDT  (Arrive by 10:45 AM)  SHORT with DOC Cox MD  [facility] ([facility] [place] ) [address]  [place] [address]  [phone]         potassium      Last Written Prescription Date:  8/27/19  Last Fill Quantity: 60,   # refills: 1  Last Office Visit: 2/18/20  Future Office visit:    Next 5 appointments (look out 90 days)    Mar 19, 2020 11:00 AM CDT  (Arrive by 10:45 AM)  SHORT with DOC Cox MD  [facility] [place] ([facility] [place] ) [address]  [place] [address]  [phone]

## 2020-03-04 RX ORDER — APIXABAN 5 MG/1
TABLET, FILM COATED ORAL
Qty: 56 TABLET | Refills: 3 | Status: SHIPPED | OUTPATIENT
Start: 2020-03-04 | End: 2020-07-02

## 2020-03-04 RX ORDER — DULOXETIN HYDROCHLORIDE 20 MG/1
CAPSULE, DELAYED RELEASE ORAL
Qty: 56 CAPSULE | Refills: 3 | Status: SHIPPED | OUTPATIENT
Start: 2020-03-04 | End: 2020-07-01

## 2020-03-04 RX ORDER — POTASSIUM CHLORIDE 750 MG/1
TABLET, EXTENDED RELEASE ORAL
Qty: 56 TABLET | Refills: 3 | Status: SHIPPED | OUTPATIENT
Start: 2020-03-04 | End: 2020-07-01

## 2020-03-04 RX ORDER — ATORVASTATIN CALCIUM 40 MG/1
TABLET, FILM COATED ORAL
Qty: 28 TABLET | Refills: 3 | Status: SHIPPED | OUTPATIENT
Start: 2020-03-04 | End: 2020-07-01

## 2020-03-04 RX ORDER — ALENDRONATE SODIUM 70 MG/1
TABLET ORAL
Qty: 4 TABLET | Refills: 3 | Status: SHIPPED | OUTPATIENT
Start: 2020-03-04 | End: 2020-07-02

## 2020-03-04 NOTE — TELEPHONE ENCOUNTER
Eliquis protocol failed due to:Patient is 18-79 years of age    Fosamax protocol failed due to:Dexa on file within past 2 years    PCP out. Please advise, thank you.

## 2020-03-04 NOTE — TELEPHONE ENCOUNTER
Omeprazole failed protocol due to No diagnosis of osteoporosis on record.  PCP out. Please advise, thank you.

## 2020-03-10 NOTE — PROGRESS NOTES
"Annie Arguello is a 86 year old female who is being evaluated via a billable telephone visit.      The patient has been notified of following:     \"This telephone visit will be conducted via a call between you and your physician/provider. We have found that certain health care needs can be provided without the need for a physical exam.  This service lets us provide the care you need with a short phone conversation.  If a prescription is necessary we can send it directly to your pharmacy.  If lab work is needed we can place an order for that and you can then stop by our lab to have the test done at a later time.    If during the course of the call the physician/provider feels a telephone visit is not appropriate, you will not be charged for this service.\"     Annie Arguello complains of    Chief Complaint   Patient presents with     Heart Failure     Hypertension       I have reviewed and updated the patient's Past Medical History, Social History, Family History and Medication List.    ALLERGIES  Ampicillin; Desvenlafaxine; Sertraline hcl; and Sulfa drugs    Hypertension Follow-up      Do you check your blood pressure regularly outside of the clinic? Yes     Are you following a low salt diet? Yes    Are your blood pressures ever more than 140 on the top number (systolic) OR more   than 90 on the bottom number (diastolic), for example 140/90? No     Loose stools BP drops, nurses push fluids and BP comes up to normal.     Heart Failure Follow-up    Are you experiencing any shortness of breath? No    Are you experiencing any swelling in your legs or feet?  No    Are you using more pillows than usual? Yes    Do you cough at night?  No    Do you check your weight daily?  No    Have you had a weight change recently?  No    Are you having any of the following side effects from your medications? (Select all that apply)  The patient does not report symptoms of dizziness, fatigue, cough, swelling, or slow heart " beat.    Since your last visit, how many times have you gone to the cardiologist, urgent care, emergency room, or hospital because of your heart failure?   None    Additional provider notes:     Sue Campbell LPN    Assessment/Plan:  1. Permanent atrial fibrillation  Stable  2. Essential hypertension  Stable    3. Diarrhea, unspecified type  Episodically patient will have a couple days of diarrhea the push oral fluids and it settles down.  When she has the diarrhea has a little drop in her blood pressure patient has no chest pain or shortness of breath with it.  Her daughter brought in an over-the-counter product for diarrhea and that seems to have helped.  We will fax an order that on day 2 if she has loose stools to hold her chlorthalidone and then once she has a day without diarrhea then she can go back on her chlorthalidone.  That should help her.    4. Controlled type 2 diabetes mellitus without complication, without long-term current use of insulin (H)  Sounds like her diabetes is been stable.  Her recent labs in February were good we reviewed those kidney function was good and she is not having any problems other than the periodic diarrhea.  We will plan if this coronavirus settles down we will see her in the clinic in 3 months.  Assisted living facility knows to call us if there is any concerns.  Patient is in isolation there because of the high risk and emotionally it sounds like she is doing good.      Phone call duration:  6 minutes    DOC Cox MD

## 2020-03-19 ENCOUNTER — VIRTUAL VISIT (OUTPATIENT)
Dept: FAMILY MEDICINE | Facility: OTHER | Age: 85
End: 2020-03-19
Attending: FAMILY MEDICINE
Payer: MEDICARE

## 2020-03-19 VITALS
DIASTOLIC BLOOD PRESSURE: 48 MMHG | TEMPERATURE: 97.2 F | RESPIRATION RATE: 16 BRPM | SYSTOLIC BLOOD PRESSURE: 89 MMHG | BODY MASS INDEX: 28.17 KG/M2 | HEIGHT: 62 IN | HEART RATE: 84 BPM | OXYGEN SATURATION: 98 %

## 2020-03-19 DIAGNOSIS — R19.7 DIARRHEA, UNSPECIFIED TYPE: ICD-10-CM

## 2020-03-19 DIAGNOSIS — I48.21 PERMANENT ATRIAL FIBRILLATION (H): Primary | ICD-10-CM

## 2020-03-19 DIAGNOSIS — I10 ESSENTIAL HYPERTENSION: ICD-10-CM

## 2020-03-19 DIAGNOSIS — E11.9 CONTROLLED TYPE 2 DIABETES MELLITUS WITHOUT COMPLICATION, WITHOUT LONG-TERM CURRENT USE OF INSULIN (H): ICD-10-CM

## 2020-03-19 PROCEDURE — 99441 ZZC PHYSICIAN TELEPHONE EVALUATION 5-10 MIN: CPT | Performed by: FAMILY MEDICINE

## 2020-03-19 PROCEDURE — G0463 HOSPITAL OUTPT CLINIC VISIT: HCPCS

## 2020-03-19 RX ORDER — GLUCOSAMINE HCL/CHONDROITIN SU 500-400 MG
CAPSULE ORAL
Qty: 100 EACH | Refills: 3 | Status: ON HOLD | OUTPATIENT
Start: 2020-03-19 | End: 2021-07-13

## 2020-03-19 RX ORDER — ISOPROPYL ALCOHOL 70 ML/100ML
SWAB TOPICAL
Status: ON HOLD | COMMUNITY
Start: 2020-03-18 | End: 2021-07-13

## 2020-03-19 ASSESSMENT — PAIN SCALES - GENERAL: PAINLEVEL: NO PAIN (0)

## 2020-03-27 DIAGNOSIS — E11.9 CONTROLLED TYPE 2 DIABETES MELLITUS WITHOUT COMPLICATION, WITHOUT LONG-TERM CURRENT USE OF INSULIN (H): ICD-10-CM

## 2020-03-27 DIAGNOSIS — K21.9 GASTROESOPHAGEAL REFLUX DISEASE, ESOPHAGITIS PRESENCE NOT SPECIFIED: ICD-10-CM

## 2020-03-30 RX ORDER — ASPIRIN 81 MG
TABLET,CHEWABLE ORAL
Qty: 28 TABLET | Refills: 3 | Status: SHIPPED | OUTPATIENT
Start: 2020-03-30 | End: 2020-07-29

## 2020-03-30 NOTE — TELEPHONE ENCOUNTER
omeprazole (PRILOSEC) 20 MG DR capsule   Last Written Prescription Date:  3/4/2020  Last Fill Quantity: 28,   # refills: 0  Last Office Visit: 2/18/2020

## 2020-04-30 DIAGNOSIS — E11.9 CONTROLLED TYPE 2 DIABETES MELLITUS WITHOUT COMPLICATION, WITHOUT LONG-TERM CURRENT USE OF INSULIN (H): Primary | ICD-10-CM

## 2020-04-30 NOTE — TELEPHONE ENCOUNTER
Blood glucose test strips and lancets      Last Written Prescription Date:  01/27/20  Last Fill Quantity: 1,   # refills: 11  Last Office Visit: 03/19/20  Future Office visit:       Routing refill request to provider for review/approval because:  Change in brand of monitoring supplies

## 2020-05-20 DIAGNOSIS — E11.9 TYPE 2 DIABETES MELLITUS WITHOUT COMPLICATION, WITHOUT LONG-TERM CURRENT USE OF INSULIN (H): ICD-10-CM

## 2020-05-20 DIAGNOSIS — E11.9 CONTROLLED TYPE 2 DIABETES MELLITUS WITHOUT COMPLICATION, WITHOUT LONG-TERM CURRENT USE OF INSULIN (H): ICD-10-CM

## 2020-05-20 RX ORDER — BLOOD-GLUCOSE METER
KIT MISCELLANEOUS
Qty: 100 EACH | Refills: 3 | Status: SHIPPED | OUTPATIENT
Start: 2020-05-20 | End: 2021-01-05

## 2020-05-20 RX ORDER — BLOOD-GLUCOSE METER
1 KIT MISCELLANEOUS DAILY
Qty: 1 KIT | Refills: 0 | Status: SHIPPED | OUTPATIENT
Start: 2020-05-20 | End: 2023-01-01

## 2020-05-21 NOTE — TELEPHONE ENCOUNTER
lancets      Last Written Prescription Date:  05/20/20  Last Fill Quantity: 100,   # refills: 3  Last Office Visit: 03/19/20  Future Office visit:         Test strip      Last Written Prescription Date:  05/20/20  Last Fill Quantity: 100,   # refills: 3  Last Office Visit: 03/19/20  Future Office visit:

## 2020-05-22 RX ORDER — LANCETS 21 GAUGE
EACH MISCELLANEOUS
Qty: 100 EACH | OUTPATIENT
Start: 2020-05-22

## 2020-05-22 RX ORDER — BLOOD-GLUCOSE METER
KIT MISCELLANEOUS
Qty: 100 EACH | Refills: 3 | OUTPATIENT
Start: 2020-05-22

## 2020-06-01 DIAGNOSIS — I50.9 CONGESTIVE HEART FAILURE, UNSPECIFIED HF CHRONICITY, UNSPECIFIED HEART FAILURE TYPE (H): ICD-10-CM

## 2020-06-02 RX ORDER — LISINOPRIL 2.5 MG/1
TABLET ORAL
Qty: 28 TABLET | Refills: 5 | Status: SHIPPED | OUTPATIENT
Start: 2020-06-02 | End: 2020-11-17

## 2020-06-09 DIAGNOSIS — I50.9 CONGESTIVE HEART FAILURE, UNSPECIFIED HF CHRONICITY, UNSPECIFIED HEART FAILURE TYPE (H): ICD-10-CM

## 2020-06-09 RX ORDER — LISINOPRIL 2.5 MG/1
TABLET ORAL
Qty: 28 TABLET | Refills: 0 | OUTPATIENT
Start: 2020-06-09

## 2020-06-29 DIAGNOSIS — M19.90 OSTEOARTHRITIS, UNSPECIFIED OSTEOARTHRITIS TYPE, UNSPECIFIED SITE: ICD-10-CM

## 2020-06-29 DIAGNOSIS — I48.21 PERMANENT ATRIAL FIBRILLATION (H): ICD-10-CM

## 2020-06-29 DIAGNOSIS — E87.6 HYPOKALEMIA: ICD-10-CM

## 2020-06-29 DIAGNOSIS — R69 DIAGNOSIS UNKNOWN: ICD-10-CM

## 2020-06-29 DIAGNOSIS — E78.2 MIXED HYPERLIPIDEMIA: ICD-10-CM

## 2020-06-30 NOTE — TELEPHONE ENCOUNTER
fosamax      Last Written Prescription Date:  3.4.2020  Last Fill Quantity: 4 tabs,   # refills: 3  Last Office Visit: 3.19.2020    lipitor      Last Written Prescription Date:  3.4.2020  Last Fill Quantity: 28,   # refills: 3  Last Office Visit: 3.19.2020    cymbalta      Last Written Prescription Date:  3.4.2020  Last Fill Quantity: 56,   # refills: 3  Last Office Visit: 3.19.2020    eliquis      Last Written Prescription Date:  3.4.2020  Last Fill Quantity: 56,   # refills: 3  Last Office Visit: 3.19.2020

## 2020-07-01 RX ORDER — POTASSIUM CHLORIDE 750 MG/1
TABLET, EXTENDED RELEASE ORAL
Qty: 56 TABLET | Refills: 3 | Status: SHIPPED | OUTPATIENT
Start: 2020-07-01 | End: 2020-10-22

## 2020-07-01 RX ORDER — DULOXETIN HYDROCHLORIDE 20 MG/1
CAPSULE, DELAYED RELEASE ORAL
Qty: 56 CAPSULE | Refills: 0 | Status: SHIPPED | OUTPATIENT
Start: 2020-07-01 | End: 2020-07-28

## 2020-07-01 RX ORDER — ATORVASTATIN CALCIUM 40 MG/1
TABLET, FILM COATED ORAL
Qty: 28 TABLET | Refills: 0 | Status: SHIPPED | OUTPATIENT
Start: 2020-07-01 | End: 2020-07-28

## 2020-07-01 NOTE — TELEPHONE ENCOUNTER
Direct Oral Anticoagulant Agents [id number]/30/2020 12:01 PM   Patient is 18-79 years of age       Bisphosphonates [id number]/30/2020 12:01 PM   Dexa on file within past 2 years     Last DEXA 6.19.13.    Pended.    Sima Hightower RN

## 2020-07-02 RX ORDER — ALENDRONATE SODIUM 70 MG/1
TABLET ORAL
Qty: 4 TABLET | Refills: 0 | Status: SHIPPED | OUTPATIENT
Start: 2020-07-02 | End: 2020-07-27

## 2020-07-02 RX ORDER — APIXABAN 5 MG/1
TABLET, FILM COATED ORAL
Qty: 56 TABLET | Refills: 0 | Status: SHIPPED | OUTPATIENT
Start: 2020-07-02 | End: 2020-07-27

## 2020-07-27 DIAGNOSIS — R69 DIAGNOSIS UNKNOWN: ICD-10-CM

## 2020-07-27 DIAGNOSIS — E78.2 MIXED HYPERLIPIDEMIA: ICD-10-CM

## 2020-07-27 DIAGNOSIS — I48.21 PERMANENT ATRIAL FIBRILLATION (H): ICD-10-CM

## 2020-07-27 DIAGNOSIS — R19.7 DIARRHEA, UNSPECIFIED TYPE: ICD-10-CM

## 2020-07-27 DIAGNOSIS — M19.90 OSTEOARTHRITIS, UNSPECIFIED OSTEOARTHRITIS TYPE, UNSPECIFIED SITE: ICD-10-CM

## 2020-07-27 DIAGNOSIS — I50.9 CONGESTIVE HEART FAILURE, UNSPECIFIED HF CHRONICITY, UNSPECIFIED HEART FAILURE TYPE (H): ICD-10-CM

## 2020-07-27 RX ORDER — CALCIUM POLYCARBOPHIL 625 MG/1
TABLET, FILM COATED ORAL
Qty: 28 TABLET | Refills: 0 | Status: SHIPPED | OUTPATIENT
Start: 2020-07-27 | End: 2020-08-27

## 2020-07-27 RX ORDER — CHLORTHALIDONE 25 MG/1
TABLET ORAL
Qty: 28 TABLET | Refills: 0 | Status: SHIPPED | OUTPATIENT
Start: 2020-07-27 | End: 2020-08-27

## 2020-07-27 RX ORDER — APIXABAN 5 MG/1
TABLET, FILM COATED ORAL
Qty: 56 TABLET | Refills: 0 | Status: SHIPPED | OUTPATIENT
Start: 2020-07-27 | End: 2020-08-27

## 2020-07-27 RX ORDER — ALENDRONATE SODIUM 70 MG/1
TABLET ORAL
Qty: 4 TABLET | Refills: 0 | Status: SHIPPED | OUTPATIENT
Start: 2020-07-27 | End: 2020-08-20

## 2020-07-28 RX ORDER — DULOXETIN HYDROCHLORIDE 20 MG/1
CAPSULE, DELAYED RELEASE ORAL
Qty: 56 CAPSULE | Refills: 0 | Status: SHIPPED | OUTPATIENT
Start: 2020-07-28 | End: 2020-08-27

## 2020-07-28 RX ORDER — ATORVASTATIN CALCIUM 40 MG/1
TABLET, FILM COATED ORAL
Qty: 28 TABLET | Refills: 0 | Status: SHIPPED | OUTPATIENT
Start: 2020-07-28 | End: 2020-08-27

## 2020-08-17 DIAGNOSIS — M19.90 OSTEOARTHRITIS, UNSPECIFIED OSTEOARTHRITIS TYPE, UNSPECIFIED SITE: ICD-10-CM

## 2020-08-18 NOTE — TELEPHONE ENCOUNTER
alendronate (FOSAMAX) 70 MG tablet     Last Written Prescription Date:  07/27/2020  Last Fill Quantity: 4,   # refills: 0  Last Office Visit: 03/19/2020  Future Office visit:       Routing refill request to provider for review/approval because:

## 2020-08-20 RX ORDER — ALENDRONATE SODIUM 70 MG/1
TABLET ORAL
Qty: 4 TABLET | Refills: 0 | Status: SHIPPED | OUTPATIENT
Start: 2020-08-20 | End: 2020-09-22

## 2020-08-21 DIAGNOSIS — R19.7 DIARRHEA, UNSPECIFIED TYPE: Primary | ICD-10-CM

## 2020-08-24 DIAGNOSIS — R19.7 DIARRHEA, UNSPECIFIED TYPE: ICD-10-CM

## 2020-08-24 DIAGNOSIS — I10 ESSENTIAL HYPERTENSION: ICD-10-CM

## 2020-08-24 DIAGNOSIS — I48.21 PERMANENT ATRIAL FIBRILLATION (H): ICD-10-CM

## 2020-08-24 DIAGNOSIS — I50.9 CONGESTIVE HEART FAILURE, UNSPECIFIED HF CHRONICITY, UNSPECIFIED HEART FAILURE TYPE (H): ICD-10-CM

## 2020-08-24 NOTE — TELEPHONE ENCOUNTER
metoprolol tartrate (LOPRESSOR) 25 MG tablet      Last Written Prescription Date:  2/18/20  Last Fill Quantity: 60,   # refills: 5  Last Office Visit: 3/19/2020    Future Office visit:       ELIQUIS ANTICOAGULANT 5 MG tablet      Last Written Prescription Date:  7/27/20  Last Fill Quantity: 56,   # refills: 0  Last Office Visit: 3/19/2020    Future Office visit:       chlorthalidone (HYGROTON) 25 MG tablet      Last Written Prescription Date:  2/18/20  Last Fill Quantity: 60,   # refills: 5  Last Office Visit: 3/19/2020    Future Office visit:       FIBER LAXATIVE 625 MG tablet      Last Written Prescription Date:  7/27/20  Last Fill Quantity: 28,   # refills: 0  Last Office Visit: 3/19/2020    Future Office visit:

## 2020-08-25 RX ORDER — BISMUTH SUBSALICYLATE 262 MG
TABLET,CHEWABLE ORAL
Qty: 30 TABLET | Refills: 0 | Status: SHIPPED | OUTPATIENT
Start: 2020-08-25 | End: 2023-01-01

## 2020-08-27 DIAGNOSIS — I48.21 PERMANENT ATRIAL FIBRILLATION (H): ICD-10-CM

## 2020-08-27 DIAGNOSIS — R19.7 DIARRHEA, UNSPECIFIED TYPE: ICD-10-CM

## 2020-08-27 RX ORDER — CALCIUM POLYCARBOPHIL 625 MG 625 MG/1
1 TABLET ORAL DAILY
Qty: 28 TABLET | Refills: 0 | Status: SHIPPED | OUTPATIENT
Start: 2020-08-27 | End: 2020-09-15

## 2020-08-27 RX ORDER — CALCIUM POLYCARBOPHIL 625 MG/1
TABLET, FILM COATED ORAL
Qty: 28 TABLET | Refills: 0 | Status: SHIPPED | OUTPATIENT
Start: 2020-08-27 | End: 2020-08-27

## 2020-08-27 RX ORDER — METOPROLOL TARTRATE 25 MG/1
TABLET, FILM COATED ORAL
Qty: 56 TABLET | Refills: 0 | Status: SHIPPED | OUTPATIENT
Start: 2020-08-27 | End: 2020-09-22

## 2020-08-27 RX ORDER — CHLORTHALIDONE 25 MG/1
TABLET ORAL
Qty: 28 TABLET | Refills: 0 | Status: SHIPPED | OUTPATIENT
Start: 2020-08-27 | End: 2020-09-22

## 2020-08-27 RX ORDER — APIXABAN 5 MG/1
TABLET, FILM COATED ORAL
Qty: 56 TABLET | Refills: 0 | Status: SHIPPED | OUTPATIENT
Start: 2020-08-27 | End: 2020-08-27

## 2020-08-27 NOTE — TELEPHONE ENCOUNTER
ELIQUIS      Last Written Prescription Date:  8-  Last Fill Quantity: 56,   # refills: 0  Last Office Visit: 3-  Future Office visit:       Routing refill request to provider for review/approval because:   FIBERLAX      Last Written Prescription Date:  8-  Last Fill Quantity: 28,   # refills: 0  Last Office Visit: 3-  Future Office visit:       Routing refill request to provider for review/approval because:

## 2020-09-08 DIAGNOSIS — R69 DIAGNOSIS UNKNOWN: ICD-10-CM

## 2020-09-08 RX ORDER — NYSTATIN 100000 [USP'U]/G
POWDER TOPICAL
Qty: 30 G | Refills: 0 | Status: SHIPPED | OUTPATIENT
Start: 2020-09-08 | End: 2021-02-09

## 2020-09-10 NOTE — PROGRESS NOTES
Subjective     Annie Arguello is a 86 year old female who presents to clinic today for the following health issues:    HPI       Diabetes Follow-up    How often are you checking your blood sugar? One time daily  What time of day are you checking your blood sugars (select all that apply)?  fasting am  Have you had any blood sugars above 200?  No  Have you had any blood sugars below 70?  No    What symptoms do you notice when your blood sugar is low?  None    What concerns do you have today about your diabetes? None     Do you have any of these symptoms? (Select all that apply)  No numbness or tingling in feet.  No redness, sores or blisters on feet.  No complaints of excessive thirst.  No reports of blurry vision.  No significant changes to weight.    Have you had a diabetic eye exam in the last 12 months? No          Hyperlipidemia Follow-Up      Are you regularly taking any medication or supplement to lower your cholesterol?   Yes- atorvastatin 40 mg daily    Are you having muscle aches or other side effects that you think could be caused by your cholesterol lowering medication?  no    Hypotension Follow-up      Do you check your blood pressure regularly outside of the clinic? No     Are you following a low salt diet? Yes    Are your blood pressures ever more than 140 on the top number (systolic) OR more   than 90 on the bottom number (diastolic), for example 140/90? No  She is having loose stools also has a runny nose chronically with clear discharge no sinus pressure  BP Readings from Last 2 Encounters:   09/15/20 118/70   03/19/20 (!) 89/48     Hemoglobin A1C (%)   Date Value   04/02/2019 6.8 (H)   10/30/2018 6.3 (H)     LDL Cholesterol Calculated (mg/dL)   Date Value   07/17/2019 53   04/02/2019 38           PAST MEDICAL HISTORY:  Past Medical History:   Diagnosis Date     Atrial fibrillation (H) 09/06/2012     Calculus of kidney 03/14/2003     Controlled type 2 diabetes mellitus without complication, without  long-term current use of insulin (H) 10/24/2017     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 2007     Diverticulosis 2011     Fibromyalgia 05/10/2011     Nonallopathic lesions of cervical region, not else 2001     Obesity 2011     Osteoporosis 2011     Other malaise and fatigue 2002     Renal cyst 2011     Sciatica 2007       PAST SURGICAL HISTORY:  Past Surgical History:   Procedure Laterality Date     Breast biopsy      LT, benign      SECTION       CHOLECYSTECTOMY      lap     COLONOSCOPY       LUCILLE / BSO         MEDICATIONS:  Prior to Admission medications    Medication Sig Start Date End Date Taking? Authorizing Provider   acetaminophen (TYLENOL) 325 MG tablet Take 1 tablet (325 mg) by mouth every 6 hours as needed for mild pain 19  Yes DOC Cox MD   alcohol swab prep pads Use to swab area of injection/abdiel as directed. 3/19/20  Yes DOC Cox MD   Alcohol Swabs (EASY TOUCH ALCOHOL PREP MEDIUM) 70 % PADS  3/18/20  Yes Reported, Patient   alendronate (FOSAMAX) 70 MG tablet TAKE 1 TABLET BY MOUTH WEEKLY AS DIRECTED. 20  Yes DOC Cox MD   apixaban ANTICOAGULANT (ELIQUIS ANTICOAGULANT) 5 MG tablet TAKE (1) TABLET BY MOUTH TWICE A DAY. 20  Yes DOC Cox MD   ASPIRIN LOW DOSE 81 MG chewable tablet CHEW AND SWALLOW 1 TABLET BY MOUTH DAILY 20  Yes Rayo Barajas MD   atorvastatin (LIPITOR) 40 MG tablet TAKE 1 TABLET BY MOUTH ONCE DAILY. 20  Yes Noemi Cox MD   blood glucose (GLUCOCARD VITAL TEST) test strip Use to test blood sugar 1 times daily or as directed. 20  Yes DOC Cox MD   blood glucose (NO BRAND SPECIFIED) lancets standard Use to test blood sugar 1 times daily 20  Yes DOC Cox MD   blood glucose (NO BRAND SPECIFIED) test strip Use to test blood sugar 1 times daily or as directed.    Please dispense Accu-chek 20  Yes DOC Cox MD   Blood Glucose  Monitoring Suppl (GLUCOCARD VITAL MONITOR) w/Device KIT 1 kit daily Test blood Sugars 1 times daily 5/20/20  Yes DOC Cox MD   chlorthalidone (HYGROTON) 25 MG tablet TAKE 1 TABLET BY MOUTH ONCE DAILY. 8/27/20  Yes DOC Cox MD   diphenhydrAMINE HCl (BENADRYL ALLERGY CHILDRENS) 12.5 MG CHEW Take 1 tablet by mouth every 6 hours as needed   Yes Reported, Patient   DULoxetine (CYMBALTA) 20 MG capsule TAKE (1) CAPSULE BY MOUTH TWICE DAILY. 8/27/20  Yes Noemi Cox MD   fluticasone (FLONASE) 50 MCG/ACT nasal spray Spray 1 spray into both nostrils daily 9/15/20  Yes DOC Cox MD   lisinopril (ZESTRIL) 2.5 MG tablet TAKE 1 TABLET BY MOUTH ONCE DAILY. 6/2/20  Yes DOC Cox MD   metoprolol tartrate (LOPRESSOR) 25 MG tablet TAKE (1) TABLET BY MOUTH TWICE A DAY. 8/27/20  Yes DOC Cox MD   Multiple Vitamin (TAB-A-MARIUSZ) TABS TAKE 1 TABLET BY MOUTH ONCE DAILY. 3/2/20  Yes Rayo Barajas MD   nystatin (MYCOSTATIN) 654914 UNIT/GM external powder APPLY TWICE DAILY AS NEEDED 9/8/20  Yes DOC Cox MD   omeprazole (PRILOSEC) 20 MG DR capsule TAKE 1 CAPSULE BY MOUTH DAILY 3/30/20  Yes DOC Cox MD   potassium chloride ER (KLOR-CON M) 10 MEQ CR tablet TAKE (1) TABLET BY MOUTH TWICE A DAY. 7/1/20  Yes DOC Cox MD   SB BISMUTH 262 MG TABS CHEW AND SWALLOW 2 TABLETS EVERY 6 HOURS AS NEEDED FOR DIARRHEA 8/25/20  Yes DOC Cox MD       ALLERGIES:     Allergies   Allergen Reactions     Ampicillin      Desvenlafaxine Other (See Comments)     Desvenlafaxine Succinate  Pristiq       Sertraline Hcl Other (See Comments) and Diarrhea     Zoloft       Sulfa Drugs Other (See Comments)     Sulfa(Sulfonamide Antibiotics)       ROS:  Constitutional, HEENT, cardiovascular, pulmonary, gi and gu systems are negative, except as otherwise noted.      EXAM:  /70 (BP Location: Left arm, Patient Position: Sitting, Cuff Size: Adult Regular)   Pulse 83   Temp 97.4  F (36.3  C) (Tympanic)   Resp  21   SpO2 98%  There is no height or weight on file to calculate BMI.   GENERAL APPEARANCE: alert and no distress  EYES: Eyes grossly normal to inspection, PERRL and conjunctivae and sclerae normal  HENT: ear canals and TM's normal and nose and mouth without ulcers or lesions  NECK: no adenopathy, no asymmetry, masses, or scars and thyroid normal to palpation  RESP: lungs clear to auscultation - no rales, rhonchi or wheezes  CV: normal S1 S2, no S3 or S4, no murmur, click or rub and irregularly irregular rhythm  ABDOMEN: soft, nontender, without hepatosplenomegaly or masses and bowel sounds normal  MS: extremities normal- no gross deformities noted  Lab/ X-ray  No results found for this or any previous visit (from the past 24 hour(s)).    ASSESSMENT/PLAN:    ICD-10-CM    1. Controlled type 2 diabetes mellitus without complication, without long-term current use of insulin (H)  E11.9 Lipid Profile (Chol, Trig, HDL, LDL calc)     Basic metabolic panel     C FOOT EXAM  NO CHARGE     Hemoglobin A1c   2. Chronic rhinitis  J31.0 fluticasone (FLONASE) 50 MCG/ACT nasal spray   3. Loose stools  R19.5    Living at assisted living here with her daughter was fasting so we checked lipid A1c BMP diabetic foot exam done she controls with diet.  Has chronic rhinitis will try Flonase nasal spray.  Has loose stools no blood or mucus this is chronic she is on a laxative so we will discontinue that give it a week and see if that helps if not we can add some Kaopectate.  Here with her daughter questions answered she states she would love to leave the assisted living and go home but that is just not an option with her medical condition now.      ONEAL Cox MD  September 15, 2020

## 2020-09-15 ENCOUNTER — APPOINTMENT (OUTPATIENT)
Dept: LAB | Facility: OTHER | Age: 85
End: 2020-09-15
Attending: FAMILY MEDICINE
Payer: MEDICARE

## 2020-09-15 ENCOUNTER — OFFICE VISIT (OUTPATIENT)
Dept: FAMILY MEDICINE | Facility: OTHER | Age: 85
End: 2020-09-15
Attending: FAMILY MEDICINE
Payer: MEDICARE

## 2020-09-15 VITALS
TEMPERATURE: 97.4 F | HEART RATE: 83 BPM | DIASTOLIC BLOOD PRESSURE: 70 MMHG | RESPIRATION RATE: 21 BRPM | OXYGEN SATURATION: 98 % | SYSTOLIC BLOOD PRESSURE: 118 MMHG

## 2020-09-15 DIAGNOSIS — R19.5 LOOSE STOOLS: ICD-10-CM

## 2020-09-15 DIAGNOSIS — E11.9 CONTROLLED TYPE 2 DIABETES MELLITUS WITHOUT COMPLICATION, WITHOUT LONG-TERM CURRENT USE OF INSULIN (H): Primary | ICD-10-CM

## 2020-09-15 DIAGNOSIS — J31.0 CHRONIC RHINITIS: ICD-10-CM

## 2020-09-15 LAB
ANION GAP SERPL CALCULATED.3IONS-SCNC: 7 MMOL/L (ref 3–14)
BUN SERPL-MCNC: 27 MG/DL (ref 7–30)
CALCIUM SERPL-MCNC: 9.4 MG/DL (ref 8.5–10.1)
CHLORIDE SERPL-SCNC: 102 MMOL/L (ref 94–109)
CHOLEST SERPL-MCNC: 126 MG/DL
CO2 SERPL-SCNC: 26 MMOL/L (ref 20–32)
CREAT SERPL-MCNC: 0.92 MG/DL (ref 0.52–1.04)
EST. AVERAGE GLUCOSE BLD GHB EST-MCNC: 183 MG/DL
GFR SERPL CREATININE-BSD FRML MDRD: 56 ML/MIN/{1.73_M2}
GLUCOSE SERPL-MCNC: 170 MG/DL (ref 70–99)
HBA1C MFR BLD: 8 % (ref 0–5.6)
HDLC SERPL-MCNC: 55 MG/DL
LDLC SERPL CALC-MCNC: 52 MG/DL
NONHDLC SERPL-MCNC: 71 MG/DL
POTASSIUM SERPL-SCNC: 3.8 MMOL/L (ref 3.4–5.3)
SODIUM SERPL-SCNC: 135 MMOL/L (ref 133–144)
TRIGL SERPL-MCNC: 93 MG/DL

## 2020-09-15 PROCEDURE — 80061 LIPID PANEL: CPT | Mod: ZL | Performed by: FAMILY MEDICINE

## 2020-09-15 PROCEDURE — 40000788 ZZHCL STATISTIC ESTIMATED AVERAGE GLUCOSE: Mod: ZL | Performed by: FAMILY MEDICINE

## 2020-09-15 PROCEDURE — 80048 BASIC METABOLIC PNL TOTAL CA: CPT | Mod: ZL | Performed by: FAMILY MEDICINE

## 2020-09-15 PROCEDURE — 99214 OFFICE O/P EST MOD 30 MIN: CPT | Performed by: FAMILY MEDICINE

## 2020-09-15 PROCEDURE — 36415 COLL VENOUS BLD VENIPUNCTURE: CPT | Mod: ZL | Performed by: FAMILY MEDICINE

## 2020-09-15 PROCEDURE — 83036 HEMOGLOBIN GLYCOSYLATED A1C: CPT | Mod: ZL | Performed by: FAMILY MEDICINE

## 2020-09-15 RX ORDER — DIPHENHYDRAMINE HCL 12.5 MG
1 TABLET,CHEWABLE ORAL EVERY 6 HOURS PRN
COMMUNITY
End: 2021-02-09

## 2020-09-15 RX ORDER — FLUTICASONE PROPIONATE 50 MCG
1 SPRAY, SUSPENSION (ML) NASAL DAILY
Qty: 16 G | Refills: 3 | Status: SHIPPED | OUTPATIENT
Start: 2020-09-15 | End: 2022-11-10

## 2020-09-15 ASSESSMENT — PAIN SCALES - GENERAL: PAINLEVEL: NO PAIN (0)

## 2020-09-15 ASSESSMENT — PATIENT HEALTH QUESTIONNAIRE - PHQ9: SUM OF ALL RESPONSES TO PHQ QUESTIONS 1-9: 13

## 2020-09-15 NOTE — LETTER
"My Heart Failure Action Plan   Name: Annie Arguello    YOB: 1933   Date: 9/10/2020    [facility]     [address] [place] [id number]  [phone]  My Diagnosis: {HF STAGES:874821}   My Exercise Goal: 30 minutes daily  .     My Weight Plan:   Wt Readings from Last 2 Encounters:   02/18/20 69.9 kg (154 lb)   02/13/20 68 kg (150 lb)     Weigh yourself daily using the same scale. If you gain more than 2 pounds in 24 hours or 5 pounds in a week {HF WEIGHT GOAL:954940}    My Diet Goal: { :204799::\"No added salt\"}    Emergency Room Visits:    Our goal is to improve your quality of life and help you avoid a visit to the emergency room or hospital.  If we work together, we can achieve this goal. But, if you feel you need to call 911 or go to the emergency room, please do so.  If you go to the emergency room, please bring your list of medicines and your daily weight chart with you.       GREEN ZONE     Doing well today    Weight gained is no more than 2 pounds a day or 5 pounds a week.    No swelling in feet, ankles, legs or stomach.    No more swelling than usual.    No more trouble breathing than usual.    No change in my sleep.    No other problems. Actions:    I am doing fine.  I will take my medicine, follow my diet, see my doctor, exercise, and watch for symptoms.           YELLOW ZONE         Having a bad day or flare up    Weight gain of more than 2 pounds in one day or 5 pounds in one week.    New swelling in ankle, leg, knee or thigh.    Bloating in belly, pants feel tighter.    Swelling in hands or face.    Coughing or trouble breathing while walking or talking.    Harder to breathe last night.    Have trouble sleeping, wake up short of breath.    Much more tired than usual.    Not eating.    Pain in my chest or bad leg cramps.    Feel weak or dizzy. Actions:    I need to take action and call my doctor or nurse today.                 RED " ZONE         Need medical care now    Weight gain of 5 pounds overnight.    Chest pain or pressure that does not go away.    Feel less alert.    Wheezing or have trouble breathing when at rest.    Cannot sleep lying down.    Cannot take my water pill.    Pass out or faint. Actions:    I need to call my doctor or nurse now!    Call 911 if I have chest pain or cannot breathe.

## 2020-09-15 NOTE — NURSING NOTE
"Chief Complaint   Patient presents with     Diabetes     Lipids     Hypotension       Initial /70 (BP Location: Left arm, Patient Position: Sitting, Cuff Size: Adult Regular)   Pulse 83   Temp 97.4  F (36.3  C) (Tympanic)   Resp 21   SpO2 98%  Estimated body mass index is 28.17 kg/m  as calculated from the following:    Height as of 3/19/20: 1.575 m (5' 2\").    Weight as of 2/18/20: 69.9 kg (154 lb).  Medication Reconciliation: complete  Summer Lagunas LPN  "

## 2020-09-21 ENCOUNTER — TELEPHONE (OUTPATIENT)
Dept: FAMILY MEDICINE | Facility: OTHER | Age: 85
End: 2020-09-21

## 2020-09-21 DIAGNOSIS — R19.5 LOOSE STOOLS: Primary | ICD-10-CM

## 2020-09-21 DIAGNOSIS — I50.9 CONGESTIVE HEART FAILURE, UNSPECIFIED HF CHRONICITY, UNSPECIFIED HEART FAILURE TYPE (H): ICD-10-CM

## 2020-09-21 DIAGNOSIS — M19.90 OSTEOARTHRITIS, UNSPECIFIED OSTEOARTHRITIS TYPE, UNSPECIFIED SITE: ICD-10-CM

## 2020-09-21 DIAGNOSIS — I10 ESSENTIAL HYPERTENSION: ICD-10-CM

## 2020-09-21 DIAGNOSIS — I48.21 PERMANENT ATRIAL FIBRILLATION (H): ICD-10-CM

## 2020-09-21 NOTE — TELEPHONE ENCOUNTER
Meghann nurse from [facility] calling and would like patient's lab results from 09/15 faxed to her.  Please fax to [phone] ATTN: Meghann.  Thank you.

## 2020-09-22 RX ORDER — APIXABAN 5 MG/1
TABLET, FILM COATED ORAL
Qty: 56 TABLET | Refills: 0 | Status: SHIPPED | OUTPATIENT
Start: 2020-09-22 | End: 2020-10-22

## 2020-09-22 RX ORDER — CALCIUM POLYCARBOPHIL 625 MG/1
TABLET, FILM COATED ORAL
Qty: 28 TABLET | Refills: 0 | Status: SHIPPED | OUTPATIENT
Start: 2020-09-22 | End: 2020-10-22

## 2020-09-22 RX ORDER — CHLORTHALIDONE 25 MG/1
TABLET ORAL
Qty: 28 TABLET | Refills: 0 | Status: SHIPPED | OUTPATIENT
Start: 2020-09-22 | End: 2020-10-22

## 2020-09-22 RX ORDER — ALENDRONATE SODIUM 70 MG/1
TABLET ORAL
Qty: 4 TABLET | Refills: 0 | Status: SHIPPED | OUTPATIENT
Start: 2020-09-22 | End: 2020-10-22

## 2020-09-22 RX ORDER — METOPROLOL TARTRATE 25 MG/1
TABLET, FILM COATED ORAL
Qty: 56 TABLET | Refills: 0 | Status: SHIPPED | OUTPATIENT
Start: 2020-09-22 | End: 2020-10-22

## 2020-09-30 ENCOUNTER — TRANSFERRED RECORDS (OUTPATIENT)
Dept: HEALTH INFORMATION MANAGEMENT | Facility: CLINIC | Age: 85
End: 2020-09-30

## 2020-09-30 LAB — RETINOPATHY: NEGATIVE

## 2020-10-02 ENCOUNTER — TELEPHONE (OUTPATIENT)
Dept: FAMILY MEDICINE | Facility: OTHER | Age: 85
End: 2020-10-02

## 2020-10-19 DIAGNOSIS — M19.90 OSTEOARTHRITIS, UNSPECIFIED OSTEOARTHRITIS TYPE, UNSPECIFIED SITE: ICD-10-CM

## 2020-10-19 DIAGNOSIS — R19.5 LOOSE STOOLS: ICD-10-CM

## 2020-10-19 DIAGNOSIS — I10 ESSENTIAL HYPERTENSION: ICD-10-CM

## 2020-10-19 DIAGNOSIS — I48.21 PERMANENT ATRIAL FIBRILLATION (H): ICD-10-CM

## 2020-10-19 DIAGNOSIS — E78.2 MIXED HYPERLIPIDEMIA: ICD-10-CM

## 2020-10-19 DIAGNOSIS — E87.6 HYPOKALEMIA: ICD-10-CM

## 2020-10-19 DIAGNOSIS — I50.9 CONGESTIVE HEART FAILURE, UNSPECIFIED HF CHRONICITY, UNSPECIFIED HEART FAILURE TYPE (H): ICD-10-CM

## 2020-10-19 DIAGNOSIS — R69 DIAGNOSIS UNKNOWN: ICD-10-CM

## 2020-10-22 RX ORDER — CALCIUM POLYCARBOPHIL 625 MG/1
TABLET, FILM COATED ORAL
Qty: 90 TABLET | Refills: 1 | Status: SHIPPED | OUTPATIENT
Start: 2020-10-22 | End: 2021-02-09

## 2020-10-22 RX ORDER — ALENDRONATE SODIUM 70 MG/1
TABLET ORAL
Qty: 4 TABLET | Refills: 1 | Status: SHIPPED | OUTPATIENT
Start: 2020-10-22 | End: 2021-01-11

## 2020-10-22 RX ORDER — METOPROLOL TARTRATE 25 MG/1
TABLET, FILM COATED ORAL
Qty: 180 TABLET | Refills: 1 | Status: SHIPPED | OUTPATIENT
Start: 2020-10-22 | End: 2021-04-05

## 2020-10-22 RX ORDER — ATORVASTATIN CALCIUM 40 MG/1
TABLET, FILM COATED ORAL
Qty: 28 TABLET | Refills: 2 | Status: SHIPPED | OUTPATIENT
Start: 2020-10-22 | End: 2021-06-29

## 2020-10-22 RX ORDER — POTASSIUM CHLORIDE 750 MG/1
TABLET, EXTENDED RELEASE ORAL
Qty: 180 TABLET | Refills: 1 | Status: SHIPPED | OUTPATIENT
Start: 2020-10-22 | End: 2021-04-05

## 2020-10-22 RX ORDER — CHLORTHALIDONE 25 MG/1
TABLET ORAL
Qty: 90 TABLET | Refills: 1 | Status: SHIPPED | OUTPATIENT
Start: 2020-10-22 | End: 2021-04-05

## 2020-10-22 RX ORDER — DULOXETIN HYDROCHLORIDE 20 MG/1
CAPSULE, DELAYED RELEASE ORAL
Qty: 56 CAPSULE | Refills: 2 | Status: SHIPPED | OUTPATIENT
Start: 2020-10-22 | End: 2021-01-12

## 2020-10-22 RX ORDER — APIXABAN 5 MG/1
TABLET, FILM COATED ORAL
Qty: 180 TABLET | Refills: 1 | Status: SHIPPED | OUTPATIENT
Start: 2020-10-22 | End: 2021-04-05

## 2020-10-22 NOTE — TELEPHONE ENCOUNTER
Fosamax      Last Written Prescription Date:  09/22/20  Last Fill Quantity: 4,   # refills: 0  Last Office Visit: 09/15/20  Future Office visit:     Last Dexa 06/19/13    Chlorthalidone      Last Written Prescription Date:  09/22/20  Last Fill Quantity: 28,   # refills: 0  Last Office Visit: 09/15/20  Future Office visit:     Last /70  Last Potassium 09/15/20, normal    Eliquis      Last Written Prescription Date:  09/22/20  Last Fill Quantity: 56,   # refills: 0  Last Office Visit: 09/15/20  Future Office visit:       Fiber laxative      Last Written Prescription Date:  09/22/20  Last Fill Quantity: 28,   # refills: 0  Last Office Visit: 09/15/20  Future Office visit:       Lopressor      Last Written Prescription Date:  09/22/20  Last Fill Quantity: 56,   # refills: 0  Last Office Visit: 09/15/20  Future Office visit:       Last /70  Potassium      Last Written Prescription Date:  07/01/20  Last Fill Quantity: 56,   # refills: 3  Last Office Visit: 09/15/20  Future Office visit:       Last Potassium 09/15/20, normal

## 2020-11-17 DIAGNOSIS — I50.9 CONGESTIVE HEART FAILURE, UNSPECIFIED HF CHRONICITY, UNSPECIFIED HEART FAILURE TYPE (H): ICD-10-CM

## 2020-11-17 RX ORDER — LISINOPRIL 2.5 MG/1
TABLET ORAL
Qty: 28 TABLET | Refills: 0 | Status: SHIPPED | OUTPATIENT
Start: 2020-11-17 | End: 2020-12-15

## 2020-11-17 NOTE — TELEPHONE ENCOUNTER
ZESTRIL      Last Written Prescription Date:  6-2-2020  Last Fill Quantity: 28,   # refills: 5  Last Office Visit: 9-  Future Office visit:

## 2020-12-09 ENCOUNTER — TELEPHONE (OUTPATIENT)
Dept: FAMILY MEDICINE | Facility: OTHER | Age: 85
End: 2020-12-09

## 2020-12-09 DIAGNOSIS — R19.5 LOOSE STOOLS: ICD-10-CM

## 2020-12-09 NOTE — TELEPHONE ENCOUNTER
Family states fiberlax was discontinued in September, medication still on med list, please confirm with pharmacy and family either way.

## 2020-12-10 ENCOUNTER — TELEPHONE (OUTPATIENT)
Dept: FAMILY MEDICINE | Facility: OTHER | Age: 85
End: 2020-12-10

## 2020-12-14 DIAGNOSIS — I50.9 CONGESTIVE HEART FAILURE, UNSPECIFIED HF CHRONICITY, UNSPECIFIED HEART FAILURE TYPE (H): ICD-10-CM

## 2020-12-15 RX ORDER — LISINOPRIL 2.5 MG/1
TABLET ORAL
Qty: 29 TABLET | Refills: 0 | Status: SHIPPED | OUTPATIENT
Start: 2020-12-15 | End: 2021-01-11

## 2020-12-15 NOTE — TELEPHONE ENCOUNTER
Lisinopril 2.5 mg      Last Written Prescription Date:  11/17/20  Last Fill Quantity: 28,   # refills: 0  Last Office Visit: 9/15/20  Future Office visit:       Routing refill request to provider for review/approval because:

## 2021-01-05 DIAGNOSIS — E11.9 TYPE 2 DIABETES MELLITUS WITHOUT COMPLICATION, WITHOUT LONG-TERM CURRENT USE OF INSULIN (H): ICD-10-CM

## 2021-01-05 RX ORDER — BLOOD-GLUCOSE METER
KIT MISCELLANEOUS
Qty: 100 STRIP | Refills: 0 | Status: SHIPPED | OUTPATIENT
Start: 2021-01-05 | End: 2021-04-26

## 2021-01-05 NOTE — TELEPHONE ENCOUNTER
blood glucose (GLUCOCARD VITAL TEST) test strip      Last Written Prescription Date:  5/20/20  Last Fill Quantity: 100,   # refills: 3  Last Office Visit: 9/15/20  Future Office visit:       Routing refill request to provider for review/approval

## 2021-01-08 DIAGNOSIS — H04.123 DRY EYES: Primary | ICD-10-CM

## 2021-01-08 RX ORDER — POLYETHYLENE GLYCOL 400 AND PROPYLENE GLYCOL 4; 3 MG/ML; MG/ML
1 SOLUTION/ DROPS OPHTHALMIC 4 TIMES DAILY PRN
Qty: 1 BOTTLE | Refills: 3 | Status: SHIPPED | OUTPATIENT
Start: 2021-01-08 | End: 2023-01-01

## 2021-01-11 DIAGNOSIS — I50.9 CONGESTIVE HEART FAILURE, UNSPECIFIED HF CHRONICITY, UNSPECIFIED HEART FAILURE TYPE (H): ICD-10-CM

## 2021-01-11 DIAGNOSIS — E11.9 CONTROLLED TYPE 2 DIABETES MELLITUS WITHOUT COMPLICATION, WITHOUT LONG-TERM CURRENT USE OF INSULIN (H): ICD-10-CM

## 2021-01-11 DIAGNOSIS — M19.90 OSTEOARTHRITIS, UNSPECIFIED OSTEOARTHRITIS TYPE, UNSPECIFIED SITE: ICD-10-CM

## 2021-01-11 DIAGNOSIS — R69 DIAGNOSIS UNKNOWN: ICD-10-CM

## 2021-01-11 RX ORDER — ALENDRONATE SODIUM 70 MG/1
TABLET ORAL
Qty: 4 TABLET | Refills: 0 | Status: SHIPPED | OUTPATIENT
Start: 2021-01-11 | End: 2021-02-09

## 2021-01-11 RX ORDER — LISINOPRIL 2.5 MG/1
TABLET ORAL
Qty: 29 TABLET | Refills: 0 | Status: SHIPPED | OUTPATIENT
Start: 2021-01-11 | End: 2021-02-10

## 2021-01-11 NOTE — TELEPHONE ENCOUNTER
lisinopril (ZESTRIL) 2.5 MG tablet     Last Written Prescription Date:  12/15/20  Last Fill Quantity: 29,   # refills: 0  Last Office Visit: 9/15/20  Future Office visit:       alendronate (FOSAMAX) 70 MG tablet      Last Written Prescription Date:  10/22/20  Last Fill Quantity: 4,   # refills: 1  Last Office Visit: 9/15/20  Future Office visit:       Routing refill request to provider for review/approval

## 2021-01-12 RX ORDER — ASPIRIN 81 MG
TABLET,CHEWABLE ORAL
Qty: 29 TABLET | Refills: 0 | Status: SHIPPED | OUTPATIENT
Start: 2021-01-12 | End: 2021-02-10

## 2021-01-12 RX ORDER — DULOXETIN HYDROCHLORIDE 20 MG/1
CAPSULE, DELAYED RELEASE ORAL
Qty: 58 CAPSULE | Refills: 0 | Status: SHIPPED | OUTPATIENT
Start: 2021-01-12 | End: 2021-02-08

## 2021-01-12 RX ORDER — MULTIVITAMIN WITH FOLIC ACID 400 MCG
TABLET ORAL
Qty: 29 TABLET | Refills: 0 | Status: SHIPPED | OUTPATIENT
Start: 2021-01-12 | End: 2021-02-10

## 2021-01-12 NOTE — TELEPHONE ENCOUNTER
Multiple Vitamins-Minerals      Last Written Prescription Date:  3/2/2020  Last Fill Quantity: 28,   # refills: 11  Last Office Visit: 9/15/2020  Future Office visit:       Routing refill request to provider for review/approval because:    Aspirin LOW DOSE 81 MG      Last Written Prescription Date:  7/29/2020  Last Fill Quantity: 28,   # refills: 4  Last Office Visit: 9/15/2020  Future Office visit:       Routing refill request to provider for review/approval because:

## 2021-02-08 DIAGNOSIS — I50.9 CONGESTIVE HEART FAILURE, UNSPECIFIED HF CHRONICITY, UNSPECIFIED HEART FAILURE TYPE (H): ICD-10-CM

## 2021-02-08 DIAGNOSIS — E11.9 CONTROLLED TYPE 2 DIABETES MELLITUS WITHOUT COMPLICATION, WITHOUT LONG-TERM CURRENT USE OF INSULIN (H): ICD-10-CM

## 2021-02-08 DIAGNOSIS — R69 DIAGNOSIS UNKNOWN: ICD-10-CM

## 2021-02-08 DIAGNOSIS — M19.90 OSTEOARTHRITIS, UNSPECIFIED OSTEOARTHRITIS TYPE, UNSPECIFIED SITE: ICD-10-CM

## 2021-02-08 RX ORDER — DULOXETIN HYDROCHLORIDE 20 MG/1
CAPSULE, DELAYED RELEASE ORAL
Qty: 58 CAPSULE | Refills: 0 | Status: SHIPPED | OUTPATIENT
Start: 2021-02-08 | End: 2021-03-09

## 2021-02-08 NOTE — TELEPHONE ENCOUNTER
Cymbalta 20mg      Last Written Prescription Date:  1/12/21  Last Fill Quantity: 58,   # refills: 0  Last Office Visit: 9/15/20  Future Office visit:       Routing refill request to provider for review/approval because:

## 2021-02-09 ENCOUNTER — HOSPITAL ENCOUNTER (EMERGENCY)
Facility: HOSPITAL | Age: 86
Discharge: HOME | End: 2021-02-09
Attending: EMERGENCY MEDICINE | Admitting: EMERGENCY MEDICINE
Payer: MEDICARE

## 2021-02-09 ENCOUNTER — APPOINTMENT (OUTPATIENT)
Dept: CT IMAGING | Facility: HOSPITAL | Age: 86
End: 2021-02-09
Attending: EMERGENCY MEDICINE
Payer: MEDICARE

## 2021-02-09 ENCOUNTER — APPOINTMENT (OUTPATIENT)
Dept: GENERAL RADIOLOGY | Facility: HOSPITAL | Age: 86
End: 2021-02-09
Attending: EMERGENCY MEDICINE
Payer: MEDICARE

## 2021-02-09 ENCOUNTER — HOSPITAL ENCOUNTER (EMERGENCY)
Dept: ULTRASOUND IMAGING | Facility: HOSPITAL | Age: 86
End: 2021-02-09
Attending: EMERGENCY MEDICINE
Payer: MEDICARE

## 2021-02-09 VITALS
HEIGHT: 62 IN | DIASTOLIC BLOOD PRESSURE: 89 MMHG | TEMPERATURE: 98.9 F | WEIGHT: 175.49 LBS | HEART RATE: 69 BPM | OXYGEN SATURATION: 98 % | SYSTOLIC BLOOD PRESSURE: 112 MMHG | RESPIRATION RATE: 18 BRPM | BODY MASS INDEX: 32.29 KG/M2

## 2021-02-09 DIAGNOSIS — R06.09 DYSPNEA ON EXERTION: ICD-10-CM

## 2021-02-09 DIAGNOSIS — S42.032A TRAUMATIC CLOSED FRACTURE OF DISTAL CLAVICLE WITH MINIMAL DISPLACEMENT, LEFT, INITIAL ENCOUNTER: ICD-10-CM

## 2021-02-09 DIAGNOSIS — I48.11 LONGSTANDING PERSISTENT ATRIAL FIBRILLATION (H): ICD-10-CM

## 2021-02-09 DIAGNOSIS — R00.0 TACHYCARDIA: ICD-10-CM

## 2021-02-09 LAB
ALBUMIN UR-MCNC: NEGATIVE MG/DL
ANION GAP SERPL CALCULATED.3IONS-SCNC: 4 MMOL/L (ref 3–14)
APPEARANCE UR: CLEAR
BASOPHILS # BLD AUTO: 0.1 10E9/L (ref 0–0.2)
BASOPHILS NFR BLD AUTO: 0.5 %
BILIRUB UR QL STRIP: NEGATIVE
BUN SERPL-MCNC: 25 MG/DL (ref 7–30)
CALCIUM SERPL-MCNC: 9 MG/DL (ref 8.5–10.1)
CHLORIDE SERPL-SCNC: 104 MMOL/L (ref 94–109)
CO2 SERPL-SCNC: 28 MMOL/L (ref 20–32)
COLOR UR AUTO: YELLOW
CREAT SERPL-MCNC: 0.88 MG/DL (ref 0.52–1.04)
DIFFERENTIAL METHOD BLD: ABNORMAL
EOSINOPHIL # BLD AUTO: 0.3 10E9/L (ref 0–0.7)
EOSINOPHIL NFR BLD AUTO: 2.2 %
ERYTHROCYTE [DISTWIDTH] IN BLOOD BY AUTOMATED COUNT: 13.7 % (ref 10–15)
GFR SERPL CREATININE-BSD FRML MDRD: 59 ML/MIN/{1.73_M2}
GLUCOSE BLDC GLUCOMTR-MCNC: 185 MG/DL (ref 70–99)
GLUCOSE SERPL-MCNC: 220 MG/DL (ref 70–99)
GLUCOSE UR STRIP-MCNC: NEGATIVE MG/DL
HCT VFR BLD AUTO: 41 % (ref 35–47)
HGB BLD-MCNC: 13.4 G/DL (ref 11.7–15.7)
HGB UR QL STRIP: NEGATIVE
IMM GRANULOCYTES # BLD: 0.1 10E9/L (ref 0–0.4)
IMM GRANULOCYTES NFR BLD: 0.5 %
KETONES UR STRIP-MCNC: NEGATIVE MG/DL
LABORATORY COMMENT REPORT: NORMAL
LEUKOCYTE ESTERASE UR QL STRIP: NEGATIVE
LYMPHOCYTES # BLD AUTO: 2.5 10E9/L (ref 0.8–5.3)
LYMPHOCYTES NFR BLD AUTO: 21.4 %
MAGNESIUM SERPL-MCNC: 1.7 MG/DL (ref 1.6–2.3)
MCH RBC QN AUTO: 28.9 PG (ref 26.5–33)
MCHC RBC AUTO-ENTMCNC: 32.7 G/DL (ref 31.5–36.5)
MCV RBC AUTO: 88 FL (ref 78–100)
MONOCYTES # BLD AUTO: 0.8 10E9/L (ref 0–1.3)
MONOCYTES NFR BLD AUTO: 6.4 %
NEUTROPHILS # BLD AUTO: 8.2 10E9/L (ref 1.6–8.3)
NEUTROPHILS NFR BLD AUTO: 69 %
NITRATE UR QL: NEGATIVE
NRBC # BLD AUTO: 0 10*3/UL
NRBC BLD AUTO-RTO: 0 /100
PH UR STRIP: 5 PH (ref 4.7–8)
PLATELET # BLD AUTO: 195 10E9/L (ref 150–450)
POTASSIUM SERPL-SCNC: 4.4 MMOL/L (ref 3.4–5.3)
PROCALCITONIN SERPL-MCNC: <0.05 NG/ML
RBC # BLD AUTO: 4.64 10E12/L (ref 3.8–5.2)
SARS-COV-2 RNA RESP QL NAA+PROBE: NEGATIVE
SODIUM SERPL-SCNC: 136 MMOL/L (ref 133–144)
SOURCE: NORMAL
SP GR UR STRIP: 1.02 (ref 1–1.03)
SPECIMEN SOURCE: NORMAL
TROPONIN I SERPL-MCNC: <0.015 UG/L (ref 0–0.04)
TROPONIN I SERPL-MCNC: <0.015 UG/L (ref 0–0.04)
UROBILINOGEN UR STRIP-MCNC: NORMAL MG/DL (ref 0–2)
WBC # BLD AUTO: 11.8 10E9/L (ref 4–11)

## 2021-02-09 PROCEDURE — 250N000009 HC RX 250: Performed by: EMERGENCY MEDICINE

## 2021-02-09 PROCEDURE — C9803 HOPD COVID-19 SPEC COLLECT: HCPCS

## 2021-02-09 PROCEDURE — 250N000009 HC RX 250

## 2021-02-09 PROCEDURE — 36415 COLL VENOUS BLD VENIPUNCTURE: CPT | Performed by: EMERGENCY MEDICINE

## 2021-02-09 PROCEDURE — 96365 THER/PROPH/DIAG IV INF INIT: CPT | Mod: XU

## 2021-02-09 PROCEDURE — 258N000003 HC RX IP 258 OP 636: Performed by: EMERGENCY MEDICINE

## 2021-02-09 PROCEDURE — 84145 PROCALCITONIN (PCT): CPT | Performed by: EMERGENCY MEDICINE

## 2021-02-09 PROCEDURE — U0003 INFECTIOUS AGENT DETECTION BY NUCLEIC ACID (DNA OR RNA); SEVERE ACUTE RESPIRATORY SYNDROME CORONAVIRUS 2 (SARS-COV-2) (CORONAVIRUS DISEASE [COVID-19]), AMPLIFIED PROBE TECHNIQUE, MAKING USE OF HIGH THROUGHPUT TECHNOLOGIES AS DESCRIBED BY CMS-2020-01-R: HCPCS | Performed by: EMERGENCY MEDICINE

## 2021-02-09 PROCEDURE — 70450 CT HEAD/BRAIN W/O DYE: CPT | Mod: MG

## 2021-02-09 PROCEDURE — 72125 CT NECK SPINE W/O DYE: CPT | Mod: MG

## 2021-02-09 PROCEDURE — 250N000013 HC RX MED GY IP 250 OP 250 PS 637: Performed by: EMERGENCY MEDICINE

## 2021-02-09 PROCEDURE — 250N000013 HC RX MED GY IP 250 OP 250 PS 637: Mod: GY

## 2021-02-09 PROCEDURE — 93308 TTE F-UP OR LMTD: CPT | Mod: TC | Performed by: EMERGENCY MEDICINE

## 2021-02-09 PROCEDURE — 73030 X-RAY EXAM OF SHOULDER: CPT | Mod: LT

## 2021-02-09 PROCEDURE — 71045 X-RAY EXAM CHEST 1 VIEW: CPT

## 2021-02-09 PROCEDURE — 96375 TX/PRO/DX INJ NEW DRUG ADDON: CPT | Mod: XU

## 2021-02-09 PROCEDURE — 250N000011 HC RX IP 250 OP 636: Performed by: EMERGENCY MEDICINE

## 2021-02-09 PROCEDURE — U0005 INFEC AGEN DETEC AMPLI PROBE: HCPCS | Performed by: EMERGENCY MEDICINE

## 2021-02-09 PROCEDURE — 96376 TX/PRO/DX INJ SAME DRUG ADON: CPT | Mod: XU

## 2021-02-09 PROCEDURE — 85025 COMPLETE CBC W/AUTO DIFF WBC: CPT | Performed by: EMERGENCY MEDICINE

## 2021-02-09 PROCEDURE — 99285 EMERGENCY DEPT VISIT HI MDM: CPT | Mod: 25

## 2021-02-09 PROCEDURE — G0390 TRAUMA RESPONS W/HOSP CRITI: HCPCS

## 2021-02-09 PROCEDURE — 683N000002 HC PARTIAL TRAUMA W/O CC LEVEL III

## 2021-02-09 PROCEDURE — 93005 ELECTROCARDIOGRAM TRACING: CPT | Mod: 76

## 2021-02-09 PROCEDURE — 81003 URINALYSIS AUTO W/O SCOPE: CPT | Performed by: EMERGENCY MEDICINE

## 2021-02-09 PROCEDURE — 99291 CRITICAL CARE FIRST HOUR: CPT | Mod: 25 | Performed by: EMERGENCY MEDICINE

## 2021-02-09 PROCEDURE — 96361 HYDRATE IV INFUSION ADD-ON: CPT

## 2021-02-09 PROCEDURE — 99292 CRITICAL CARE ADDL 30 MIN: CPT | Mod: 25 | Performed by: EMERGENCY MEDICINE

## 2021-02-09 PROCEDURE — 999N001017 HC STATISTIC GLUCOSE BY METER IP

## 2021-02-09 PROCEDURE — 93010 ELECTROCARDIOGRAM REPORT: CPT | Mod: 76 | Performed by: INTERNAL MEDICINE

## 2021-02-09 PROCEDURE — 83735 ASSAY OF MAGNESIUM: CPT | Performed by: EMERGENCY MEDICINE

## 2021-02-09 PROCEDURE — 84484 ASSAY OF TROPONIN QUANT: CPT | Performed by: EMERGENCY MEDICINE

## 2021-02-09 PROCEDURE — 93308 TTE F-UP OR LMTD: CPT | Mod: 26 | Performed by: EMERGENCY MEDICINE

## 2021-02-09 PROCEDURE — 80048 BASIC METABOLIC PNL TOTAL CA: CPT | Performed by: EMERGENCY MEDICINE

## 2021-02-09 RX ORDER — METOPROLOL TARTRATE 1 MG/ML
INJECTION, SOLUTION INTRAVENOUS
Status: COMPLETED
Start: 2021-02-09 | End: 2021-02-09

## 2021-02-09 RX ORDER — METOPROLOL TARTRATE 1 MG/ML
20 INJECTION, SOLUTION INTRAVENOUS
Status: COMPLETED | OUTPATIENT
Start: 2021-02-09 | End: 2021-02-09

## 2021-02-09 RX ORDER — METOPROLOL TARTRATE 1 MG/ML
10 INJECTION, SOLUTION INTRAVENOUS ONCE
Status: DISCONTINUED | OUTPATIENT
Start: 2021-02-09 | End: 2021-02-09

## 2021-02-09 RX ORDER — ACETAMINOPHEN 325 MG/1
975 TABLET ORAL ONCE
Status: COMPLETED | OUTPATIENT
Start: 2021-02-09 | End: 2021-02-09

## 2021-02-09 RX ORDER — DILTIAZEM HYDROCHLORIDE 5 MG/ML
20 INJECTION INTRAVENOUS ONCE
Status: COMPLETED | OUTPATIENT
Start: 2021-02-09 | End: 2021-02-09

## 2021-02-09 RX ORDER — METOPROLOL TARTRATE 1 MG/ML
20 INJECTION, SOLUTION INTRAVENOUS ONCE
Status: COMPLETED | OUTPATIENT
Start: 2021-02-09 | End: 2021-02-09

## 2021-02-09 RX ORDER — METOPROLOL TARTRATE 1 MG/ML
10 INJECTION, SOLUTION INTRAVENOUS ONCE
Status: DISCONTINUED | OUTPATIENT
Start: 2021-02-09 | End: 2021-02-09 | Stop reason: HOSPADM

## 2021-02-09 RX ORDER — METOPROLOL TARTRATE 25 MG/1
25 TABLET, FILM COATED ORAL ONCE
Status: COMPLETED | OUTPATIENT
Start: 2021-02-09 | End: 2021-02-09

## 2021-02-09 RX ORDER — METOPROLOL TARTRATE 25 MG/1
25 TABLET, FILM COATED ORAL ONCE
Status: DISCONTINUED | OUTPATIENT
Start: 2021-02-09 | End: 2021-02-09 | Stop reason: HOSPADM

## 2021-02-09 RX ORDER — METOPROLOL TARTRATE 25 MG/1
TABLET, FILM COATED ORAL
Status: COMPLETED
Start: 2021-02-09 | End: 2021-02-09

## 2021-02-09 RX ORDER — MAGNESIUM SULFATE HEPTAHYDRATE 40 MG/ML
2 INJECTION, SOLUTION INTRAVENOUS ONCE
Status: COMPLETED | OUTPATIENT
Start: 2021-02-09 | End: 2021-02-09

## 2021-02-09 RX ORDER — METOPROLOL TARTRATE 1 MG/ML
10 INJECTION, SOLUTION INTRAVENOUS ONCE
Status: COMPLETED | OUTPATIENT
Start: 2021-02-09 | End: 2021-02-09

## 2021-02-09 RX ADMIN — METOPROLOL TARTRATE 25 MG: 25 TABLET, FILM COATED ORAL at 09:14

## 2021-02-09 RX ADMIN — SODIUM CHLORIDE 1000 ML: 9 INJECTION, SOLUTION INTRAVENOUS at 14:33

## 2021-02-09 RX ADMIN — DILTIAZEM HYDROCHLORIDE 20 MG: 5 INJECTION INTRAVENOUS at 17:43

## 2021-02-09 RX ADMIN — SODIUM CHLORIDE 500 ML: 9 INJECTION, SOLUTION INTRAVENOUS at 10:02

## 2021-02-09 RX ADMIN — METOPROLOL TARTRATE 20 MG: 5 INJECTION INTRAVENOUS at 14:47

## 2021-02-09 RX ADMIN — METOPROLOL TARTRATE 10 MG: 5 INJECTION INTRAVENOUS at 09:21

## 2021-02-09 RX ADMIN — METOPROLOL TARTRATE 20 MG: 5 INJECTION INTRAVENOUS at 11:00

## 2021-02-09 RX ADMIN — MAGNESIUM SULFATE IN WATER 2 G: 40 INJECTION, SOLUTION INTRAVENOUS at 14:55

## 2021-02-09 RX ADMIN — SODIUM CHLORIDE 1000 ML: 9 INJECTION, SOLUTION INTRAVENOUS at 13:50

## 2021-02-09 RX ADMIN — SODIUM CHLORIDE 500 ML: 9 INJECTION, SOLUTION INTRAVENOUS at 11:23

## 2021-02-09 RX ADMIN — METOPROLOL TARTRATE 20 MG: 5 INJECTION INTRAVENOUS at 12:28

## 2021-02-09 RX ADMIN — METOPROLOL TARTRATE 10 MG: 5 INJECTION INTRAVENOUS at 10:05

## 2021-02-09 RX ADMIN — METOPROLOL TARTRATE 20 MG: 5 INJECTION INTRAVENOUS at 13:51

## 2021-02-09 RX ADMIN — ACETAMINOPHEN 975 MG: 325 TABLET, FILM COATED ORAL at 11:03

## 2021-02-09 RX ADMIN — METOPROLOL TARTRATE 25 MG: 25 TABLET, FILM COATED ORAL at 15:00

## 2021-02-09 ASSESSMENT — ENCOUNTER SYMPTOMS
COUGH: 0
FEVER: 0
CHILLS: 0
SHORTNESS OF BREATH: 0

## 2021-02-09 ASSESSMENT — MIFFLIN-ST. JEOR: SCORE: 1184.25

## 2021-02-09 NOTE — DISCHARGE INSTRUCTIONS
You have a small fracture to your left clavicle, wear the sling for as long as you are uncomfortable, it will heal on its own and does not require any urgent follow-up.

## 2021-02-09 NOTE — ED PROVIDER NOTES
History     Chief Complaint   Patient presents with     Fall     this AM.      HPI  Annie Arguello is a 87 year old female who has a history of stroke, atrial fibrillation on NOAC, rate control with metoprolol, diabetes, here with near syncope.  Stood up wall getting ready to go get some food, felt lightheaded fell down landing on her left shoulder than left side of her head.  Only complaint is left shoulder pain.  Caregivers: 1 1 and brought her here.  Patient denies chest pain palpitations difficulty breathing headache nausea vomiting diarrhea.    Allergies:  Allergies   Allergen Reactions     Ampicillin      Desvenlafaxine Other (See Comments)     Desvenlafaxine Succinate  Pristiq       Sertraline Hcl Other (See Comments) and Diarrhea     Zoloft       Sulfa Drugs Other (See Comments)     Sulfa(Sulfonamide Antibiotics)       Problem List:    Patient Active Problem List    Diagnosis Date Noted     Hypotension due to hypovolemia 07/02/2019     Priority: Medium     Generalized muscle weakness 07/02/2019     Priority: Medium     Sepsis (H) 06/29/2019     Priority: Medium     Lung nodule 10/30/2018     Priority: Medium     Controlled type 2 diabetes mellitus without complication, without long-term current use of insulin (H) 10/24/2017     Priority: Medium     Permanent atrial fibrillation (H) 10/24/2017     Priority: Medium     ACP (advance care planning) 01/04/2017     Priority: Medium     Advance Care Planning 1/4/2017: ACP Review of Chart / Resources Provided:  Reviewed chart for advance care plan.  Annie Arguello has no plan or code status on file. Discussed available resources and provided with information. Confirmed code status reflects current choices pending further ACP discussions.  Confirmed/documented legally designated decision makers.  Added by Kenzie Mancilla           Advanced care planning/counseling discussion 11/02/2012     Priority: Medium     Atrial fibrillation with RVR (H) 09/06/2012      Priority: Medium     Myalgia and myositis 05/10/2011     Priority: Medium     Problem list name updated by automated process. Provider to review       Diverticulosis of large intestine 2011     Priority: Medium     Problem list name updated by automated process. Provider to review       Osteoporosis 2011     Priority: Medium     Problem list name updated by automated process. Provider to review       Obesity 2011     Priority: Medium     Problem list name updated by automated process. Provider to review       Congenital cystic kidney disease 2011     Priority: Medium     Problem list name updated by automated process. Provider to review       Sciatica 2007     Priority: Medium     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 2007     Priority: Medium     Calculus of kidney 2003     Priority: Medium        Past Medical History:    Past Medical History:   Diagnosis Date     Atrial fibrillation (H) 2012     Calculus of kidney 2003     Controlled type 2 diabetes mellitus without complication, without long-term current use of insulin (H) 10/24/2017     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 2007     Diverticulosis 2011     Fibromyalgia 05/10/2011     Nonallopathic lesions of cervical region, not else 2001     Obesity 2011     Osteoporosis 2011     Other malaise and fatigue 2002     Renal cyst 2011     Sciatica 2007       Past Surgical History:    Past Surgical History:   Procedure Laterality Date     Breast biopsy      LT, benign      SECTION       CHOLECYSTECTOMY      lap     COLONOSCOPY       LUCILLE / BSO         Family History:    Family History   Problem Relation Age of Onset     Myocardial Infarction Mother 59        myocardial infarction, cause of death     Other - See Comments Daughter         fibromyalgia     Thyroid Disease Sister         hypo     Thyroid Disease Sister         hypo  "    Thyroid Disease Sister         hypo     Myocardial Infarction Brother 63        myocardial infarction, cause of death       Social History:  Marital Status:   [5]  Social History     Tobacco Use     Smoking status: Never Smoker     Smokeless tobacco: Never Used     Tobacco comment: no passive exposure   Substance Use Topics     Alcohol use: No     Drug use: No        Medications:         acetaminophen (TYLENOL) 325 MG tablet       ASPIRIN LOW DOSE 81 MG chewable tablet       atorvastatin (LIPITOR) 40 MG tablet       chlorthalidone (HYGROTON) 25 MG tablet       diphenhydrAMINE HCl (BENADRYL PO)       DULoxetine (CYMBALTA) 20 MG capsule       ELIQUIS ANTICOAGULANT 5 MG tablet       fluticasone (FLONASE) 50 MCG/ACT nasal spray       lisinopril (ZESTRIL) 2.5 MG tablet       metoprolol tartrate (LOPRESSOR) 25 MG tablet       Multiple Vitamins-Minerals (TAB-A-MARIUSZ) TABS       omeprazole (PRILOSEC) 20 MG DR capsule       polyethylene glycol-propylene glycol (SYSTANE) 0.4-0.3 % SOLN ophthalmic solution       potassium chloride ER (KLOR-CON M) 10 MEQ CR tablet       SB BISMUTH 262 MG TABS       alcohol swab prep pads       Alcohol Swabs (EASY TOUCH ALCOHOL PREP MEDIUM) 70 % PADS       Assure [place] Lancets MISC       blood glucose (GLUCOCARD VITAL TEST) test strip       blood glucose (NO BRAND SPECIFIED) test strip       Blood Glucose Monitoring Suppl (GLUCOCARD VITAL MONITOR) w/Device KIT          Review of Systems   Constitutional: Negative for chills and fever.   Respiratory: Negative for cough and shortness of breath.    All other systems reviewed and are negative.      Physical Exam   BP: 122/84  Pulse: (!) 149  Resp: 16  Height: 157.5 cm (5' 2\")  Weight: 79.6 kg (175 lb 7.8 oz)  SpO2: 99 %      Physical Exam  Constitutional:       General: She is not in acute distress.     Appearance: She is not diaphoretic.   HENT:      Head: Atraumatic.      Mouth/Throat:      Pharynx: No oropharyngeal exudate.   Eyes:      " General: No scleral icterus.     Pupils: Pupils are equal, round, and reactive to light.   Cardiovascular:      Rate and Rhythm: Tachycardia present. Rhythm irregular.      Heart sounds: Normal heart sounds.   Pulmonary:      Effort: No respiratory distress.      Breath sounds: Normal breath sounds.   Abdominal:      General: Bowel sounds are normal.      Palpations: Abdomen is soft.      Tenderness: There is no abdominal tenderness.   Musculoskeletal:         General: Tenderness present.   Skin:     General: Skin is warm.      Findings: No rash.         ED Course        [facility]    -Fracture    Date/Time: 2/9/2021 6:14 PM  Performed by: Gustavo Heredia MD  Authorized by: Gustavo Heredia MD       INJURY      Injury location:  Sternoclavicular    Sternoclavicular injury location:  L clavicle    PRE PROCEDURE ASSESSMENT      Neurological function: normal      Distal perfusion: normal      Range of motion: normal      ANESTHESIA (see MAR for exact dosages)      Anesthesia method:  None    PROCEDURE DETAILS:     Immobilization:  Sling    POST PROCEDURE ASSESSMENT      Neurological function: normal      Distal perfusion: normal      Range of motion: normal      PROCEDURE   Patient Tolerance:  Patient tolerated the procedure well with no immediate complications          Results for orders placed during the hospital encounter of 02/09/21   POC US ECHO LIMITED    [facility] Procedure Note      Limited Bedside ED Cardiac Ultrasound:    PROCEDURE: PERFORMED BY: Dr. Gustavo Heredia MD  INDICATIONS/SYMPTOM:  Abnormal EKG  PROBE: Cardiac phased array probe  BODY LOCATION: Chest  FINDINGS:   The ultrasound was performed utilizing the subcostal, parasternal long axis and parasternal short axis views.  Cardiac contractility:  Present  Gross estimation of cardiac kinesis: depressed  Pericardial Effusion:  Small  RV:LV ratio: LV > RV  Collapsibility:  IVC collapses > 50% with  inspiration  INTERPRETATION:    Atrial fibrillation, ? Decreased ef Pericardial effusion was found.  IVC visualized and findings indicate hypovolemia.  IMAGE DOCUMENTATION: Images were archived to hard drive. Some b-lines an anterior chest wall, no pleural effusions. No collapse of RV noted                   EKG Interpretation:      Interpreted by Gustavo Heredia MD  Time reviewed: 1433  Symptoms at time of EKG: None   Rhythm: atrial fibrillation - rapid  Rate: Tachycardia  Axis: Left Axis Deviation  Ectopy: none  Conduction: normal  ST Segments/ T Waves: No ST-T wave changes  Q Waves: none  Comparison to prior: Unchanged    Clinical Impression: atrial fibrillation (chronic)                Critical Care time:  was 97 minutes for this patient excluding procedures.               Results for orders placed or performed during the hospital encounter of 02/09/21 (from the past 24 hour(s))   Glucose by meter   Result Value Ref [facility]    Glucose 185 (H) 70 - 99 mg/dL   CBC with platelets differential   Result Value Ref [facility]    WBC 11.8 (H) 4.0 - 11.0 10e9/L    RBC Count 4.64 3.8 - 5.2 10e12/L    Hemoglobin 13.4 11.7 - 15.7 g/dL    Hematocrit 41.0 35.0 - 47.0 %    MCV 88 78 - 100 fl    MCH 28.9 26.5 - 33.0 pg    MCHC 32.7 31.5 - 36.5 g/dL    RDW 13.7 10.0 - 15.0 %    Platelet Count 195 150 - 450 10e9/L    Diff Method Automated Method     % Neutrophils 69.0 %    % Lymphocytes 21.4 %    % Monocytes 6.4 %    % Eosinophils 2.2 %    % Basophils 0.5 %    % Immature Granulocytes 0.5 %    Nucleated RBCs 0 0 /100    Absolute Neutrophil 8.2 1.6 - 8.3 10e9/L    Absolute Lymphocytes 2.5 0.8 - 5.3 10e9/L    Absolute Monocytes 0.8 0.0 - 1.3 10e9/L    Absolute Eosinophils 0.3 0.0 - 0.7 10e9/L    Absolute Basophils 0.1 0.0 - 0.2 10e9/L    Abs Immature Granulocytes 0.1 0 - 0.4 10e9/L    Absolute Nucleated RBC 0.0    Basic metabolic panel   Result Value Ref [facility]    Sodium 136 133 - 144 mmol/L    Potassium 4.4 3.4 - 5.3 mmol/L    Chloride 104  94 - 109 mmol/L    Carbon Dioxide 28 20 - 32 mmol/L    Anion Gap 4 3 - 14 mmol/L    Glucose 220 (H) 70 - 99 mg/dL    Urea Nitrogen 25 7 - 30 mg/dL    Creatinine 0.88 0.52 - 1.04 mg/dL    GFR Estimate 59 (L) >60 mL/min/[1.73_m2]    GFR Estimate If Black 68 >60 mL/min/[1.73_m2]    Calcium 9.0 8.5 - 10.1 mg/dL   Magnesium   Result Value Ref Range    Magnesium 1.7 1.6 - 2.3 mg/dL   Procalcitonin   Result Value Ref Range    Procalcitonin <0.05 ng/ml   Troponin I   Result Value Ref Range    Troponin I ES <0.015 0.000 - 0.045 ug/L   Cervical spine CT w/o contrast    Narrative    PROCEDURE: CT CERVICAL SPINE W/O CONTRAST 2/9/2021 9:43 AM    HISTORY: Cervical trauma    COMPARISONS: None.    Meds/Dose Given:    TECHNIQUE: CT scan of the cervical spine with sagittal coronal  reconstructions    FINDINGS: There is an unfused ring of C1 both dorsally and ventrally.  Degenerative changes are seen at the middle atlantoaxial joint. The  C2-C3 disc appears normal. There is some disc space protrusion and  posterior longitudinal ligament calcification at C3-C4. There is mild  fourth subluxation of C4 on C5 due to severe facet joint degenerative  change. There is decreased height of the C5-C6 and C6-C7 disc with  anterior posterior osteophytes. There are bilateral uncovertebral  joint spurs at these levels.    There are no fractures of the vertebral bodies or arches. Severe  cervical facet joint degenerative changes are noted throughout the  cervical spine.    There is calcific plaquing at the carotid bifurcations bilaterally.  Otherwise, the prevertebral soft tissues appear normal.         Impression    IMPRESSION: Severe degenerative changes of the cervical spine. No  acute fracture    ODILIA CARO MD   XR Shoulder Left G/E 3 Views    Narrative    PROCEDURE:  XR SHOULDER LT G/E 3 VW    HISTORY: rall r/o fx    COMPARISON:  None.    TECHNIQUE:  4 views of the left shoulder were obtained.    FINDINGS:  There is deformity of the  distal left clavicle. I am  uncertain if this is acute or chronic. The glenohumeral articulation  is normally aligned. Scapula and proximal humerus are intact.       Impression    IMPRESSION: Distal clavicular deformity, I am uncertain if this is  acute or chronic      ODILIA CARO MD   CT Head w/o Contrast    Narrative    PROCEDURE: CT HEAD W/O CONTRAST     HISTORY: Head. Trauma    COMPARISON: June 2019    TECHNIQUE:  Helical images of the head from the foramen magnum to the  vertex were obtained without contrast.    FINDINGS: There is encephalomalacia in the right frontal and parietal  lobes unchanged from previous examination. There is white matter low  density in both hemispheres consistent with small vessel changes.  There are no masses or ventricular shifts or extracerebral  collections. Brain stem and cerebellum appear normal. The cranial  vault is intact.  There is mild mucosal thickening seen in the  sphenoid sinus.      Impression    IMPRESSION: No acute brain injury.      ODILIA CARO MD   UA reflex to Microscopic and Culture    Specimen: Catheterized Urine   Result Value Ref [facility]    Color Urine Yellow     Appearance Urine Clear     Glucose Urine Negative NEG^Negative mg/dL    Bilirubin Urine Negative NEG^Negative    Ketones Urine Negative NEG^Negative mg/dL    Specific Gravity Urine 1.019 1.003 - 1.035    Blood Urine Negative NEG^Negative    pH Urine 5.0 4.7 - 8.0 pH    Protein Albumin Urine Negative NEG^Negative mg/dL    Urobilinogen mg/dL Normal 0.0 - 2.0 mg/dL    Nitrite Urine Negative NEG^Negative    Leukocyte Esterase Urine Negative NEG^Negative    Source Catheterized Urine    XR Chest Port 1 View    Narrative    PROCEDURE:  XR CHEST PORT 1 VW    HISTORY:  persistent afib rvr not going down after 120mg metoprolol,  r/o PNA.     COMPARISON:  February 13, 2020    FINDINGS:   The heart is enlarged. The pulmonary vasculature is normal.  The  interstitial thickening seen previously in February  2020 has improved.  No pleural effusion or pneumothorax.      Impression    IMPRESSION:  Mild cardiomegaly. No active pulmonary infiltrates.      ODILIA CARO MD   Asymptomatic SARS-CoV-2 COVID-19 Virus (Coronavirus) by PCR    Specimen: Nasopharyngeal   Result Value Ref [facility]    SARS-CoV-2 Virus Specimen Source Nasopharyngeal     SARS-CoV-2 PCR Result NEGATIVE     SARS-CoV-2 PCR Comment       Testing was performed using the Xpert Xpress SARS-CoV-2 Assay on the Cepheid Gene-Xpert   Instrument Systems. Additional information about this Emergency Use Authorization (EUA)   assay can be found via the Lab Guide.     POC US ECHO LIMITED    Impression    [facility] Procedure Note      Limited Bedside ED Cardiac Ultrasound:    PROCEDURE: PERFORMED BY: Dr. Gustavo Heredia MD  INDICATIONS/SYMPTOM:  Abnormal EKG  PROBE: Cardiac phased array probe  BODY LOCATION: Chest  FINDINGS:   The ultrasound was performed utilizing the subcostal, parasternal long axis and parasternal short axis views.  Cardiac contractility:  Present  Gross estimation of cardiac kinesis: depressed  Pericardial Effusion:  Small  RV:LV ratio: LV > RV  Collapsibility:  IVC collapses > 50% with inspiration  INTERPRETATION:    Atrial fibrillation, ? Decreased ef Pericardial effusion was found.  IVC visualized and findings indicate hypovolemia.  IMAGE DOCUMENTATION: Images were archived to hard drive. Some b-lines an anterior chest wall, no pleural effusions. No collapse of RV noted             Medications   metoprolol tartrate (LOPRESSOR) tablet 25 mg (25 mg Oral Not Given 2/9/21 0953)   metoprolol (LOPRESSOR) injection 10 mg (10 mg Intravenous Not Given 2/9/21 1109)   metoprolol (LOPRESSOR) 5 MG/5ML injection (10 mg  Given 2/9/21 0921)   metoprolol tartrate (LOPRESSOR) 25 MG tablet (25 mg  Given 2/9/21 0914)   metoprolol (LOPRESSOR) injection 10 mg (10 mg Intravenous Given 2/9/21 1005)   0.9% sodium chloride BOLUS (0 mLs Intravenous Stopped 2/9/21  1121)   acetaminophen (TYLENOL) tablet 975 mg (975 mg Oral Given 2/9/21 1103)   metoprolol (LOPRESSOR) injection 20 mg (20 mg Intravenous Given 2/9/21 1100)   0.9% sodium chloride BOLUS (0 mLs Intravenous Stopped 2/9/21 1149)   metoprolol (LOPRESSOR) injection 20 mg (20 mg Intravenous Given 2/9/21 1228)   metoprolol (LOPRESSOR) injection 20 mg (20 mg Intravenous Given 2/9/21 1351)   metoprolol (LOPRESSOR) injection 20 mg (20 mg Intravenous Given 2/9/21 1447)   0.9% sodium chloride BOLUS (0 mLs Intravenous Stopped 2/9/21 1430)   magnesium sulfate 2 g in water intermittent infusion (0 g Intravenous Stopped 2/9/21 1555)   metoprolol tartrate (LOPRESSOR) tablet 25 mg (25 mg Oral Given 2/9/21 1500)   0.9% sodium chloride BOLUS (0 mLs Intravenous Stopped 2/9/21 1530)   diltiazem (CARDIZEM) injection 20 mg (20 mg Intravenous Given 2/9/21 1743)       Assessments & Plan (with Medical Decision Making)     I have reviewed the nursing notes.    I have reviewed the findings, diagnosis, plan and need for follow up with the patient.     Critical Care Addendum    My initial assessment, based on my review of vital signs, focused history, physical exam, review of cardiac rhythm monitor and 12 lead ECG analysis, established that Annie Arguello has severe tachycardia, which requires immediate intervention, and therefore she is critically ill.     After the initial assessment, the care team initiated multiple lab tests, initiated IV fluid administration, initiated medication therapy with metoprolol, magnesium and consulted with hospitalist to provide stabilization care. Due to the critical nature of this patient, I reassessed nursing observations, review of cardiac rhythm monitor, 12 lead ECG analysis, interpretation of volume status and respiratory status multiple times prior to her disposition.     Time also spent performing documentation, reviewing test results and coordination of care.     Critical care time (excluding teaching  time and procedures): 97 minutes.     New Prescriptions    No medications on file     After several hours, patient has received over 100 mg of IV metoprolol, I doubled her oral dose of metoprolol, she still is seen in A. fib with rapid ventricular response.  I have attempted to find a cause for this, her urine is unremarkable her chest x-ray is clear, her procalcitonin is undetectable, and she is to troponin negative.  She is very dyspneic with ambulation and cannot go home.  I consider sending her home with a mildly elevated heart rate thinking her agitation from being in the ED may be causing her to become tachycardic, however heart rate went up to the 140s.  This is not a safe disposition and she should at least be observed overnight.  I did not want to give her calcium blockers as she is always been on beta-blockers and do not want to cross medications.  Stable and does not require cardioversion.    Update as of 1812 patient given 20 of diltiazem after discussion with hospitalist, patient soon slow down to the 60s, completely asymptomatic, thrilled to go home, will oblige.  Return precautions provided of course.      Final diagnoses:   Longstanding persistent atrial fibrillation (H)   Tachycardia   Dyspnea on exertion   Traumatic closed fracture of distal clavicle with minimal displacement, left, initial encounter       2/9/2021   [facility]     Gustavo Heredia MD  02/09/21 1815

## 2021-02-09 NOTE — ED TRIAGE NOTES
Incoming report from Meghann SIMMONS: Pt coming from [facility]. Fell this morning and hit her head and LT shoulder.mPt is on eliquis.   Pt states she was getting up for breakfast and got dizzy, reached for her walker and missed and fell onto carpeted dolores. Pt had no LOC.   Is normally a/o, independent with wheeled walker.

## 2021-02-10 RX ORDER — LISINOPRIL 2.5 MG/1
TABLET ORAL
Qty: 29 TABLET | Refills: 0 | Status: SHIPPED | OUTPATIENT
Start: 2021-02-10 | End: 2021-03-09

## 2021-02-10 RX ORDER — MULTIVITAMIN WITH FOLIC ACID 400 MCG
TABLET ORAL
Qty: 29 TABLET | Refills: 0 | Status: SHIPPED | OUTPATIENT
Start: 2021-02-10 | End: 2021-03-09

## 2021-02-10 RX ORDER — ALENDRONATE SODIUM 70 MG/1
TABLET ORAL
Qty: 4 TABLET | Refills: 0 | Status: SHIPPED | OUTPATIENT
Start: 2021-02-10 | End: 2021-03-09

## 2021-02-10 RX ORDER — ASPIRIN 81 MG
TABLET,CHEWABLE ORAL
Qty: 29 TABLET | Refills: 0 | Status: SHIPPED | OUTPATIENT
Start: 2021-02-10 | End: 2021-03-09

## 2021-02-10 NOTE — ED NOTES
Daughter called and given an update that patient will be discharged back to [facility].

## 2021-02-10 NOTE — TELEPHONE ENCOUNTER
multivit      Last Written Prescription Date:  1/12/21  Last Fill Quantity: 29,   # refills: 0  Last Office Visit: 9/15/20  Future Office visit:       Lisinopril 1/11/21 #29  Aspirin 1/12/21 #29  fosomax- not on current med list

## 2021-02-10 NOTE — ED NOTES
Condition stable, [facility] transportation here taking patient back to [facility].

## 2021-03-08 DIAGNOSIS — K21.9 GASTROESOPHAGEAL REFLUX DISEASE: ICD-10-CM

## 2021-03-08 DIAGNOSIS — K21.00 GASTROESOPHAGEAL REFLUX DISEASE WITH ESOPHAGITIS WITHOUT HEMORRHAGE: Primary | ICD-10-CM

## 2021-03-08 DIAGNOSIS — E11.9 CONTROLLED TYPE 2 DIABETES MELLITUS WITHOUT COMPLICATION, WITHOUT LONG-TERM CURRENT USE OF INSULIN (H): ICD-10-CM

## 2021-03-08 DIAGNOSIS — R69 DIAGNOSIS UNKNOWN: ICD-10-CM

## 2021-03-08 DIAGNOSIS — I50.9 CONGESTIVE HEART FAILURE, UNSPECIFIED HF CHRONICITY, UNSPECIFIED HEART FAILURE TYPE (H): ICD-10-CM

## 2021-03-08 DIAGNOSIS — M19.90 OSTEOARTHRITIS, UNSPECIFIED OSTEOARTHRITIS TYPE, UNSPECIFIED SITE: ICD-10-CM

## 2021-03-09 RX ORDER — MULTIVITAMIN WITH FOLIC ACID 400 MCG
TABLET ORAL
Qty: 29 TABLET | Refills: 0 | Status: SHIPPED | OUTPATIENT
Start: 2021-03-09 | End: 2021-04-05

## 2021-03-09 RX ORDER — DULOXETIN HYDROCHLORIDE 20 MG/1
CAPSULE, DELAYED RELEASE ORAL
Qty: 58 CAPSULE | Refills: 0 | Status: SHIPPED | OUTPATIENT
Start: 2021-03-09 | End: 2021-04-05

## 2021-03-09 RX ORDER — LISINOPRIL 2.5 MG/1
TABLET ORAL
Qty: 29 TABLET | Refills: 0 | Status: SHIPPED | OUTPATIENT
Start: 2021-03-09 | End: 2021-04-05

## 2021-03-09 RX ORDER — ASPIRIN 81 MG
TABLET,CHEWABLE ORAL
Qty: 29 TABLET | Refills: 0 | Status: SHIPPED | OUTPATIENT
Start: 2021-03-09 | End: 2021-04-05

## 2021-03-09 RX ORDER — ALENDRONATE SODIUM 70 MG/1
TABLET ORAL
Qty: 4 TABLET | Refills: 0 | Status: SHIPPED | OUTPATIENT
Start: 2021-03-09 | End: 2021-04-05

## 2021-03-09 NOTE — TELEPHONE ENCOUNTER
Multivitamin       Last Written Prescription Date:  2/10/21  Last Fill Quantity: 29,   # refills: 0  Last Office Visit: 9/15/20  Future Office visit:       Routing refill request to provider for review/approval because:      Omeprazole  20 mg    Last Written Prescription Date:  3/30/20  Last Fill Quantity: 28,   # refills: 11  Last Office Visit: 9/15/20  Future Office visit:       Routing refill request to provider for review/approval because:      Lisinopril 2.5 mg      Last Written Prescription Date:  2/10/21  Last Fill Quantity: 29,   # refills: 0  Last Office Visit: 9/15/20  Future Office visit:       Routing refill request to provider for review/approval because:      Cymbalta 20 mg      Last Written Prescription Date:  2/8/21  Last Fill Quantity: 58,   # refills: 0  Last Office Visit: 9/15/20  Future Office visit:       Routing refill request to provider for review/approval because:    ASA 81 mg      Last Written Prescription Date:  2/10/21  Last Fill Quantity: 29,   # refills: 0  Last Office Visit: 9/15/20  Future Office visit:       Routing refill request to provider for review/approval because:      Fosamax 70 mg      Last Written Prescription Date:  2/10/21  Last Fill Quantity: 4,   # refills: 0  Last Office Visit: 9/15/20  Future Office visit:       Routing refill request to provider for review/approval because:

## 2021-04-05 DIAGNOSIS — E87.6 HYPOKALEMIA: ICD-10-CM

## 2021-04-05 DIAGNOSIS — R69 DIAGNOSIS UNKNOWN: ICD-10-CM

## 2021-04-05 DIAGNOSIS — K21.00 GASTROESOPHAGEAL REFLUX DISEASE WITH ESOPHAGITIS WITHOUT HEMORRHAGE: ICD-10-CM

## 2021-04-05 DIAGNOSIS — M19.90 OSTEOARTHRITIS, UNSPECIFIED OSTEOARTHRITIS TYPE, UNSPECIFIED SITE: ICD-10-CM

## 2021-04-05 DIAGNOSIS — I50.9 CONGESTIVE HEART FAILURE, UNSPECIFIED HF CHRONICITY, UNSPECIFIED HEART FAILURE TYPE (H): ICD-10-CM

## 2021-04-05 DIAGNOSIS — I48.21 PERMANENT ATRIAL FIBRILLATION (H): ICD-10-CM

## 2021-04-05 DIAGNOSIS — I10 ESSENTIAL HYPERTENSION: ICD-10-CM

## 2021-04-05 DIAGNOSIS — E11.9 CONTROLLED TYPE 2 DIABETES MELLITUS WITHOUT COMPLICATION, WITHOUT LONG-TERM CURRENT USE OF INSULIN (H): ICD-10-CM

## 2021-04-05 RX ORDER — METOPROLOL TARTRATE 25 MG/1
TABLET, FILM COATED ORAL
Qty: 58 TABLET | Refills: 0 | Status: SHIPPED | OUTPATIENT
Start: 2021-04-05 | End: 2021-05-04

## 2021-04-05 RX ORDER — ASPIRIN 81 MG
TABLET,CHEWABLE ORAL
Qty: 29 TABLET | Refills: 0 | Status: SHIPPED | OUTPATIENT
Start: 2021-04-05 | End: 2021-05-04

## 2021-04-05 RX ORDER — DULOXETIN HYDROCHLORIDE 20 MG/1
CAPSULE, DELAYED RELEASE ORAL
Qty: 58 CAPSULE | Refills: 0 | Status: SHIPPED | OUTPATIENT
Start: 2021-04-05 | End: 2021-05-04

## 2021-04-05 RX ORDER — MULTIVITAMIN WITH FOLIC ACID 400 MCG
TABLET ORAL
Qty: 29 TABLET | Refills: 0 | Status: SHIPPED | OUTPATIENT
Start: 2021-04-05 | End: 2021-05-04

## 2021-04-05 RX ORDER — ALENDRONATE SODIUM 70 MG/1
TABLET ORAL
Qty: 4 TABLET | Refills: 0 | Status: SHIPPED | OUTPATIENT
Start: 2021-04-05 | End: 2021-05-04

## 2021-04-05 RX ORDER — APIXABAN 5 MG/1
TABLET, FILM COATED ORAL
Qty: 58 TABLET | Refills: 0 | Status: SHIPPED | OUTPATIENT
Start: 2021-04-05 | End: 2021-05-04

## 2021-04-05 RX ORDER — LISINOPRIL 2.5 MG/1
TABLET ORAL
Qty: 29 TABLET | Refills: 0 | Status: SHIPPED | OUTPATIENT
Start: 2021-04-05 | End: 2021-05-04

## 2021-04-05 RX ORDER — CHLORTHALIDONE 25 MG/1
TABLET ORAL
Qty: 29 TABLET | Refills: 0 | Status: SHIPPED | OUTPATIENT
Start: 2021-04-05 | End: 2021-05-04

## 2021-04-05 RX ORDER — POTASSIUM CHLORIDE 750 MG/1
TABLET, EXTENDED RELEASE ORAL
Qty: 58 TABLET | Refills: 0 | Status: SHIPPED | OUTPATIENT
Start: 2021-04-05 | End: 2021-05-04

## 2021-04-05 NOTE — TELEPHONE ENCOUNTER
Fosamax  Last Written Prescription Date: 3/9/21  Last Fill Quantity: 4 # of Refills: 0  Last Office Visit: 9/15/20    Chlorthalidone  Last Written Prescription Date: 10/22/20  Last Fill Quantity: 90 # of Refills: 1  Last Office Visit: 9/15/20    Eliquis  Last Written Prescription Date: 10/22/20  Last Fill Quantity: 180 # of Refills: 1  Last Office Visit: 9/15/20    Metoprolol  Last Written Prescription Date: 10/22/20  Last Fill Quantity: 180 # of Refills: 1  Last Office Visit: 9/15/20    Potassium  Last Written Prescription Date: 10/22/20  Last Fill Quantity: 180 # of Refills: 1  Last Office Visit: 9/15/20    Cymbalta  Last Written Prescription Date: 3/9/21  Last Fill Quantity: 58 # of Refills: 0  Last Office Visit: 9/15/20    Aspirin  Last Written Prescription Date: 3/9/21  Last Fill Quantity: 29 # of Refills: 0  Last Office Visit: 9/15/20    Lisinopril  Last Written Prescription Date: 3/9/21  Last Fill Quantity: 29 # of Refills: 0  Last Office Visit: 9/15/20    Omeprazole  Last Written Prescription Date: 3/9/21  Last Fill Quantity: 29 # of Refills: 0  Last Office Visit: 9/15/20    Multiple Vitamins  Last Written Prescription Date: 3/9/21  Last Fill Quantity: 29 # of Refills: 0  Last Office Visit: 9/15/20

## 2021-04-18 ENCOUNTER — HEALTH MAINTENANCE LETTER (OUTPATIENT)
Age: 86
End: 2021-04-18

## 2021-04-26 DIAGNOSIS — E11.9 TYPE 2 DIABETES MELLITUS WITHOUT COMPLICATION, WITHOUT LONG-TERM CURRENT USE OF INSULIN (H): ICD-10-CM

## 2021-04-26 RX ORDER — BLOOD-GLUCOSE METER
KIT MISCELLANEOUS
Qty: 100 STRIP | Refills: 0 | Status: SHIPPED | OUTPATIENT
Start: 2021-04-26 | End: 2021-07-29

## 2021-04-26 NOTE — TELEPHONE ENCOUNTER
glucocard    Test strips  Last Written Prescription Date:  1/5/21  Last Fill Quantity: 100,   # refills: 0  Last Office Visit: 9/15/2020  Future Office visit:

## 2021-05-03 DIAGNOSIS — E11.9 CONTROLLED TYPE 2 DIABETES MELLITUS WITHOUT COMPLICATION, WITHOUT LONG-TERM CURRENT USE OF INSULIN (H): ICD-10-CM

## 2021-05-03 DIAGNOSIS — M19.90 OSTEOARTHRITIS, UNSPECIFIED OSTEOARTHRITIS TYPE, UNSPECIFIED SITE: ICD-10-CM

## 2021-05-03 DIAGNOSIS — K21.00 GASTROESOPHAGEAL REFLUX DISEASE WITH ESOPHAGITIS WITHOUT HEMORRHAGE: ICD-10-CM

## 2021-05-03 DIAGNOSIS — R69 DIAGNOSIS UNKNOWN: ICD-10-CM

## 2021-05-03 DIAGNOSIS — I50.9 CONGESTIVE HEART FAILURE, UNSPECIFIED HF CHRONICITY, UNSPECIFIED HEART FAILURE TYPE (H): ICD-10-CM

## 2021-05-03 DIAGNOSIS — I48.21 PERMANENT ATRIAL FIBRILLATION (H): ICD-10-CM

## 2021-05-03 DIAGNOSIS — E87.6 HYPOKALEMIA: ICD-10-CM

## 2021-05-03 DIAGNOSIS — I10 ESSENTIAL HYPERTENSION: ICD-10-CM

## 2021-05-04 DIAGNOSIS — E11.9 CONTROLLED TYPE 2 DIABETES MELLITUS WITHOUT COMPLICATION, WITHOUT LONG-TERM CURRENT USE OF INSULIN (H): ICD-10-CM

## 2021-05-04 RX ORDER — APIXABAN 5 MG/1
TABLET, FILM COATED ORAL
Qty: 58 TABLET | Refills: 3 | Status: SHIPPED | OUTPATIENT
Start: 2021-05-04 | End: 2021-08-25

## 2021-05-04 RX ORDER — POTASSIUM CHLORIDE 750 MG/1
TABLET, EXTENDED RELEASE ORAL
Qty: 58 TABLET | Refills: 3 | Status: ON HOLD | OUTPATIENT
Start: 2021-05-04 | End: 2021-07-13

## 2021-05-04 RX ORDER — ALENDRONATE SODIUM 70 MG/1
TABLET ORAL
Qty: 4 TABLET | Refills: 0 | Status: SHIPPED | OUTPATIENT
Start: 2021-05-04 | End: 2021-06-03

## 2021-05-04 RX ORDER — ASPIRIN 81 MG
TABLET,CHEWABLE ORAL
Qty: 29 TABLET | Refills: 5 | Status: SHIPPED | OUTPATIENT
Start: 2021-05-04 | End: 2021-10-19

## 2021-05-04 RX ORDER — CHLORTHALIDONE 25 MG/1
TABLET ORAL
Qty: 29 TABLET | Refills: 0 | Status: SHIPPED | OUTPATIENT
Start: 2021-05-04 | End: 2021-06-03

## 2021-05-04 RX ORDER — LISINOPRIL 2.5 MG/1
TABLET ORAL
Qty: 29 TABLET | Refills: 3 | Status: ON HOLD | OUTPATIENT
Start: 2021-05-04 | End: 2021-07-13

## 2021-05-04 RX ORDER — METOPROLOL TARTRATE 25 MG/1
TABLET, FILM COATED ORAL
Qty: 58 TABLET | Refills: 3 | Status: ON HOLD | OUTPATIENT
Start: 2021-05-04 | End: 2021-07-13

## 2021-05-04 RX ORDER — MULTIVITAMIN WITH FOLIC ACID 400 MCG
TABLET ORAL
Qty: 29 TABLET | Refills: 3 | Status: SHIPPED | OUTPATIENT
Start: 2021-05-04 | End: 2021-08-25

## 2021-05-04 RX ORDER — DULOXETIN HYDROCHLORIDE 20 MG/1
CAPSULE, DELAYED RELEASE ORAL
Qty: 58 CAPSULE | Refills: 3 | Status: SHIPPED | OUTPATIENT
Start: 2021-05-04 | End: 2021-08-25

## 2021-06-01 DIAGNOSIS — I50.9 CONGESTIVE HEART FAILURE, UNSPECIFIED HF CHRONICITY, UNSPECIFIED HEART FAILURE TYPE (H): ICD-10-CM

## 2021-06-01 DIAGNOSIS — M19.90 OSTEOARTHRITIS, UNSPECIFIED OSTEOARTHRITIS TYPE, UNSPECIFIED SITE: ICD-10-CM

## 2021-06-02 NOTE — TELEPHONE ENCOUNTER
Alendronate 70 mg      Last Written Prescription Date:  5/04/21  Last Fill Quantity: 4,   # refills: 0  Last Office Visit: 9/15/20  Future Office visit:       Routing refill request to provider for review/approval because:  Drug not on the [facility], [facility] or [facility] refill protocol or controlled substance  Chlorthalidone 25 mg      Last Written Prescription Date:  5/04/21  Last Fill Quantity: 29,   # refills: 0  Last Office Visit: 9/15/20  Future Office visit:       Routing refill request to provider for review/approval because:  Drug not on the [facility], [facility] or [facility] refill protocol or controlled substance

## 2021-06-03 RX ORDER — ALENDRONATE SODIUM 70 MG/1
TABLET ORAL
Qty: 4 TABLET | Refills: 0 | Status: SHIPPED | OUTPATIENT
Start: 2021-06-03 | End: 2021-06-30

## 2021-06-03 RX ORDER — CHLORTHALIDONE 25 MG/1
TABLET ORAL
Qty: 29 TABLET | Refills: 0 | Status: ON HOLD | OUTPATIENT
Start: 2021-06-03 | End: 2021-07-13

## 2021-06-13 ENCOUNTER — TRANSFERRED RECORDS (OUTPATIENT)
Dept: HEALTH INFORMATION MANAGEMENT | Facility: CLINIC | Age: 86
End: 2021-06-13

## 2021-06-13 ENCOUNTER — APPOINTMENT (OUTPATIENT)
Dept: CT IMAGING | Facility: HOSPITAL | Age: 86
End: 2021-06-13
Attending: NURSE PRACTITIONER
Payer: MEDICARE

## 2021-06-13 ENCOUNTER — HOSPITAL ENCOUNTER (EMERGENCY)
Facility: HOSPITAL | Age: 86
Discharge: ANOTHER HOSPITAL | End: 2021-06-13
Attending: NURSE PRACTITIONER | Admitting: NURSE PRACTITIONER
Payer: MEDICARE

## 2021-06-13 ENCOUNTER — APPOINTMENT (OUTPATIENT)
Dept: GENERAL RADIOLOGY | Facility: HOSPITAL | Age: 86
End: 2021-06-13
Attending: NURSE PRACTITIONER
Payer: MEDICARE

## 2021-06-13 VITALS
DIASTOLIC BLOOD PRESSURE: 61 MMHG | RESPIRATION RATE: 14 BRPM | WEIGHT: 150 LBS | OXYGEN SATURATION: 96 % | SYSTOLIC BLOOD PRESSURE: 105 MMHG | TEMPERATURE: 98 F | HEART RATE: 121 BPM | BODY MASS INDEX: 27.44 KG/M2

## 2021-06-13 DIAGNOSIS — M62.81 GENERALIZED MUSCLE WEAKNESS: ICD-10-CM

## 2021-06-13 DIAGNOSIS — S70.02XA TRAUMATIC HEMATOMA OF LEFT HIP, INITIAL ENCOUNTER: ICD-10-CM

## 2021-06-13 DIAGNOSIS — S40.022A TRAUMATIC HEMATOMA OF LEFT UPPER ARM, INITIAL ENCOUNTER: ICD-10-CM
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
## 2021-06-13 DIAGNOSIS — Z79.01 CHRONIC ANTICOAGULATION: ICD-10-CM

## 2021-06-13 LAB
ABO + RH BLD: NORMAL
ABO + RH BLD: NORMAL
ALBUMIN SERPL-MCNC: 2.5 G/DL (ref 3.4–5)
ALBUMIN UR-MCNC: NEGATIVE MG/DL
ALP SERPL-CCNC: 77 U/L (ref 40–150)
ALT SERPL W P-5'-P-CCNC: 25 U/L (ref 0–50)
ANION GAP SERPL CALCULATED.3IONS-SCNC: 9 MMOL/L (ref 3–14)
APPEARANCE UR: CLEAR
AST SERPL W P-5'-P-CCNC: 47 U/L (ref 0–45)
BASOPHILS # BLD AUTO: 0 10E9/L (ref 0–0.2)
BASOPHILS # BLD AUTO: 0.1 10E9/L (ref 0–0.2)
BASOPHILS NFR BLD AUTO: 0.3 %
BASOPHILS NFR BLD AUTO: 0.4 %
BILIRUB SERPL-MCNC: 1 MG/DL (ref 0.2–1.3)
BILIRUB UR QL STRIP: NEGATIVE
BLD GP AB SCN SERPL QL: NORMAL
BLOOD BANK CMNT PATIENT-IMP: NORMAL
BUN SERPL-MCNC: 30 MG/DL (ref 7–30)
CALCIUM SERPL-MCNC: 8.5 MG/DL (ref 8.5–10.1)
CHLORIDE SERPL-SCNC: 101 MMOL/L (ref 94–109)
CO2 SERPL-SCNC: 22 MMOL/L (ref 20–32)
COLOR UR AUTO: YELLOW
CREAT SERPL-MCNC: 0.86 MG/DL (ref 0.52–1.04)
DIFFERENTIAL METHOD BLD: ABNORMAL
DIFFERENTIAL METHOD BLD: ABNORMAL
EOSINOPHIL # BLD AUTO: 0.1 10E9/L (ref 0–0.7)
EOSINOPHIL # BLD AUTO: 0.1 10E9/L (ref 0–0.7)
EOSINOPHIL NFR BLD AUTO: 0.9 %
EOSINOPHIL NFR BLD AUTO: 1 %
ERYTHROCYTE [DISTWIDTH] IN BLOOD BY AUTOMATED COUNT: 14 % (ref 10–15)
ERYTHROCYTE [DISTWIDTH] IN BLOOD BY AUTOMATED COUNT: 14.1 % (ref 10–15)
GFR SERPL CREATININE-BSD FRML MDRD: 61 ML/MIN/{1.73_M2}
GLUCOSE BLDC GLUCOMTR-MCNC: 303 MG/DL (ref 70–99)
GLUCOSE SERPL-MCNC: 300 MG/DL (ref 70–99)
GLUCOSE UR STRIP-MCNC: 300 MG/DL
HCT VFR BLD AUTO: 29.8 % (ref 35–47)
HCT VFR BLD AUTO: 32.7 % (ref 35–47)
HGB BLD-MCNC: 10.9 G/DL (ref 11.7–15.7)
HGB BLD-MCNC: 9.8 G/DL (ref 11.7–15.7)
HGB UR QL STRIP: NEGATIVE
IMM GRANULOCYTES # BLD: 0.1 10E9/L (ref 0–0.4)
IMM GRANULOCYTES # BLD: 0.1 10E9/L (ref 0–0.4)
IMM GRANULOCYTES NFR BLD: 0.7 %
IMM GRANULOCYTES NFR BLD: 0.9 %
INR PPP: 1.51 (ref 0.86–1.14)
KETONES UR STRIP-MCNC: NEGATIVE MG/DL
LABORATORY COMMENT REPORT: NORMAL
LEUKOCYTE ESTERASE UR QL STRIP: NEGATIVE
LYMPHOCYTES # BLD AUTO: 1.8 10E9/L (ref 0.8–5.3)
LYMPHOCYTES # BLD AUTO: 1.8 10E9/L (ref 0.8–5.3)
LYMPHOCYTES NFR BLD AUTO: 12 %
LYMPHOCYTES NFR BLD AUTO: 12.6 %
MCH RBC QN AUTO: 28.9 PG (ref 26.5–33)
MCH RBC QN AUTO: 29.1 PG (ref 26.5–33)
MCHC RBC AUTO-ENTMCNC: 32.9 G/DL (ref 31.5–36.5)
MCHC RBC AUTO-ENTMCNC: 33.3 G/DL (ref 31.5–36.5)
MCV RBC AUTO: 87 FL (ref 78–100)
MCV RBC AUTO: 88 FL (ref 78–100)
MONOCYTES # BLD AUTO: 1.1 10E9/L (ref 0–1.3)
MONOCYTES # BLD AUTO: 1.1 10E9/L (ref 0–1.3)
MONOCYTES NFR BLD AUTO: 7.5 %
MONOCYTES NFR BLD AUTO: 7.6 %
NEUTROPHILS # BLD AUTO: 10.9 10E9/L (ref 1.6–8.3)
NEUTROPHILS # BLD AUTO: 11.5 10E9/L (ref 1.6–8.3)
NEUTROPHILS NFR BLD AUTO: 77.5 %
NEUTROPHILS NFR BLD AUTO: 78.6 %
NITRATE UR QL: NEGATIVE
NRBC # BLD AUTO: 0 10*3/UL
NRBC # BLD AUTO: 0 10*3/UL
NRBC BLD AUTO-RTO: 0 /100
NRBC BLD AUTO-RTO: 0 /100
PH UR STRIP: 5 PH (ref 4.7–8)
PLATELET # BLD AUTO: 191 10E9/L (ref 150–450)
PLATELET # BLD AUTO: 213 10E9/L (ref 150–450)
POTASSIUM SERPL-SCNC: 4.3 MMOL/L (ref 3.4–5.3)
PROT SERPL-MCNC: 6.6 G/DL (ref 6.8–8.8)
RBC # BLD AUTO: 3.39 10E12/L (ref 3.8–5.2)
RBC # BLD AUTO: 3.74 10E12/L (ref 3.8–5.2)
SARS-COV-2 RNA RESP QL NAA+PROBE: NEGATIVE
SODIUM SERPL-SCNC: 132 MMOL/L (ref 133–144)
SOURCE: ABNORMAL
SP GR UR STRIP: 1.02 (ref 1–1.03)
SPECIMEN EXP DATE BLD: NORMAL
SPECIMEN SOURCE: NORMAL
TROPONIN I SERPL-MCNC: <0.015 UG/L (ref 0–0.04)
UROBILINOGEN UR STRIP-MCNC: NORMAL MG/DL (ref 0–2)
WBC # BLD AUTO: 14 10E9/L (ref 4–11)
WBC # BLD AUTO: 14.6 10E9/L (ref 4–11)

## 2021-06-13 PROCEDURE — 81003 URINALYSIS AUTO W/O SCOPE: CPT | Performed by: NURSE PRACTITIONER

## 2021-06-13 PROCEDURE — 99285 EMERGENCY DEPT VISIT HI MDM: CPT | Mod: 25

## 2021-06-13 PROCEDURE — 86900 BLOOD TYPING SEROLOGIC ABO: CPT | Performed by: NURSE PRACTITIONER

## 2021-06-13 PROCEDURE — 258N000003 HC RX IP 258 OP 636: Performed by: NURSE PRACTITIONER

## 2021-06-13 PROCEDURE — 84484 ASSAY OF TROPONIN QUANT: CPT | Performed by: NURSE PRACTITIONER

## 2021-06-13 PROCEDURE — 85025 COMPLETE CBC W/AUTO DIFF WBC: CPT | Performed by: NURSE PRACTITIONER

## 2021-06-13 PROCEDURE — 96360 HYDRATION IV INFUSION INIT: CPT | Mod: XU

## 2021-06-13 PROCEDURE — U0003 INFECTIOUS AGENT DETECTION BY NUCLEIC ACID (DNA OR RNA); SEVERE ACUTE RESPIRATORY SYNDROME CORONAVIRUS 2 (SARS-COV-2) (CORONAVIRUS DISEASE [COVID-19]), AMPLIFIED PROBE TECHNIQUE, MAKING USE OF HIGH THROUGHPUT TECHNOLOGIES AS DESCRIBED BY CMS-2020-01-R: HCPCS | Performed by: NURSE PRACTITIONER

## 2021-06-13 PROCEDURE — 250N000011 HC RX IP 250 OP 636: Performed by: NURSE PRACTITIONER

## 2021-06-13 PROCEDURE — 999N001017 HC STATISTIC GLUCOSE BY METER IP

## 2021-06-13 PROCEDURE — 99285 EMERGENCY DEPT VISIT HI MDM: CPT | Performed by: NURSE PRACTITIONER

## 2021-06-13 PROCEDURE — 73562 X-RAY EXAM OF KNEE 3: CPT | Mod: RT

## 2021-06-13 PROCEDURE — 71260 CT THORAX DX C+: CPT | Mod: MG

## 2021-06-13 PROCEDURE — 73070 X-RAY EXAM OF ELBOW: CPT | Mod: LT

## 2021-06-13 PROCEDURE — C9803 HOPD COVID-19 SPEC COLLECT: HCPCS

## 2021-06-13 PROCEDURE — 255N000002 HC RX 255 OP 636: Performed by: NURSE PRACTITIONER

## 2021-06-13 PROCEDURE — 80053 COMPREHEN METABOLIC PANEL: CPT | Performed by: NURSE PRACTITIONER

## 2021-06-13 PROCEDURE — 70450 CT HEAD/BRAIN W/O DYE: CPT | Mod: MG

## 2021-06-13 PROCEDURE — 73206 CT ANGIO UPR EXTRM W/O&W/DYE: CPT | Mod: LT,MG

## 2021-06-13 PROCEDURE — 90715 TDAP VACCINE 7 YRS/> IM: CPT | Performed by: NURSE PRACTITIONER

## 2021-06-13 PROCEDURE — 96361 HYDRATE IV INFUSION ADD-ON: CPT

## 2021-06-13 PROCEDURE — 72125 CT NECK SPINE W/O DYE: CPT | Mod: ME

## 2021-06-13 PROCEDURE — 85610 PROTHROMBIN TIME: CPT | Performed by: NURSE PRACTITIONER

## 2021-06-13 PROCEDURE — 93005 ELECTROCARDIOGRAM TRACING: CPT

## 2021-06-13 PROCEDURE — 86901 BLOOD TYPING SEROLOGIC RH(D): CPT | Performed by: NURSE PRACTITIONER

## 2021-06-13 PROCEDURE — 36415 COLL VENOUS BLD VENIPUNCTURE: CPT | Performed by: NURSE PRACTITIONER

## 2021-06-13 PROCEDURE — 90471 IMMUNIZATION ADMIN: CPT | Performed by: NURSE PRACTITIONER

## 2021-06-13 PROCEDURE — 86850 RBC ANTIBODY SCREEN: CPT | Performed by: NURSE PRACTITIONER

## 2021-06-13 PROCEDURE — U0005 INFEC AGEN DETEC AMPLI PROBE: HCPCS | Performed by: NURSE PRACTITIONER

## 2021-06-13 PROCEDURE — 93010 ELECTROCARDIOGRAM REPORT: CPT | Performed by: INTERNAL MEDICINE

## 2021-06-13 RX ORDER — IOPAMIDOL 755 MG/ML
100 INJECTION, SOLUTION INTRAVASCULAR ONCE
Status: COMPLETED | OUTPATIENT
Start: 2021-06-13 | End: 2021-06-13

## 2021-06-13 RX ORDER — IOPAMIDOL 612 MG/ML
100 INJECTION, SOLUTION INTRAVASCULAR ONCE
Status: COMPLETED | OUTPATIENT
Start: 2021-06-13 | End: 2021-06-13

## 2021-06-13 RX ORDER — SODIUM CHLORIDE 9 MG/ML
INJECTION, SOLUTION INTRAVENOUS CONTINUOUS
Status: DISCONTINUED | OUTPATIENT
Start: 2021-06-13 | End: 2021-06-13 | Stop reason: HOSPADM

## 2021-06-13 RX ADMIN — SODIUM CHLORIDE: 9 INJECTION, SOLUTION INTRAVENOUS at 13:00

## 2021-06-13 RX ADMIN — CLOSTRIDIUM TETANI TOXOID ANTIGEN (FORMALDEHYDE INACTIVATED), CORYNEBACTERIUM DIPHTHERIAE TOXOID ANTIGEN (FORMALDEHYDE INACTIVATED), BORDETELLA PERTUSSIS TOXOID ANTIGEN (GLUTARALDEHYDE INACTIVATED), BORDETELLA PERTUSSIS FILAMENTOUS HEMAGGLUTININ ANTIGEN (FORMALDEHYDE INACTIVATED), BORDETELLA PERTUSSIS PERTACTIN ANTIGEN, AND BORDETELLA PERTUSSIS FIMBRIAE 2/3 ANTIGEN 0.5 ML: 5; 2; 2.5; 5; 3; 5 INJECTION, SUSPENSION INTRAMUSCULAR at 15:37

## 2021-06-13 RX ADMIN — SODIUM CHLORIDE 500 ML: 9 INJECTION, SOLUTION INTRAVENOUS at 14:47

## 2021-06-13 RX ADMIN — SODIUM CHLORIDE 500 ML: 9 INJECTION, SOLUTION INTRAVENOUS at 12:06

## 2021-06-13 RX ADMIN — IOPAMIDOL 100 ML: 612 INJECTION, SOLUTION INTRAVENOUS at 13:41

## 2021-06-13 RX ADMIN — IOPAMIDOL 100 ML: 755 INJECTION, SOLUTION INTRAVENOUS at 17:45

## 2021-06-13 ASSESSMENT — ENCOUNTER SYMPTOMS
APPETITE CHANGE: 0
DIARRHEA: 0
DYSURIA: 0
ABDOMINAL PAIN: 0
WEAKNESS: 0
DIZZINESS: 0
NAUSEA: 0
DIFFICULTY URINATING: 0
CHILLS: 0
BLOOD IN STOOL: 0
FEVER: 0
ARTHRALGIAS: 1
HEADACHES: 0
PALPITATIONS: 0
NUMBNESS: 0
FATIGUE: 0
HEMATURIA: 0
JOINT SWELLING: 1
COUGH: 0
SHORTNESS OF BREATH: 0
COLOR CHANGE: 1
VOMITING: 0
LIGHT-HEADEDNESS: 0
FREQUENCY: 0
WOUND: 0
NECK PAIN: 0

## 2021-06-13 NOTE — ED NOTES
CTA of left arm done. CMS remains intact in left arm. Radial pulse present. Pain at tolerable level.

## 2021-06-13 NOTE — ED NOTES
Bed: ED02  Expected date:   Expected time:   Means of arrival:   Comments:  HIB- Fe fell standing position

## 2021-06-13 NOTE — ED PROVIDER NOTES
"  History     Chief Complaint   Patient presents with     Fall     HPI  Annie Arguello is a 87 year old female who presents on stretcher via EMS from [facility] for evaluation of left elbow after fall. Yesterday morning she got out of the shower and was sitting on the toilet stool but when she got up there was a wet spot and she slipped and fell on the floor - she twisted her right knee, bumped her left elbow. She denies hitting her head. Staff assisted her up. Today she was going into the bathroom, tried to back up, lost her balance and fell onto the floor and bumped her left elbow. She denies hitting her head. Staff assisted her to seated position on chair and called the ambulance. She currently denies any pain, lightheaded, dizziness, chest pain, back pain - \"I don't feel any different.\"     Allergies:  Allergies   Allergen Reactions     Ampicillin      Desvenlafaxine Other (See Comments)     Desvenlafaxine Succinate  Pristiq       Sertraline Hcl Other (See Comments) and Diarrhea     Zoloft       Sulfa Drugs Other (See Comments)     Sulfa(Sulfonamide Antibiotics)       Problem List:    Patient Active Problem List    Diagnosis Date Noted     Hypotension due to hypovolemia 07/02/2019     Priority: Medium     Generalized muscle weakness 07/02/2019     Priority: Medium     Sepsis (H) 06/29/2019     Priority: Medium     Lung nodule 10/30/2018     Priority: Medium     Controlled type 2 diabetes mellitus without complication, without long-term current use of insulin (H) 10/24/2017     Priority: Medium     Permanent atrial fibrillation (H) 10/24/2017     Priority: Medium     ACP (advance care planning) 01/04/2017     Priority: Medium     Advance Care Planning 1/4/2017: ACP Review of Chart / Resources Provided:  Reviewed chart for advance care plan.  Annie Arguello has no plan or code status on file. Discussed available resources and provided with information. Confirmed code status reflects current choices pending " further ACP discussions.  Confirmed/documented legally designated decision makers.  Added by Kenzie Mancilla           Advanced care planning/counseling discussion 2012     Priority: Medium     Atrial fibrillation with RVR (H) 2012     Priority: Medium     Myalgia and myositis 05/10/2011     Priority: Medium     Problem list name updated by automated process. Provider to review       Diverticulosis of large intestine 2011     Priority: Medium     Problem list name updated by automated process. Provider to review       Osteoporosis 2011     Priority: Medium     Problem list name updated by automated process. Provider to review       Obesity 2011     Priority: Medium     Problem list name updated by automated process. Provider to review       Congenital cystic kidney disease 2011     Priority: Medium     Problem list name updated by automated process. Provider to review       Sciatica 2007     Priority: Medium     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 2007     Priority: Medium     Calculus of kidney 2003     Priority: Medium        Past Medical History:    Past Medical History:   Diagnosis Date     Atrial fibrillation (H) 2012     Calculus of kidney 2003     Controlled type 2 diabetes mellitus without complication, without long-term current use of insulin (H) 10/24/2017     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 2007     Diverticulosis 2011     Fibromyalgia 05/10/2011     Nonallopathic lesions of cervical region, not else 2001     Obesity 2011     Osteoporosis 2011     Other malaise and fatigue 2002     Renal cyst 2011     Sciatica 2007       Past Surgical History:    Past Surgical History:   Procedure Laterality Date     Breast biopsy      LT, benign      SECTION       CHOLECYSTECTOMY      lap     COLONOSCOPY       LUCILLE / BSO         Family History:    Family History    Problem Relation Age of Onset     Myocardial Infarction Mother 59        myocardial infarction, cause of death     Other - See Comments Daughter         fibromyalgia     Thyroid Disease Sister         hypo     Thyroid Disease Sister         hypo     Thyroid Disease Sister         hypo     Myocardial Infarction Brother 63        myocardial infarction, cause of death       Social History:  Marital Status:   [5]  Social History     Tobacco Use     Smoking status: Never Smoker     Smokeless tobacco: Never Used     Tobacco comment: no passive exposure   Substance Use Topics     Alcohol use: No     Drug use: No        Medications:    alendronate (FOSAMAX) 70 MG tablet  ASPIRIN LOW DOSE 81 MG chewable tablet  atorvastatin (LIPITOR) 40 MG tablet  chlorthalidone (HYGROTON) 25 MG tablet  DULoxetine (CYMBALTA) 20 MG capsule  ELIQUIS ANTICOAGULANT 5 MG tablet  fluticasone (FLONASE) 50 MCG/ACT nasal spray  lisinopril (ZESTRIL) 2.5 MG tablet  metoprolol tartrate (LOPRESSOR) 25 MG tablet  omeprazole (PRILOSEC) 20 MG DR capsule  potassium chloride ER (KLOR-CON M) 10 MEQ CR tablet  acetaminophen (TYLENOL) 325 MG tablet  alcohol swab prep pads  Alcohol Swabs (EASY TOUCH ALCOHOL PREP MEDIUM) 70 % PADS  Assure [place] Lancets MISC  blood glucose (NO BRAND SPECIFIED) test strip  Blood Glucose Monitoring Suppl (GLUCOCARD VITAL MONITOR) w/Device KIT  diphenhydrAMINE HCl (BENADRYL PO)  GLUCOCARD VITAL TEST test strip  Multiple Vitamins-Minerals (TAB-A-MARIUSZ) TABS  polyethylene glycol-propylene glycol (SYSTANE) 0.4-0.3 % SOLN ophthalmic solution  SB BISMUTH 262 MG TABS          Review of Systems   Constitutional: Negative for appetite change, chills, fatigue and fever.   HENT: Negative for nosebleeds.    Eyes: Negative for visual disturbance.   Respiratory: Negative for cough and shortness of breath.    Cardiovascular: Negative for chest pain, palpitations and leg swelling.   Gastrointestinal: Negative for abdominal pain, blood in  stool, diarrhea, nausea and vomiting.   Genitourinary: Negative for difficulty urinating, dysuria, frequency and hematuria.   Musculoskeletal: Positive for arthralgias (right knee) and joint swelling (left elbow). Negative for neck pain.   Skin: Positive for color change (left buttock, left elbow - ecchymosis). Negative for rash and wound.   Neurological: Negative for dizziness, syncope, weakness, light-headedness, numbness and headaches.       Physical Exam   BP: 91/71  Pulse: 113  Temp: 97.6  F (36.4  C)  Resp: 18  Weight: 68 kg (150 lb)  SpO2: 96 %      Physical Exam  Constitutional:       General: She is not in acute distress.     Appearance: Normal appearance. She is not ill-appearing or toxic-appearing.   HENT:      Head: Normocephalic and atraumatic. No abrasion, contusion or laceration.      Right Ear: Tympanic membrane, ear canal and external ear normal.      Left Ear: Tympanic membrane, ear canal and external ear normal.      Nose: Nose normal.      Mouth/Throat:      Lips: Pink.      Mouth: Mucous membranes are dry.      Pharynx: Oropharynx is clear. Uvula midline.   Eyes:      General: Lids are normal.      Extraocular Movements: Extraocular movements intact.      Conjunctiva/sclera: Conjunctivae normal.   Neck:      Musculoskeletal: Full passive range of motion without pain. No spinous process tenderness or muscular tenderness.   Cardiovascular:      Rate and Rhythm: Tachycardia present. Rhythm irregularly irregular.      Heart sounds: No murmur. No friction rub. No gallop.    Pulmonary:      Effort: Pulmonary effort is normal.      Breath sounds: Normal breath sounds. No wheezing, rhonchi or rales.   Chest:      Chest wall: No swelling or tenderness.      Comments: No ecchymosis or erythema   Abdominal:      General: Abdomen is flat. Bowel sounds are normal.      Palpations: Abdomen is soft.      Tenderness: There is no abdominal tenderness. There is no guarding or rebound.   Musculoskeletal:      Left  elbow: She exhibits swelling.      Right hip: She exhibits normal range of motion and no tenderness.      Left hip: She exhibits normal range of motion and no tenderness.      Right knee: She exhibits normal range of motion and no swelling. Tenderness found.      Cervical back: She exhibits no tenderness.      Thoracic back: She exhibits no tenderness.      Lumbar back: She exhibits no tenderness.      Right lower leg: No edema.      Left lower leg: No edema.   Skin:     General: Skin is warm and dry.      Capillary Refill: Capillary refill takes less than 2 seconds.      Findings: Ecchymosis (see photos below) present.   Neurological:      Mental Status: She is alert and oriented to person, place, and time.      GCS: GCS eye subscore is 4. GCS verbal subscore is 5. GCS motor subscore is 6.      Cranial Nerves: Cranial nerves are intact.   Psychiatric:         Mood and Affect: Mood normal.         Speech: Speech normal.         Behavior: Behavior normal. Behavior is cooperative.              Left buttock/hip      Left elbow      Left elbow      Left elbow        ED Course     ED Course as of Jun 13 1932   Sun Jun 13, 2021   1424 Re-evaluated. Denies pain to left elbow. Swelling appears to be extending more distally. Nursing reports blood pressures trending lower than at time of arrival. Plan to re-check hemoglobin.   Silvia Morales CNP on 6/13/2021 at 2:25 PM        [address] [facility] - no beds at this time, possibly after 3 pm.  Silvia Morales CNP on 6/13/2021 at 2:43 PM        1444 [facility] on divert at this time  Silvia Morales CNP on 6/13/2021 at 2:45 PM        [facility] now has bed availability to C unit in about 1 hour. Consulted with Dr. Montesinos, hospitalist, regarding HPI, exam, labs, VS, imaging results. Interventional radiologist added to consult line - plan for CTA LUE given wait for bed and will call with positive results.   Plan for transfer to [facility]. Patient in  agreement with this plan.  Silvia Morales CNP on 6/13/2021 at 3:23 PM          Procedures               EKG Interpretation:      Interpreted by Silvia Morales CNP  Time reviewed: 1152  Symptoms at time of EKG: fall   Rhythm: atrial fibrillation  Rate: tachycardia - 117  Axis: Left Axis Deviation  Conduction: Normal QRS 90 ms, normal  ms, normal  ms  ST Segments/ T Waves: no acute ST-T wave changes  Q Waves: none  Comparison to prior: Unchanged from 2/2021         Results for orders placed or performed during the hospital encounter of 06/13/21 (from the past 24 hour(s))   Glucose by meter   Result Value Ref Range    Glucose 303 (H) 70 - 99 mg/dL   CBC with platelets differential   Result Value Ref Range    WBC 14.0 (H) 4.0 - 11.0 10e9/L    RBC Count 3.74 (L) 3.8 - 5.2 10e12/L    Hemoglobin 10.9 (L) 11.7 - 15.7 g/dL    Hematocrit 32.7 (L) 35.0 - 47.0 %    MCV 87 78 - 100 fl    MCH 29.1 26.5 - 33.0 pg    MCHC 33.3 31.5 - 36.5 g/dL    RDW 14.0 10.0 - 15.0 %    Platelet Count 213 150 - 450 10e9/L    Diff Method Automated Method     % Neutrophils 77.5 %    % Lymphocytes 12.6 %    % Monocytes 7.6 %    % Eosinophils 1.0 %    % Basophils 0.4 %    % Immature Granulocytes 0.9 %    Nucleated RBCs 0 0 /100    Absolute Neutrophil 10.9 (H) 1.6 - 8.3 10e9/L    Absolute Lymphocytes 1.8 0.8 - 5.3 10e9/L    Absolute Monocytes 1.1 0.0 - 1.3 10e9/L    Absolute Eosinophils 0.1 0.0 - 0.7 10e9/L    Absolute Basophils 0.1 0.0 - 0.2 10e9/L    Abs Immature Granulocytes 0.1 0 - 0.4 10e9/L    Absolute Nucleated RBC 0.0    Comprehensive metabolic panel   Result Value Ref Range    Sodium 132 (L) 133 - 144 mmol/L    Potassium 4.3 3.4 - 5.3 mmol/L    Chloride 101 94 - 109 mmol/L    Carbon Dioxide 22 20 - 32 mmol/L    Anion Gap 9 3 - 14 mmol/L    Glucose 300 (H) 70 - 99 mg/dL    Urea Nitrogen 30 7 - 30 mg/dL    Creatinine 0.86 0.52 - 1.04 mg/dL    GFR Estimate 61 >60 mL/min/[1.73_m2]    GFR Estimate If Black 71 >60  mL/min/[1.73_m2]    Calcium 8.5 8.5 - 10.1 mg/dL    Bilirubin Total 1.0 0.2 - 1.3 mg/dL    Albumin 2.5 (L) 3.4 - 5.0 g/dL    Protein Total 6.6 (L) 6.8 - 8.8 g/dL    Alkaline Phosphatase 77 40 - 150 U/L    ALT 25 0 - 50 U/L    AST 47 (H) 0 - 45 U/L   Troponin I   Result Value Ref Range    Troponin I ES <0.015 0.000 - 0.045 ug/L   UA reflex to Microscopic and Culture    Specimen: Catheterized Urine   Result Value Ref Range    Color Urine Yellow     Appearance Urine Clear     Glucose Urine 300 (A) NEG^Negative mg/dL    Bilirubin Urine Negative NEG^Negative    Ketones Urine Negative NEG^Negative mg/dL    Specific Gravity Urine 1.020 1.003 - 1.035    Blood Urine Negative NEG^Negative    pH Urine 5.0 4.7 - 8.0 pH    Protein Albumin Urine Negative NEG^Negative mg/dL    Urobilinogen mg/dL Normal 0.0 - 2.0 mg/dL    Nitrite Urine Negative NEG^Negative    Leukocyte Esterase Urine Negative NEG^Negative    Source Catheterized Urine    Elbow XR,  2 views, left    Narrative    PROCEDURE:  XR ELBOW LT 2 VW    HISTORY: fall, hematoma.    COMPARISON:  None.    TECHNIQUE:  2 views left elbow.    FINDINGS:  No fracture or dislocation is identified. Subcutaneous  swelling and probable hemorrhage is present over the distal left  humerus and olecranon fossa. No foreign body is seen.       Impression    IMPRESSION: No acute fracture.      AL HERNANDEZ MD   XR Knee Right 3 Views    Narrative    PROCEDURE:  XR KNEE RIGHT 3 VIEWS    HISTORY: fall, pain.    COMPARISON:  None.    TECHNIQUE:  3 views right knee.    FINDINGS:  No fracture or dislocation is identified. There is  tricompartmental osteoarthritis of the right knee. There is moderate  medial joint space narrowing. No foreign body is seen.       Impression    IMPRESSION: Tricompartmental osteoarthritis without evidence of acute  fracture.      AL HERNANDEZ MD   Head CT w/o contrast    Narrative    PROCEDURE: CT HEAD W/O CONTRAST     HISTORY: Trauma - Head Injury; fall, on  eliquis.    COMPARISON: 2/9/2021    TECHNIQUE:  Helical images of the head from the foramen magnum to the  vertex were obtained without contrast.    FINDINGS: The ventricles and sulci are unchanged. No acute  intracranial hemorrhage, mass effect, midline shift, hydrocephalus or  basilar cystern effacement are present.    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    The calvarium is intact. The mastoid air cells are clear.  The  visualized paranasal sinuses are clear.      Impression    IMPRESSION: No acute intracranial hemorrhage. Unchanged CT appearance  of the head.      AL HERNANDEZ MD   Cervical spine CT w/o contrast    Narrative    PROCEDURE: CT CERVICAL SPINE W/O CONTRAST     HISTORY: Trauma - C-Spine Injury; Neck trauma (Age >= 65y); fall,  fall.    TECHNIQUE: Helical noncontrast CT images of the cervical spine.    COMPARISON: 2/9/2021.    FINDINGS:     No acute fracture is identified. The vertebral bodies are normal in  height. The cervical lordosis is unchanged. The C1-2 articulation and  the craniocervical junction are intact. Diffuse degenerative changes  are chronic.     The paravertebral soft tissues are stable. The lung apices are clear.      Impression    IMPRESSION: No evidence of acute cervical spine fracture.    AL HERNANDEZ MD   CT Chest/Abdomen/Pelvis w Contrast    Narrative    PROCEDURE: CT CHEST/ABDOMEN/PELVIS W CONTRAST    HISTORY: Trauma - Chest, Abdomen, and Pelvis Injury; fall, hematoma  left hip, on eliquis    COMPARISON: 6/29/2019.    TECHNIQUE:  Initial pre-contrast  and localizer images were  obtained.  Contrast enhanced helical CT of the chest, abdomen and  pelvis was performed.  Routine transaxial and post-processed  (multiplanar and/or MIP) reformations were obtained.    MEDS/CONTRAST: 100 mL Isovue 300    FINDINGS:   CHEST:    No periaortic or mediastinal hematoma is seen. No evidence of  acute  aortic injury is seen. The heart is enlarged. No pericardial effusion  is present. No abnormal lymphadenopathy is present. The lungs and  pleural spaces are notable for mild subpleural fibrosis. There is a  small hiatal hernia.    The gallbladder is surgically absent, possibly accounting for mild  intra and extrahepatic biliary ductal dilatation. Spleen, pancreas,  kidneys and adrenal glands are notable for multiple renal cysts and  patchy renal cortical scarring. No evidence of solid organ laceration  or hematoma is seen.     The bladder is unremarkable.  There are no abnormally dilated loops of  bowel. Extensive diverticular disease is noted. No abnormal  lymphadenopathy is present.     Subcutaneous hemorrhage is present in the subcutaneous tissues  overlying the left proximal femoral greater trochanter.    No acute fracture or suspicious osseous lesion is seen.      Impression    IMPRESSION:     Hematoma formation in the subcutaneous tissues overlying the lateral  aspect of the left hip/greater trochanter. No evidence of acute  fracture.    AL HERNANDEZ MD   CBC with platelets differential   Result Value Ref Range    WBC 14.6 (H) 4.0 - 11.0 10e9/L    RBC Count 3.39 (L) 3.8 - 5.2 10e12/L    Hemoglobin 9.8 (L) 11.7 - 15.7 g/dL    Hematocrit 29.8 (L) 35.0 - 47.0 %    MCV 88 78 - 100 fl    MCH 28.9 26.5 - 33.0 pg    MCHC 32.9 31.5 - 36.5 g/dL    RDW 14.1 10.0 - 15.0 %    Platelet Count 191 150 - 450 10e9/L    Diff Method Automated Method     % Neutrophils 78.6 %    % Lymphocytes 12.0 %    % Monocytes 7.5 %    % Eosinophils 0.9 %    % Basophils 0.3 %    % Immature Granulocytes 0.7 %    Nucleated RBCs 0 0 /100    Absolute Neutrophil 11.5 (H) 1.6 - 8.3 10e9/L    Absolute Lymphocytes 1.8 0.8 - 5.3 10e9/L    Absolute Monocytes 1.1 0.0 - 1.3 10e9/L    Absolute Eosinophils 0.1 0.0 - 0.7 10e9/L    Absolute Basophils 0.0 0.0 - 0.2 10e9/L    Abs Immature Granulocytes 0.1 0 - 0.4 10e9/L    Absolute Nucleated RBC  0.0    INR   Result Value Ref Range    INR 1.51 (H) 0.86 - 1.14   ABO/Rh type and screen   Result Value Ref Range    ABO B     RH(D) Pos     Antibody Screen Neg     Test Valid Only At [facility]        Specimen Expires 06/16/2021    Asymptomatic SARS-CoV-2 COVID-19 Virus (Coronavirus) by PCR    Specimen: Nasopharyngeal   Result Value Ref Range    SARS-CoV-2 Virus Specimen Source Nasopharyngeal     SARS-CoV-2 PCR Result NEGATIVE     SARS-CoV-2 PCR Comment       Testing was performed using the Xpert Xpress SARS-CoV-2 Assay on the Cepheid Gene-Xpert   Instrument Systems. Additional information about this Emergency Use Authorization (EUA)   assay can be found via the Lab Guide.     CTA Upper Extremity Left Runoff with Contrast    Narrative    CTA UPPER EXTREMITY LEFT RUNOFF WITH CONTRAST    HISTORY: fall, expanding hematoma, ?arterial bleed .    COMPARISON: 6/13/2021.    TECHNIQUE: Helical postcontrast CT images of the left upper extremity  were performed with angiographic timing. Source, 3D and MIP  reconstructions were reviewed.    FINDINGS:    No acute fracture is identified.    Extensive subcutaneous swelling and hemorrhage is present centered on  the olecranon fossa.     No evidence of active extravasation is seen. No pseudoaneurysm is  identified. Compartment syndrome is a clinical diagnosis and cannot be  excluded with imaging.      Impression    IMPRESSION:     No evidence of arterial contrast extravasation or pseudoaneurysm.     AL HERNANDEZ MD       Medications   0.9% sodium chloride BOLUS (0 mLs Intravenous Stopped 6/13/21 1300)     Followed by   sodium chloride 0.9% infusion ( Intravenous New Bag 6/13/21 1300)   iopamidol (ISOVUE-300) IV solution 61% 100 mL (100 mLs Intravenous Given 6/13/21 1341)   sodium chloride (PF) 0.9% PF flush 60 mL (50 mLs Intravenous Given 6/13/21 1340)   0.9% sodium chloride BOLUS (0 mLs Intravenous Stopped 6/13/21 1619)   Tdap (tetanus-diphtheria-acell  pertussis) (ADACEL) injection 0.5 mL (0.5 mLs Intramuscular Given 6/13/21 1537)   iopamidol (ISOVUE-370) solution 100 mL (100 mLs Intravenous Given 6/13/21 1745)   sodium chloride (PF) 0.9% PF flush 100 mL (50 mLs Intravenous Given 6/13/21 1746)       Assessments & Plan (with Medical Decision Making)     I have reviewed the nursing notes.    I have reviewed the findings, diagnosis, plan and need for follow up with the patient.  (W19.XXXA) Fall, initial encounter  (primary encounter diagnosis)  (S40.022A) Traumatic hematoma of left upper arm, initial encounter  (S70.02XA) Traumatic hematoma of left hip, initial encounter  (Z79.01) Chronic anticoagulation  Pleasant 87-year old female presents via EMS from [facility] in no acute distress for evaluation of left elbow injury after fall today and yesterday. She is alert and oriented to person, place, time and situation. She denies hitting her head or LOC. She had no chest pain, lightheadedness, dizziness, headache, dyspnea prior to fall and states both falls were purely mechanical. She surprisingly denies any pain while in the ED including headache, chest pain, abdominal pain, pain to left elbow, hip pain. Given history of fall x 2, chronic anticoagulation therapy imaging of head, neck, chest, abdomen/pelvis obtained as well as left arm and right knee where she reported pain after falls. No acute findings on imaging. She does have significant hematoma of left elbow that expanded distally while in the ED as well as a drop in her hemoglobin from 10.9 upon arrival to 9.8 about 2 hours after arrival when trend in lower blood pressures was noted. She has no history of anemia and hemoglobin 2/9/2021 was normal at 13.4. She has had no other signs of bleeding including epistaxis, hematuria, melena or hematochezia. ED MD did POC US showing concern for extravasation but uncertain arterial versus venous. Given expanding hematoma, hypotension, drop in hemoglobin and  POC results CTA LUE ordered - this did not show any acute arterial concerns.   She has chronic atrial fibrillation which is why she is on Eliquis - history of tachycardia. Rate improved after 500 ml bolus of IV fluid upon arrival.   Plan for transfer to [place] for interventional radiology and/or orthopedic consult given expanding hematoma with associated hypovolemia and hypotension. Patient accepted for transfer by Dr. Montesinos.    Silvia Morales CNP      Discharge Medication List as of 6/13/2021  6:34 PM          Final diagnoses:   Fall, initial encounter   Traumatic hematoma of left upper arm, initial encounter   Traumatic hematoma of left hip, initial encounter   Chronic anticoagulation       6/13/2021   [facility]     Silvia Morales CNP  06/13/21 1932

## 2021-06-13 NOTE — ED NOTES
Updated Meghann SIMMONS from [place]. States patients BP is normally 110s/80s. Updated Silvia PEPE.

## 2021-06-13 NOTE — ED TRIAGE NOTES
Fell from standing this AM. Hit left elbow. Left elbow does appear bruised and swollen. Baseball sized area of purple bruising to left hip also noted. Patient states she also slipped in shower yesterday and bumped right knee. Report mild tenderness with weight bearing. Pain at a tolerable level per patient. CMS intact. Denies hitting head. Is on Elliquis for atrial fib. Patient alert oriented x4. GCS 15. Denies being dizzy prior to fall, states she just slipped. Debbi Wood called prior to patient arrival. Updated Dr. Baez on patient report. Declines trauma activation prior to arrival.

## 2021-06-13 NOTE — ED NOTES
Updated Meghann SIMMONS at [facility] as well as patient's daughter Lavinia regarding transfer.

## 2021-06-13 NOTE — ED NOTES
Transfer discussed with patient, Debbi SIMMONS and daughter Lavinia. Verbalizes understanding. Report called to Maddy SIMMONS at [facility].

## 2021-06-13 NOTE — ED NOTES
Updated Silvia NP of lower BPs. Silvia NP at bedside to assess patient. Left elbow hematoma appears slightly larger than arrival. New lab orders.

## 2021-06-14 ENCOUNTER — TRANSFERRED RECORDS (OUTPATIENT)
Dept: HEALTH INFORMATION MANAGEMENT | Facility: CLINIC | Age: 86
End: 2021-06-14

## 2021-06-14 LAB — EJECTION FRACTION: NORMAL %

## 2021-06-17 ENCOUNTER — TELEPHONE (OUTPATIENT)
Dept: FAMILY MEDICINE | Facility: OTHER | Age: 86
End: 2021-06-17

## 2021-06-17 NOTE — TELEPHONE ENCOUNTER
8:32 AM    Reason for Call: OVERBOOK    Patient needs a hospital follow up for a fall with A-fib, hematoma and hyperglycemia. Patient is discharging 06/17/2021. Please call patient to schedule appt.    The patient is requesting an appointment for 7-10 days with Oleksandr Cox or any available provider.    Was an appointment offered for this call? Yes  If yes : Appointment type              Date 07/01/2021 [facility] declined the appt     Preferred method for responding to this message: Telephone Call  What is your phone number ? [phone]    If we cannot reach you directly, may we leave a detailed response at the number you provided? Yes    Can this message wait until your PCP/provider returns, if unavailable today? Not applicable, Provider is in today    Nichole Chan

## 2021-06-18 ENCOUNTER — MEDICAL CORRESPONDENCE (OUTPATIENT)
Dept: HEALTH INFORMATION MANAGEMENT | Facility: CLINIC | Age: 86
End: 2021-06-18

## 2021-06-18 NOTE — PROGRESS NOTES
"    Assessment & Plan     Anemia due to blood loss, acute    - CBC with platelets and differential    Contusion of left hip, subsequent encounter      Contusion of buttock, subsequent encounter      Contusion of left elbow, subsequent encounter      Patient has a history of atrial fibrillation and has been on chronic anticoagulation with Eliquis.  She fell had a traumatic hematoma left hip left arm no head injury.  She was evaluated and hospitalized down in [place].  She is feeling much better.  She is getting physical therapy.  For her arm will use ice and elevation over the scab can put some bacitracin.  She is now at [facility] getting physical therapy.  We had a discussion regarding continued use of the anticoagulant for her atrial fibrillation at this point she wants to continue.  She does know that if she were to fall and hit her head and injured her self like how she did on the elbow or the hip she could die from that.  Her daughter is here and was a part of this conversation.  We will check a CBC today and call patient with results.  She states she has no pain and compared to the pictures done initially in the ER the swelling is significantly better.  She did get some packed cells when she was in [place].  We will call  with the CBC today           BMI:   Estimated body mass index is 32.19 kg/m  as calculated from the following:    Height as of 2/9/21: 1.575 m (5' 2\").    Weight as of this encounter: 79.8 kg (176 lb).               DOC Cox MD  [facility] - ROCÍO Valencia is a 87 year old who presents for the following health issues  accompanied by her daughter:    [facility]       Hospital Follow-up Visit:    Hospital/Nursing Home/IP Rehab Facility: [facility]  Date of Admission: 6/14/21  Date of Discharge: 6/17/21  Reason(s) for Admission: Acute blood loss anemia      Was your hospitalization related to COVID-19? No   Problems taking medications regularly:  " None  Medication changes since discharge: See list  Problems adhering to non-medication therapy:  None    Summary of hospitalization:  [facility] discharge summary reviewed  Essentially a discharge summary reviewed  Diagnostic Tests/Treatments reviewed.  Follow up needed: CBC  Other Healthcare Providers Involved in Patient s Care:         Physical Therapy  Update since discharge: improved. Post Discharge Medication Reconciliation: discharge medications reconciled, continue medications without change.  Plan of care communicated with patient and family              Review of Systems   Constitutional, HEENT, cardiovascular, pulmonary, gi and gu systems are negative, except as otherwise noted.      Objective    /80   Pulse 100   Temp 98.1  F (36.7  C)   Resp 18   Wt 79.8 kg (176 lb)   SpO2 98%   BMI 32.19 kg/m    Body mass index is 32.19 kg/m .  Physical Exam   GENERAL: healthy, alert and no distress  NECK: no adenopathy, no asymmetry, masses, or scars and thyroid normal to palpation  RESP:breathing comfortably  CV:  peripheral pulses strong  ABDOMEN: soft, nontender, no hepatosplenomegaly, no masses and bowel sounds normal  MS: Distal to the left elbow has some soft tissue swelling its much better than what it was per pictures in the ER.  There is an scab over the extensor aspect of the elbow no sign of infection.  Left buttock has some ecchymosis and some swelling but the swelling is much improved.

## 2021-06-21 ENCOUNTER — MEDICAL CORRESPONDENCE (OUTPATIENT)
Dept: HEALTH INFORMATION MANAGEMENT | Facility: CLINIC | Age: 86
End: 2021-06-21

## 2021-06-22 ENCOUNTER — MEDICAL CORRESPONDENCE (OUTPATIENT)
Dept: HEALTH INFORMATION MANAGEMENT | Facility: CLINIC | Age: 86
End: 2021-06-22

## 2021-06-24 DIAGNOSIS — Z53.9 PERSONS ENCOUNTERING HEALTH SERVICES FOR SPECIFIC PROCEDURES, NOT CARRIED OUT: Primary | ICD-10-CM

## 2021-06-24 PROCEDURE — G0180 MD CERTIFICATION HHA PATIENT: HCPCS | Performed by: FAMILY MEDICINE

## 2021-06-28 DIAGNOSIS — M19.90 OSTEOARTHRITIS, UNSPECIFIED OSTEOARTHRITIS TYPE, UNSPECIFIED SITE: ICD-10-CM

## 2021-06-28 DIAGNOSIS — E78.2 MIXED HYPERLIPIDEMIA: ICD-10-CM

## 2021-06-29 RX ORDER — ATORVASTATIN CALCIUM 40 MG/1
TABLET, FILM COATED ORAL
Qty: 29 TABLET | Refills: 2 | Status: SHIPPED | OUTPATIENT
Start: 2021-06-29 | End: 2021-09-21

## 2021-06-30 RX ORDER — ALENDRONATE SODIUM 70 MG/1
TABLET ORAL
Qty: 4 TABLET | Refills: 0 | Status: SHIPPED | OUTPATIENT
Start: 2021-06-30 | End: 2021-07-27

## 2021-06-30 NOTE — TELEPHONE ENCOUNTER
FOSAMAX      Last Written Prescription Date:  6-3-2021  Last Fill Quantity: 4,   # refills: 0  Last Office Visit: 9-  Future Office visit:    Next 5 appointments (look out 90 days)    Jul 01, 2021  1:00 PM  (Arrive by 12:45 PM)  Office Visit with DOC Cox MD  [facility] ([facility] ) [address]  [place] [address]  [phone]           Routing refill request to provider for review/approval because:  Drug not on the [facility], [facility] or [facility] refill protocol or controlled substance

## 2021-07-01 ENCOUNTER — OFFICE VISIT (OUTPATIENT)
Dept: FAMILY MEDICINE | Facility: OTHER | Age: 86
End: 2021-07-01
Attending: FAMILY MEDICINE
Payer: MEDICARE

## 2021-07-01 VITALS
HEART RATE: 100 BPM | SYSTOLIC BLOOD PRESSURE: 108 MMHG | BODY MASS INDEX: 32.19 KG/M2 | TEMPERATURE: 98.1 F | DIASTOLIC BLOOD PRESSURE: 80 MMHG | OXYGEN SATURATION: 98 % | WEIGHT: 176 LBS | RESPIRATION RATE: 18 BRPM

## 2021-07-01 DIAGNOSIS — S50.02XD CONTUSION OF LEFT ELBOW, SUBSEQUENT ENCOUNTER: ICD-10-CM

## 2021-07-01 DIAGNOSIS — S70.02XD CONTUSION OF LEFT HIP, SUBSEQUENT ENCOUNTER: ICD-10-CM

## 2021-07-01 DIAGNOSIS — S30.0XXD CONTUSION OF BUTTOCK, SUBSEQUENT ENCOUNTER: ICD-10-CM

## 2021-07-01 DIAGNOSIS — D62 ANEMIA DUE TO BLOOD LOSS, ACUTE: Primary | ICD-10-CM

## 2021-07-01 LAB
BASOPHILS # BLD AUTO: 0.1 10E9/L (ref 0–0.2)
BASOPHILS NFR BLD AUTO: 0.7 %
DIFFERENTIAL METHOD BLD: ABNORMAL
EOSINOPHIL # BLD AUTO: 0.2 10E9/L (ref 0–0.7)
EOSINOPHIL NFR BLD AUTO: 2.5 %
ERYTHROCYTE [DISTWIDTH] IN BLOOD BY AUTOMATED COUNT: 17.6 % (ref 10–15)
HCT VFR BLD AUTO: 36 % (ref 35–47)
HGB BLD-MCNC: 11.6 G/DL (ref 11.7–15.7)
IMM GRANULOCYTES # BLD: 0.1 10E9/L (ref 0–0.4)
IMM GRANULOCYTES NFR BLD: 0.5 %
LYMPHOCYTES # BLD AUTO: 1.8 10E9/L (ref 0.8–5.3)
LYMPHOCYTES NFR BLD AUTO: 20 %
MCH RBC QN AUTO: 29.4 PG (ref 26.5–33)
MCHC RBC AUTO-ENTMCNC: 32.2 G/DL (ref 31.5–36.5)
MCV RBC AUTO: 91 FL (ref 78–100)
MONOCYTES # BLD AUTO: 0.8 10E9/L (ref 0–1.3)
MONOCYTES NFR BLD AUTO: 8.8 %
NEUTROPHILS # BLD AUTO: 6.2 10E9/L (ref 1.6–8.3)
NEUTROPHILS NFR BLD AUTO: 67.5 %
NRBC # BLD AUTO: 0 10*3/UL
NRBC BLD AUTO-RTO: 0 /100
PLATELET # BLD AUTO: 237 10E9/L (ref 150–450)
RBC # BLD AUTO: 3.94 10E12/L (ref 3.8–5.2)
WBC # BLD AUTO: 9.2 10E9/L (ref 4–11)

## 2021-07-01 PROCEDURE — 36415 COLL VENOUS BLD VENIPUNCTURE: CPT | Mod: ZL | Performed by: FAMILY MEDICINE

## 2021-07-01 PROCEDURE — 99214 OFFICE O/P EST MOD 30 MIN: CPT | Performed by: FAMILY MEDICINE

## 2021-07-01 PROCEDURE — G0463 HOSPITAL OUTPT CLINIC VISIT: HCPCS

## 2021-07-01 PROCEDURE — 85025 COMPLETE CBC W/AUTO DIFF WBC: CPT | Mod: ZL | Performed by: FAMILY MEDICINE

## 2021-07-01 ASSESSMENT — ANXIETY QUESTIONNAIRES
4. TROUBLE RELAXING: NOT AT ALL
GAD7 TOTAL SCORE: 0
1. FEELING NERVOUS, ANXIOUS, OR ON EDGE: NOT AT ALL
6. BECOMING EASILY ANNOYED OR IRRITABLE: NOT AT ALL
3. WORRYING TOO MUCH ABOUT DIFFERENT THINGS: NOT AT ALL
5. BEING SO RESTLESS THAT IT IS HARD TO SIT STILL: NOT AT ALL
7. FEELING AFRAID AS IF SOMETHING AWFUL MIGHT HAPPEN: NOT AT ALL
2. NOT BEING ABLE TO STOP OR CONTROL WORRYING: NOT AT ALL

## 2021-07-01 ASSESSMENT — PAIN SCALES - GENERAL: PAINLEVEL: NO PAIN (0)

## 2021-07-01 ASSESSMENT — PATIENT HEALTH QUESTIONNAIRE - PHQ9: SUM OF ALL RESPONSES TO PHQ QUESTIONS 1-9: 5

## 2021-07-01 NOTE — NURSING NOTE
"Chief Complaint   Patient presents with     Hospital F/U       Initial /80   Pulse 100   Temp 98.1  F (36.7  C)   Resp 18   Wt 79.8 kg (176 lb)   SpO2 98%   BMI 32.19 kg/m   Estimated body mass index is 32.19 kg/m  as calculated from the following:    Height as of 2/9/21: 1.575 m (5' 2\").    Weight as of this encounter: 79.8 kg (176 lb).  Medication Reconciliation: kalina Garcia  "

## 2021-07-02 ASSESSMENT — ANXIETY QUESTIONNAIRES: GAD7 TOTAL SCORE: 0

## 2021-07-09 ENCOUNTER — HOSPITAL ENCOUNTER (INPATIENT)
Facility: HOSPITAL | Age: 86
LOS: 4 days | Discharge: NURSING, REHAB OR LONG-TERM CARE | DRG: 291 | End: 2021-07-13
Attending: EMERGENCY MEDICINE | Admitting: INTERNAL MEDICINE
Payer: MEDICARE

## 2021-07-09 ENCOUNTER — APPOINTMENT (OUTPATIENT)
Dept: GENERAL RADIOLOGY | Facility: HOSPITAL | Age: 86
DRG: 291 | End: 2021-07-09
Attending: EMERGENCY MEDICINE
Payer: MEDICARE

## 2021-07-09 ENCOUNTER — HOSPITAL ENCOUNTER (EMERGENCY)
Dept: CARDIOLOGY | Facility: HOSPITAL | Age: 86
DRG: 291 | End: 2021-07-09
Attending: EMERGENCY MEDICINE
Payer: MEDICARE

## 2021-07-09 DIAGNOSIS — I50.9 ACUTE ON CHRONIC CONGESTIVE HEART FAILURE, UNSPECIFIED HEART FAILURE TYPE (H): ICD-10-CM

## 2021-07-09 DIAGNOSIS — I50.23 ACUTE ON CHRONIC SYSTOLIC CONGESTIVE HEART FAILURE (H): Primary | ICD-10-CM

## 2021-07-09 DIAGNOSIS — I50.9 CONGESTIVE HEART FAILURE, UNSPECIFIED HF CHRONICITY, UNSPECIFIED HEART FAILURE TYPE (H): ICD-10-CM

## 2021-07-09 DIAGNOSIS — I48.91 ATRIAL FIBRILLATION WITH RVR (H): ICD-10-CM

## 2021-07-09 LAB
ALBUMIN SERPL-MCNC: 3.2 G/DL (ref 3.4–5)
ALP SERPL-CCNC: 98 U/L (ref 40–150)
ALT SERPL W P-5'-P-CCNC: 29 U/L (ref 0–50)
ANION GAP SERPL CALCULATED.3IONS-SCNC: 5 MMOL/L (ref 3–14)
AST SERPL W P-5'-P-CCNC: 35 U/L (ref 0–45)
BASOPHILS # BLD AUTO: 0.1 10E9/L (ref 0–0.2)
BASOPHILS NFR BLD AUTO: 0.7 %
BILIRUB SERPL-MCNC: 1.7 MG/DL (ref 0.2–1.3)
BUN SERPL-MCNC: 23 MG/DL (ref 7–30)
CALCIUM SERPL-MCNC: 9 MG/DL (ref 8.5–10.1)
CHLORIDE SERPL-SCNC: 108 MMOL/L (ref 94–109)
CO2 SERPL-SCNC: 28 MMOL/L (ref 20–32)
CREAT SERPL-MCNC: 0.91 MG/DL (ref 0.52–1.04)
DIFFERENTIAL METHOD BLD: ABNORMAL
EOSINOPHIL # BLD AUTO: 0.1 10E9/L (ref 0–0.7)
EOSINOPHIL NFR BLD AUTO: 1.4 %
ERYTHROCYTE [DISTWIDTH] IN BLOOD BY AUTOMATED COUNT: 17.2 % (ref 10–15)
EST. AVERAGE GLUCOSE BLD GHB EST-MCNC: 148 MG/DL
GFR SERPL CREATININE-BSD FRML MDRD: 57 ML/MIN/{1.73_M2}
GLUCOSE BLDC GLUCOMTR-MCNC: 166 MG/DL (ref 70–99)
GLUCOSE BLDC GLUCOMTR-MCNC: 200 MG/DL (ref 70–99)
GLUCOSE SERPL-MCNC: 183 MG/DL (ref 70–99)
HBA1C MFR BLD: 6.8 % (ref 0–5.6)
HCT VFR BLD AUTO: 40.6 % (ref 35–47)
HGB BLD-MCNC: 12.7 G/DL (ref 11.7–15.7)
IMM GRANULOCYTES # BLD: 0 10E9/L (ref 0–0.4)
IMM GRANULOCYTES NFR BLD: 0.4 %
INR PPP: 1.71 (ref 0.86–1.14)
LABORATORY COMMENT REPORT: NORMAL
LVEF ECHO: NORMAL
LYMPHOCYTES # BLD AUTO: 1.8 10E9/L (ref 0.8–5.3)
LYMPHOCYTES NFR BLD AUTO: 18.2 %
MCH RBC QN AUTO: 28.9 PG (ref 26.5–33)
MCHC RBC AUTO-ENTMCNC: 31.3 G/DL (ref 31.5–36.5)
MCV RBC AUTO: 92 FL (ref 78–100)
MONOCYTES # BLD AUTO: 0.7 10E9/L (ref 0–1.3)
MONOCYTES NFR BLD AUTO: 7.3 %
NEUTROPHILS # BLD AUTO: 7.1 10E9/L (ref 1.6–8.3)
NEUTROPHILS NFR BLD AUTO: 72 %
NRBC # BLD AUTO: 0 10*3/UL
NRBC BLD AUTO-RTO: 0 /100
NT-PROBNP SERPL-MCNC: 6026 PG/ML (ref 0–1800)
PLATELET # BLD AUTO: 222 10E9/L (ref 150–450)
POTASSIUM SERPL-SCNC: 4 MMOL/L (ref 3.4–5.3)
PROT SERPL-MCNC: 7.4 G/DL (ref 6.8–8.8)
RBC # BLD AUTO: 4.4 10E12/L (ref 3.8–5.2)
SARS-COV-2 RNA RESP QL NAA+PROBE: NEGATIVE
SODIUM SERPL-SCNC: 141 MMOL/L (ref 133–144)
SPECIMEN SOURCE: NORMAL
TROPONIN I SERPL-MCNC: <0.015 UG/L (ref 0–0.04)
WBC # BLD AUTO: 9.8 10E9/L (ref 4–11)

## 2021-07-09 PROCEDURE — 999N001017 HC STATISTIC GLUCOSE BY METER IP

## 2021-07-09 PROCEDURE — 99223 1ST HOSP IP/OBS HIGH 75: CPT | Mod: AI | Performed by: INTERNAL MEDICINE

## 2021-07-09 PROCEDURE — 84484 ASSAY OF TROPONIN QUANT: CPT | Performed by: EMERGENCY MEDICINE

## 2021-07-09 PROCEDURE — 85025 COMPLETE CBC W/AUTO DIFF WBC: CPT | Performed by: EMERGENCY MEDICINE

## 2021-07-09 PROCEDURE — 80053 COMPREHEN METABOLIC PANEL: CPT | Performed by: EMERGENCY MEDICINE

## 2021-07-09 PROCEDURE — 93306 TTE W/DOPPLER COMPLETE: CPT

## 2021-07-09 PROCEDURE — 93306 TTE W/DOPPLER COMPLETE: CPT | Mod: 26 | Performed by: INTERNAL MEDICINE

## 2021-07-09 PROCEDURE — U0003 INFECTIOUS AGENT DETECTION BY NUCLEIC ACID (DNA OR RNA); SEVERE ACUTE RESPIRATORY SYNDROME CORONAVIRUS 2 (SARS-COV-2) (CORONAVIRUS DISEASE [COVID-19]), AMPLIFIED PROBE TECHNIQUE, MAKING USE OF HIGH THROUGHPUT TECHNOLOGIES AS DESCRIBED BY CMS-2020-01-R: HCPCS | Performed by: EMERGENCY MEDICINE

## 2021-07-09 PROCEDURE — 96374 THER/PROPH/DIAG INJ IV PUSH: CPT

## 2021-07-09 PROCEDURE — 999N001182 HC STATISTIC ESTIMATED AVERAGE GLUCOSE: Performed by: INTERNAL MEDICINE

## 2021-07-09 PROCEDURE — U0005 INFEC AGEN DETEC AMPLI PROBE: HCPCS | Performed by: EMERGENCY MEDICINE

## 2021-07-09 PROCEDURE — 36415 COLL VENOUS BLD VENIPUNCTURE: CPT | Performed by: INTERNAL MEDICINE

## 2021-07-09 PROCEDURE — 71046 X-RAY EXAM CHEST 2 VIEWS: CPT

## 2021-07-09 PROCEDURE — 85610 PROTHROMBIN TIME: CPT | Performed by: EMERGENCY MEDICINE

## 2021-07-09 PROCEDURE — 93010 ELECTROCARDIOGRAM REPORT: CPT | Performed by: INTERNAL MEDICINE

## 2021-07-09 PROCEDURE — 120N000001 HC R&B MED SURG/OB

## 2021-07-09 PROCEDURE — 83880 ASSAY OF NATRIURETIC PEPTIDE: CPT | Performed by: EMERGENCY MEDICINE

## 2021-07-09 PROCEDURE — 99285 EMERGENCY DEPT VISIT HI MDM: CPT | Performed by: EMERGENCY MEDICINE

## 2021-07-09 PROCEDURE — 250N000013 HC RX MED GY IP 250 OP 250 PS 637: Performed by: EMERGENCY MEDICINE

## 2021-07-09 PROCEDURE — C9803 HOPD COVID-19 SPEC COLLECT: HCPCS

## 2021-07-09 PROCEDURE — 250N000011 HC RX IP 250 OP 636: Performed by: INTERNAL MEDICINE

## 2021-07-09 PROCEDURE — 99285 EMERGENCY DEPT VISIT HI MDM: CPT | Mod: 25

## 2021-07-09 PROCEDURE — 250N000012 HC RX MED GY IP 250 OP 636 PS 637: Performed by: INTERNAL MEDICINE

## 2021-07-09 PROCEDURE — 93005 ELECTROCARDIOGRAM TRACING: CPT

## 2021-07-09 PROCEDURE — 250N000011 HC RX IP 250 OP 636: Performed by: EMERGENCY MEDICINE

## 2021-07-09 PROCEDURE — 250N000013 HC RX MED GY IP 250 OP 250 PS 637: Performed by: INTERNAL MEDICINE

## 2021-07-09 PROCEDURE — 83036 HEMOGLOBIN GLYCOSYLATED A1C: CPT | Performed by: INTERNAL MEDICINE

## 2021-07-09 PROCEDURE — 36415 COLL VENOUS BLD VENIPUNCTURE: CPT

## 2021-07-09 RX ORDER — VIT A/VIT C/VIT E/ZINC/COPPER 7160-113
1 TABLET, DELAYED RELEASE (ENTERIC COATED) ORAL 2 TIMES DAILY
COMMUNITY
Start: 2021-06-28

## 2021-07-09 RX ORDER — ONDANSETRON 4 MG/1
4 TABLET, ORALLY DISINTEGRATING ORAL EVERY 6 HOURS PRN
Status: DISCONTINUED | OUTPATIENT
Start: 2021-07-09 | End: 2021-07-13 | Stop reason: HOSPADM

## 2021-07-09 RX ORDER — FUROSEMIDE 10 MG/ML
20 INJECTION INTRAMUSCULAR; INTRAVENOUS ONCE
Status: COMPLETED | OUTPATIENT
Start: 2021-07-09 | End: 2021-07-09

## 2021-07-09 RX ORDER — NALOXONE HYDROCHLORIDE 0.4 MG/ML
0.2 INJECTION, SOLUTION INTRAMUSCULAR; INTRAVENOUS; SUBCUTANEOUS
Status: DISCONTINUED | OUTPATIENT
Start: 2021-07-09 | End: 2021-07-13 | Stop reason: HOSPADM

## 2021-07-09 RX ORDER — METOPROLOL TARTRATE 25 MG/1
25 TABLET, FILM COATED ORAL ONCE
Status: COMPLETED | OUTPATIENT
Start: 2021-07-09 | End: 2021-07-09

## 2021-07-09 RX ORDER — BISACODYL 5 MG
15 TABLET, DELAYED RELEASE (ENTERIC COATED) ORAL DAILY PRN
Status: DISCONTINUED | OUTPATIENT
Start: 2021-07-09 | End: 2021-07-13 | Stop reason: HOSPADM

## 2021-07-09 RX ORDER — ONDANSETRON 2 MG/ML
4 INJECTION INTRAMUSCULAR; INTRAVENOUS EVERY 6 HOURS PRN
Status: DISCONTINUED | OUTPATIENT
Start: 2021-07-09 | End: 2021-07-13 | Stop reason: HOSPADM

## 2021-07-09 RX ORDER — NICOTINE POLACRILEX 4 MG
15-30 LOZENGE BUCCAL
Status: DISCONTINUED | OUTPATIENT
Start: 2021-07-09 | End: 2021-07-13 | Stop reason: HOSPADM

## 2021-07-09 RX ORDER — DEXTROSE MONOHYDRATE 25 G/50ML
25-50 INJECTION, SOLUTION INTRAVENOUS
Status: DISCONTINUED | OUTPATIENT
Start: 2021-07-09 | End: 2021-07-13 | Stop reason: HOSPADM

## 2021-07-09 RX ORDER — ACETAMINOPHEN 325 MG/1
325 TABLET ORAL EVERY 6 HOURS PRN
Status: DISCONTINUED | OUTPATIENT
Start: 2021-07-09 | End: 2021-07-13 | Stop reason: HOSPADM

## 2021-07-09 RX ORDER — HYDROCODONE BITARTRATE AND ACETAMINOPHEN 5; 325 MG/1; MG/1
1-2 TABLET ORAL EVERY 4 HOURS PRN
Status: DISCONTINUED | OUTPATIENT
Start: 2021-07-09 | End: 2021-07-13 | Stop reason: HOSPADM

## 2021-07-09 RX ORDER — ATORVASTATIN CALCIUM 40 MG/1
40 TABLET, FILM COATED ORAL DAILY
Status: DISCONTINUED | OUTPATIENT
Start: 2021-07-10 | End: 2021-07-13 | Stop reason: HOSPADM

## 2021-07-09 RX ORDER — LISINOPRIL 2.5 MG/1
2.5 TABLET ORAL DAILY
Status: DISCONTINUED | OUTPATIENT
Start: 2021-07-09 | End: 2021-07-10

## 2021-07-09 RX ORDER — NALOXONE HYDROCHLORIDE 0.4 MG/ML
0.4 INJECTION, SOLUTION INTRAMUSCULAR; INTRAVENOUS; SUBCUTANEOUS
Status: DISCONTINUED | OUTPATIENT
Start: 2021-07-09 | End: 2021-07-13 | Stop reason: HOSPADM

## 2021-07-09 RX ORDER — ASPIRIN 81 MG/1
81 TABLET, CHEWABLE ORAL DAILY
Status: DISCONTINUED | OUTPATIENT
Start: 2021-07-09 | End: 2021-07-13 | Stop reason: HOSPADM

## 2021-07-09 RX ORDER — METOPROLOL TARTRATE 50 MG
50 TABLET ORAL 2 TIMES DAILY
Status: ON HOLD | COMMUNITY
End: 2021-07-13

## 2021-07-09 RX ORDER — POTASSIUM CHLORIDE 1500 MG/1
20 TABLET, EXTENDED RELEASE ORAL 2 TIMES DAILY
Status: DISCONTINUED | OUTPATIENT
Start: 2021-07-09 | End: 2021-07-10

## 2021-07-09 RX ORDER — BISACODYL 5 MG
5 TABLET, DELAYED RELEASE (ENTERIC COATED) ORAL DAILY PRN
Status: DISCONTINUED | OUTPATIENT
Start: 2021-07-09 | End: 2021-07-13 | Stop reason: HOSPADM

## 2021-07-09 RX ORDER — POLYETHYLENE GLYCOL 3350 17 G/17G
17 POWDER, FOR SOLUTION ORAL DAILY PRN
Status: DISCONTINUED | OUTPATIENT
Start: 2021-07-09 | End: 2021-07-13 | Stop reason: HOSPADM

## 2021-07-09 RX ORDER — FLUTICASONE PROPIONATE 50 MCG
1 SPRAY, SUSPENSION (ML) NASAL DAILY
Status: DISCONTINUED | OUTPATIENT
Start: 2021-07-10 | End: 2021-07-13 | Stop reason: HOSPADM

## 2021-07-09 RX ORDER — BISACODYL 5 MG
10 TABLET, DELAYED RELEASE (ENTERIC COATED) ORAL DAILY PRN
Status: DISCONTINUED | OUTPATIENT
Start: 2021-07-09 | End: 2021-07-13 | Stop reason: HOSPADM

## 2021-07-09 RX ORDER — DULOXETIN HYDROCHLORIDE 20 MG/1
20 CAPSULE, DELAYED RELEASE ORAL 2 TIMES DAILY
Status: DISCONTINUED | OUTPATIENT
Start: 2021-07-09 | End: 2021-07-13 | Stop reason: HOSPADM

## 2021-07-09 RX ORDER — LIDOCAINE 40 MG/G
CREAM TOPICAL
Status: DISCONTINUED | OUTPATIENT
Start: 2021-07-09 | End: 2021-07-13 | Stop reason: HOSPADM

## 2021-07-09 RX ADMIN — APIXABAN 5 MG: 2.5 TABLET, FILM COATED ORAL at 21:20

## 2021-07-09 RX ADMIN — ASPIRIN 81 MG: 81 TABLET, CHEWABLE ORAL at 15:41

## 2021-07-09 RX ADMIN — LISINOPRIL 2.5 MG: 2.5 TABLET ORAL at 16:31

## 2021-07-09 RX ADMIN — DULOXETINE HYDROCHLORIDE 20 MG: 20 CAPSULE, DELAYED RELEASE ORAL at 21:20

## 2021-07-09 RX ADMIN — INSULIN ASPART 1 UNITS: 100 INJECTION, SOLUTION INTRAVENOUS; SUBCUTANEOUS at 16:39

## 2021-07-09 RX ADMIN — POTASSIUM CHLORIDE 20 MEQ: 1500 TABLET, EXTENDED RELEASE ORAL at 21:20

## 2021-07-09 RX ADMIN — METOPROLOL TARTRATE 25 MG: 25 TABLET, FILM COATED ORAL at 15:42

## 2021-07-09 RX ADMIN — FUROSEMIDE 20 MG: 10 INJECTION, SOLUTION INTRAMUSCULAR; INTRAVENOUS at 14:57

## 2021-07-09 RX ADMIN — METOPROLOL TARTRATE 25 MG: 25 TABLET, FILM COATED ORAL at 10:29

## 2021-07-09 RX ADMIN — METOPROLOL TARTRATE 75 MG: 50 TABLET, FILM COATED ORAL at 21:20

## 2021-07-09 RX ADMIN — FUROSEMIDE 20 MG: 10 INJECTION, SOLUTION INTRAMUSCULAR; INTRAVENOUS at 10:29

## 2021-07-09 ASSESSMENT — MIFFLIN-ST. JEOR: SCORE: 1175.25

## 2021-07-09 ASSESSMENT — ACTIVITIES OF DAILY LIVING (ADL)
ADLS_ACUITY_SCORE: 19
ADLS_ACUITY_SCORE: 19

## 2021-07-09 NOTE — ED NOTES
Vitals rechecked; po and IV meds given as charted.  covid swab sent.  US tech at bedside for echo.  Call light remains within reach.

## 2021-07-09 NOTE — ED NOTES
Patient resting on bed.  Patient was up and ambulatory on unit with pulse oximeter on.  Patient's sats remains mid 90's but patient became dyspnic with audible wheezing and noted that she felt very short of breath.  Patient back to bed and MD updated.  Plan to admit due to symptoms.

## 2021-07-09 NOTE — ED PROVIDER NOTES
History     Chief Complaint   Patient presents with     Shortness of Breath     HPI  Annie Arguello is a 87 year old female who presents for shortness of breath.  She notes that she was feeling more dyspneic since last evening.  History of atrial fibrillation, anticoagulated on Eliquis, type 2 diabetes.  She has felt more short of breath particularly with exertion but also at rest this morning.  She denies any cough.  She denies any fevers.  She notes no chest pain.  She denies any new leg swelling or pain.  Denies any abdominal pain nausea or vomiting.  No sick contacts.  Reports staff at her facility are giving her all of her medications as prescribed.    Allergies:  Allergies   Allergen Reactions     Ampicillin      Desvenlafaxine Other (See Comments)     Desvenlafaxine Succinate  Pristiq       Sertraline Hcl Other (See Comments) and Diarrhea     Zoloft       Sulfa Drugs Other (See Comments)     Sulfa(Sulfonamide Antibiotics)       Problem List:    Patient Active Problem List    Diagnosis Date Noted     Acute exacerbation of CHF (congestive heart failure) (H) 07/09/2021     Priority: Medium     Hypotension due to hypovolemia 07/02/2019     Priority: Medium     Generalized muscle weakness 07/02/2019     Priority: Medium     Sepsis (H) 06/29/2019     Priority: Medium     Lung nodule 10/30/2018     Priority: Medium     Controlled type 2 diabetes mellitus without complication, without long-term current use of insulin (H) 10/24/2017     Priority: Medium     Permanent atrial fibrillation (H) 10/24/2017     Priority: Medium     ACP (advance care planning) 01/04/2017     Priority: Medium     Advance Care Planning 1/4/2017: ACP Review of Chart / Resources Provided:  Reviewed chart for advance care plan.  Annie Arguello has no plan or code status on file. Discussed available resources and provided with information. Confirmed code status reflects current choices pending further ACP discussions.  Confirmed/documented  legally designated decision makers.  Added by Kenzie Mancilla           Advanced care planning/counseling discussion 11/02/2012     Priority: Medium     Atrial fibrillation with RVR (H) 09/06/2012     Priority: Medium     Myalgia and myositis 05/10/2011     Priority: Medium     Problem list name updated by automated process. Provider to review       Diverticulosis of large intestine 05/02/2011     Priority: Medium     Problem list name updated by automated process. Provider to review       Osteoporosis 05/02/2011     Priority: Medium     Problem list name updated by automated process. Provider to review       Obesity 05/02/2011     Priority: Medium     Problem list name updated by automated process. 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 Provider to review       Sciatica 06/26/2007     Priority: Medium     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 05/16/2007     Priority: Medium     Calculus of kidney 03/14/2003     Priority: Medium        Past Medical History:    Past Medical History:   Diagnosis Date     A-fib (H)      Acute hyperglycemia      Acute systolic heart failure (H) 06/17/2021     Acute systolic heart failure (H)      Anemia due to blood loss, acute 06/17/2021     Atrial fibrillation (H) 09/06/2012     Calculus of kidney 03/14/2003     Chronic anticoagulation 06/17/2021     Chronic anticoagulation      Controlled type 2 diabetes mellitus without complication, without long-term current use of insulin (H) 10/24/2017     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 05/16/2007     Diverticulosis 05/02/2011     Encounter for routine screening for malformation using ultrasonics 06/14/2021     Essential hypertension 06/17/2021     Fall 06/17/2021     Falls      Fibromyalgia 05/10/2011     Hematoma      Hemorrhagic shock (H) 06/17/2021     Hemorrhagic shock (H)      HTN (hypertension)       Hypotension      Hypotension, unspecified hypotension type 2021     Nonallopathic lesions of cervical region, not else 2001     Obesity 2011     Osteoporosis 2011     Other malaise and fatigue 2002     Renal cyst 2011     Sciatica 2007     Traumatic hematoma of hip, left, sequela      Traumatic hematoma of left upper arm        Past Surgical History:    Past Surgical History:   Procedure Laterality Date     Breast biopsy      LT, benign      SECTION       CHOLECYSTECTOMY      lap     COLONOSCOPY       LUCILLE / BSO         Family History:    Family History   Problem Relation Age of Onset     Myocardial Infarction Mother 59        myocardial infarction, cause of death     Other - See Comments Daughter         fibromyalgia     Thyroid Disease Sister         hypo     Thyroid Disease Sister         hypo     Thyroid Disease Sister         hypo     Myocardial Infarction Brother 63        myocardial infarction, cause of death       Social History:  Marital Status:   [5]  Social History     Tobacco Use     Smoking status: Never Smoker     Smokeless tobacco: Never Used     Tobacco comment: no passive exposure   Substance Use Topics     Alcohol use: No     Drug use: No        Medications:    acetaminophen (TYLENOL) 325 MG tablet  alcohol swab prep pads  Alcohol Swabs (EASY TOUCH ALCOHOL PREP MEDIUM) 70 % PADS  alendronate (FOSAMAX) 70 MG tablet  ASPIRIN LOW DOSE 81 MG chewable tablet  Assure [place] Lancets MISC  atorvastatin (LIPITOR) 40 MG tablet  blood glucose (NO BRAND SPECIFIED) test strip  Blood Glucose Monitoring Suppl (GLUCOCARD VITAL MONITOR) w/Device KIT  chlorthalidone (HYGROTON) 25 MG tablet  diphenhydrAMINE HCl (BENADRYL PO)  DULoxetine (CYMBALTA) 20 MG capsule  ELIQUIS ANTICOAGULANT 5 MG tablet  fluticasone (FLONASE) 50 MCG/ACT nasal spray  GLUCOCARD VITAL TEST test strip  lisinopril (ZESTRIL) 2.5 MG tablet  metoprolol tartrate (LOPRESSOR) 25 MG  tablet  Multiple Vitamins-Minerals (TAB-A-MARIUSZ) TABS  omeprazole (PRILOSEC) 20 MG DR capsule  polyethylene glycol-propylene glycol (SYSTANE) 0.4-0.3 % SOLN ophthalmic solution  potassium chloride ER (KLOR-CON M) 10 MEQ CR tablet  SB BISMUTH 262 MG TABS      Review of Systems   All systems reviewed and negative except as noted in HPI.    Physical Exam   BP: 132/96  Pulse: 111  Temp: 97.8  F (36.6  C)  Resp: 18  SpO2: 100 %  Constitutional: Obese chronically ill-appearing elderly female patient, laying in bed, no distress  HENT: Normocephalic, Atraumatic, Bilateral external ears normal. Neck Supple.  Eyes: EOMI, Conjunctiva normal.  Respiratory: Breathing comfortably on room air. Speaks full sentences easily. Lungs clear to ascultation.  Cardiovascular: Tachycardic heart rate, Irregular rhythm. Mild pitting lower extremity peripheral edema.  Abdomen: Soft, nontender  Musculoskeletal: Good range of motion in all major joints. No major deformities noted.  Integument: Warm, Dry.  Neurologic: Alert & awake, Normal motor function, Normal sensory function, No focal deficits noted.   Psychiatric: Cooperative. Mood and affect normal.    ED Course     Interpreted by Nitin Christianson MD  Time reviewed: 902 AM  Symptoms at time of EKG: Dyspnea   Rhythm: atrial fibrillation - rapid  Rate: 130-140  Axis: Left Axis Deviation  Ectopy: none  Conduction: normal  ST Segments/ T Waves: No ST-T wave changes  Q Waves: none  Comparison to prior: Unchanged from 6/13/21    Clinical Impression: Atrial fibrillation with RVR, no other changes from previous      Results for orders placed or performed during the hospital encounter of 07/09/21 (from the past 24 hour(s))   INR   Result Value Ref Range    INR 1.71 (H) 0.86 - 1.14   CBC with platelets differential   Result Value Ref Range    WBC 9.8 4.0 - 11.0 10e9/L    RBC Count 4.40 3.8 - 5.2 10e12/L    Hemoglobin 12.7 11.7 - 15.7 g/dL    Hematocrit 40.6 35.0 - 47.0 %    MCV 92 78 - 100 fl    MCH  28.9 26.5 - 33.0 pg    MCHC 31.3 (L) 31.5 - 36.5 g/dL    RDW 17.2 (H) 10.0 - 15.0 %    Platelet Count 222 150 - 450 10e9/L    Diff Method Automated Method     % Neutrophils 72.0 %    % Lymphocytes 18.2 %    % Monocytes 7.3 %    % Eosinophils 1.4 %    % Basophils 0.7 %    % Immature Granulocytes 0.4 %    Nucleated RBCs 0 0 /100    Absolute Neutrophil 7.1 1.6 - 8.3 10e9/L    Absolute Lymphocytes 1.8 0.8 - 5.3 10e9/L    Absolute Monocytes 0.7 0.0 - 1.3 10e9/L    Absolute Eosinophils 0.1 0.0 - 0.7 10e9/L    Absolute Basophils 0.1 0.0 - 0.2 10e9/L    Abs Immature Granulocytes 0.0 0 - 0.4 10e9/L    Absolute Nucleated RBC 0.0    Comprehensive metabolic panel   Result Value Ref Range    Sodium 141 133 - 144 mmol/L    Potassium 4.0 3.4 - 5.3 mmol/L    Chloride 108 94 - 109 mmol/L    Carbon Dioxide 28 20 - 32 mmol/L    Anion Gap 5 3 - 14 mmol/L    Glucose 183 (H) 70 - 99 mg/dL    Urea Nitrogen 23 7 - 30 mg/dL    Creatinine 0.91 0.52 - 1.04 mg/dL    GFR Estimate 57 (L) >60 mL/min/[1.73_m2]    GFR Estimate If Black 66 >60 mL/min/[1.73_m2]    Calcium 9.0 8.5 - 10.1 mg/dL    Bilirubin Total 1.7 (H) 0.2 - 1.3 mg/dL    Albumin 3.2 (L) 3.4 - 5.0 g/dL    Protein Total 7.4 6.8 - 8.8 g/dL    Alkaline Phosphatase 98 40 - 150 U/L    ALT 29 0 - 50 U/L    AST 35 0 - 45 U/L   NT pro BNP   Result Value Ref Range    N-Terminal Pro BNP Inpatient 6,026 (H) 0 - 1,800 pg/mL   Troponin I   Result Value Ref Range    Troponin I ES <0.015 0.000 - 0.045 ug/L   Chest XR,  PA & LAT    Narrative    PROCEDURE:  XR CHEST 2 VW    HISTORY:  Dyspnea.     COMPARISON:  February 9, 2021    FINDINGS:   The heart is enlarged. The pulmonary vasculature is normal.   Interstitial thickening is seen in both lungs which has worsened from  previous examination. There is mild blunting of the costophrenic  angles.      Impression    IMPRESSION:  Cardiomegaly, interstitial thickening and blunting of the  costophrenic angles suspicious for congestive heart failure.       ODILIA CARO MD         SYSTEM ID:  [id number]   Asymptomatic SARS-CoV-2 COVID-19 Virus (Coronavirus) by PCR    Specimen: Nasopharyngeal   Result Value Ref Range    SARS-CoV-2 Virus Specimen Source Nasopharyngeal     SARS-CoV-2 PCR Result NEGATIVE     SARS-CoV-2 PCR Comment       Testing was performed using the Xpert Xpress SARS-CoV-2 Assay on the Cepheid Gene-Xpert   Instrument Systems. Additional information about this Emergency Use Authorization (EUA)   assay can be found via the Lab Guide.         Medications   furosemide (LASIX) injection 20 mg (20 mg Intravenous Given 7/9/21 1029)   metoprolol tartrate (LOPRESSOR) tablet 25 mg (25 mg Oral Given 7/9/21 1029)       Assessments & Plan (with Medical Decision Making)   Patient is an 87-year-old female with a history of atrial fibrillation anticoagulated on Eliquis and rate controlled with metoprolol.  She presents with shortness of breath starting last evening.  She is intermittently tachycardic with RVR rates as high as 130s although more typically 100-120.  She is otherwise vitally stable and not hypoxic.  She is not any significant respiratory distress.  Lab work-up notable for elevated BNP and chest x-ray consistent with developing pulmonary edema and worsening cardiomegaly concerning for CHF.  She has no ischemic changes on her EKG and her troponin is within normal limits.  She has preserved renal function.  With her work-up complete, did attempt to ambulate her around the department and while she did not desaturate she did appear quite dyspneic with ambulation.  I do not think she is fit to return to her assisted living facility and I think she should be admitted for ongoing diuresis and management of CHF exacerbation.  Did order her echocardiogram to expedite her work-up.  Discussed case with admitting hospitalist.    I have reviewed the nursing notes.    I have reviewed the findings, diagnosis, plan and need for follow up with the patient.    Final  diagnoses:   Acute on chronic congestive heart failure, unspecified heart failure type (H)       7/9/2021   [facility]     Nitin Christianson MD  07/09/21 [id number]

## 2021-07-09 NOTE — H&P
[facility]    History and Physical  Hospitalist       Date of Admission:  7/9/2021  Date of Service (when I saw the patient): 07/09/21    Assessment & Plan   Annie Arguello is a 87 year old woman who presents with dyspnea which was particularly prominent beginning the evening prior to presentation.  On reflection this may have been somewhat more longstanding, perhaps over 4-6 months as has been modest lower extremity edema.  She does note she was entertaining multiple friends and family yesterday and may have been more fatigued because of this.  No stephanie orthopnea.  No chest pain.  On evaluation in emergency department chest radiograph suggested increased pulmonary venous distention.  B-type natriuretic peptide was elevated at approximately 6000.  She received low-dose of furosemide in emergency department with as of yet not marked fluid mobilization.  Echocardiogram was also obtained in emergency department showing on my informal review modest reduction in left ventricular systolic function, approximately 35% ejection fraction.  She is admitted for further evaluation and management.    1.  Volume overload  Acute decompensated systolic heart failure  Dyspnea  Indeed reduced ejection fraction was noted on a recent admission at [facility] at which time there was not any marked evidence of volume overload.  Perhaps more prominent now because of fluid shifts and the injury after a fall which was evaluated on her transfer there.  We will continue cautious diuresis.  Continue her metoprolol and low-dose of lisinopril.  Perhaps a higher dose of lisinopril would be reasonable but will defer this at the present time given active diuresis.  Continue her usual metoprolol.  She does note that her symptoms were most prominent beginning yesterday evening but on reflection believes she may have had some dyspnea and occasionally some lower extremity edema for a longer period of time.  Dyspnea most related to  heart failure.  Well recently hospitalized at [facility] she did have some difficulty with lower blood pressures and several of her medications including lisinopril were held and apparently not yet restarted.  Although unlikely its possible this contributed to this decompensation.  Thromboembolic disease is extremely unlikely given her full dose anticoagulation with apixaban.  2.  Persistent atrial fibrillation  Chronic anticoagulation  Modest elevation in ventricular response with a rate 100-1 20.  She has not had any of her usual medications since yesterday evening which may be the main contributing affect.  However does raise question of whether her rate control with atrial fibrillation is more limited than previously established which could contribute to the development of more overt heart failure.  3.  Recent mechanical fall  Left lower arm hematoma and contusion  Identified on emergency department evaluation here with subsequent transfer to [facility].  She had marked edema and likely some decreased venous flow in the lower left arm however this appears to be fully resolved.  She still has contusion which does not appear to be infected.  No drainage.  At her elbow.  Continue with dressing changes as needed.  She has not had recent falls and this appears to have been uneventful unlikely to be repeated.  Certainly usual monitoring of her functional status in future will be indicated.  4.  Diabetes mellitus  Usually non-insulin-requiring although she did need short course of sliding scale regular insulin during her most recent hospitalization.  Moderate hyperglycemia at present.  Continue monitoring with short acting insulin via sliding scale if glucose persistently over 140.    DVT Prophylaxis: Apixaban  Code Status: DNR / DNI     Disposition: Expected discharge in 2-3 days depending on her response to initial measures and appropriate fluid mobilization.  Siddharth Wang    Primary Care Physician   DOC  Oleksandr Cox    Chief Complaint   Increasing dyspnea most prominent beginning last night.      History is obtained from the patient    History of Present Illness   Annie Arguello is a 87 year old woman who presents with with dyspnea which was particularly prominent beginning the evening prior to presentation.  On reflection this may have been somewhat more longstanding, perhaps over 4-6 months as has been modest lower extremity edema.  She does note she was entertaining multiple friends and family yesterday and may have been more fatigued because of this.  No stephanie orthopnea. On evaluation in emergency department chest radiograph suggested increased pulmonary venous distention.  B-type natriuretic peptide was elevated at approximately 6000.  She received low-dose of furosemide in emergency department with as of yet not marked fluid mobilization.  Echocardiogram was also obtained in emergency department showing on my informal review modest reduction in left ventricular systolic function, approximately 35% ejection fraction.  She is admitted for further evaluation and management.  She had no fever cough.  No orthopnea and less she has developed dyspnea which she generally attributes to increased activity.  She has noted some lower extremity edema but this is never been prominent for her.  No chest pain or pressure.  No hemoptysis or hematemesis.  Overall her appetite has been good.  No dysuria or similar symptoms.  No unilateral weakness or paresthesias.    Past Medical History    I have reviewed this patient's medical history and updated it with pertinent information if needed.   Past Medical History:   Diagnosis Date     A-fib (H)      Acute hyperglycemia      Acute systolic heart failure (H) 06/17/2021     Acute systolic heart failure (H)      Anemia due to blood loss, acute 06/17/2021     Atrial fibrillation (H) 09/06/2012     Calculus of kidney 03/14/2003     Chronic anticoagulation 06/17/2021     Chronic  anticoagulation      Controlled type 2 diabetes mellitus without complication, without long-term current use of insulin (H) 10/24/2017     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 2007     Diverticulosis 2011     Encounter for routine screening for malformation using ultrasonics 2021     Essential hypertension 2021     Fall 2021     Falls      Fibromyalgia 05/10/2011     Hematoma      Hemorrhagic shock (H) 2021     Hemorrhagic shock (H)      HTN (hypertension)      Hypotension      Hypotension, unspecified hypotension type 2021     Nonallopathic lesions of cervical region, not else 2001     Obesity 2011     Osteoporosis 2011     Other malaise and fatigue 2002     Renal cyst 2011     Sciatica 2007     Traumatic hematoma of hip, left, sequela      Traumatic hematoma of left upper arm     Related to Eliquis and fall       Past Surgical History   I have reviewed this patient's surgical history and updated it with pertinent information if needed.  Past Surgical History:   Procedure Laterality Date     Breast biopsy      LT, benign      SECTION       CHOLECYSTECTOMY      lap     COLONOSCOPY       LUCILLE / BSO         Prior to Admission Medications   Prior to Admission Medications   Prescriptions Last Dose Informant Patient Reported? Taking?   ASPIRIN LOW DOSE 81 MG chewable tablet 2021 at 0830  No Yes   Sig: CHEW AND SWALLOW 1 TABLET BY MOUTH DAILY   Alcohol Swabs (EASY TOUCH ALCOHOL PREP MEDIUM) 70 % PADS Unknown Daughter Yes No   Assure [place] Lancets MISC   No No   Sig: TEST 1 TIME DAILY OR AS DIRECTED   Blood Glucose Monitoring Suppl (GLUCOCARD VITAL MONITOR) w/Device KIT  Daughter No No   Si kit daily Test blood Sugars 1 times daily   DULoxetine (CYMBALTA) 20 MG capsule 2021 at   No Yes   Sig: TAKE (1) CAPSULE BY MOUTH TWICE DAILY.   ELIQUIS ANTICOAGULANT 5 MG tablet 2021 at   No Yes   Sig: TAKE (1)  TABLET BY MOUTH TWICE A DAY.   GLUCOCARD VITAL TEST test strip   No No   Sig: TEST 1 TIME DAILY OR AS DIRECTED   Multiple Vitamins-Minerals (TAB-A-MARIUSZ) TABS 2021 at 0830  No Yes   Sig: TAKE 1 TABLET BY MOUTH ONCE DAILY.   SB BISMUTH 262 MG TABS Unknown at Unknown time Daughter No No   Sig: CHEW AND SWALLOW 2 TABLETS EVERY 6 HOURS AS NEEDED FOR DIARRHEA   Specialty Vitamins Products (ICAPS LUTEIN & ZEAXANTHIN) TBEC 2021 at 2000  Yes Yes   Si tablet by Orally disintegrating tablet route 2 times daily   acetaminophen (TYLENOL) 325 MG tablet Unknown at Unknown time Daughter No No   Sig: Take 1 tablet (325 mg) by mouth every 6 hours as needed for mild pain   alcohol swab prep pads  Daughter No No   Sig: Use to swab area of injection/abdiel as directed.   alendronate (FOSAMAX) 70 MG tablet 2021 at 0830  No No   Sig: TAKE 1 TABLET BY MOUTH WEEKLY AS DIRECTED.   atorvastatin (LIPITOR) 40 MG tablet 2021 at 1600  No Yes   Sig: TAKE 1 TABLET BY MOUTH ONCE DAILY.   blood glucose (NO BRAND SPECIFIED) test strip  Daughter No No   Sig: Use to test blood sugar 1 times daily or as directed.    Please dispense Accu-chek   chlorthalidone (HYGROTON) 25 MG tablet   No No   Sig: TAKE 1 TABLET BY MOUTH ONCE DAILY.   Patient not taking: Reported on 2021   diphenhydrAMINE HCl (BENADRYL PO) Unknown at Unknown time  Yes No   Sig: Take 12.5 mg by mouth as needed   fluticasone (FLONASE) 50 MCG/ACT nasal spray 2021 at 0830  No Yes   Sig: Spray 1 spray into both nostrils daily   lisinopril (ZESTRIL) 2.5 MG tablet Unknown  No No   Sig: TAKE 1 TABLET BY MOUTH ONCE DAILY.   metoprolol tartrate (LOPRESSOR) 25 MG tablet 2021 at 2000  No Yes   Sig: TAKE (1) TABLET BY MOUTH TWICE A DAY.   metoprolol tartrate (LOPRESSOR) 50 MG tablet 2021 at 2000  Yes Yes   Sig: Take 50 mg by mouth 2 times daily Takes with 25 mg tabs to equal 75 mg twice daily   omeprazole (PRILOSEC) 20 MG DR capsule 2021 at 2000  No Yes   Sig:  TAKE 1 CAPSULE BY MOUTH DAILY.   polyethylene glycol-propylene glycol (SYSTANE) 0.4-0.3 % SOLN ophthalmic solution Unknown at Unknown time  No No   Sig: Place 1 drop into both eyes 4 times daily as needed for dry eyes   potassium chloride ER (KLOR-CON M) 10 MEQ CR tablet   No No   Sig: TAKE (1) TABLET BY MOUTH TWICE A DAY.   Patient not taking: Reported on 7/1/2021      Facility-Administered Medications: None     Allergies   Allergies   Allergen Reactions     Ampicillin      Desvenlafaxine Other (See Comments)     Desvenlafaxine Succinate  Pristiq       Sertraline Hcl Other (See Comments) and Diarrhea     Zoloft       Sulfa Drugs Other (See Comments)     Sulfa(Sulfonamide Antibiotics)       Social History   I have reviewed this patient's social history and updated it with pertinent information if needed. Annie Arguello  reports that she has never smoked. She has never used smokeless tobacco. She reports that she does not drink alcohol or use drugs.    Family History   I have reviewed this patient's family history and updated it with pertinent information if needed.   Family History   Problem Relation Age of Onset     Myocardial Infarction Mother 59        myocardial infarction, cause of death     Other - See Comments Daughter         fibromyalgia     Thyroid Disease Sister         hypo     Thyroid Disease Sister         hypo     Thyroid Disease Sister         hypo     Myocardial Infarction Brother 63        myocardial infarction, cause of death       Review of Systems   The 10 point Review of Systems is negative other than noted in the HPI.    Physical Exam   Temp: 97.7  F (36.5  C) Temp src: Tympanic BP: 133/97 Pulse: 106   Resp: 20 SpO2: 95 % O2 Device: None (Room air) Oxygen Delivery: 2 LPM  Vital Signs with Ranges  Temp:  [97.7  F (36.5  C)-97.8  F (36.6  C)] 97.7  F (36.5  C)  Pulse:  [106-136] 106  Resp:  [18-20] 20  BP: ()/() 133/97  SpO2:  [95 %-100 %] 95 %  173 lbs 8.03 oz      Awake, alert,  pleasant interactive woman lying in bed on medical wards.  Speech is clear.  Not distractible.  HEENT: Pupils equal, conjugate. No icterus or nystagmus. Oral mucosa moist. No facial asymmetry.   Neck: Supple, jugular veins not elevated. Trachea midline   Chest: No chest wall movement asymmetry. Aeration preserved to bases. Accessory muscles not in use. Expiratory time not increased. No tidal wheezes. No rhonchi.  Few medium to coarse mid inspiratory right basilar crackles.   Cardiac: PMI not displaced.  Distinct heart tones.  S1, S2 irregularly irregular and otherwise unremarkable. No S3, S4. P2 not accentuated. No murmurs. Carotid upstroke preserved. Carotid amplitude preserved.   Abdomen: Soft. No palpation or percussion tenderness. No distention. Normoactive bowel sounds. Liver and spleen not increased in size. No bruits, masses, or pulsations.   Extremities: Mild bilaterally equal lower extremity edema.  Warm distally.  Brisk distal pulses.  No eccymoses, clubbing.   Neurologic: Mental state above. Motor 5/5 and bilaterally equal. Tone preserved. No fasiculations or tremors. Sensation intact to light touch. DTR 2/4 and bilaterally equal.   Data   Data reviewed today:  I personally reviewed EKG shows atrial fibrillation with ventricular response of 130 bpm.  Axis -40.  Nonspecific ST-T wave abnormality.  Chest radiograph suggest some pulmonary vascular distention.  No effusion..  Recent Labs   Lab 07/09/21  0910   WBC 9.8   HGB 12.7   MCV 92      INR 1.71*      POTASSIUM 4.0   CHLORIDE 108   CO2 28   BUN 23   CR 0.91   ANIONGAP 5   MALIHA 9.0   *   ALBUMIN 3.2*   PROTTOTAL 7.4   BILITOTAL 1.7*   ALKPHOS 98   ALT 29   AST 35   TROPI <0.015       Lactic Acid   Date Value Ref Range Status   06/30/2019 2.2 (H) 0.7 - 2.0 mmol/L Final     Comment:     Significant value called to and read back by  HAILE JOHNSON AT 2324 ON 6/30/19 BY LYNDON.     06/30/2019 2.2 (H) 0.7 - 2.0 mmol/L Final     Comment:      Significant value called to and read back by  AJAY THOMAS AT 0516 ON 6/30/19 BY LYNDON.     06/29/2019 3.3 (H) 0.7 - 2.0 mmol/L Final     Comment:     Significant value called to and read back by  FEMI JOHNSON AT 1015 ON 6/29/2019 BY ERM         Recent Results (from the past 24 hour(s))   Chest XR,  PA & LAT    Narrative    PROCEDURE:  XR CHEST 2 VW    HISTORY:  Dyspnea.     COMPARISON:  February 9, 2021    FINDINGS:   The heart is enlarged. The pulmonary vasculature is normal.   Interstitial thickening is seen in both lungs which has worsened from  previous examination. There is mild blunting of the costophrenic  angles.      Impression    IMPRESSION:  Cardiomegaly, interstitial thickening and blunting of the  costophrenic angles suspicious for congestive heart failure.      ODILIA CARO MD         SYSTEM ID:  [id number]

## 2021-07-09 NOTE — PLAN OF CARE
Charge nurse does have the copy of PTA med list, this writer did attempt to obtain the appropriate times meds were last given, message was left with Debbi Wood, we are awaiting return phone call from the RN at that facility to clarify patients meds.

## 2021-07-09 NOTE — PLAN OF CARE
Heart rate Afib 130s on telemetry.Notified Mohini Pop RN. Noted that pt usually takes 75mg of metoprolol BID medications not yet verified. Last dose yesterday at 2000.  Called pharmacy regarding verifying medications. Pharmacy called back and stated that MD is going to give one time dose of 25mg of metoprolol and leave timing on 75mg of nighttime metoprolol. Updated IRIS Pop.

## 2021-07-09 NOTE — PLAN OF CARE
Patient admitted today from the ER, following shortness of breath for a few days, patient does have a history of A Fib and CHF,  patient very pleasant slightly hard of hearing, patient uses a walker at home, and was able to stand with assist of one staff, patient did have a dressing in place to the left elbow, this writer did remove the dressing, the left elbow is swollen, with a quarter sized black eschar scab noted to the elbow, and there are several smaller scabs noted to the arm, the jesse area is reddened, and somewhat yeasty, MD was made aware, there are scattered bruises noted throughout patients body, patient denies pain at present, and does make her needs known.

## 2021-07-09 NOTE — PLAN OF CARE
Face to face report given with opportunity to observe patient.    Report given to Riaz Bruno RN   7/9/2021  3:25 PM

## 2021-07-09 NOTE — PROGRESS NOTES
RUDDY BOWMAN A  Patient visit during  rounds. Rita is [employer]an in denomination and has contacted her [employer].  She has no spiritual care requests at this time.

## 2021-07-09 NOTE — PLAN OF CARE
[facility] Inpatient Admission Note:    Patient admitted to 3108/3108-2 at approximately 1300 via wheel chair accompanied by staff from emergency room . Report received from Tati SIMMONS in SBAR format at 12:29 via telephone. Patient transferred to bed via self.. Patient is alert and oriented X 3, denies pain; rates at 0 on 0-10 scale.  Patient oriented to room, unit, hourly rounding, and plan of care. Explained admission packet and patient handbook with patient bill of rights brochure. Will continue to monitor and document as needed.     Inpatient Nursing criteria listed below was met:    Health care directives status obtained and documented: Yes    Patient identifies a surrogate decision maker: Yes If yes, who: daughter Ángela Contact Information:see facesheet     If initial lactic acid >2.0, repeat lactic acid drawn within one hour of arrival to unit: NA. If no, state reason: N/A    Clergy visit ordered if patient requests: Yes    Skin issues/needs documented: Yes    Isolation Patient: no Education given, correct sign in place and documentation row added to PCS:  No    Fall Prevention Yes: Care plan updated, education given and documented, sticker and magnet in place: Yes    Care Plan initiated: Yes    Education Documented (including assessment): Yes    Patient has discharge needs : Yes If yes, please explain:  Nursing facility

## 2021-07-09 NOTE — ED NOTES
Patient sitting upright at bedside eating toast and orange juice.  Patient again with sob/wheezing with minimal activity.  Call light next to hand.

## 2021-07-09 NOTE — ED TRIAGE NOTES
Patient arrived via EMS. Patient was placed on 2 L O2 via NC. Patient having increased SOB and fatigue since last night. Patient denies pain and report feeling fine. Patient denies cough, fever or felling ill. Patient alert and oriented. Patient has a history of A-fib.

## 2021-07-10 LAB
ALBUMIN SERPL-MCNC: 2.9 G/DL (ref 3.4–5)
ALP SERPL-CCNC: 86 U/L (ref 40–150)
ALT SERPL W P-5'-P-CCNC: 25 U/L (ref 0–50)
ANION GAP SERPL CALCULATED.3IONS-SCNC: 7 MMOL/L (ref 3–14)
AST SERPL W P-5'-P-CCNC: 29 U/L (ref 0–45)
BASOPHILS # BLD AUTO: 0.1 10E9/L (ref 0–0.2)
BASOPHILS NFR BLD AUTO: 0.8 %
BILIRUB SERPL-MCNC: 1.3 MG/DL (ref 0.2–1.3)
BUN SERPL-MCNC: 25 MG/DL (ref 7–30)
CALCIUM SERPL-MCNC: 8.7 MG/DL (ref 8.5–10.1)
CHLORIDE SERPL-SCNC: 107 MMOL/L (ref 94–109)
CO2 SERPL-SCNC: 27 MMOL/L (ref 20–32)
CREAT SERPL-MCNC: 0.86 MG/DL (ref 0.52–1.04)
DIFFERENTIAL METHOD BLD: ABNORMAL
EOSINOPHIL # BLD AUTO: 0.2 10E9/L (ref 0–0.7)
EOSINOPHIL NFR BLD AUTO: 2.2 %
ERYTHROCYTE [DISTWIDTH] IN BLOOD BY AUTOMATED COUNT: 17.2 % (ref 10–15)
GFR SERPL CREATININE-BSD FRML MDRD: 60 ML/MIN/{1.73_M2}
GLUCOSE BLDC GLUCOMTR-MCNC: 136 MG/DL (ref 70–99)
GLUCOSE BLDC GLUCOMTR-MCNC: 142 MG/DL (ref 70–99)
GLUCOSE BLDC GLUCOMTR-MCNC: 154 MG/DL (ref 70–99)
GLUCOSE BLDC GLUCOMTR-MCNC: 209 MG/DL (ref 70–99)
GLUCOSE SERPL-MCNC: 160 MG/DL (ref 70–99)
HCT VFR BLD AUTO: 36.7 % (ref 35–47)
HGB BLD-MCNC: 11.6 G/DL (ref 11.7–15.7)
IMM GRANULOCYTES # BLD: 0.1 10E9/L (ref 0–0.4)
IMM GRANULOCYTES NFR BLD: 0.9 %
LYMPHOCYTES # BLD AUTO: 2 10E9/L (ref 0.8–5.3)
LYMPHOCYTES NFR BLD AUTO: 22.1 %
MAGNESIUM SERPL-MCNC: 1.6 MG/DL (ref 1.6–2.3)
MCH RBC QN AUTO: 28.9 PG (ref 26.5–33)
MCHC RBC AUTO-ENTMCNC: 31.6 G/DL (ref 31.5–36.5)
MCV RBC AUTO: 91 FL (ref 78–100)
MONOCYTES # BLD AUTO: 1 10E9/L (ref 0–1.3)
MONOCYTES NFR BLD AUTO: 10.6 %
NEUTROPHILS # BLD AUTO: 5.8 10E9/L (ref 1.6–8.3)
NEUTROPHILS NFR BLD AUTO: 63.4 %
NRBC # BLD AUTO: 0 10*3/UL
NRBC BLD AUTO-RTO: 0 /100
PLATELET # BLD AUTO: 201 10E9/L (ref 150–450)
POTASSIUM SERPL-SCNC: 3.6 MMOL/L (ref 3.4–5.3)
PROT SERPL-MCNC: 6.7 G/DL (ref 6.8–8.8)
RBC # BLD AUTO: 4.02 10E12/L (ref 3.8–5.2)
SODIUM SERPL-SCNC: 141 MMOL/L (ref 133–144)
WBC # BLD AUTO: 9.1 10E9/L (ref 4–11)

## 2021-07-10 PROCEDURE — 99232 SBSQ HOSP IP/OBS MODERATE 35: CPT | Performed by: INTERNAL MEDICINE

## 2021-07-10 PROCEDURE — 250N000011 HC RX IP 250 OP 636: Performed by: INTERNAL MEDICINE

## 2021-07-10 PROCEDURE — 999N001017 HC STATISTIC GLUCOSE BY METER IP

## 2021-07-10 PROCEDURE — 83735 ASSAY OF MAGNESIUM: CPT | Performed by: INTERNAL MEDICINE

## 2021-07-10 PROCEDURE — 36415 COLL VENOUS BLD VENIPUNCTURE: CPT | Performed by: INTERNAL MEDICINE

## 2021-07-10 PROCEDURE — 80053 COMPREHEN METABOLIC PANEL: CPT | Performed by: INTERNAL MEDICINE

## 2021-07-10 PROCEDURE — 85025 COMPLETE CBC W/AUTO DIFF WBC: CPT | Performed by: INTERNAL MEDICINE

## 2021-07-10 PROCEDURE — 120N000001 HC R&B MED SURG/OB

## 2021-07-10 PROCEDURE — 250N000013 HC RX MED GY IP 250 OP 250 PS 637: Performed by: INTERNAL MEDICINE

## 2021-07-10 RX ORDER — FUROSEMIDE 10 MG/ML
20 INJECTION INTRAMUSCULAR; INTRAVENOUS EVERY 6 HOURS
Status: COMPLETED | OUTPATIENT
Start: 2021-07-10 | End: 2021-07-10

## 2021-07-10 RX ORDER — METOPROLOL TARTRATE 25 MG/1
25 TABLET, FILM COATED ORAL ONCE
Status: COMPLETED | OUTPATIENT
Start: 2021-07-10 | End: 2021-07-10

## 2021-07-10 RX ORDER — LISINOPRIL 5 MG/1
5 TABLET ORAL DAILY
Status: DISCONTINUED | OUTPATIENT
Start: 2021-07-10 | End: 2021-07-13 | Stop reason: HOSPADM

## 2021-07-10 RX ORDER — CHLORTHALIDONE 25 MG/1
25 TABLET ORAL DAILY
Status: DISCONTINUED | OUTPATIENT
Start: 2021-07-10 | End: 2021-07-13 | Stop reason: HOSPADM

## 2021-07-10 RX ORDER — MAGNESIUM OXIDE 400 MG/1
400 TABLET ORAL 3 TIMES DAILY
Status: COMPLETED | OUTPATIENT
Start: 2021-07-10 | End: 2021-07-12

## 2021-07-10 RX ORDER — METOPROLOL TARTRATE 100 MG
100 TABLET ORAL 2 TIMES DAILY
Status: DISCONTINUED | OUTPATIENT
Start: 2021-07-10 | End: 2021-07-13 | Stop reason: HOSPADM

## 2021-07-10 RX ORDER — POTASSIUM CHLORIDE 1500 MG/1
40 TABLET, EXTENDED RELEASE ORAL 2 TIMES DAILY
Status: DISCONTINUED | OUTPATIENT
Start: 2021-07-10 | End: 2021-07-13 | Stop reason: HOSPADM

## 2021-07-10 RX ADMIN — DULOXETINE HYDROCHLORIDE 20 MG: 20 CAPSULE, DELAYED RELEASE ORAL at 09:00

## 2021-07-10 RX ADMIN — ATORVASTATIN CALCIUM 40 MG: 40 TABLET, FILM COATED ORAL at 09:01

## 2021-07-10 RX ADMIN — APIXABAN 5 MG: 2.5 TABLET, FILM COATED ORAL at 21:56

## 2021-07-10 RX ADMIN — MAGNESIUM OXIDE 400 MG (241.3 MG MAGNESIUM) TABLET 400 MG: TABLET at 21:56

## 2021-07-10 RX ADMIN — ASPIRIN 81 MG: 81 TABLET, CHEWABLE ORAL at 09:01

## 2021-07-10 RX ADMIN — OMEPRAZOLE 20 MG: 20 CAPSULE, DELAYED RELEASE ORAL at 09:01

## 2021-07-10 RX ADMIN — POTASSIUM CHLORIDE 40 MEQ: 1500 TABLET, EXTENDED RELEASE ORAL at 21:56

## 2021-07-10 RX ADMIN — APIXABAN 5 MG: 2.5 TABLET, FILM COATED ORAL at 09:01

## 2021-07-10 RX ADMIN — FUROSEMIDE 20 MG: 10 INJECTION, SOLUTION INTRAMUSCULAR; INTRAVENOUS at 10:00

## 2021-07-10 RX ADMIN — DULOXETINE HYDROCHLORIDE 20 MG: 20 CAPSULE, DELAYED RELEASE ORAL at 21:56

## 2021-07-10 RX ADMIN — METOPROLOL TARTRATE 25 MG: 25 TABLET, FILM COATED ORAL at 09:59

## 2021-07-10 RX ADMIN — METOPROLOL TARTRATE 100 MG: 100 TABLET, FILM COATED ORAL at 21:56

## 2021-07-10 RX ADMIN — FUROSEMIDE 20 MG: 10 INJECTION, SOLUTION INTRAMUSCULAR; INTRAVENOUS at 16:57

## 2021-07-10 RX ADMIN — FLUTICASONE PROPIONATE 1 SPRAY: 50 SPRAY, METERED NASAL at 09:01

## 2021-07-10 RX ADMIN — METOPROLOL TARTRATE 75 MG: 50 TABLET, FILM COATED ORAL at 09:01

## 2021-07-10 RX ADMIN — INSULIN ASPART 2 UNITS: 100 INJECTION, SOLUTION INTRAVENOUS; SUBCUTANEOUS at 12:16

## 2021-07-10 RX ADMIN — INSULIN ASPART 1 UNITS: 100 INJECTION, SOLUTION INTRAVENOUS; SUBCUTANEOUS at 09:11

## 2021-07-10 RX ADMIN — POTASSIUM CHLORIDE 20 MEQ: 1500 TABLET, EXTENDED RELEASE ORAL at 09:01

## 2021-07-10 RX ADMIN — MAGNESIUM OXIDE 400 MG (241.3 MG MAGNESIUM) TABLET 400 MG: TABLET at 14:41

## 2021-07-10 RX ADMIN — CHLORTHALIDONE 25 MG: 25 TABLET ORAL at 09:01

## 2021-07-10 RX ADMIN — LISINOPRIL 5 MG: 5 TABLET ORAL at 09:01

## 2021-07-10 RX ADMIN — MAGNESIUM OXIDE 400 MG (241.3 MG MAGNESIUM) TABLET 400 MG: TABLET at 10:00

## 2021-07-10 ASSESSMENT — ACTIVITIES OF DAILY LIVING (ADL)
ADLS_ACUITY_SCORE: 19

## 2021-07-10 ASSESSMENT — MIFFLIN-ST. JEOR: SCORE: 1167.25

## 2021-07-10 NOTE — PLAN OF CARE
Vital signs and assessment as charted. AP irregular, tachy at times. Telemetry: A fib 90's. Remains on RA. Ax1 with walker and gait belt to commode. BLE edema 1-2+. Denies SOB, lungs clear. IV saline locked, patent, flushes and blood return noted. Alarms on, call light within reach.     Face to face report given with opportunity to observe patient.    Report given to Riaz Gandara RN   7/10/2021  7:09 AM

## 2021-07-10 NOTE — UTILIZATION REVIEW
Admission Status; Secondary Review Determination    Under the authority of the Utilization Management Committee, the utilization review process indicated a secondary review on the above patient. The review outcome is based on review of the medical records, discussions with staff, and applying clinical experience noted on the date of the review.    (x) Inpatient Status Appropriate - This patient's medical care is consistent with medical management for inpatient care and reasonable inpatient medical practice.    RATIONALE FOR DETERMINATION: 87-year-old female with history of cardiomyopathy, atrial fibrillation, type 2 diabetes, hypertension who now presents with significant increase shortness of breath as well as overt edema.  Patient has an ejection fraction of approximately 35%, N-terminal proBNP level 6000 with significant basilar crackles and clinical heart failure.  On hospital day 2 patient has significant wheezes, recurrent/persistent atrial fibrillation with rapid ventricular response requiring better rate control as well as ongoing diuresis appropriate for inpatient care.    At the time of admission with the information available to the attending physician more than 2 nights Hospital complex care was anticipated, based on patient risk of adverse outcome if treated as outpatient and complex care required. Inpatient admission is appropriate based on the Medicare guidelines.    This document was produced using voice recognition software    The information on this document is developed by the utilization review team in order for the business office to ensure compliance. This only denotes the appropriateness of proper admission status and does not reflect the quality of care rendered.    The definitions of Inpatient Status and Observation Status used in making the determination above are those provided in the CMS Coverage Manual, Chapter 1 and Chapter 6, section 70.4.    Sincerely,    Ed Navarro,  MD  Utilization Review  Physician Advisor  [facility].

## 2021-07-10 NOTE — PLAN OF CARE
"Reason for hospital stay:  CHF    Living situation PTA: Debbi Wood    Most recent vitals: /89   Pulse 116   Temp 98.4  F (36.9  C) (Tympanic)   Resp 16   Ht 1.575 m (5' 2\")   Wt 78.7 kg (173 lb 8 oz)   SpO2 96%   BMI 31.73 kg/m      Pain Management:  Denied any pain this shift.     LOC:  Disoriented to time and forgetful at times. Pleasant and talkative with staff. Occasionally forgets to use call light and sets off alarm.    Cardiac:  Tele reading A-Fib 100s-130s per ICU nurse. Received extra dose of metoprolol as well as scheduled metoprolol and lisinopril this evening. Denies any chest pain or lightheadedness.     Respiratory:  WDL    GI:  WDL    :  Voided over 1000mL urine this evening. Some urine output unmeasured due to patient missed hat.     Skin Issues:  Left elbow edematous with quarter sized black eschar area - no drainage noted - open to air. Rash areas in groin folds.     IVF:  SL    Activity: Standby assist with walker and gait belt.     Safety:  Alarms on      Face to face report given with opportunity to observe patient.    Report given to Nanette Dillon RN   7/9/2021  11:28 PM      "

## 2021-07-10 NOTE — PROGRESS NOTES
[facility]    Hospitalist Progress Note    Date of Service (when I saw the patient): 07/10/2021    Assessment & Plan   Annie Arguello is a 87 year old  woman who was admitted on 7/9/2021.  She presented with dyspnea which was particularly prominent beginning the evening prior to presentation.  On reflection this may have been somewhat more longstanding, perhaps over 4-6 months as has been modest lower extremity edema.  She does note she was entertaining multiple friends and family yesterday and may have been more fatigued because of this.  No stephanie orthopnea.  No chest pain.  On evaluation in emergency department chest radiograph suggested increased pulmonary venous distention.  B-type natriuretic peptide was elevated at approximately 6000.  She received low-dose of furosemide in emergency department with as of yet not marked fluid mobilization.  Echocardiogram was also obtained in emergency department showing on my informal review modest reduction in left ventricular systolic function, approximately 35% ejection fraction.  She is admitted for further evaluation and management.     1.  Volume overload  Acute decompensated systolic heart failure  Dyspnea  Still some mild dyspnea although improved.  Source of decompensation somewhat uncertain but she recalls that her dyspnea has been slowly progressive since her discharge from [place] where multiple medications were withheld because of mild hypotension.  Certainly reasonable at the time but I suspect she developed dyspnea and hypervolemia as result of that.  In addition likely some volume shifts because of her fall at that time and marked left arm hematoma.  Since this admission, she has had a good diuresis of approximately 1.3 L since then.  Overall she does feel slightly improved.  Examination shows mild diffuse lower lung field wheezes without crackles.  Several additional doses of Lasix today.  Resumed her usual chlorthalidone in addition.   Lisinopril resumed yesterday and will increase the dose of this slightly.  As below persistent atrial fibrillation with intermittent increased ventricular response and will increase her metoprolol dose.  Encouraging walking to assess her exercise tolerance.      Systolic heart failure with reduced ejection fraction was noted on a recent admission at [facility] at which time there was not any marked evidence of volume overload.  Perhaps more prominent now because of fluid shifts and the injury after a fall which was evaluated on her transfer there.    While recently hospitalized at [place] she did have some difficulty with lower blood pressures and several of her medications including lisinopril were held and apparently not yet restarted.  Although unlikely its possible this contributed to this decompensation.  Thromboembolic disease is extremely unlikely given her full dose anticoagulation with apixaban.  2.  Persistent atrial fibrillation  Chronic anticoagulation  Still some increased ventricular response.  Increase metoprolol dose slightly.  Some of her increase may be missing several doses of her medications because of her acute decompensation and transport here.  If she has continued difficulties with rate control alternative or additional agent to metoprolol may be indicated.    Not certain if possibly her rate control is been suboptimal for some time which might have resulted in worsening in myocardial function.  3.  Recent mechanical fall  Left lower arm hematoma and contusion  Identified on emergency department evaluation here with subsequent transfer to [facility].  She had marked edema and likely some decreased venous flow in the lower left arm however this appears to be fully resolved.  She still has contusion which does not appear to be infected.  No drainage.  At her elbow.  Continue with dressing changes as needed.  She has not had recent falls and this appears to have been uneventful  unlikely to be repeated.  Certainly usual monitoring of her functional status in future will be indicated.  4.  Diabetes mellitus  Some elevations in glucose levels likely in part related to acute illness.  She has not required medication treatment in the past.  Hemoglobin A1c is less at 6.8 than it was in September 2020.  For the time being continue low-dose of sliding scale regular insulin.  With attention to dietary intake and encouraging activity she may be able to avoid other active treatment.    Active Problems:    Acute exacerbation of CHF (congestive heart failure) (H)       DVT Prophylaxis: Full anticoagulation with apixaban.  Code Status: No CPR- Do NOT Intubate    Disposition: Expected discharge in 2-3 days depending on her course.  Improvement in her level of dyspnea and asserting that there is not any decompensation in terms of renal function or reduced blood pressure with multiple alterations in medications indicated.    Siddharth Wang    Interval History   Feels somewhat improved.  Still dyspneic including at rest.  No chest pain or pressure.  Fair appetite.    -Data reviewed today: I reviewed all new labs and imaging results over the last 24 hours. I personally reviewed telemetry monitoring.    Peripheral IV 07/09/21 Right Upper arm (Active)   Site Assessment WDL except;Bleeding 07/10/21 0241   Line Status Saline locked 07/10/21 0241   Phlebitis Scale 0-->no symptoms 07/10/21 0241   Infiltration Scale 0 07/10/21 0241   Infiltration Site Treatment Method  None 07/09/21 1301   If infiltrated, was a Vesicant infusing? No 07/09/21 1301   Number of days: 1       Wound Elbow Abrasion(s);Contusion;Other (comment) (Active)   Wound Bed Black;Eschar;Edema 07/10/21 0232   Scarlet-wound Assessment Dusky;Edema;Warm 07/10/21 0232   Wound Width (cm) 2 cm 07/09/21 1300   Estimated Circumference ( if not measured quarter sized 07/09/21 1630   Drainage Amount None 07/10/21 0232   Dressing Open to air 07/10/21 0232    Dressing Status Clean, dry, intact 07/09/21 1300   Number of days: 1     Line/device assessment(s) completed for medical necessity July 10    Physical Exam   Temp: 97.3  F (36.3  C) Temp src: Tympanic BP: 136/89 Pulse: 120   Resp: 16 SpO2: 95 % O2 Device: None (Room air)    Vitals:    07/09/21 1254 07/10/21 0544   Weight: 78.7 kg (173 lb 8 oz) 77.9 kg (171 lb 11.8 oz)     Vital Signs with Ranges  Temp:  [97.3  F (36.3  C)-98.4  F (36.9  C)] 97.3  F (36.3  C)  Pulse:  [103-136] 120  Resp:  [16-20] 16  BP: ()/() 136/89  SpO2:  [95 %-97 %] 95 %  I/O last 3 completed shifts:  In: 240 [P.O.:240]  Out: 1550 [Urine:1550]    Awake, alert, pleasant interactive woman sitting in chair on medical wards.  Speech is clear.  Not distractible.  HEENT: Pupils equal, conjugate. No icterus or nystagmus. Oral mucosa moist. No facial asymmetry.   Neck: Supple, jugular veins 2 cm of water. Trachea midline   Chest: No chest wall movement asymmetry. Aeration preserved to bases. Accessory muscles not in use. Expiratory time not increased.  Few scattered wheezes predominantly in lower lung fields.  Increase in wheeze on forced expiration.  No rhonchi. No discrete crackles.   Cardiac: PMI not displaced. Distinct heart tones.  Irregularly irregular and otherwise unremarkable S1, S2. No S3, S4. P2 not accentuated. No murmurs.    Abdomen: Soft. No palpation or percussion tenderness. No distention. Normoactive bowel sounds. Liver and spleen not increased in size. No bruits, masses, or pulsations.   Extremities: Still present but diminished bilateral lower extremity edema.  Warm distally.  Easily palpable peripheral pulses.  Neurologic: Mental state above. Motor 5/5 and bilaterally equal. Tone preserved. No fasiculations or tremors.      Medications     - MEDICATION INSTRUCTIONS -         apixaban ANTICOAGULANT  5 mg Oral BID     aspirin  81 mg Oral Daily     atorvastatin  40 mg Oral Daily     chlorthalidone  25 mg Oral Daily      DULoxetine  20 mg Oral BID     fluticasone  1 spray Both Nostrils Daily     furosemide  20 mg Intravenous Q6H     insulin aspart  1-7 Units Subcutaneous TID AC     insulin aspart  1-5 Units Subcutaneous At Bedtime     lisinopril  5 mg Oral Daily     magnesium oxide  400 mg Oral TID     metoprolol tartrate  100 mg Oral BID     metoprolol tartrate  25 mg Oral Once     omeprazole  20 mg Oral Daily     potassium chloride ER  40 mEq Oral BID     sodium chloride (PF)  3 mL Intracatheter Q8H           Data   Recent Labs   Lab 07/10/21  0524 07/09/21  0910   WBC 9.1 9.8   HGB 11.6* 12.7   MCV 91 92    222   INR  --  1.71*    141   POTASSIUM 3.6 4.0   CHLORIDE 107 108   CO2 27 28   BUN 25 23   CR 0.86 0.91   ANIONGAP 7 5   MALIHA 8.7 9.0   * 183*   ALBUMIN 2.9* 3.2*   PROTTOTAL 6.7* 7.4   BILITOTAL 1.3 1.7*   ALKPHOS 86 98   ALT 25 29   AST 29 35   TROPI  --  <0.015       Lactic Acid   Date Value Ref Range Status   06/30/2019 2.2 (H) 0.7 - 2.0 mmol/L Final     Comment:     Significant value called to and read back by  HAILE JOHNSON AT 2324 ON 6/30/19 BY LYNDON.     06/30/2019 2.2 (H) 0.7 - 2.0 mmol/L Final     Comment:     Significant value called to and read back by  AJAY THOMAS AT 0516 ON 6/30/19 BY LYNDON.     06/29/2019 3.3 (H) 0.7 - 2.0 mmol/L Final     Comment:     Significant value called to and read back by  FEMI JOHNSON AT 1015 ON 6/29/2019 BY ERM         Recent Results (from the past 24 hour(s))   Chest XR,  PA & LAT    Narrative    PROCEDURE:  XR CHEST 2 VW    HISTORY:  Dyspnea.     COMPARISON:  February 9, 2021    FINDINGS:   The heart is enlarged. The pulmonary vasculature is normal.   Interstitial thickening is seen in both lungs which has worsened from  previous examination. There is mild blunting of the costophrenic  angles.      Impression    IMPRESSION:  Cardiomegaly, interstitial thickening and blunting of the  costophrenic angles suspicious for congestive heart failure.      ODILIA CARO  MD         SYSTEM ID:  [id number]   Echocardiogram Complete   Result Value    LVEF  30-35% (moderately reduced)    [facility]    [id number]  [id number]  [id number]  [id number]^[email or web address]     [facility]  Echocardiography Laboratory  [address] [place] [id number]     Name: RUDDY JOSE  MRN: [id number]  : 1933  Study Date: 2021 10:31 AM  Age: 87 yrs  Gender: Female  Patient Location: [facility]  Reason For Study: CHF  History: CHF  Ordering Physician: SRIDEVI BELLA  Referring Physician: SRIDEVI BELLA  Performed By: Zara Palmer RDCS     BSA: 1.8 m2  Height: 62 in  Weight: 176 lb  ______________________________________________________________________________  Procedure  The patient's rhythm is atrial fibrillation with RVR.  ______________________________________________________________________________  Interpretation Summary  No pericardial effusion is present.  Left ventricular function is decreased. The ejection fraction is 30-35%  (moderately reduced).  Anterior wall hypokinesis is present.  Pt is tachycardic with rates 130-150 bpm making estimation of LV function less  reliable.  The right ventricle is normal size.  Moderate to severe left atrial enlargement is present.  Moderate to severe right atrial enlargement is present.  Mild to moderate mitral insufficiency is present.  Moderate aortic valve calcification is present.  Mild aortic insufficiency is present.  The peak aortic velocity is 2.74 m/sec.  The mean gradient across the aortic valve is18 mmHg.  Mild to moderate aortic stenosis is present.  Moderate to severe tricuspid insufficiency is present.  Right ventricular systolic pressure is 39mmHg above the right atrial pressure.  The pulmonic valve is normal.  ______________________________________________________________________________  Left Ventricle  Left ventricular function is decreased. The ejection fraction is 30-35%  (moderately reduced). Anterior wall  hypokinesis is present. Pt is tachycardic  with rates 130-150 bpm making estimation of LV function less reliable.     Right Ventricle  The right ventricle is normal size.     Atria  Moderate to severe left atrial enlargement is present. Moderate to severe  right atrial enlargement is present.     Mitral Valve  Mild to moderate mitral insufficiency is present.     Aortic Valve  Moderate aortic valve calcification is present. Mild aortic insufficiency is  present. Mild to moderate aortic stenosis is present. The mean AoV pressure  gradient is 18.0 mmHg. The peak AoV pressure gradient is 29.2 mmHg. The peak  aortic velocity is 2.74 m/sec. The mean gradient across the aortic valve is18  mmHg.     Tricuspid Valve  Moderate to severe tricuspid insufficiency is present. Right ventricular  systolic pressure is 39mmHg above the right atrial pressure.     Pulmonic Valve  The pulmonic valve is normal.     Vessels  The aorta root is normal.     Pericardium  No pericardial effusion is present.     ______________________________________________________________________________  MMode/2D Measurements & Calculations  IVSd: 1.0 cm  LVIDd: 4.2 cm  LVIDs: 3.5 cm  LVPWd: 1.0 cm  FS: 15.8 %  LV mass(C)d: 141.1 grams  LV mass(C)dI: 77.9 grams/m2  Ao root diam: 3.8 cm  LVOT diam: 2.0 cm  LVOT area: 3.2 cm2     RWT: 0.49     Time Measurements  Aortic HR: 274.4 BPM     Doppler Measurements & Calculations  Ao V2 max: 270.4 cm/sec  Ao max P.2 mmHg  Ao V2 mean: 203.3 cm/sec  Ao mean P.0 mmHg  Ao V2 VTI: 44.4 cm  NAHUM(I,D): 0.69 cm2  NAHUM(V,D): 0.81 cm2  LV V1 max P.9 mmHg  LV V1 max: 68.5 cm/sec  LV V1 VTI: 9.6 cm  CO(LVOT): 8.5 l/min  CI(LVOT): 4.7 l/min/m2  SV(LVOT): 30.8 ml  SI(LVOT): 17.0 ml/m2  TR max ion: 311.7 cm/sec  TR max P.9 mmHg  AV Ion Ratio (DI): 0.25  NAHUM Index (cm2/m2): 0.38     ______________________________________________________________________________  Report approved by: Dustin Yoder 2021  04:40 PM

## 2021-07-11 LAB — GLUCOSE BLDC GLUCOMTR-MCNC: 122 MG/DL (ref 70–99)

## 2021-07-11 PROCEDURE — 250N000011 HC RX IP 250 OP 636: Performed by: INTERNAL MEDICINE

## 2021-07-11 PROCEDURE — 120N000001 HC R&B MED SURG/OB

## 2021-07-11 PROCEDURE — 99232 SBSQ HOSP IP/OBS MODERATE 35: CPT | Performed by: INTERNAL MEDICINE

## 2021-07-11 PROCEDURE — 250N000013 HC RX MED GY IP 250 OP 250 PS 637: Performed by: INTERNAL MEDICINE

## 2021-07-11 PROCEDURE — 82962 GLUCOSE BLOOD TEST: CPT

## 2021-07-11 RX ORDER — FUROSEMIDE 10 MG/ML
20 INJECTION INTRAMUSCULAR; INTRAVENOUS EVERY 6 HOURS
Status: COMPLETED | OUTPATIENT
Start: 2021-07-11 | End: 2021-07-11

## 2021-07-11 RX ADMIN — FUROSEMIDE 20 MG: 10 INJECTION, SOLUTION INTRAMUSCULAR; INTRAVENOUS at 07:47

## 2021-07-11 RX ADMIN — LISINOPRIL 5 MG: 5 TABLET ORAL at 09:47

## 2021-07-11 RX ADMIN — POTASSIUM CHLORIDE 40 MEQ: 1500 TABLET, EXTENDED RELEASE ORAL at 20:44

## 2021-07-11 RX ADMIN — DULOXETINE HYDROCHLORIDE 20 MG: 20 CAPSULE, DELAYED RELEASE ORAL at 09:47

## 2021-07-11 RX ADMIN — POTASSIUM CHLORIDE 40 MEQ: 1500 TABLET, EXTENDED RELEASE ORAL at 09:48

## 2021-07-11 RX ADMIN — DULOXETINE HYDROCHLORIDE 20 MG: 20 CAPSULE, DELAYED RELEASE ORAL at 20:44

## 2021-07-11 RX ADMIN — FLUTICASONE PROPIONATE 1 SPRAY: 50 SPRAY, METERED NASAL at 09:49

## 2021-07-11 RX ADMIN — METOPROLOL TARTRATE 100 MG: 100 TABLET, FILM COATED ORAL at 09:47

## 2021-07-11 RX ADMIN — METOPROLOL TARTRATE 100 MG: 100 TABLET, FILM COATED ORAL at 20:45

## 2021-07-11 RX ADMIN — CHLORTHALIDONE 25 MG: 25 TABLET ORAL at 09:47

## 2021-07-11 RX ADMIN — APIXABAN 5 MG: 2.5 TABLET, FILM COATED ORAL at 09:47

## 2021-07-11 RX ADMIN — MAGNESIUM OXIDE 400 MG (241.3 MG MAGNESIUM) TABLET 400 MG: TABLET at 09:47

## 2021-07-11 RX ADMIN — OMEPRAZOLE 20 MG: 20 CAPSULE, DELAYED RELEASE ORAL at 09:47

## 2021-07-11 RX ADMIN — MAGNESIUM OXIDE 400 MG (241.3 MG MAGNESIUM) TABLET 400 MG: TABLET at 13:55

## 2021-07-11 RX ADMIN — FUROSEMIDE 20 MG: 10 INJECTION, SOLUTION INTRAMUSCULAR; INTRAVENOUS at 13:55

## 2021-07-11 RX ADMIN — ATORVASTATIN CALCIUM 40 MG: 40 TABLET, FILM COATED ORAL at 09:47

## 2021-07-11 RX ADMIN — MAGNESIUM OXIDE 400 MG (241.3 MG MAGNESIUM) TABLET 400 MG: TABLET at 20:45

## 2021-07-11 RX ADMIN — APIXABAN 5 MG: 2.5 TABLET, FILM COATED ORAL at 20:44

## 2021-07-11 RX ADMIN — ASPIRIN 81 MG: 81 TABLET, CHEWABLE ORAL at 09:47

## 2021-07-11 ASSESSMENT — ACTIVITIES OF DAILY LIVING (ADL)
ADLS_ACUITY_SCORE: 19
ADLS_ACUITY_SCORE: 21
ADLS_ACUITY_SCORE: 21

## 2021-07-11 ASSESSMENT — MIFFLIN-ST. JEOR: SCORE: 1167.25

## 2021-07-11 NOTE — PLAN OF CARE
A&O. Disoriented to time. Pleasant and cooperative. Denies pain. VSS. IV lasix q6h and having adequate output. LS have fine crackles in bilateral bases. ESQUEDA. On tele, ICU tele report reading Afib 100-120s. IV saline locked. Alarms active. SBA with gait belt and walker. Pt free from injury and falls this shift.  Face to face report given with opportunity to observe patient.    Report given to Kate Olivia RN   7/11/2021  3:40 PM

## 2021-07-11 NOTE — PLAN OF CARE
Patient alert, disoriented to time. Patient did have some hallucinating during the night, speaking to a person in the room that wasn't there. SBA with gait belt to commode, incontinent of urine x1, continent of mixed urine and stool after. Remains tachycardic P 100's-120, AP irregular. Remains on telemetry: A fib  per written report from ICU. IV saline locked, patent. Trace edema to BLE. Alarms on, call light within reach, makes needs known.     Face to face report given with opportunity to observe patient.    Report given to Chelsea Gandara RN   7/11/2021  7:20 AM

## 2021-07-11 NOTE — DOWNTIME EVENT NOTE
The EMR was down for 5 hours on 7/11/2021.    Nanette SIMMONS was responsible for completing the paper charting during this time period.     The following information was re-entered into the system by Nanette Gandara RN: Flowsheet data and Intake and output    The following information will remain in the paper chart: N/A    Nanette Gandara RN  7/11/2021

## 2021-07-11 NOTE — PROGRESS NOTES
[facility]    Hospitalist Progress Note    Date of Service (when I saw the patient): 07/11/2021    Assessment & Plan   Annie Arguello is a 87 year old  woman who was admitted on 7/9/2021.  She presented with dyspnea which was particularly prominent beginning the evening prior to presentation.  On reflection this may have been somewhat more longstanding, perhaps over 4-6 months as has been modest lower extremity edema.  She does note she was entertaining multiple friends and family yesterday and may have been more fatigued because of this.  No stephanie orthopnea.  No chest pain.  On evaluation in emergency department chest radiograph suggested increased pulmonary venous distention.  B-type natriuretic peptide was elevated at approximately 6000.  She received low-dose of furosemide in emergency department with as of yet not marked fluid mobilization.  Echocardiogram was also obtained in emergency department showing on my informal review modest reduction in left ventricular systolic function, approximately 35% ejection fraction.  She is admitted for further evaluation and management.     1.  Volume overload  Acute decompensated systolic heart failure  Dyspnea  Minimal resting dyspnea.  No walking or other significant activity as of yet today however.  She has had a prominent diuresis with relatively modest doses of Lasix and still has some peripheral edema so suspect some residual volume overload.  Continue intravenous Lasix for another several doses.  Also encourage walking in part to assess her exercise tolerance.      Source of decompensation somewhat uncertain but she recalls that her dyspnea has been slowly progressive since her discharge from [place] where multiple medications were withheld because of mild hypotension.  Certainly reasonable at the time but I suspect she developed dyspnea and hypervolemia as result of that.  In addition likely some volume shifts because of her fall at that time and  marked left arm hematoma.  Resumed her usual chlorthalidone in addition 7/9.  Lisinopril resumed yesterday and will increase the dose of this given systolic dysfunction.  As below persistent atrial fibrillation with intermittent increased ventricular response and will increase her metoprolol dose.  Encouraging walking to assess her exercise tolerance.      Systolic heart failure with reduced ejection fraction was noted on a recent admission at [facility] at which time there was not any marked evidence of volume overload.  Perhaps more prominent now because of fluid shifts and the injury after a fall which was evaluated on her transfer there.    While recently hospitalized at [place] she did have some difficulty with lower blood pressures and several of her medications including lisinopril were held and apparently not yet restarted.  Although unlikely its possible this contributed to this decompensation.  Thromboembolic disease is extremely unlikely given her full dose anticoagulation with apixaban.  2.  Persistent atrial fibrillation  Chronic anticoagulation  Proved rate control with increase in metoprolol dose to 100 mg twice daily. Not certain if possibly her rate control is been suboptimal for some time which might have resulted in worsening in myocardial function.  3.  Recent mechanical fall  Left lower arm hematoma and contusion  Identified on emergency department evaluation here with subsequent transfer to [facility].  She had marked edema and likely some decreased venous flow in the lower left arm however this appears to be fully resolved.  She still has contusion which does not appear to be infected.  No drainage.  At her elbow.  Continue with dressing changes as needed.  She has not had recent falls and this appears to have been uneventful unlikely to be repeated.  Certainly usual monitoring of her functional status in future will be indicated.  4.  Diabetes mellitus  Some elevations in  glucose levels likely in part related to acute illness.  She has not required medication treatment in the past.  Hemoglobin A1c is less at 6.8 than it was in September 2020.  For the time being continue low-dose of sliding scale regular insulin, with dose increased slightly 7/11.  With attention to dietary intake and encouraging activity she may be able to avoid other active treatment.  5.  Metabolic encephalopathy versus dementia  Some confusion and hallucinations during the night described by nursing staff.  Likely multifactorial but would favor some underlying dementia.  Certainly worsened by her being away from usual environment and likely some sleep disturbance.  No focal findings.  Doubt any extensive evaluation will be fruitful.    Active Problems:    Acute exacerbation of CHF (congestive heart failure) (H)       DVT Prophylaxis: Full anticoagulation with apixaban.  Code Status: No CPR- Do NOT Intubate    Disposition: Expected discharge in another 1-2 days depending on her course.  Still requiring intravenous diuresis  Siddharth Wang    Interval History   Feels somewhat improved.  No dyspnea at rest.  No chest pain or pressure.  Fair appetite.    -Data reviewed today: I reviewed all new labs and imaging results over the last 24 hours. I personally reviewed telemetry monitoring.    Peripheral IV 07/10/21 Anterior;Right Wrist (Active)   Site Assessment WDL 07/11/21 0802   Line Status Saline locked 07/11/21 0802   Phlebitis Scale 0-->no symptoms 07/11/21 0802   Infiltration Scale 0 07/11/21 0802   Number of days: 1       Wound Elbow Abrasion(s);Contusion;Other (comment) (Active)   Wound Bed Black;Eschar 07/11/21 0744   Scarlet-wound Assessment Ecchymosis;Edema;Warm 07/11/21 0744   Wound Width (cm) 2 cm 07/09/21 1300   Estimated Circumference ( if not measured quarter sized 07/10/21 1630   Drainage Amount None 07/11/21 0744   Dressing Open to air 07/11/21 0744   Dressing Status Clean, dry, intact 07/11/21 0005    Number of days: 2     Line/device assessment(s) completed for medical necessity July 11    Physical Exam   Temp: 97.5  F (36.4  C) Temp src: Tympanic BP: 116/81 Pulse: 92   Resp: 16 SpO2: 95 % O2 Device: None (Room air)    Vitals:    07/09/21 1254 07/10/21 0544 07/11/21 0547   Weight: 78.7 kg (173 lb 8 oz) 77.9 kg (171 lb 11.8 oz) 77.9 kg (171 lb 11.8 oz)     Vital Signs with Ranges  Temp:  [97.3  F (36.3  C)-97.5  F (36.4  C)] 97.5  F (36.4  C)  Pulse:  [] 92  Resp:  [16] 16  BP: (109-116)/(73-85) 116/81  SpO2:  [95 %-97 %] 95 %  I/O last 3 completed shifts:  In: 240 [P.O.:240]  Out: 1350 [Urine:1150; Other:200]    Awake, alert, pleasant interactive woman sitting in chair on medical wards.  Speech is clear.  Slightly distractible.    HEENT: Pupils equal, conjugate. No icterus or nystagmus. Oral mucosa moist. No facial asymmetry.   Neck: Supple, jugular veins 1-2 cm of water. Trachea midline   Chest: No chest wall movement asymmetry. Aeration preserved to bases. Accessory muscles not in use. Expiratory time not increased.  No tidal wheezes.  Minimal wheeze on forced expiratory maneuver.  No rhonchi.  Few right basilar discrete crackles.   Cardiac: PMI not displaced.  Distinct heart tones.  Irregularly irregular and otherwise unremarkable S1, S2. No S3, S4. P2 not accentuated. No murmurs.    Abdomen: Soft. No palpation or percussion tenderness. No distention. Normoactive bowel sounds. Liver and spleen not increased in size. No bruits, masses, or pulsations.   Extremities: Mild bilateral lower extremity edema.  Warm distally.  Easily palpable peripheral pulses.  Neurologic: Mental state above. Motor 5/5 and bilaterally equal. Tone preserved. No fasiculations or tremors.      Medications     - MEDICATION INSTRUCTIONS -         apixaban ANTICOAGULANT  5 mg Oral BID     aspirin  81 mg Oral Daily     atorvastatin  40 mg Oral Daily     chlorthalidone  25 mg Oral Daily     DULoxetine  20 mg Oral BID     fluticasone   1 spray Both Nostrils Daily     furosemide  20 mg Intravenous Q6H     insulin aspart  1-10 Units Subcutaneous TID AC     insulin aspart  1-7 Units Subcutaneous At Bedtime     lisinopril  5 mg Oral Daily     magnesium oxide  400 mg Oral TID     metoprolol tartrate  100 mg Oral BID     omeprazole  20 mg Oral Daily     potassium chloride ER  40 mEq Oral BID     sodium chloride (PF)  3 mL Intracatheter Q8H           Data   Recent Labs   Lab 07/10/21  0524 07/09/21  0910   WBC 9.1 9.8   HGB 11.6* 12.7   MCV 91 92    222   INR  --  1.71*    141   POTASSIUM 3.6 4.0   CHLORIDE 107 108   CO2 27 28   BUN 25 23   CR 0.86 0.91   ANIONGAP 7 5   MALIHA 8.7 9.0   * 183*   ALBUMIN 2.9* 3.2*   PROTTOTAL 6.7* 7.4   BILITOTAL 1.3 1.7*   ALKPHOS 86 98   ALT 25 29   AST 29 35   TROPI  --  <0.015       Lactic Acid   Date Value Ref Range Status   06/30/2019 2.2 (H) 0.7 - 2.0 mmol/L Final     Comment:     Significant value called to and read back by  HAILE JOHNSON AT 2324 ON 6/30/19 BY LYNDON.     06/30/2019 2.2 (H) 0.7 - 2.0 mmol/L Final     Comment:     Significant value called to and read back by  AJAY THOMAS AT 0516 ON 6/30/19 BY LYNDON.     06/29/2019 3.3 (H) 0.7 - 2.0 mmol/L Final     Comment:     Significant value called to and read back by  FEMI JOHNSON AT 1015 ON 6/29/2019 BY [facility]         No results found for this or any previous visit (from the past 24 hour(s)).

## 2021-07-12 ENCOUNTER — APPOINTMENT (OUTPATIENT)
Dept: PHYSICAL THERAPY | Facility: HOSPITAL | Age: 86
DRG: 291 | End: 2021-07-12
Attending: INTERNAL MEDICINE
Payer: MEDICARE

## 2021-07-12 LAB
ANION GAP SERPL CALCULATED.3IONS-SCNC: 6 MMOL/L (ref 3–14)
BASOPHILS # BLD AUTO: 0.1 10E3/UL (ref 0–0.2)
BASOPHILS NFR BLD AUTO: 1 %
BUN SERPL-MCNC: 21 MG/DL (ref 7–30)
CALCIUM SERPL-MCNC: 9.2 MG/DL (ref 8.5–10.1)
CHLORIDE BLD-SCNC: 105 MMOL/L (ref 94–109)
CO2 SERPL-SCNC: 28 MMOL/L (ref 20–32)
CREAT SERPL-MCNC: 0.94 MG/DL (ref 0.52–1.04)
EOSINOPHIL # BLD AUTO: 0.2 10E3/UL (ref 0–0.7)
EOSINOPHIL NFR BLD AUTO: 2 %
ERYTHROCYTE [DISTWIDTH] IN BLOOD BY AUTOMATED COUNT: 17.2 % (ref 10–15)
GFR SERPL CREATININE-BSD FRML MDRD: 55 ML/MIN/1.73M2
GLUCOSE BLD-MCNC: 161 MG/DL (ref 70–99)
GLUCOSE BLDC GLUCOMTR-MCNC: 124 MG/DL (ref 70–99)
GLUCOSE BLDC GLUCOMTR-MCNC: 146 MG/DL (ref 70–99)
GLUCOSE BLDC GLUCOMTR-MCNC: 175 MG/DL (ref 70–99)
GLUCOSE BLDC GLUCOMTR-MCNC: 212 MG/DL (ref 70–99)
GLUCOSE BLDC GLUCOMTR-MCNC: 255 MG/DL (ref 70–99)
HCT VFR BLD AUTO: 39.9 % (ref 35–47)
HGB BLD-MCNC: 12.8 G/DL (ref 11.7–15.7)
IMM GRANULOCYTES # BLD: 0 10E3/UL
IMM GRANULOCYTES NFR BLD: 0 %
LYMPHOCYTES # BLD AUTO: 2.3 10E3/UL (ref 0.8–5.3)
LYMPHOCYTES NFR BLD AUTO: 24 %
MCH RBC QN AUTO: 29 PG (ref 26.5–33)
MCHC RBC AUTO-ENTMCNC: 32.1 G/DL (ref 31.5–36.5)
MCV RBC AUTO: 90 FL (ref 78–100)
MONOCYTES # BLD AUTO: 0.9 10E3/UL (ref 0–1.3)
MONOCYTES NFR BLD AUTO: 10 %
NEUTROPHILS # BLD AUTO: 5.8 10E3/UL (ref 1.6–8.3)
NEUTROPHILS NFR BLD AUTO: 63 %
NRBC # BLD AUTO: 0 10E3/UL
NRBC BLD AUTO-RTO: 0 /100
PLATELET # BLD AUTO: 211 10E3/UL (ref 150–450)
POTASSIUM BLD-SCNC: 4.7 MMOL/L (ref 3.4–5.3)
RBC # BLD AUTO: 4.42 10E6/UL (ref 3.8–5.2)
SODIUM SERPL-SCNC: 139 MMOL/L (ref 133–144)
WBC # BLD AUTO: 9.3 10E3/UL (ref 4–11)

## 2021-07-12 PROCEDURE — 97161 PT EVAL LOW COMPLEX 20 MIN: CPT | Mod: GP | Performed by: PHYSICAL THERAPIST

## 2021-07-12 PROCEDURE — 99232 SBSQ HOSP IP/OBS MODERATE 35: CPT | Performed by: INTERNAL MEDICINE

## 2021-07-12 PROCEDURE — 97530 THERAPEUTIC ACTIVITIES: CPT | Mod: GP | Performed by: PHYSICAL THERAPIST

## 2021-07-12 PROCEDURE — 120N000001 HC R&B MED SURG/OB

## 2021-07-12 PROCEDURE — 36415 COLL VENOUS BLD VENIPUNCTURE: CPT | Performed by: INTERNAL MEDICINE

## 2021-07-12 PROCEDURE — 82962 GLUCOSE BLOOD TEST: CPT

## 2021-07-12 PROCEDURE — 85025 COMPLETE CBC W/AUTO DIFF WBC: CPT | Performed by: INTERNAL MEDICINE

## 2021-07-12 PROCEDURE — 250N000011 HC RX IP 250 OP 636: Performed by: INTERNAL MEDICINE

## 2021-07-12 PROCEDURE — 80048 BASIC METABOLIC PNL TOTAL CA: CPT | Performed by: INTERNAL MEDICINE

## 2021-07-12 PROCEDURE — 250N000013 HC RX MED GY IP 250 OP 250 PS 637: Performed by: INTERNAL MEDICINE

## 2021-07-12 RX ORDER — DIGOXIN 0.25 MG/ML
250 INJECTION INTRAMUSCULAR; INTRAVENOUS EVERY 4 HOURS
Status: COMPLETED | OUTPATIENT
Start: 2021-07-12 | End: 2021-07-12

## 2021-07-12 RX ADMIN — ASPIRIN 81 MG: 81 TABLET, CHEWABLE ORAL at 08:34

## 2021-07-12 RX ADMIN — APIXABAN 5 MG: 2.5 TABLET, FILM COATED ORAL at 20:31

## 2021-07-12 RX ADMIN — DIGOXIN 250 MCG: 0.25 INJECTION INTRAMUSCULAR; INTRAVENOUS at 17:46

## 2021-07-12 RX ADMIN — OMEPRAZOLE 20 MG: 20 CAPSULE, DELAYED RELEASE ORAL at 08:35

## 2021-07-12 RX ADMIN — CHLORTHALIDONE 25 MG: 25 TABLET ORAL at 08:35

## 2021-07-12 RX ADMIN — ATORVASTATIN CALCIUM 40 MG: 40 TABLET, FILM COATED ORAL at 08:35

## 2021-07-12 RX ADMIN — DULOXETINE HYDROCHLORIDE 20 MG: 20 CAPSULE, DELAYED RELEASE ORAL at 08:34

## 2021-07-12 RX ADMIN — DIGOXIN 250 MCG: 0.25 INJECTION INTRAMUSCULAR; INTRAVENOUS at 13:19

## 2021-07-12 RX ADMIN — MAGNESIUM OXIDE 400 MG (241.3 MG MAGNESIUM) TABLET 400 MG: TABLET at 13:18

## 2021-07-12 RX ADMIN — POTASSIUM CHLORIDE 40 MEQ: 1500 TABLET, EXTENDED RELEASE ORAL at 08:35

## 2021-07-12 RX ADMIN — DIGOXIN 250 MCG: 0.25 INJECTION INTRAMUSCULAR; INTRAVENOUS at 09:51

## 2021-07-12 RX ADMIN — MAGNESIUM OXIDE 400 MG (241.3 MG MAGNESIUM) TABLET 400 MG: TABLET at 20:32

## 2021-07-12 RX ADMIN — POTASSIUM CHLORIDE 40 MEQ: 1500 TABLET, EXTENDED RELEASE ORAL at 20:32

## 2021-07-12 RX ADMIN — APIXABAN 5 MG: 2.5 TABLET, FILM COATED ORAL at 08:34

## 2021-07-12 RX ADMIN — MAGNESIUM OXIDE 400 MG (241.3 MG MAGNESIUM) TABLET 400 MG: TABLET at 08:35

## 2021-07-12 RX ADMIN — METOPROLOL TARTRATE 100 MG: 100 TABLET, FILM COATED ORAL at 20:31

## 2021-07-12 RX ADMIN — DULOXETINE HYDROCHLORIDE 20 MG: 20 CAPSULE, DELAYED RELEASE ORAL at 20:31

## 2021-07-12 RX ADMIN — FLUTICASONE PROPIONATE 1 SPRAY: 50 SPRAY, METERED NASAL at 08:35

## 2021-07-12 RX ADMIN — METOPROLOL TARTRATE 100 MG: 100 TABLET, FILM COATED ORAL at 08:35

## 2021-07-12 RX ADMIN — LISINOPRIL 5 MG: 5 TABLET ORAL at 08:34

## 2021-07-12 ASSESSMENT — ACTIVITIES OF DAILY LIVING (ADL)
ADLS_ACUITY_SCORE: 21
ADLS_ACUITY_SCORE: 22
ADLS_ACUITY_SCORE: 22
ADLS_ACUITY_SCORE: 21

## 2021-07-12 ASSESSMENT — MIFFLIN-ST. JEOR: SCORE: 1126.25

## 2021-07-12 NOTE — PLAN OF CARE
A&O, vital signs stable, afebrile. Heart rate elevated. To have 3 doses if IV Digoxin today.  IV flushed without difficulty. Ambulates to bathroom with walker, gait belt & stand by assistance.  Lungs sounds are diminished with a few fine crackles in the bases. Denies pain  Bowel sound active, two loose stools today.  Up in chair most of this shift. Talking on the phone off/on today  accuchecks today 175 & 212.         Face to face report given with opportunity to observe patient.    Report given to IRIS Swan RN   7/12/2021  3:08 PM  .

## 2021-07-12 NOTE — PLAN OF CARE
Continued care of pt throughout night. Up to bathroom with A1. Denies pain or SOB. Trace edema to LLE. BG @ 0300 146. No significant changes.     Face to face report given with opportunity to observe patient.    Report given to IRIS Finn RN   7/12/2021  7:28 AM

## 2021-07-12 NOTE — PROGRESS NOTES
[facility]    Hospitalist Progress Note      Assessment & Plan   Annie Arguello is a 87 year old female who was admitted on 7/9/2021.  Patient presented to the ER with increasing dyspnea.  Which have been specifically worse over the last couple of days.  In the ER she was hemodynamically stable.  With the exception of her A. fib which was rapid ventricular response.  O2 sats were normal on room air.  Her BNP was up at 6000.  Echo showed EF around 35%.  This was similar to 1 which was performed at [facility] recently after she was sent there after trauma fall.  Pressures were somewhat soft when she was at [facility] and they cut back on her medications somewhat specifically her oral diuretic and her beta-blocker.      1.  Acute on chronic systolic heart failure: Patient has a known echo showing EF around 30 to 35%.  She had some evidence of volume overload and dyspnea.  Was given some IV Lasix.  Difficult to really sort out what her diuresis was as her weights are somewhat unreliable.  But based on today she may have lost at least 3 kg.  She is normally on only chlorthalidone.  This is been restarted.  Her A. fib is not well controlled.  Do not think it has been well controlled for some time.  This may be part of the issue.  She is on lisinopril.  The metoprolol tartrate is being used.  Would be nice to put her on the succinate due to her underlying LV dysfunction.    2.  Chronic atrial fibrillation, rapid ventricular response: When she was at [facility] they also had difficulty controlling it in fact she was on amiodarone for a while then on just the oral metoprolol.  Was discharged with a resting heart rate of 100.  So currently now she is really not all that well controlled once again she is on 100 twice daily of metoprolol.  We will add some digoxin will IV load her today with saline 50 mcg and start some low-dose oral tomorrow.  She otherwise feels very well.  We will see how she does ambulating.  She  was living at [facility].  Hopefully she can return to that facility at discharge.  She is on apixaban we will continue with that currently.    3.  Status post recent fall: Patient did have a fall was transferred to [place].  She had all kinds of trauma to her left side.  Her left elbow still has a significant hematoma there although resolving.  No signs of any obvious infection of that area.  Her hemoglobin has been stable.  12.8 today.    4.  Diabetes mellitus type 2: She has not been on anything at home.  Sugars here have been 120s to 170s.  We will continue to monitor.    5.  Disposition: We will order physical therapy today to make sure she is safe and able to return back to [facility] [facility] in her current situation.  I think that should not be a problem she was there prior to coming in with her heart failure issues.  Once again her heart rate under relatively good control I see no reason why she cannot go back there.      Diet: Combination Diet 3 gm NA Diet; Low Saturated Fat Diet  Fluids: None    DVT Prophylaxis: Apixaban  Code Status: No CPR- Do NOT Intubate    Disposition: Expected discharge in 1 days once rate controlled.    Mckay Acosta    Interval History   Patient's morning is alert pleasant states feeling markedly better.  Denies any current dyspnea no chest pains at all.  She is worried about her left elbow.  She states is markedly better than when she initially fell.  There is still some resolving fluid there probably bursitis but no signs of any obvious infection.  Her heart rate still is not well controlled she is anywhere from 100-120 range.  A. fib.  Sats are good on room air.  Will give her some IV dig load her up on this if we get her better controlled.  From a heart failure standpoint she seems to be doing fine.  Her weights are not very accurate.  She lost 4 kg apparently since yesterday which is not really feasible.  I think her previous values were wrong.  But she looks good if she is able to  ambulate safely and rates better controlled I think she can go back to assisted living tomorrow.  We will have physical therapy see her.    -Data reviewed today: I reviewed all new labs and imaging results over the last 24 hours. I personally reviewed no images or EKG's today.    Physical Exam   Temp: 97.5  F (36.4  C) Temp src: Tympanic BP: 119/91 Pulse: 97   Resp: 20 SpO2: 100 % O2 Device: None (Room air)    Vitals:    07/10/21 0544 07/11/21 0547 07/12/21 0622   Weight: 77.9 kg (171 lb 11.8 oz) 77.9 kg (171 lb 11.8 oz) 73.8 kg (162 lb 11.2 oz)     Vital Signs with Ranges  Temp:  [97.5  F (36.4  C)-98  F (36.7  C)] 97.5  F (36.4  C)  Pulse:  [] 97  Resp:  [18-22] 20  BP: (104-119)/(67-91) 119/91  SpO2:  [94 %-100 %] 100 %  I/O last 3 completed shifts:  In: 360 [P.O.:360]  Out: 1025 [Urine:1025]    Peripheral IV 07/10/21 Anterior;Right Wrist (Active)   Site Assessment WDL 07/12/21 0644   Line Status Saline locked 07/12/21 0644   Dressing Intervention Dressing reinforced 07/12/21 0644   Phlebitis Scale 0-->no symptoms 07/12/21 0644   Infiltration Scale 0 07/12/21 0644   Number of days: 2       Wound Elbow Abrasion(s);Contusion;Other (comment) (Active)   Wound Bed Black;Eschar 07/11/21 2357   Scarlet-wound Assessment Ecchymosis;Edema 07/11/21 2357   Wound Width (cm) 2 cm 07/09/21 1300   Estimated Circumference ( if not measured quarter sized 07/11/21 2357   Drainage Amount None 07/11/21 2357   Dressing Open to air 07/11/21 2357   Dressing Status Clean, dry, intact 07/11/21 0005   Number of days: 3     No line/device    Constitutional: Alert and oriented x3. No distress    HEENT: Normocephalic/atraumatic, PERRL, EOMI, mouth moist, neck supple, no LN.     Cardiovascular:IRRR. no Murmur, no  rubs, or gallops.  JVD no.  Bruits no.  Pulses 2+    Respiratory: Clear to auscultation bilaterally    Abdomen: Soft, nontender, nondistended, no organomegaly. Bowel sounds present    Extremities: Warm/dry. 1-2edema    Neuro:    Non focal, cranial nerves intact, Moves all extremities.  Medications     - MEDICATION INSTRUCTIONS -         apixaban ANTICOAGULANT  5 mg Oral BID     aspirin  81 mg Oral Daily     atorvastatin  40 mg Oral Daily     chlorthalidone  25 mg Oral Daily     digoxin  250 mcg Intravenous Q4H     DULoxetine  20 mg Oral BID     fluticasone  1 spray Both Nostrils Daily     insulin aspart  1-10 Units Subcutaneous TID AC     insulin aspart  1-7 Units Subcutaneous At Bedtime     lisinopril  5 mg Oral Daily     magnesium oxide  400 mg Oral TID     metoprolol tartrate  100 mg Oral BID     omeprazole  20 mg Oral Daily     potassium chloride ER  40 mEq Oral BID     sodium chloride (PF)  3 mL Intracatheter Q8H       Data   Recent Labs   Lab 07/12/21  0824 07/12/21  0530 07/12/21  0301 07/10/21  0524 07/09/21  0910 07/09/21  0910   WBC  --  9.3  --  9.1  --  9.8   HGB  --  12.8  --  11.6*  --  12.7   MCV  --  90  --  91  --  92   PLT  --  211  --  201  --  222   INR  --   --   --   --   --  1.71*   NA  --  139  --  141  --  141   POTASSIUM  --  4.7  --  3.6  --  4.0   CHLORIDE  --  105  --  107  --  108   CO2  --  28  --  27  --  28   BUN  --  21  --  25  --  23   CR  --  0.94  --  0.86  --  0.91   ANIONGAP  --  6  --  7  --  5   MALIHA  --  9.2  --  8.7  --  9.0   * 161* 146* 160*   < > 183*   ALBUMIN  --   --   --  2.9*  --  3.2*   PROTTOTAL  --   --   --  6.7*  --  7.4   BILITOTAL  --   --   --  1.3  --  1.7*   ALKPHOS  --   --   --  86  --  98   ALT  --   --   --  25  --  29   AST  --   --   --  29  --  35   TROPI  --   --   --   --   --  <0.015    < > = values in this interval not displayed.       No results found for this or any previous visit (from the past 24 hour(s)).

## 2021-07-12 NOTE — PROGRESS NOTES
Inpatient Physical Therapy Evaluation    Name: Annie Arguello MRN# [id number]   Age: 87 year old YOB: 1933     Date of Consultation: 2021  Consultation is requested by:  Dr. Acosta  Specific Consultation Request:  Discharge recommendations, weakness  Primary care provider: DOC [facility]    General Information:   Onset Date: 2021    History of Current Problem/Admission: Acute exacerbation of CHF (congestive heart failure) (H) [I50.9]  Acute on chronic congestive heart failure, unspecified heart failure type (H) [I50.9]    Prior Level of Function: Pt was recently hospitalized in [place] after fall but had been discharged back to [facility] with home PT/OT services 2-3x/week.  Pt states she has been independent with bathing, dressing, toileting, and ambulates with 4WW outside her apartment and FWW inside her apartment due to size.  Pt ambulates to/from dining room with 4WW.  Pt's family assists with transportation.  as needed  Ambulation: 1 - Assistive Equipment   Transferrin - Independent   Toiletin - Independent    Bathin - Assistive Equipment   Dressin - Independent   Eatin - Independent   Communication: 0 - Understands/communicates without difficulty  Swallowin - swallows foods/liquids without difficulty  Cognition: 0 - no cognitive issues reported    Fall history within the last 6 months: Yes, 2    Current Living Situation: Patient lives at [facility]    Current Equipment Used at Home: 4WW, FWW     Patient & Family Goals: to go back to assisted living     Past Medical History:   Past Medical History:   Diagnosis Date     A-fib (H)      Acute hyperglycemia      Acute systolic heart failure (H) 2021     Acute systolic heart failure (H)      Anemia due to blood loss, acute 2021     Atrial fibrillation (H) 2012     Calculus of kidney 2003     Chronic anticoagulation 2021     Chronic  anticoagulation      Controlled type 2 diabetes mellitus without complication, without long-term current use of insulin (H) 10/24/2017     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 2007     Diverticulosis 2011     Encounter for routine screening for malformation using ultrasonics 2021     Essential hypertension 2021     Fall 2021     Falls      Fibromyalgia 05/10/2011     Hematoma      Hemorrhagic shock (H) 2021     Hemorrhagic shock (H)      HTN (hypertension)      Hypotension      Hypotension, unspecified hypotension type 2021     Nonallopathic lesions of cervical region, not else 2001     Obesity 2011     Osteoporosis 2011     Other malaise and fatigue 2002     Renal cyst 2011     Sciatica 2007     Traumatic hematoma of hip, left, sequela      Traumatic hematoma of left upper arm     Related to Eliquis and fall       Past Surgical History:  Past Surgical History:   Procedure Laterality Date     Breast biopsy      LT, benign      SECTION       CHOLECYSTECTOMY      lap     COLONOSCOPY       LUCILLE / BSO         Medications:   Current Facility-Administered Medications   Medication     acetaminophen (TYLENOL) tablet 325 mg     apixaban ANTICOAGULANT (ELIQUIS) tablet 5 mg     aspirin (ASA) chewable tablet 81 mg     atorvastatin (LIPITOR) tablet 40 mg     bisacodyl (DULCOLAX) EC tablet 5 mg    Or     bisacodyl (DULCOLAX) EC tablet 10 mg    Or     bisacodyl (DULCOLAX) EC tablet 15 mg     chlorthalidone (HYGROTON) tablet 25 mg     glucose gel 15-30 g    Or     dextrose 50 % injection 25-50 mL    Or     glucagon injection 1 mg     digoxin (LANOXIN) injection 250 mcg     DULoxetine (CYMBALTA) DR capsule 20 mg     fluticasone (FLONASE) 50 MCG/ACT spray 1 spray     HYDROcodone-acetaminophen (NORCO) 5-325 MG per tablet 1-2 tablet     insulin aspart (NovoLOG) injection (RAPID ACTING)     insulin aspart (NovoLOG) injection (RAPID  ACTING)     lidocaine (LMX4) kit     lidocaine 1 % 0.1-1 mL     lisinopril (ZESTRIL) tablet 5 mg     magnesium oxide (MAG-OX) tablet 400 mg     melatonin tablet 1 mg     metoprolol tartrate (LOPRESSOR) tablet 100 mg     naloxone (NARCAN) injection 0.2 mg    Or     naloxone (NARCAN) injection 0.4 mg    Or     naloxone (NARCAN) injection 0.2 mg    Or     naloxone (NARCAN) injection 0.4 mg     omeprazole (priLOSEC) CR capsule 20 mg     ondansetron (ZOFRAN-ODT) ODT tab 4 mg    Or     ondansetron (ZOFRAN) injection 4 mg     Patient is already receiving anticoagulation with heparin, enoxaparin (LOVENOX), warfarin (COUMADIN)  or other anticoagulant medication     polyethylene glycol (MIRALAX) Packet 17 g     polyethylene glycol-propylene glycol PF (SYSTANE ULTRA PF) opthalmic solution 1 drop     potassium chloride ER (KLOR-CON M) CR tablet 40 mEq     sodium chloride (PF) 0.9% PF flush 3 mL     sodium chloride (PF) 0.9% PF flush 3 mL       Weight Bearing Status: FWB LEs     Cognitive Status Examination:  Orientation: oriented to time, place and person  Level of Consciousness: alert  Follows Commands and Answers Questions: 100% of the time  Personal Safety and Judgement: Intact  Memory: Immediate recall intact  Comments:     Pain:   Currently in pain? No  Pain Location?   Pain Rating:     Physical Therapy Evaluation:   Integumentary/Edema: large hematoma over left elbow, areas of scattered bruising throughout   ROM: LE AROM WFL  Strength: LE strength grossly 4+/5 throughout  Bed Mobility: NT, up in chair upon arrival  Transfers: sit<>stand SBA  Gait: Ambulated into bathroom with FWW SBA, able to transition onto toilet in small bathroom with SBA, good stability  Stairs: NT  Balance: good with support from walker  Sensory: denies numbness/tingling LE  Coordination: NT    Physical Therapy Interventions: Balance, Bed Mobility, Gait Training , Neuro-muscular re-education, Strengthening, Transfer Training, Risk Factor Education  and Progressive Activity/Exercise     Clinical Impressions:  Criteria for Skilled Therapeutic Intervention Met: Yes, treatment indicated  PT Diagnosis: gait disturbance  Influenced by the following impairments: decreased activity tolerance, weakness  Functional limitations due to impairment: decreased tolerance for functional mobility and ADLs  Clinical presentation: Stable/Uncomplicated  Clinical presentation rationale: clinical judgement  Clinical Decision making (complexity): Low Complexity  Frequency: 6 times/week  Predicted Duration of Therapy Intervention (days/wks): 5 days  Anticipated Discharge Disposition: Home with Home Therapy  Anticipated Equipment Needs at Discharge:   Risks and Benefits of therapy have been explained: Yes  Patient, Family & other staff in agreement with plan of care: Yes  Clinical Impression Comments: PT evaluation completed.  Pt was recently discharged from [facility] after fall with injury and receiving home PT/OT services at her assisted living.  Pt reports things have been going well and she continues to be independent with ADLs.  Pt tolerated ambulation well with no significant deficits.  Pt would benefit from continued home PT/OT services upon discharge.  Will see during acute care stay to progress mobility and strength.     Total Eval Time: 10

## 2021-07-12 NOTE — PROGRESS NOTES
07/12/21 1200   Signing Clinician's Name / Credentials   Signing clinician's name / credentials Elizabeth Darling DPT   Quick Adds   Rehab Discipline PT   Therapeutic Activity   Minutes of Treatment 15 minutes   Symptoms Noted During/After Treatment None   Treatment Detail Pt able to complete toileting independently and toilet transfer mod I.  Pt ambulated additional 110' with FWW SBA with good stability and tolerance.  O2 sats 97% on RA.  Discussed discharge recommendations and pt feels safe to return home with home PT services, feels things were going well and she feels strong.    PT Discharge Planning    PT Discharge Recommendation (DC Rec) home with home care physical therapy   PT Rationale for DC Rec PT evaluation completed.  Pt was recently discharged from [facility] after fall with injury and receiving home PT/OT services at her assisted living.  Pt reports things have been going well and she continues to be independent with ADLs.  Pt tolerated ambulation well with no significant deficits.  Pt would benefit from continued home PT/OT services upon discharge.  Will see during acute care stay to progress mobility and strength.    PT Brief overview of current status  sit>stand SBA, ambulated 110' with FWW SBA sats 97% on RA, HR briefly 125-130 but returned to >115 within 10 seconds.  Completed toileting and toilet transfer independently.   Additional Documentation   Rehab Comments tolerated mobility well   PT Plan Progress strength and activity tolerance   Total Session Time   Total Session Time (minutes) 15 minutes

## 2021-07-12 NOTE — PROGRESS NOTES
[facility]    Spiritual Health Progress Note    Date of Service (when I saw the patient): 07/12/2021     Assessment & Plan   Annie Arguello is a 87 year old female who was admitted on 7/9/2021.  Introduced the patient as an initial visit to Spiritual Health Services and to see if I could connect the patient to their identified marty community.  Had a good discussion with patient. She does have a marty community at [facility]. There are people that come to visit her weekly since she cannot get out because of her physical status.  Has support of her family in the area as well.  Patient said she had been feeling a bit depressed because of her health condition and not being able to get out much. Generally has a positive attitude despite what she is going through. Closed our time with prayer.    Rev. Rayo Lozano  Volunteer

## 2021-07-12 NOTE — PLAN OF CARE
"/81 (BP Location: Right arm)   Pulse 95   Temp 97.9  F (36.6  C) (Tympanic)   Resp 22   Ht 1.575 m (5' 2\")   Wt 77.9 kg (171 lb 11.8 oz)   SpO2 95%   BMI 31.41 kg/m      Pt is A&O. Forgetful at times. Ambulated the halls x1 with walker. Stopped to rest once, some ESQUEDA, BUE weakness reported. Declined dinner, BG at bedtime 122, pt had applesauce with bedtime meds. Fine crackles to RLL. 1-2+ edma to BLE. IV SL and patent. Alarms active and audible. Call light in reach.   "

## 2021-07-12 NOTE — PROGRESS NOTES
Assessment completed by face to face with patient and w/IRIS Zavaleta    LOC: alert and oriented    Dx: Acute exacerbation of CHF (congestive heart failure)  Chronic Disease Management: Generalized muscle weakness, Hypotension due to hypovolemia, Lung nodule, Sciatica, Diverticulosis of large intestine    Lives with: Congregate setting  Living at:  [facility], [place]  Transportation: YES , family    Primary PCP: [facility]  Insurance:  Medicare, BCBS  Medicare IM letter reviewed per [place] Admitting documentation on 7/9/2021..    Support System:  Family, friends at assisted living  Homecare/PCA: [facility] services for PT, OT prior to hospital stay  /County Services: Not connected  : NO      How was the VA notification completed: N/A    Health Care Directive: POLST on file  Guardian: N/A  POA: Lavinia/dominicr    Pharmacy: s [facility]  Meds management: facility nursing    Adequate Resources for needs (housing, utilities, food/med): YES  Household chores: Facility staff  Work/community/social activity: YES , pt stays connected w/family et states that she has many friends at her home/assisted living     ADLs: cuing for dressing,pt gets assist w/bathing  Ambulation:Uses 4 WW, does well w/it per [facility]  Falls: 2x/last six months, due to slipping upon exiting shower, 2nd fall due to getting off toilet too fast, dizziness caused pt to fall to her butt on July 11th, 2021.   Nutrition: No concerns per Meghann/IRIS  Sleep: Not discussed    Equipment used: 4 WW, shower chair available at facility, grab bars in bathroom      Oxygen supplier: N/A      Does the supplier have valid oxygen orders: N/A    Mental health: No concerns voiced  Substance abuse: None  Exposure to violence/abuse: No concerns voiced    Able to Return to Prior Living Arrangements: YES    Choice of Vendor: N/A    Barriers: None anticipated    JOHN: Low    Plan: Return to [facility] via family

## 2021-07-13 VITALS
SYSTOLIC BLOOD PRESSURE: 112 MMHG | TEMPERATURE: 97.1 F | OXYGEN SATURATION: 96 % | HEART RATE: 72 BPM | WEIGHT: 162.26 LBS | HEIGHT: 62 IN | BODY MASS INDEX: 29.86 KG/M2 | RESPIRATION RATE: 18 BRPM | DIASTOLIC BLOOD PRESSURE: 68 MMHG

## 2021-07-13 PROBLEM — I50.23 ACUTE ON CHRONIC SYSTOLIC CONGESTIVE HEART FAILURE (H): Status: ACTIVE | Noted: 2021-07-13

## 2021-07-13 PROBLEM — E11.9 CONTROLLED TYPE 2 DIABETES MELLITUS WITHOUT COMPLICATION, WITHOUT LONG-TERM CURRENT USE OF INSULIN (H): Status: ACTIVE | Noted: 2017-10-24

## 2021-07-13 LAB — GLUCOSE BLDC GLUCOMTR-MCNC: 128 MG/DL (ref 70–99)

## 2021-07-13 PROCEDURE — 99239 HOSP IP/OBS DSCHRG MGMT >30: CPT | Performed by: INTERNAL MEDICINE

## 2021-07-13 PROCEDURE — 82962 GLUCOSE BLOOD TEST: CPT

## 2021-07-13 PROCEDURE — 250N000013 HC RX MED GY IP 250 OP 250 PS 637: Performed by: INTERNAL MEDICINE

## 2021-07-13 RX ORDER — METOPROLOL TARTRATE 100 MG
100 TABLET ORAL 2 TIMES DAILY
Qty: 60 TABLET | Refills: 1 | Status: SHIPPED | OUTPATIENT
Start: 2021-07-13 | End: 2021-08-23

## 2021-07-13 RX ORDER — CHLORTHALIDONE 25 MG/1
25 TABLET ORAL DAILY
Qty: 29 TABLET | Refills: 0 | Status: SHIPPED | OUTPATIENT
Start: 2021-07-13 | End: 2021-07-27

## 2021-07-13 RX ORDER — LISINOPRIL 5 MG/1
5 TABLET ORAL DAILY
Qty: 30 TABLET | Refills: 1 | Status: SHIPPED | OUTPATIENT
Start: 2021-07-13 | End: 2021-08-23

## 2021-07-13 RX ORDER — DIGOXIN 125 MCG
125 TABLET ORAL DAILY
Status: DISCONTINUED | OUTPATIENT
Start: 2021-07-13 | End: 2021-07-13 | Stop reason: HOSPADM

## 2021-07-13 RX ORDER — DIGOXIN 125 MCG
125 TABLET ORAL EVERY OTHER DAY
Qty: 30 TABLET | Refills: 1 | Status: SHIPPED | OUTPATIENT
Start: 2021-07-13 | End: 2021-10-20

## 2021-07-13 RX ORDER — POTASSIUM CHLORIDE 1500 MG/1
20 TABLET, EXTENDED RELEASE ORAL DAILY
Qty: 30 TABLET | Refills: 1 | Status: SHIPPED | OUTPATIENT
Start: 2021-07-13 | End: 2021-08-23

## 2021-07-13 RX ADMIN — DULOXETINE HYDROCHLORIDE 20 MG: 20 CAPSULE, DELAYED RELEASE ORAL at 08:55

## 2021-07-13 RX ADMIN — APIXABAN 5 MG: 2.5 TABLET, FILM COATED ORAL at 08:54

## 2021-07-13 RX ADMIN — DIGOXIN 125 MCG: 125 TABLET ORAL at 08:55

## 2021-07-13 RX ADMIN — ATORVASTATIN CALCIUM 40 MG: 40 TABLET, FILM COATED ORAL at 08:54

## 2021-07-13 RX ADMIN — POTASSIUM CHLORIDE 40 MEQ: 1500 TABLET, EXTENDED RELEASE ORAL at 08:54

## 2021-07-13 RX ADMIN — CHLORTHALIDONE 25 MG: 25 TABLET ORAL at 08:55

## 2021-07-13 RX ADMIN — LISINOPRIL 5 MG: 5 TABLET ORAL at 08:54

## 2021-07-13 RX ADMIN — METOPROLOL TARTRATE 100 MG: 100 TABLET, FILM COATED ORAL at 08:54

## 2021-07-13 RX ADMIN — FLUTICASONE PROPIONATE 1 SPRAY: 50 SPRAY, METERED NASAL at 09:02

## 2021-07-13 RX ADMIN — OMEPRAZOLE 20 MG: 20 CAPSULE, DELAYED RELEASE ORAL at 08:54

## 2021-07-13 RX ADMIN — ASPIRIN 81 MG: 81 TABLET, CHEWABLE ORAL at 08:54

## 2021-07-13 ASSESSMENT — MIFFLIN-ST. JEOR: SCORE: 1124.25

## 2021-07-13 ASSESSMENT — ACTIVITIES OF DAILY LIVING (ADL)
ADLS_ACUITY_SCORE: 21

## 2021-07-13 NOTE — PLAN OF CARE
Alert, disoriented to time only. Denies pain. On tele Afib 50-80s per ICU tele report. Denies shortness of breath and chest pain. Fine crackles heard in LLL. IV saline locked. Up to bedside commode and voiding without difficulty. Had 3 loose stools this shift. Call light remains within reach and pt uses appropriately.  Pt free from injury and falls this shift.    Face to face report given with opportunity to observe patient.    Report given to Huma Olivia RN   7/13/2021  7:13 AM

## 2021-07-13 NOTE — PLAN OF CARE
No falls or injuries this shift. Had final dose of IV Digoxin, for rate control, this evening. Telemetry: A Fib, 70's-80's. Takes po meds whole and in applesauce. Crax noted L) base. Remains on room air. Bed alarm on at all times. Glucose: 124/255 this shift. Uses call light approp.     Face to face report given with opportunity to observe patient.    Report given to Chelsea Diane RN   7/12/2021  11:14 PM

## 2021-07-13 NOTE — PROGRESS NOTES
Name: Annie Arguello    MRN#: [id number]    Reason for Hospitalization: Acute exacerbation of CHF (congestive heart failure) (H) [I50.9]  Acute on chronic congestive heart failure, unspecified heart failure type (H) [I50.9]    JOHN: Low    Discharge Date: July 13, 2021    Medicare IM Discharge letter reviewed with patient et via telephone w/dtr/Ángela    Patient / Family response to discharge plan: Patient et family were involved in discharge planning.    Follow-Up Appt:   Future Appointments   Date Time Provider Department Center   7/21/2021 11:00 AM Karoline Tee APRN CNP [facility]       Other Providers (Care Coordinator, County Services, PCA services etc): No    Discharge Disposition: Home/ [facility] via dtr/Ángela    Pt or health care agent verbalized understanding.         Carmen Leigh CM RN

## 2021-07-13 NOTE — PLAN OF CARE
Patient discharged at 10:54 AM via wheel chair accompanied by daughter and staff. Prescriptions sent to patients preferred pharmacy. All belongings sent with patient.     Discharge instructions reviewed with Ángela at [facility]. Listed belongings gathered and returned to patient. At discharge    Patient discharged to [facility].   Report called to Nursing Home:  [facility]    Surgical Patient   Surgical Procedures during stay: N/A  Did patient receive discharge instruction on wound care and recognition of infection symptoms? N/A    MISC  Follow up appointment made:  Yes  Home medications returned to patient: N/A  Patient reports pain was well managed at discharge: Yes

## 2021-07-13 NOTE — PLAN OF CARE
Prior to Admission Medication Reconciliation:     Medications added:   [x] None  [] As listed below:    Medications deleted:   [] None  [x] As listed below:    Lisinopril- not taking, not on MAR    Potassium- not taking, not on MAr     Alcohol swabs    Medications marked for review/removal by attending:  [x] None  [] As listed below:    Changes made to existing medications:   [] None  [x] As listed below:    prilosec- pt takes at bedtime    apap- pt takes 2 tabs q6h prn     Cleaned up sig    Last times/dates taken verified with patient:  [] Yes- completed myself  [] Prepared PTA medlist for review only. (will not be available to review personally)  [] Did not review with patient. Rx verification only. Review completed by nursing.    [x] Nurse completed no changes made (double checked entries)  [] Unable to review with patient at this time:  [] Nurse completed/changes made:       Allergies listed at another location:  []Not applicable   []See below:    Allergy review:    [x]Did not review: reviewed by nursing  []Did not review: pt unable at this time  []Patient/MAR verified NKDA  []Patient/MAR verified current existing allergies: no changes made    Medication reconciliation sources:   []Patient  []Patient family member/emergency contact: **  [][facility] Review  []Epic Chart Review  []Care Everywhere review  [x]Debbi Wood   Name Strength Instructions Last Fill Date QTY/DS Notes   [x] Alendronate  70 mg  Weekly on Mondays 7/5/21 0837    [x] eliquis  5 mg BID  7/8/21 2029    [x] asa 81 mg  Daily  7/8/21 0854    [x] cymbalta  20 mg  BID  7/8/21 2029    [x] flonase  50 mcg  1 spray both nostrils daily  78/21 0854    [x] icaps   BID 7/8/21 2029    [x] Metoprolol  50 +25 mg 75 mg BID 7/8/21 2029    [x] Tab-a-kendrick   Daily  7/8/21 0854    [x] Atorvastatin  40 mg  Daily  7/8/21 1616    [x] prilosec  20 mg  Daily  7/8/21 2029    [x] apap  325 mg  2 tabs q4h prn 7/6/21     [x] systane   1 gtt both eyes prn per hour       x kaopectate 252 mg  2 tabs q6h prn       [x] Benadryl  12.5 mg  12.5 mg q6h prn anxiety/ inconsollable crying         []Pharmacy phone call  [x]Outside meds dispense report  []Nursing home or Assisted Living MAR:  []Other: **    Pharmacy desired at discharge: [facility] [place]    Is patient on coumadin?  [x]No      Requests for consultation by provider or pharmacist:   [] Patient understands why all of their meds were prescribed and how to take them. No questions.   [x] Managing party has no questions.   [] Patient/ managing party has questions about the following:  [] Did not review with patient. Cannot assess.     Fill dates and reported compliancy:  [x] Fill dates coincide with compliancy for all/most maintenance meds.   [] Fill dates do not coincide with compliancy with maintenance meds. See notes in PTA medlist and in comments.    [] Fill dates do not coincide with the following medications but pt reports compliancy:  [] Did not review with patient. Cannot assess.     Historian accuracy:  [] Excellent- alert and oriented, understands why meds were prescribed and how to take, able to answer specifics  [] Good- alert and oriented, understands why meds were prescribed and how to take, some confusion   [] Fair- alert and oriented, doesn't know medications without list, cannot answer specifics about medications, but has a decent process for which to take at home  [] Poor- does not know medications, may not have a process to take at home, may be cognitively unable to review at this time  [x]Medication management done by family member or facility, no concerns about historian accuracy.   [] Did not review with patient. Cannot assess.     Medication Management:  [] Manages meds independently  [] Family member/ other party manages meds:  [x] Meds managed by staff at facility  [] Meds set up by home care, family/other party helps administer  [] Meds set up by home care, self administers  [] Did not review with patient. Cannot  assess.     Other medications aside from PTA:  [x] Denies taking any medications aside from those listed in PTA meds  [] Reports taking another medication(s) but cannot recall the name(s)  [] Refuses to say.  [] Did not review with patient. Cannot assess.     Comments: none      Chelsea Reyes on 7/13/2021 at 7:32 AM       Discrepancies:  No []Not Applicable []Yes: listed below    Time spent on medication reconciliation:   [x]5-20 mins  []20-40 mins  []> 40 mins    Issues completing PTA medication reconciliation:  [] On hold for a long time  [] Waited for a call back  [] Fax didn't come through  [] Fax took a long time  [] Other:    Notifying appropriate party of changes/additions/discrepancies:  [x]No pertinent changes made, notification not necessary.   [] Notified attending provider via text page/phone call  [] Notified attending provider in person  [] Notified pharmacy  [] Notified nurse  [] Attending provider not available, left detailed notes  [] Pt is not admitted to floor yet, PTA meds completed before admission.     Medications Prior to Admission   Medication Sig Dispense Refill Last Dose     ASPIRIN LOW DOSE 81 MG chewable tablet CHEW AND SWALLOW 1 TABLET BY MOUTH DAILY 29 tablet 5 7/8/2021 at 0830     atorvastatin (LIPITOR) 40 MG tablet TAKE 1 TABLET BY MOUTH ONCE DAILY. 29 tablet 2 7/8/2021 at 1600     DULoxetine (CYMBALTA) 20 MG capsule TAKE (1) CAPSULE BY MOUTH TWICE DAILY. 58 capsule 3 7/8/2021 at 2000     ELIQUIS ANTICOAGULANT 5 MG tablet TAKE (1) TABLET BY MOUTH TWICE A DAY. 58 tablet 3 7/8/2021 at 2000     fluticasone (FLONASE) 50 MCG/ACT nasal spray Spray 1 spray into both nostrils daily 16 g 3 7/8/2021 at 0830     metoprolol tartrate (LOPRESSOR) 25 MG tablet TAKE (1) TABLET BY MOUTH TWICE A DAY. 58 tablet 3 7/8/2021 at 2000     metoprolol tartrate (LOPRESSOR) 50 MG tablet Take 50 mg by mouth 2 times daily along with a 25 mg tab to equal 75 mg twice daily   7/8/2021 at 2000     Multiple  Vitamins-Minerals (TAB-A-MARIUSZ) TABS TAKE 1 TABLET BY MOUTH ONCE DAILY. 29 tablet 3 7/8/2021 at 0830     omeprazole (PRILOSEC) 20 MG DR capsule TAKE 1 CAPSULE BY MOUTH DAILY. (Patient taking differently: Take 20 mg by mouth At Bedtime ) 29 capsule 3 7/8/2021 at 2000     Specialty Vitamins Products (ICAPS LUTEIN & ZEAXANTHIN) TBEC Take 1 tablet by mouth 2 times daily    7/8/2021 at 2000     acetaminophen (TYLENOL) 325 MG tablet Take 1 tablet (325 mg) by mouth every 6 hours as needed for mild pain (Patient taking differently: Take 650 mg by mouth every 6 hours as needed for mild pain ) 100 tablet 3 7/6/2021 at Unknown time     alendronate (FOSAMAX) 70 MG tablet TAKE 1 TABLET BY MOUTH WEEKLY AS DIRECTED. 4 tablet 0 7/5/2021 at 0830     Assure [place] Lancets MISC TEST 1 TIME DAILY OR AS DIRECTED 100 each 3 Unknown at Unknown time     blood glucose (NO BRAND SPECIFIED) test strip Use to test blood sugar 1 times daily or as directed.    Please dispense Accu-chek 100 each 0 Unknown at Unknown time     Blood Glucose Monitoring Suppl (GLUCOCARD VITAL MONITOR) w/Device KIT 1 kit daily Test blood Sugars 1 times daily 1 kit 0 Unknown at Unknown time     diphenhydrAMINE (BENADRYL) 25 MG tablet Take 12.5 mg by mouth every 6 hours as needed for anxiety or other   Unknown at Unknown time     GLUCOCARD VITAL TEST test strip TEST 1 TIME DAILY OR AS DIRECTED 100 strip 0 Unknown at Unknown time     polyethylene glycol-propylene glycol (SYSTANE) 0.4-0.3 % SOLN ophthalmic solution Place 1 drop into both eyes 4 times daily as needed for dry eyes 1 Bottle 3 Unknown at Unknown time     SB BISMUTH 262 MG TABS CHEW AND SWALLOW 2 TABLETS EVERY 6 HOURS AS NEEDED FOR DIARRHEA 30 tablet 0 Unknown at Unknown time     Medication Dispense History (from 7/13/2020 to 7/12/2021)  Expand All  Collapse All  Alcohol Swabs   Dispensed Days Supply Quantity Provider Pharmacy   EASY TOUCH ALCOHOL 70% PADS 03/16/2021 30 100 [facility] [place] ...    EASY TOUCH ALCOHOL 70% PADS 08/11/2020 30 100 [facility] [place] [place] ...         Alendronate Sodium   Dispensed Days Supply Quantity Provider Pharmacy   ALENDRONATE SODIUM 70 MG TAB 06/28/2021 28 4 tablet [facility] [place] [place] ...   ALENDRONATE SODIUM 70 MG TAB 06/03/2021 28 4 tablet DOC [facility] [place] [place] ...   ALENDRONATE SODIUM 70 MG TAB 05/04/2021 28 4 tablet DOC [facility] [place] [place] ...   ALENDRONATE SODIUM 70 MG TAB 04/05/2021 28 4 tablet DOC [facility] [place] [place] ...   ALENDRONATE SODIUM 70 MG TAB 03/09/2021 28 4 tablet DOC [facility] [place] [place] ...   ALENDRONATE SODIUM 70 MG TAB 02/10/2021 28 4 tablet DOC [facility] [place] [place] ...   ALENDRONATE SODIUM 70 MG TAB 01/11/2021 28 4 tablet DOC [facility] [place] [place] ...   ALENDRONATE SODIUM 70 MG TAB 12/14/2020 28 [facility] [place] [place] ...   ALENDRONATE SODIUM 70 MG TAB 11/17/2020 28 4 tablet DOC [facility] [place] [place] ...   ALENDRONATE SODIUM 70 MG TAB 10/22/2020 28 4 tablet DOC [facility] [place] [place] ...   ALENDRONATE SODIUM 70 MG TAB 09/22/2020 28 4 tablet [facility] [place] [place] ...   ALENDRONATE SODIUM 70 MG TAB 08/20/2020 28 [facility] [place] [place] ...   ALENDRONATE SODIUM 70 MG TAB 07/27/2020 28 4 tablet DOC [facility] [place] [place] ...         Apixaban   Dispensed Days Supply Quantity Provider Pharmacy   ELIQUIS 5 MG TABLET 06/28/2021 29 58 tablet DOC [facility] [place] [place] ...   ELIQUIS 5 MG TABLET 06/01/2021 29 58 tablet [facility] [place] [place] ...   ELIQUIS 5 MG TABLET 05/04/2021 29 58 tablet [facility] [place] [place] ...   ELIQUIS 5 MG TABLET 04/05/2021 29 58 tablet [facility] [place] [place] ...   ELIQUIS 5 MG TABLET 03/08/2021 29 58 tablet [facility] [place] [place] ...   ELIQUIS 5 MG TABLET 02/08/2021 29 58 tablet [facility] [place] [place] ...   ELIQUIS 5 MG TABLET 01/11/2021 29 58 tablet [facility] [place]   ELIQUIS 5 MG TABLET 12/14/2020 29 58 tablet [facility] [place]   ELIQUIS 5 MG TABLET 11/17/2020 29 58 tablet [facility] [place]   ELIQUIS 5 MG TABLET 10/22/2020 29 58 tablet DOC [facility] [place]   ELIQUIS 5 MG TABLET 09/22/2020 28 56 tablet [facility] [place]   ELIQUIS 5 MG TABLET 08/27/2020 28 56 tablet [facility] [place]   ELIQUIS 5 MG TABLET 07/27/2020 28 56 tablet [facility]         Aspirin   Dispensed Days Supply Quantity Provider Pharmacy   ASPIRIN 81 MG CHEWABLE TABLET 06/28/2021 29 29 tablet [facility]   ASPIRIN 81 MG CHEWABLE TABLET 06/01/2021 29 29 tablet [facility] [place]   ASPIRIN 81 MG CHEWABLE TABLET 05/04/2021 29 29 tablet DOC [facility] [place]   ASPIRIN 81 MG CHEWABLE TABLET 04/05/2021 29 29 tablet [facility]   ASPIRIN 81 MG CHEWABLE TABLET 03/09/2021 29 29 tablet [facility]   ASPIRIN 81 MG CHEWABLE TABLET 02/10/2021 29 29 tablet [facility] [place]   ASPIRIN 81 MG CHEWABLE TABLET 01/12/2021 29 29 tablet [facility] [place]   ASPIRIN 81 MG CHEWABLE TABLET 12/14/2020 29 29 tablet ELLI WIL[facility] [place] ...         Atorvastatin Calcium   Dispensed Days Supply Quantity Provider Pharmacy   ATORVASTATIN 40 MG TABLET 06/28/2021 29 29 tablet CECI [facility]   ATORVASTATIN 40 MG TABLET 06/01/2021 29 29 tablet CECI [facility] [place]   ATORVASTATIN 40 MG TABLET 05/03/2021 29 29 tablet CECI [facility] [place]   ATORVASTATIN 40 MG TABLET 04/05/2021 29 29 tablet [facility]ECI [facility]   ATORVASTATIN 40 MG TABLET 03/08/2021 29 29 tablet [facility] [place]   ATORVASTATIN 40 MG TABLET 02/08/2021 29 29 tablet [facility]I [facility] [place]   ATORVASTATIN 40 MG TABLET 01/11/2021 29 29 tablet [facility] [place]   ATORVASTATIN 40 MG TABLET 12/14/2020 29 29 tablet [facility]ECI [facility] [place]   ATORVASTATIN 40 MG TABLET 11/17/2020 28 28 tablet CECI [facility] [place]   ATORVASTATIN 40 MG TABLET 10/22/2020 28 28 tablet CECI [facility] [place]   ATORVASTATIN 40 MG TABLET 09/21/2020 28 28 tablet CECI [facility] [place]   ATORVASTATIN 40 MG TABLET 08/27/2020 28 28 tablet CECI [facility] [place]   ATORVASTATIN 40 MG TABLET 07/28/2020 28 28 tablet [facility]I [facility] [place] ...         Carboxymethylcellul-Glycerin   Dispensed Days Supply Quantity Provider Pharmacy   REFRESH RELIEVA 0.5-0.9% DROP 09/30/2020 18 10 each [facility] [place]         Chlorthalidone   Dispensed Days Supply Quantity Provider Pharmacy   CHLORTHALIDONE 25 MG TABLET 06/03/2021 29 29 tablet DOC [facility] [place]   CHLORTHALIDONE 25 MG TABLET 05/04/2021 29 29 tablet DOC [facility] [place]   CHLORTHALIDONE 25 MG TABLET 04/05/2021 29 29 tablet DOC [facility] [place]   CHLORTHALIDONE 25 MG TABLET 03/08/2021 29 29 tablet [facility] [place]   CHLORTHALIDONE 25 MG TABLET 02/08/2021 29 29 tablet DOC [facility] [place]   CHLORTHALIDONE 25 MG TABLET 01/11/2021 29 29 tablet [facility] [place]   CHLORTHALIDONE 25 MG TABLET 12/14/2020 29 29 tablet [facility] [place]   CHLORTHALIDONE 25 MG TABLET 11/17/2020 29 29 tablet [facility] [place]   CHLORTHALIDONE 25 MG TABLET 10/22/2020 29 29 tablet [facility] [place]   CHLORTHALIDONE 25 MG TABLET 09/22/2020 28 28 tablet [facility] [place]   CHLORTHALIDONE 25 MG TABLET 08/27/2020 28 28 tablet [facility] [place]   CHLORTHALIDONE 25 MG TABLET 07/27/2020 28 28  tablet DOC [facility] ...         DULoxetine HCl   Dispensed Days Supply Quantity Provider Pharmacy   DULOXETINE HCL DR 20 MG CAP 06/28/2021 29 58 capsule [facility] ...   DULOXETINE HCL DR 20 MG CAP 06/01/2021 29 58 capsule [facility] ...   DULOXETINE HCL DR 20 MG CAP 05/04/2021 29 58 capsule [facility] ...   DULOXETINE HCL DR 20 MG CAP 04/05/2021 29 58 capsule [facility] ...   DULOXETINE HCL DR 20 MG CAP 03/09/2021 29 58 capsule [facility] ...   DULOXETINE HCL DR 20 MG CAP 02/08/2021 29 58 [facility] ...   DULOXETINE HCL DR 20 MG CAP 01/12/2021 29 58 [facility] ...   DULOXETINE HCL DR 20 MG CAP 12/14/2020 29 58 capsule [facility] [place] [facility] ...   DULOXETINE HCL DR 20 MG CAP 11/17/2020 29 58 capsule [facility] [place] [facility] ...   DULOXETINE HCL DR 20 MG CAP 10/22/2020 28 56 capsule [facility] ...   DULOXETINE HCL DR 20 MG CAP 09/21/2020 28 56 capsule [facility] [place] [facility] ...   DULOXETINE HCL DR 20 MG CAP 08/27/2020 28 56 capsule [facility] ...   DULOXETINE HCL DR 20 MG CAP 07/28/2020 28 56 capsule SRIDEVI GUZMAN [facility] ...         Fluticasone Propionate   Dispensed Days Supply Quantity Provider Pharmacy   FLUTICASONE PROP 50 MCG SPRAY 06/07/2021 30 16 spray [facility] ...   FLUTICASONE PROP 50 MCG SPRAY 11/09/2020 30 16 spray DOC [facility] ...   FLUTICASONE PROP 50 MCG SPRAY 09/15/2020 30 16 [facility] ...         Influenza Vac Split Quad   Dispensed Days Supply Quantity Provider Pharmacy   AFLURIA QUAD [id number] FOR INJ 10/21/2020 1 0.5 mL MOY GIBBONS Pharmacy...         Lancets   Dispensed Days Supply Quantity Provider Pharmacy   SYLVIA  SOFTCLIX LANCET 11/03/2020 90 100 Units ONAEL [facility] Pharm[facility]...         Lisinopril   Dispensed Days Supply Quantity Provider Pharmacy   LISINOPRIL 2.5 MG TABLET 06/01/2021 29 29 tablet [facility] [place]   LISINOPRIL 2.5 MG TABLET 05/04/2021 29 29 tablet DOC [facility] [place]   LISINOPRIL 2.5 MG TABLET 04/05/2021 29 29 tablet [facility] [place]   LISINOPRIL 2.5 MG TABLET 03/09/2021 29 29 tablet DOC [facility] [place]   LISINOPRIL 2.5 MG TABLET 02/10/2021 29 29 tablet DOC [facility] [place]   LISINOPRIL 2.5 MG TABLET 01/11/2021 29 29 tablet [facility] [place]   LISINOPRIL 2.5 MG TABLET 12/15/2020 29 29 tablet DOC [facility] [place]   LISINOPRIL 2.5 MG TABLET 11/17/2020 28 28 tablet DOC [facility] [place]   LISINOPRIL 2.5 MG TABLET 10/19/2020 28 28 tablet DOC [facility] [place]   LISINOPRIL 2.5 MG TABLET 09/21/2020 28 28 tablet [facility] [place]   LISINOPRIL 2.5 MG TABLET 08/24/2020 28 28 tablet DOC [facility] [place]   LISINOPRIL 2.5 MG TABLET 07/28/2020 28 28 tablet DOC [facility] [place]         Metoprolol Tartrate   Dispensed Days Supply Quantity Provider Pharmacy   METOPROLOL TARTRATE 50 MG TAB 06/29/2021 29 58 tablet [facility] [place]   METOPROLOL TARTRATE 25 MG TAB 06/28/2021 29 58 tablet [facility] [place]   METOPROLOL TARTRATE 50 MG TAB 06/17/2021 17 34 tablet [facility] [place]   METOPROLOL TARTRATE 25 MG TAB 06/01/2021 29 58 tablet [facility] [place]   METOPROLOL TARTRATE 25 MG TAB 05/04/2021 29 58 tablet [facility] [place]   METOPROLOL TARTRATE 25 MG TAB 04/05/2021 29 58 tablet [facility] [place]   METOPROLOL TARTRATE 25 MG TAB 03/08/2021 29 58 tablet [facility] [place]   METOPROLOL TARTRATE 25 MG TAB  02/08/2021 29 58 tablet [facility] [place]   METOPROLOL TARTRATE 25 MG TAB 01/11/2021 29 58 tablet [facility] [place] ...   METOPROLOL TARTRATE 25 MG TAB 12/14/2020 29 58 tablet [facility] [place] ...   METOPROLOL TARTRATE 25 MG TAB 11/17/2020 29 58 tablet [facility] [place]   METOPROLOL TARTRATE 25 MG TAB 10/22/2020 29 58 tablet [facility] [place]   METOPROLOL TARTRATE 25 MG TAB 09/22/2020 28 56 tablet [facility] [place]   METOPROLOL TARTRATE 25 MG TAB 08/27/2020 28 56 tablet [facility] [place] ...   METOPROLOL TARTRATE 25 MG TAB 07/27/2020 28 56 tablet [facility] [place] ...         Multiple Vitamins-Minerals   Dispensed Days Supply Quantity Provider Pharmacy   TAB-A-MARIUSZ TABLET 06/28/2021 29 29 tablet [facility] [place]   TAB-A-MARIUSZ TABLET 06/01/2021 29 29 tablet [facility] [place] ...   TAB-A-MARIUSZ TABLET 05/04/2021 29 29 tablet [facility]   TAB-A-MARIUSZ TABLET 04/05/2021 29 29 tablet [facility]   TAB-A-MARIUSZ TABLET 03/09/2021 29 29 tablet [facility]   TAB-A-MARIUSZ TABLET 02/10/2021 29 29 tablet [facility] [place]   TAB-A-MARIUSZ TABLET 01/12/2021 29 29 tablet [facility]         Nystatin   Dispensed Days Supply Quantity Provider Pharmacy   NYSTATIN 100,000 UNIT/GM POWD 09/08/2020 21 60 each [facility] ...         Omeprazole   Dispensed Days Supply Quantity Provider Pharmacy   OMEPRAZOLE DR 20 MG CAPSULE 06/28/2021 29 29 capsule [facility]   OMEPRAZOLE DR 20 MG CAPSULE 06/01/2021 29 29 capsule [facility]   OMEPRAZOLE DR 20 MG CAPSULE 05/04/2021 29 29 capsule [facility] [place]   OMEPRAZOLE DR 20 MG CAPSULE 04/05/2021 29 29 capsule [facility] ...    OMEPRAZOLE DR 20 MG CAPSULE 03/09/2021 29 29 capsule [facility] [place]   OMEPRAZOLE DR 20 MG CAPSULE 02/08/2021 29 29 capsule [facility] ...   OMEPRAZOLE DR 20 MG CAPSULE 01/11/2021 29 29 capsule [facility] [place] ...   OMEPRAZOLE DR 20 MG CAPSULE 12/14/2020 29 29 capsule [facility] ...   OMEPRAZOLE DR 20 MG CAPSULE 11/17/2020 28 28 capsule [facility] ...   OMEPRAZOLE DR 20 MG CAPSULE 10/19/2020 28 28 capsule [facility] [place] ...   OMEPRAZOLE DR 20 MG CAPSULE 09/21/2020 28 28 capsule [facility] ...   OMEPRAZOLE DR 20 MG CAPSULE 08/24/2020 28 28 capsule [facility] ...   OMEPRAZOLE DR 20 MG CAPSULE 07/27/2020 28 28 capsule [facility] ...         Polyethyl Glycol-Propyl Glycol   Dispensed Days Supply Quantity Provider Pharmacy   SYSTANE 0.3-0.4% EYE DROPS 01/08/2021 33 15 each [facility] ...         Potassium Chloride [facility]   Dispensed Days Supply Quantity Provider Pharmacy   POTASSIUM CL ER 10 MEQ TABLET 06/01/2021 29 58 tablet [facility] ...   POTASSIUM CL ER 10 MEQ TABLET 05/04/2021 29 58 tablet [facility] ...   POTASSIUM CL ER 10 MEQ TABLET 04/05/2021 29 58 tablet [facility] ...   POTASSIUM CL ER 10 MEQ TABLET 03/08/2021 29 58 tablet [facility] ...   POTASSIUM CL ER 10 MEQ TABLET 02/08/2021 29 58 tablet [facility] ...   POTASSIUM CL ER 10 MEQ TABLET 01/11/2021 29 58 tablet [facility] ...   POTASSIUM CL ER 10 MEQ TABLET 12/14/2020 29 58 tablet [facility] ...   POTASSIUM CL ER 10 MEQ TABLET 11/17/2020 29 58 tablet [facility] ...   POTASSIUM CL ER 10 MEQ TABLET 10/22/2020 29 58 tablet [facility] ...   POTASSIUM CL ER 10 MEQ TABLET  09/21/2020 28 [facility]   POTASSIUM CL ER 10 MEQ TABLET 08/24/2020 28 56 [facility]   POTASSIUM CL ER 10 MEQ TABLET 07/28/2020 28 [facility]         Specialty Vitamins Products   Dispensed Days Supply Quantity Provider Pharmacy   ICAPS TABLET 06/28/2021 29 58 [facility]   ICAPS TABLET 06/01/2021 29 58 tablet [facility] ...   ICAPS TABLET 05/03/2021 29 58 tablet [facility] ...   ICAPS TABLET 04/05/2021 29 58 tablet [facility] ...   ICAPS TABLET 03/08/2021 29 58 tablet [facility] ...   ICAPS TABLET 02/08/2021 29 58 tablet [facility] ...   ICAPS TABLET 01/11/2021 29 58 tablet [facility] ...   ICAPS TABLET 12/14/2020 29 58 tablet [facility] ...   ICAPS TABLET 11/17/2020 29 58 tablet [facility] ...   ICAPS TABLET 10/19/2020 28 56 tablet [facility] ...   ICAPS TABLET 10/02/2020 21 42 tablet [facility] ...         diphenhydrAMINE HCl   Dispensed Days Supply Quantity Provider Pharmacy   GS ALLERGY RELIEF 25 MG TABLET 08/11/2020 8 15 [facility]

## 2021-07-14 ENCOUNTER — TELEPHONE (OUTPATIENT)
Dept: FAMILY MEDICINE | Facility: OTHER | Age: 86
End: 2021-07-14

## 2021-07-14 NOTE — TELEPHONE ENCOUNTER
Called Shreya back, but she was on phone. LM with staff, anum Bustamante for verbal orders per Karoline Tee.

## 2021-07-14 NOTE — TELEPHONE ENCOUNTER
Shreya from [facility] called, requesting verbal orders to resume home care services following hospitalization. PCP out. Please advise. Thanks!    Shreya's phone number is [phone]

## 2021-07-15 ENCOUNTER — MEDICAL CORRESPONDENCE (OUTPATIENT)
Dept: HEALTH INFORMATION MANAGEMENT | Facility: CLINIC | Age: 86
End: 2021-07-15

## 2021-07-16 ENCOUNTER — MEDICAL CORRESPONDENCE (OUTPATIENT)
Dept: HEALTH INFORMATION MANAGEMENT | Facility: CLINIC | Age: 86
End: 2021-07-16

## 2021-07-20 ENCOUNTER — TELEPHONE (OUTPATIENT)
Dept: FAMILY MEDICINE | Facility: OTHER | Age: 86
End: 2021-07-20

## 2021-07-20 NOTE — TELEPHONE ENCOUNTER
Ladan from [facility] called requesting VO PT once time for six weeks.   Call back number: [phone] please call her back.

## 2021-07-20 NOTE — TELEPHONE ENCOUNTER
Ladan from [facility] notified VO ok verbalized understanding  Silvia Portillo LPN

## 2021-07-20 NOTE — PROGRESS NOTES
"    Assessment & Plan     Congestive heart failure, unspecified HF chronicity, unspecified heart failure type (H)  Symptoms improving and reviewed labs and imaging.  Continue current plan  Encouraged to follow up with Dr Oleksandr Cox  - CBC with platelets and differential; Future  - Comprehensive metabolic panel (BMP + Alb, Alk Phos, ALT, AST, Total. Bili, TP); Future  - BNP-N terminal pro; Future  - XR CHEST 2 VW (Clinic Performed); Future  - CBC with platelets and differential  - Comprehensive metabolic panel (BMP + Alb, Alk Phos, ALT, AST, Total. Bili, TP)  - BNP-N terminal pro    Controlled type 2 diabetes mellitus without complication, without long-term current use of insulin (H)  Noted increased glucose on lab today - last A1c 7/9/21 6.8 no change in medication today.    Encouraged to check BGs 2 times a day: daily fasting and either before supper or at bedtime  Bring in BGs at next appointment     Stage 3 chronic kidney disease, unspecified whether stage 3a or 3b CKD  Slight decrease to GFR today 45 and creatinine 1.10  Continue with chlorthalidone for now and consider repeating labs at next visit         45 minutes spent on the date of the encounter doing chart review, review of test results, interpretation of tests, patient visit, documentation and discussion with family        BMI:   Estimated body mass index is 28.35 kg/m  as calculated from the following:    Height as of 7/9/21: 1.575 m (5' 2\").    Weight as of this encounter: 70.3 kg (155 lb).       Daughter will call to set up follow up appointment   See Patient Instructions    Return in about 4 weeks (around 8/18/2021) for CHF and diabetes .    NAHUN Clifford [facility] - ROCÍO Valencia is a 87 year old who presents for the following health issues  accompanied by her daughter:    [facility]       Hospital Follow-up Visit:    Hospital/Nursing Home/IP Rehab Facility: [facility]  Date of Admission: " 7/9/21  Date of Discharge: 7/13/21  Reason(s) for Admission: CHF - chronic A Fib      Was your hospitalization related to COVID-19? No   Problems taking medications regularly:  None  Medication changes since discharge: None  Problems adhering to non-medication therapy:  None  Metoprolol 100 mg 2 times daily  Chlorthalidone 25 mg daily  Digoxin 0.125 mg every other day hold when HR<60  apixaban 2 times daily   Summary of hospitalization:  [facility] hospital discharge summary unavailable - reviewed hospital stay -   Diagnostic Tests/Treatments reviewed.  Follow up needed: discharge summary not available   Other Healthcare Providers Involved in Patient s Care:  Debbi assisted living with [facility] care with [facility]          Homecare and assisted living   Update since discharge: stable. Post Discharge Medication Reconciliation: unable to reconcile discharge medications due to discharge summary unavailable .  Plan of care communicated with patient and family              Review of Systems   CONSTITUTIONAL: NEGATIVE for fever, chills, change in weight  INTEGUMENTARY/SKIN: hematoma left elbow with a scab in the center   ENT/MOUTH: NEGATIVE for ear, mouth and throat problems  RESP:chronic mild cough  CV: NEGATIVE for chest pain, palpitations or peripheral edema and POSITIVE for atrial fib  GI: NEGATIVE for nausea, abdominal pain, heartburn, or change in bowel habits  : denies dysuria   MUSCULOSKELETAL: left elbow if she bumps it  NEURO: more tired and increased weakness  PSYCHIATRIC: HX anxiety      Objective    BP 98/60 (BP Location: Left arm, Patient Position: Chair, Cuff Size: Adult Large)   Pulse 78   Temp 98.4  F (36.9  C) (Tympanic)   Resp 16   Wt 70.3 kg (155 lb)   SpO2 98%   BMI 28.35 kg/m    Body mass index is 28.35 kg/m .  Physical Exam   GENERAL: alert and no distress  RESP: fine crackles bilateral bases  CV: irregularly irregular rhythm, peripheral pulses strong and no peripheral  edema  ABDOMEN: soft, nontender, no hepatosplenomegaly, no masses and bowel sounds normal  MS: no gross musculoskeletal defects noted, no edema  SKIN: large bruise left elbow dark color with a 1 cm scab in the center   NEURO: weakness of generalized, sensory exam grossly normal, mentation intact, speech normal and cranial nerves 2-12 intact  PSYCH: mentation appears normal and affect normal/bright  LYMPH: no cervical adenopathy    Results for orders placed or performed in visit on 07/21/21   XR CHEST 2 VW (Clinic Performed)     Status: None    Narrative    PROCEDURE: XR CHEST 2 VW 7/21/2021 11:42 AM    HISTORY: Congestive heart failure, unspecified HF chronicity,  unspecified heart failure type (H)    COMPARISONS: 7/9/2021.    TECHNIQUE: 2 views.    FINDINGS: Heart is stable in size. There has been interval decrease in  patchy bilateral interstitial prominence. No confluent infiltrate or  pleural effusion is seen.    There is moderate degenerative change in the spine. There is a right  convex scoliosis.         Impression    IMPRESSION: Interval improvement in the appearance of the chest with  decrease in interstitial prominence.    ARRON PAREKH MD         SYSTEM ID:  [id number]   Results for orders placed or performed in visit on 07/21/21   Comprehensive metabolic panel (BMP + Alb, Alk Phos, ALT, AST, Total. Bili, TP)     Status: Abnormal   Result Value Ref Range    Sodium 133 133 - 144 mmol/L    Potassium 4.5 3.4 - 5.3 mmol/L    Chloride 100 94 - 109 mmol/L    Carbon Dioxide (CO2) 25 20 - 32 mmol/L    Anion Gap 8 3 - 14 mmol/L    Urea Nitrogen 30 7 - 30 mg/dL    Creatinine 1.10 (H) 0.52 - 1.04 mg/dL    Calcium 9.1 8.5 - 10.1 mg/dL    Glucose 285 (H) 70 - 99 mg/dL    Alkaline Phosphatase 100 40 - 150 U/L    AST 27 0 - 45 U/L    ALT 28 0 - 50 U/L    Protein Total 7.9 6.8 - 8.8 g/dL    Albumin 3.3 (L) 3.4 - 5.0 g/dL    Bilirubin Total 0.9 0.2 - 1.3 mg/dL    GFR Estimate 45 (L) >60 mL/min/1.73m2   BNP-N terminal  pro     Status: Abnormal   Result Value Ref Range    N Terminal Pro BNP Outpatient 536 (H) 0 - 450 pg/mL   CBC with platelets and differential     Status: Abnormal   Result Value Ref Range    WBC Count 11.5 (H) 4.0 - 11.0 10e3/uL    RBC Count 4.97 3.80 - 5.20 10e6/uL    Hemoglobin 14.3 11.7 - 15.7 g/dL    Hematocrit 44.4 35.0 - 47.0 %    MCV 89 78 - 100 fL    MCH 28.8 26.5 - 33.0 pg    MCHC 32.2 31.5 - 36.5 g/dL    RDW 15.9 (H) 10.0 - 15.0 %    Platelet Count 224 150 - 450 10e3/uL    % Neutrophils 72 %    % Lymphocytes 16 %    % Monocytes 9 %    % Eosinophils 2 %    % Basophils 1 %    % Immature Granulocytes 0 %    NRBCs per 100 WBC 0 <1 /100    Absolute Neutrophils 8.3 1.6 - 8.3 10e3/uL    Absolute Lymphocytes 1.9 0.8 - 5.3 10e3/uL    Absolute Monocytes 1.0 0.0 - 1.3 10e3/uL    Absolute Eosinophils 0.2 0.0 - 0.7 10e3/uL    Absolute Basophils 0.1 0.0 - 0.2 10e3/uL    Absolute Immature Granulocytes 0.0 <=0.0 10e3/uL    Absolute NRBCs 0.0 10e3/uL   CBC with platelets and differential     Status: Abnormal    Narrative    The following orders were created for panel order CBC with platelets and differential.  Procedure                               Abnormality         Status                     ---------                               -----------         ------                     CBC with platelets and d...[[id number]]  Abnormal            Final result                 Please view results for these tests on the individual orders.

## 2021-07-21 ENCOUNTER — ANCILLARY PROCEDURE (OUTPATIENT)
Dept: GENERAL RADIOLOGY | Facility: OTHER | Age: 86
End: 2021-07-21
Attending: NURSE PRACTITIONER
Payer: MEDICARE

## 2021-07-21 ENCOUNTER — OFFICE VISIT (OUTPATIENT)
Dept: FAMILY MEDICINE | Facility: OTHER | Age: 86
End: 2021-07-21
Attending: NURSE PRACTITIONER
Payer: MEDICARE

## 2021-07-21 VITALS
SYSTOLIC BLOOD PRESSURE: 98 MMHG | BODY MASS INDEX: 28.35 KG/M2 | HEART RATE: 78 BPM | RESPIRATION RATE: 16 BRPM | DIASTOLIC BLOOD PRESSURE: 60 MMHG | TEMPERATURE: 98.4 F | WEIGHT: 155 LBS | OXYGEN SATURATION: 98 %

## 2021-07-21 DIAGNOSIS — I50.9 CONGESTIVE HEART FAILURE, UNSPECIFIED HF CHRONICITY, UNSPECIFIED HEART FAILURE TYPE (H): Primary | ICD-10-CM

## 2021-07-21 DIAGNOSIS — E11.9 CONTROLLED TYPE 2 DIABETES MELLITUS WITHOUT COMPLICATION, WITHOUT LONG-TERM CURRENT USE OF INSULIN (H): ICD-10-CM

## 2021-07-21 DIAGNOSIS — I50.9 CONGESTIVE HEART FAILURE, UNSPECIFIED HF CHRONICITY, UNSPECIFIED HEART FAILURE TYPE (H): ICD-10-CM

## 2021-07-21 DIAGNOSIS — N18.30 STAGE 3 CHRONIC KIDNEY DISEASE, UNSPECIFIED WHETHER STAGE 3A OR 3B CKD (H): ICD-10-CM

## 2021-07-21 PROBLEM — S40.022A HEMATOMA OF ARM, LEFT, INITIAL ENCOUNTER: Status: ACTIVE | Noted: 2021-06-16

## 2021-07-21 PROBLEM — Z79.01 CHRONIC ANTICOAGULATION: Status: ACTIVE | Noted: 2021-06-13

## 2021-07-21 PROBLEM — D62 ACUTE BLOOD LOSS ANEMIA: Status: ACTIVE | Noted: 2021-06-13

## 2021-07-21 PROBLEM — R73.9 HYPERGLYCEMIA: Status: ACTIVE | Noted: 2021-06-14

## 2021-07-21 PROBLEM — I10 ESSENTIAL (PRIMARY) HYPERTENSION: Status: ACTIVE | Noted: 2021-06-13

## 2021-07-21 PROBLEM — S70.02XA TRAUMATIC HEMATOMA OF LEFT HIP: Status: ACTIVE | Noted: 2021-06-16

## 2021-07-21 PROBLEM — R29.6 FALLS: Status: ACTIVE | Noted: 2021-06-13

## 2021-07-21 PROBLEM — R57.8 HEMORRHAGIC SHOCK (H): Status: ACTIVE | Noted: 2021-06-16

## 2021-07-21 LAB
ALBUMIN SERPL-MCNC: 3.3 G/DL (ref 3.4–5)
ALP SERPL-CCNC: 100 U/L (ref 40–150)
ALT SERPL W P-5'-P-CCNC: 28 U/L (ref 0–50)
ANION GAP SERPL CALCULATED.3IONS-SCNC: 8 MMOL/L (ref 3–14)
AST SERPL W P-5'-P-CCNC: 27 U/L (ref 0–45)
BASOPHILS # BLD AUTO: 0.1 10E3/UL (ref 0–0.2)
BASOPHILS NFR BLD AUTO: 1 %
BILIRUB SERPL-MCNC: 0.9 MG/DL (ref 0.2–1.3)
BUN SERPL-MCNC: 30 MG/DL (ref 7–30)
CALCIUM SERPL-MCNC: 9.1 MG/DL (ref 8.5–10.1)
CHLORIDE BLD-SCNC: 100 MMOL/L (ref 94–109)
CO2 SERPL-SCNC: 25 MMOL/L (ref 20–32)
CREAT SERPL-MCNC: 1.1 MG/DL (ref 0.52–1.04)
EOSINOPHIL # BLD AUTO: 0.2 10E3/UL (ref 0–0.7)
EOSINOPHIL NFR BLD AUTO: 2 %
ERYTHROCYTE [DISTWIDTH] IN BLOOD BY AUTOMATED COUNT: 15.9 % (ref 10–15)
GFR SERPL CREATININE-BSD FRML MDRD: 45 ML/MIN/1.73M2
GLUCOSE BLD-MCNC: 285 MG/DL (ref 70–99)
HCT VFR BLD AUTO: 44.4 % (ref 35–47)
HGB BLD-MCNC: 14.3 G/DL (ref 11.7–15.7)
IMM GRANULOCYTES # BLD: 0 10E3/UL
IMM GRANULOCYTES NFR BLD: 0 %
LYMPHOCYTES # BLD AUTO: 1.9 10E3/UL (ref 0.8–5.3)
LYMPHOCYTES NFR BLD AUTO: 16 %
MCH RBC QN AUTO: 28.8 PG (ref 26.5–33)
MCHC RBC AUTO-ENTMCNC: 32.2 G/DL (ref 31.5–36.5)
MCV RBC AUTO: 89 FL (ref 78–100)
MONOCYTES # BLD AUTO: 1 10E3/UL (ref 0–1.3)
MONOCYTES NFR BLD AUTO: 9 %
NEUTROPHILS # BLD AUTO: 8.3 10E3/UL (ref 1.6–8.3)
NEUTROPHILS NFR BLD AUTO: 72 %
NRBC # BLD AUTO: 0 10E3/UL
NRBC BLD AUTO-RTO: 0 /100
NT-PROBNP SERPL-MCNC: 536 PG/ML (ref 0–450)
PLATELET # BLD AUTO: 224 10E3/UL (ref 150–450)
POTASSIUM BLD-SCNC: 4.5 MMOL/L (ref 3.4–5.3)
PROT SERPL-MCNC: 7.9 G/DL (ref 6.8–8.8)
RBC # BLD AUTO: 4.97 10E6/UL (ref 3.8–5.2)
SODIUM SERPL-SCNC: 133 MMOL/L (ref 133–144)
WBC # BLD AUTO: 11.5 10E3/UL (ref 4–11)

## 2021-07-21 PROCEDURE — 36415 COLL VENOUS BLD VENIPUNCTURE: CPT | Mod: ZL | Performed by: NURSE PRACTITIONER

## 2021-07-21 PROCEDURE — 83880 ASSAY OF NATRIURETIC PEPTIDE: CPT | Mod: ZL | Performed by: NURSE PRACTITIONER

## 2021-07-21 PROCEDURE — 71046 X-RAY EXAM CHEST 2 VIEWS: CPT | Mod: TC

## 2021-07-21 PROCEDURE — G0463 HOSPITAL OUTPT CLINIC VISIT: HCPCS

## 2021-07-21 PROCEDURE — 85025 COMPLETE CBC W/AUTO DIFF WBC: CPT | Mod: ZL | Performed by: NURSE PRACTITIONER

## 2021-07-21 PROCEDURE — 99215 OFFICE O/P EST HI 40 MIN: CPT | Performed by: NURSE PRACTITIONER

## 2021-07-21 PROCEDURE — G0463 HOSPITAL OUTPT CLINIC VISIT: HCPCS | Mod: 25

## 2021-07-21 PROCEDURE — 80053 COMPREHEN METABOLIC PANEL: CPT | Mod: ZL | Performed by: NURSE PRACTITIONER

## 2021-07-21 RX ORDER — DOXEPIN HYDROCHLORIDE 50 MG/1
1 CAPSULE ORAL
COMMUNITY
Start: 2021-05-04 | End: 2022-12-23

## 2021-07-21 ASSESSMENT — PAIN SCALES - GENERAL: PAINLEVEL: NO PAIN (0)

## 2021-07-21 NOTE — NURSING NOTE
"Chief Complaint   Patient presents with     Hospital F/U       Initial BP 98/60 (BP Location: Left arm, Patient Position: Chair, Cuff Size: Adult Large)   Pulse 78   Temp 98.4  F (36.9  C) (Tympanic)   Resp 16   Wt 70.3 kg (155 lb)   SpO2 98%   BMI 28.35 kg/m   Estimated body mass index is 28.35 kg/m  as calculated from the following:    Height as of 7/9/21: 1.575 m (5' 2\").    Weight as of this encounter: 70.3 kg (155 lb).  Medication Reconciliation: complete  Brooke Swenson LPN    "

## 2021-07-21 NOTE — Clinical Note
Good Afternoon,  Annie was in for hospital follow up.   Her CHF is improving, but noted increase in glucose and worsening CKD.  I did not make any changes today.  Her last A1c was below 7.  And she was recently restarted on chlorthalidone in the hospital.  Her daughter is supposed to set up a follow up in about a month with you.      Karoline GARNICA FNP-BC  Family Nurse Practitioner

## 2021-07-26 DIAGNOSIS — I48.21 PERMANENT ATRIAL FIBRILLATION (H): Primary | ICD-10-CM

## 2021-07-26 DIAGNOSIS — I50.9 CONGESTIVE HEART FAILURE, UNSPECIFIED HF CHRONICITY, UNSPECIFIED HEART FAILURE TYPE (H): ICD-10-CM

## 2021-07-26 DIAGNOSIS — M19.90 OSTEOARTHRITIS, UNSPECIFIED OSTEOARTHRITIS TYPE, UNSPECIFIED SITE: ICD-10-CM

## 2021-07-27 RX ORDER — ALENDRONATE SODIUM 70 MG/1
TABLET ORAL
Qty: 4 TABLET | Refills: 0 | Status: SHIPPED | OUTPATIENT
Start: 2021-07-27 | End: 2021-08-18

## 2021-07-27 RX ORDER — CHLORTHALIDONE 25 MG/1
TABLET ORAL
Qty: 20 TABLET | Refills: 0 | Status: SHIPPED | OUTPATIENT
Start: 2021-07-27 | End: 2021-08-25

## 2021-07-27 RX ORDER — METOPROLOL TARTRATE 50 MG
TABLET ORAL
Qty: 58 TABLET | Refills: 0 | Status: SHIPPED | OUTPATIENT
Start: 2021-07-27 | End: 2021-11-19

## 2021-07-27 NOTE — TELEPHONE ENCOUNTER
Fosamax       Last Written Prescription Date:  6/30/2021  Last Fill Quantity: 4,   # refills: 0  Last Office Visit: 7/21/2021  Future Office visit:       Routing refill request to provider for review/approval because:  PATIENT HAS REFILLS OF LOPRESSOR

## 2021-07-27 NOTE — TELEPHONE ENCOUNTER
Mariana       Last Written Prescription Date:  7/13/2021  Last Fill Quantity: 29,   # refills: 0  Last Office Visit: 7/21/2021  Future Office visit:

## 2021-07-29 DIAGNOSIS — E11.9 TYPE 2 DIABETES MELLITUS WITHOUT COMPLICATION, WITHOUT LONG-TERM CURRENT USE OF INSULIN (H): ICD-10-CM

## 2021-07-29 DIAGNOSIS — E11.9 CONTROLLED TYPE 2 DIABETES MELLITUS WITHOUT COMPLICATION, WITHOUT LONG-TERM CURRENT USE OF INSULIN (H): ICD-10-CM

## 2021-07-29 RX ORDER — ISOPROPYL ALCOHOL 70 ML/100ML
SWAB TOPICAL
Qty: 100 EACH | Refills: 0 | Status: SHIPPED | OUTPATIENT
Start: 2021-07-29 | End: 2021-12-01

## 2021-07-29 RX ORDER — BLOOD-GLUCOSE METER
KIT MISCELLANEOUS
Qty: 100 STRIP | Refills: 0 | Status: SHIPPED | OUTPATIENT
Start: 2021-07-29 | End: 2021-11-05

## 2021-07-29 RX ORDER — LANCETS 21 GAUGE
EACH MISCELLANEOUS
Qty: 100 EACH | Refills: 0 | Status: SHIPPED | OUTPATIENT
Start: 2021-07-29 | End: 2022-01-04

## 2021-07-29 NOTE — TELEPHONE ENCOUNTER
Glucocard Vital Test Strip       Last Written Prescription Date:  4/26/2021  Last Fill Quantity: 100,   # refills: 0    Alcohol Swabs       Last Written Prescription Date:  Request   Last Fill Quantity: 100,   # refills: 0  Last Office Visit: 7/21/2021  Future Office visit:

## 2021-07-29 NOTE — TELEPHONE ENCOUNTER
Francisco Hernandez MISC      Last Written Prescription Date:  11/27/2020  Last Fill Quantity: 100,   # refills: 3  Last Office Visit: 7/21/2021  Future Office visit:

## 2021-08-06 ENCOUNTER — PATIENT OUTREACH (OUTPATIENT)
Dept: CARE COORDINATION | Facility: OTHER | Age: 86
End: 2021-08-06

## 2021-08-06 NOTE — TELEPHONE ENCOUNTER
Follow-Up Note - Sleep Center   Kim Franco  61 y o  female  VSX:[birth year]  PZE:[id number]  DOS:8/6/2021    CC: I saw this patient for follow-up in clinic today for Sleep disordered breathing, Coexisting Sleep and Medical Problems  She is accompanied by her granddaughter  She got a ResMed and machine around 2 months ago  A diagnostic study on 05/23/2021 confirmed   obstructive sleep apnea:  AHI 11 4 /hour considerably higher during stage REM   Intermittent snoring of moderate intensity was noted and there were 2 respiratory effort-related arousals  Minimum oxygen saturation 81 %  and 10 1 minutes of total sleep time was spent with saturations less than 90%  She had difficulty maintaining sleep and sleep efficiency was reduced at 77 5%     During the subsequent therapeutic study, sleep disordered breathing was sufficiently remediated with PAP at 7 cm H2O  She was not observed during stage REM or while supine  Auto titrating Pap 7-10 cm H2O was recommended  PFSH, Problem List, Medications & Allergies were reviewed in EMR  Interval changes: none reported  She  has a past medical history of Diabetes mellitus ([place] [address] ), External hemorrhoids, Hernia, ventral, Hypertension, Incarcerated incisional hernia, Insomnia, Lyme disease, Morbid obesity with BMI of 45 0-49 9, adult ([place] [address] ) (5/3/2017), MS (multiple sclerosis) ([place] [address] ), 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), and Type 2 diabetes mellitus with hyperglycemia, without long-term current use of insulin ([place] [address] )  She has a current medication list which includes the following prescription(s): acetaminophen, alcohol swabs, aspirin, atorvastatin, blood pressure, cinacalcet, duloxetine, gabapentin, relion prime test, glucose blood, hydrochlorothiazide, interferon beta-1b, lisinopril, metformin, metoprolol succinate, digital glass scale, vitamin b-12, diazepam, fluticasone, and insulin syringe-needle u-100      PHYSIOLOGICAL DATA REVIEW AND INTERPRETATION:   using PAP > 4 Left message, notifying staff   DANETTE HEARN  -   hours/night 100%  Estimated OLIVIA 2 3/hour with pressure of 9 8cm H2O @90th percentile; Compliance: excellent; Sleep disordered breathing:stable & within target range; Patient has not been using ozone based or other devices to sanitize the machine  SUBJECTIVE: Regarding use of PAP, Rigoberto Win reports:   · She is experiencing no  adverse effects: ; has not noticed any fibres or foreign material in air line  · She is benefiting from use: sleeping better and improved headaches , until around a week and a half ago when she has been awakening with headaches once again   · Restless leg symptoms occur less frequently at around once a week and not disturbing sleep  Sleep Routine: Rigoberto Win reports getting 6-8 hrs sleep  ; she has no difficulty initiating or maintaining sleep   She arises by disturbance from pets  and usually feels refreshed  Rigoberto Win denies Excessive Daytime Sleepiness,  and rated herself at Total score: 6 /24 on the [place] Sleepiness Scale  Habits: reports that she has never smoked  She has never used smokeless tobacco ,  reports previous alcohol use ,  reports no history of drug use , Caffeine use: limited , Exercise routine: regular    ROS: as attached  Significant for weight has been stable  She reported no nasal, respiratory or cardiac symptoms  Neuropathy pain is controlled  EXAM: /80   Ht 5' 7" (1 702 m)   Wt 128 kg (282 lb)   LMP  (LMP Unknown)   BMI 44 17 kg/m²     Patient is well groomed; well appearing  H&N: EOMI; NC/AT:no facial pressure marks, no rashes  Skin/Extrem: col & hydration normal; no edema  Psych: cooperativeand in no distress  Mental state:  Cognitive impairment  Resp: Respiratory effort is normal  CNS: Alert, orientated, clear & coherent speech  Physical findings otherwise essentially unchanged from previous  IMPRESSION: Problem List Items & Comorbidities Addressed this Visit    1  SELENE (obstructive sleep apnea)  PAP DME Resupply/Reorder   2   RLS (restless legs syndrome)     3  Headache upon awakening     4  Daytime sleepiness     5  Cognitive decline     6  Right hemiparesis ([facility] [address] )     7  Neuropathic pain, leg, bilateral     8  Depression with anxiety     9  Benign essential hypertension     10  Morbid obesity with BMI of 45 0-49 9, adult ([facility] [address] )         PLAN:  1  I reviewed results of prior studies and physiologic data with the patient  I discussed treatment options with risks and benefits  2  Treatment with  PAP is medically necessary and Verónica Couch is agreable to continue use  3  Care of equipment, methods to improve comfort using PAP and importance of compliance with therapy were discussed  4  Pressure setting: continue 7-10 cmH2O     5  Rx provided to replace  supplies and Care coordinated with DME provider  6  She is on gabapentin 300 mg and is taking 7 a day  7  She has been monitoring blood pressure at home and also elevated  8  She is due to follow up with PCP shortly  9  Discussed strategies for weight reduction  10  Follow-up is advised in 1 year or sooner if needed to monitor progress, compliance and to adjust therapy  Thank you for allowing me to participate in the care of this patient  Sincerely,    Authenticated electronically by Ronit Vo MD on 60/60/32   Board Certified Specialist     Portions of the record may have been created with voice recognition software  Occasional wrong word or "sound a like" substitutions may have occurred due to the inherent limitations of voice recognition software  There may also be notations and random deletions of words or characters from malfunctioning software  Read the chart carefully and recognize, using context, where substitutions/deletions have occurred

## 2021-08-13 ENCOUNTER — TELEPHONE (OUTPATIENT)
Dept: FAMILY MEDICINE | Facility: OTHER | Age: 86
End: 2021-08-13

## 2021-08-13 NOTE — TELEPHONE ENCOUNTER
10:29 AM    Reason for Call: Phone Call    Description: Bailey with [facility] is looking for a call back to give verbal orders for pt.     Was an appointment offered for this call? No  If yes : Appointment type              Date    Preferred method for responding to this message: Telephone Call  What is your phone number ?[phone]    If we cannot reach you directly, may we leave a detailed response at the number you provided? Yes    Can this message wait until your PCP/provider returns, if available today? Linda Knox

## 2021-08-17 DIAGNOSIS — Z53.9 PERSONS ENCOUNTERING HEALTH SERVICES FOR SPECIFIC PROCEDURES, NOT CARRIED OUT: Primary | ICD-10-CM

## 2021-08-18 DIAGNOSIS — M19.90 OSTEOARTHRITIS, UNSPECIFIED OSTEOARTHRITIS TYPE, UNSPECIFIED SITE: ICD-10-CM

## 2021-08-18 RX ORDER — ALENDRONATE SODIUM 70 MG/1
TABLET ORAL
Qty: 4 TABLET | Refills: 0 | Status: SHIPPED | OUTPATIENT
Start: 2021-08-18 | End: 2021-09-21

## 2021-08-18 NOTE — TELEPHONE ENCOUNTER
Fosamax       Last Written Prescription Date:  7/27/2021  Last Fill Quantity: 4,   # refills: 0  Last Office Visit: 7/21/2021  Future Office visit:

## 2021-08-23 DIAGNOSIS — K21.00 GASTROESOPHAGEAL REFLUX DISEASE WITH ESOPHAGITIS WITHOUT HEMORRHAGE: ICD-10-CM

## 2021-08-23 DIAGNOSIS — I48.91 ATRIAL FIBRILLATION WITH RVR (H): ICD-10-CM

## 2021-08-23 DIAGNOSIS — R69 DIAGNOSIS UNKNOWN: ICD-10-CM

## 2021-08-23 DIAGNOSIS — I50.23 ACUTE ON CHRONIC SYSTOLIC CONGESTIVE HEART FAILURE (H): ICD-10-CM

## 2021-08-23 DIAGNOSIS — I48.21 PERMANENT ATRIAL FIBRILLATION (H): ICD-10-CM

## 2021-08-23 DIAGNOSIS — E11.9 CONTROLLED TYPE 2 DIABETES MELLITUS WITHOUT COMPLICATION, WITHOUT LONG-TERM CURRENT USE OF INSULIN (H): ICD-10-CM

## 2021-08-23 DIAGNOSIS — I50.9 CONGESTIVE HEART FAILURE, UNSPECIFIED HF CHRONICITY, UNSPECIFIED HEART FAILURE TYPE (H): ICD-10-CM

## 2021-08-23 RX ORDER — POTASSIUM CHLORIDE 1500 MG/1
TABLET, EXTENDED RELEASE ORAL
Qty: 28 TABLET | Refills: 0 | Status: SHIPPED | OUTPATIENT
Start: 2021-08-23 | End: 2021-09-21

## 2021-08-23 RX ORDER — METOPROLOL TARTRATE 100 MG
100 TABLET ORAL 2 TIMES DAILY
Qty: 56 TABLET | Refills: 0 | Status: SHIPPED | OUTPATIENT
Start: 2021-08-23 | End: 2021-09-21

## 2021-08-23 RX ORDER — LISINOPRIL 5 MG/1
TABLET ORAL
Qty: 28 TABLET | Refills: 0 | Status: SHIPPED | OUTPATIENT
Start: 2021-08-23 | End: 2021-09-21

## 2021-08-24 NOTE — TELEPHONE ENCOUNTER
Hygroton       Last Written Prescription Date:  7/27/2021  Last Fill Quantity: 20,   # refills: 0    Eliquis       Last Written Prescription Date:  5/4/2021  Last Fill Quantity: 58,   # refills: 3    Multivitamin       Last Written Prescription Date:  5/4/2021  Last Fill Quantity: 29,   # refills: 3    Prilosec       Last Written Prescription Date:  5/4/2021  Last Fill Quantity: 29,   # refills: 3    Cymbalta       Last Written Prescription Date:  5/4/2021  Last Fill Quantity: 58,   # refills: 3  Last Office Visit: 7/21/2021  Future Office visit:

## 2021-08-25 DIAGNOSIS — K21.00 GASTROESOPHAGEAL REFLUX DISEASE WITH ESOPHAGITIS WITHOUT HEMORRHAGE: ICD-10-CM

## 2021-08-25 RX ORDER — CHLORTHALIDONE 25 MG/1
TABLET ORAL
Qty: 28 TABLET | Refills: 0 | Status: SHIPPED | OUTPATIENT
Start: 2021-08-25 | End: 2021-09-21

## 2021-08-25 RX ORDER — APIXABAN 5 MG/1
TABLET, FILM COATED ORAL
Qty: 58 TABLET | Refills: 0 | Status: SHIPPED | OUTPATIENT
Start: 2021-08-25 | End: 2021-09-21

## 2021-08-25 RX ORDER — MULTIVITAMIN WITH FOLIC ACID 400 MCG
TABLET ORAL
Qty: 29 TABLET | Refills: 0 | Status: SHIPPED | OUTPATIENT
Start: 2021-08-25 | End: 2021-09-21

## 2021-08-25 RX ORDER — DULOXETIN HYDROCHLORIDE 20 MG/1
CAPSULE, DELAYED RELEASE ORAL
Qty: 58 CAPSULE | Refills: 0 | Status: SHIPPED | OUTPATIENT
Start: 2021-08-25 | End: 2021-09-21

## 2021-08-26 NOTE — TELEPHONE ENCOUNTER
OMEPRAZOLE DR 20 MG CAPSULE   Last Written Prescription Date:  8/25/2021  Last Fill Quantity: 90,   # refills: 4  Last Office Visit: 7/21/2021  Future Office visit:       Routing refill request to provider for review/approval because:

## 2021-09-05 NOTE — DISCHARGE SUMMARY
[facility]  Hospitalist Discharge Summary      Date of Admission:  7/9/2021  Date of Discharge:  7/13/2021 10:54 AM  Discharging Provider: Mckay Acosta MD      Discharge Diagnoses   Acute on chronic systolic heart failure  Atrial fibrillation with rapid ventricular response  Diabetes mellitus type 2      Follow-ups Needed After Discharge   Follow-up Appointments     Follow-up and recommended labs and tests       Follow up with primary care provider, DOC Cox, within 7 days for   hospital follow- up.  The following labs/tests are recommended: chem 8  HR   and BP evaluation.         None    Unresulted Labs Ordered in the Past 30 Days of this Admission     No orders found from 6/9/2021 to 7/10/2021.      These results will be followed up by not needed    Discharge Disposition   Discharged to assisted living  Condition at discharge: Stable      Hospital Course     Patient is an 87-year-old female who presented to the ER on day of admission with increasing dyspnea.  Has been worse over the last several days prior to presentation.  When she arrived in the ER she was hemodynamically stable terms of blood pressure however she does have chronic A. fib which she presented with rapid ventricular response.  O2 sats were normal room air.  BNP was up around 6000.  An echo showed EF around 35%.  This is similar to an echo which was performed at [place] recently after she was there for a traumatic fall.  While she was at [facility] her pressures were somewhat soft and they did cut back her medications specifically her oral diuretic and her beta-blocker.    Acute on chronic systolic heart failure: She did have some evidence of volume overload and dyspnea.  She was given some IV Lasix.  And did diurese at least a minimum of 3 kg.  She was doing markedly better.  Normally she is only on chlorthalidone.  This has been restarted.  Concern for discharge.  She is not surrounding Lasix.  Her A. fib was not well  controlled.  And likely had not been controlled for some time.  Not my part of the issue that her heart failure had worsened somewhat.  She is already on lisinopril.  Metroprolol tartrate was being used for her rate control.  At some point it may be reasonable to switch her over to the succinate form given her poor LV function.  But the time she left she was on room air and breathing markedly better.    Chronic A. fib with rapid ventricular response.  While she was recently had essentially had a difficult time controlling her rate.  In fact she was on amiodarone for a while.  Then was discharged on oral metoprolol however her resting heart rate was still rate around 100.  I put her back on her 100 twice a day of metoprolol and she still had fairly significant rate greater than 100 so I did load her up with some IV digoxin 750 mcg and actually she did quite well heart rate came down in the 70s.  And she felt markedly better.  Plans are to send her out on 0.125 mg Qamar 0.125 mg every other day.  They are to hold for heart rates less than 60.  We continue with her apixaban and she has had no issues with bleeding.    In regards to the recent fall she had she had all kinds of trauma specifically to her left side she still has a significant hematoma on her left elbow that is slowly resolving there is no signs of any infection however.  Her hemoglobin has remained stable at 12.8.    Sugars have been fine she has diabetes mellitus type 2 she is on nothing at this time just dietary controlled they have been around 120s.    Overall she has been feeling much better now since her diuresis and rate control.  She will be returning back to [place].  Physical therapy felt that she was safe in returning home.        Consultations This Hospital Stay   PHYSICAL THERAPY ADULT IP CONSULT    Code Status   No CPR- Do NOT Intubate    Time Spent on this Encounter   I, Mckay Acosta MD, personally saw the patient today and spent greater  than 30 minutes discharging this patient.       Mckay Acosta MD  [facility]  [address] [place] [address]  [place] [address]  Phone: [phone]  Fax: [phone]  ______________________________________________________________________    Physical Exam   Vital Signs:                    Weight: 162 lbs 4.14 oz  Constitutional: awake, alert, cooperative, no apparent distress, and appears stated age  Respiratory: No increased work of breathing, good air exchange, clear to auscultation bilaterally, no crackles or wheezing  Cardiovascular: Irregular rhythm no audible murmurs neck veins appear to be flat her pulses are 2+ and equal.  GI: No scars, normal bowel sounds, soft, non-distended, non-tender, no masses palpated, no hepatosplenomegally  Musculoskeletal: 1-2 bilateral edema there are scattered ecchymosis throughout.  Her left elbow is still rather swollen.  Does not have any warmth erythema no evidence of bursitis.  There is a quarter size black eschar on the elbow.  No evidence of any purulence.       Primary Care Physician   DOC Cox    Discharge Orders      Reason for your hospital stay    Patient was admitted with increasing shortness of breath.  Felt to have evidence of some volume overload congestive heart failure was diuresed with improvement in her symptoms.  Had atrial fibrillation which is chronic but rate not controlled.  Metoprolol was increased and digoxin added with nice response.     Follow-up and recommended labs and tests     Follow up with primary care provider, DOC Cox, within 7 days for hospital follow- up.  The following labs/tests are recommended: chem 8  HR and BP evaluation.     Activity    Your activity upon discharge: activity as tolerated     Monitor and record    weight every day     No CPR- Do NOT Intubate     Diet    Follow this diet upon discharge: Orders Placed This Encounter      Combination Diet 3 gm NA Diet; Low Saturated Fat Diet       Significant Results and  Procedures   Most Recent 3 CBC's:Recent Labs   Lab Test 07/21/21  1125 07/12/21  0530 07/10/21  0524   WBC 11.5* 9.3 9.1   HGB 14.3 12.8 11.6*   MCV 89 90 91    211 201     Most Recent 3 BMP's:Recent Labs   Lab Test 07/21/21  1125 07/13/21  0748 07/12/21  2040 07/12/21  0530 07/10/21  0524     --   --  139 141   POTASSIUM 4.5  --   --  4.7 3.6   CHLORIDE 100  --   --  105 107   CO2 25  --   --  28 27   BUN 30  --   --  21 25   CR 1.10*  --   --  0.94 0.86   ANIONGAP 8  --   --  6 7   MALIHA 9.1  --   --  9.2 8.7   * 128* 255* 161* 160*     Most Recent 2 LFT's:Recent Labs   Lab Test 07/21/21  1125 07/10/21  0524   AST 27 29   ALT 28 25   ALKPHOS 100 86   BILITOTAL 0.9 1.3     Most Recent 3 Troponin's:Recent Labs   Lab Test 07/09/21  0910 06/13/21  1155 02/09/21  1749   TROPI <0.015 <0.015 <0.015     Most Recent 3 BNP's:Recent Labs   Lab Test 07/21/21  1125 07/09/21  0910 02/13/20  1339 05/14/16  1325   NTBNPI  --  6,026*  --  551   NTBNP 536*  --  4,833*  --      Most Recent D-dimer:Recent Labs   Lab Test 06/29/19  1459   DIMER <200     Most Recent 6 Bacteria Isolates From Any Culture (See EPIC Reports for Culture Details):Recent Labs   Lab Test 06/29/19  2248 06/29/19  1927 06/29/19  1458 11/25/16  0950 05/14/16  1310   CULT No MRSA isolated No growth after 6 days No growth after 6 days 10,000 to 50,000 colonies/mL Mixed bacterial rodrick No further identification or   sensitivity done  * 10,000 to 50,000 colonies/mL Mixed bacterial rodrick No further identification or   sensitivity done  *  Canceled, Test credited  Duplicate request       Most Recent 6 glucoses:Recent Labs   Lab Test 07/21/21  1125 07/13/21  0748 07/12/21  2040 07/12/21  1740 07/12/21  1119 07/12/21  0824   * 128* 255* 124* 212* 175*     Most Recent Urinalysis:Recent Labs   Lab Test 06/13/21  1223 06/30/19  0904   COLOR Yellow Light Yellow   APPEARANCE Clear Clear   URINEGLC 300* Negative   URINEBILI Negative Negative    URINEKETONE Negative Negative   SG 1.020 1.010   UBLD Negative Trace*   URINEPH 5.0 5.5   PROTEIN Negative Negative   NITRITE Negative Negative   LEUKEST Negative Negative   RBCU  --  3*   WBCU  --  3   ,   Results for orders placed or performed during the hospital encounter of 21   Chest XR,  PA & LAT    Narrative    PROCEDURE:  XR CHEST 2 VW    HISTORY:  Dyspnea.     COMPARISON:  2021    FINDINGS:   The heart is enlarged. The pulmonary vasculature is normal.   Interstitial thickening is seen in both lungs which has worsened from  previous examination. There is mild blunting of the costophrenic  angles.      Impression    IMPRESSION:  Cardiomegaly, interstitial thickening and blunting of the  costophrenic angles suspicious for congestive heart failure.      ODILIA CARO MD         SYSTEM ID:  [id number]   Echocardiogram Complete     Value    LVEF  30-35% (moderately reduced)    Narrative    [id number]  [id number]  [id number]  [id number]^[email or web address]^[email or web address]     [facility]  Echocardiography Laboratory  [address] [place] [address]     Name: RUDDY JOSE  MRN: [id number]  : 1933  Study Date: 2021 10:31 AM  Age: 87 yrs  Gender: Female  Patient Location: [facility]  Reason For Study: CHF  History: CHF  Ordering Physician: SRIDEVI BELLA  Referring Physician: SRIDEVI BELLA  Performed By: Zara Palmer RDCS     BSA: 1.8 m2  Height: 62 in  Weight: 176 lb  ______________________________________________________________________________  Procedure  The patient's rhythm is atrial fibrillation with RVR.  ______________________________________________________________________________  Interpretation Summary  No pericardial effusion is present.  Left ventricular function is decreased. The ejection fraction is 30-35%  (moderately reduced).  Anterior wall hypokinesis is present.  Pt is tachycardic with rates 130-150 bpm making estimation of LV function  less  reliable.  The right ventricle is normal size.  Moderate to severe left atrial enlargement is present.  Moderate to severe right atrial enlargement is present.  Mild to moderate mitral insufficiency is present.  Moderate aortic valve calcification is present.  Mild aortic insufficiency is present.  The peak aortic velocity is 2.74 m/sec.  The mean gradient across the aortic valve is18 mmHg.  Mild to moderate aortic stenosis is present.  Moderate to severe tricuspid insufficiency is present.  Right ventricular systolic pressure is 39mmHg above the right atrial pressure.  The pulmonic valve is normal.  ______________________________________________________________________________  Left Ventricle  Left ventricular function is decreased. The ejection fraction is 30-35%  (moderately reduced). Anterior wall hypokinesis is present. Pt is tachycardic  with rates 130-150 bpm making estimation of LV function less reliable.     Right Ventricle  The right ventricle is normal size.     Atria  Moderate to severe left atrial enlargement is present. Moderate to severe  right atrial enlargement is present.     Mitral Valve  Mild to moderate mitral insufficiency is present.     Aortic Valve  Moderate aortic valve calcification is present. Mild aortic insufficiency is  present. Mild to moderate aortic stenosis is present. The mean AoV pressure  gradient is 18.0 mmHg. The peak AoV pressure gradient is 29.2 mmHg. The peak  aortic velocity is 2.74 m/sec. The mean gradient across the aortic valve is18  mmHg.     Tricuspid Valve  Moderate to severe tricuspid insufficiency is present. Right ventricular  systolic pressure is 39mmHg above the right atrial pressure.     Pulmonic Valve  The pulmonic valve is normal.     Vessels  The aorta root is normal.     Pericardium  No pericardial effusion is present.     ______________________________________________________________________________  MMode/2D Measurements & Calculations  IVSd: 1.0  cm  LVIDd: 4.2 cm  LVIDs: 3.5 cm  LVPWd: 1.0 cm  FS: 15.8 %  LV mass(C)d: 141.1 grams  LV mass(C)dI: 77.9 grams/m2  Ao root diam: 3.8 cm  LVOT diam: 2.0 cm  LVOT area: 3.2 cm2     RWT: 0.49     Time Measurements  Aortic HR: 274.4 BPM     Doppler Measurements & Calculations  Ao V2 max: 270.4 cm/sec  Ao max P.2 mmHg  Ao V2 mean: 203.3 cm/sec  Ao mean P.0 mmHg  Ao V2 VTI: 44.4 cm  NAHUM(I,D): 0.69 cm2  NAHUM(V,D): 0.81 cm2  LV V1 max P.9 mmHg  LV V1 max: 68.5 cm/sec  LV V1 VTI: 9.6 cm  CO(LVOT): 8.5 l/min  CI(LVOT): 4.7 l/min/m2  SV(LVOT): 30.8 ml  SI(LVOT): 17.0 ml/m2  TR max ion: 311.7 cm/sec  TR max P.9 mmHg  AV Ion Ratio (DI): 0.25  NAHUM Index (cm2/m2): 0.38     ______________________________________________________________________________  Report approved by: Dustin Yoder 2021 04:40 PM               Discharge Medications   Discharge Medication List as of 2021 10:42 AM      START taking these medications    Details   digoxin (LANOXIN) 125 MCG tablet Take 1 tablet (125 mcg) by mouth every other day Hold if HR is less than 60 bpm, Disp-30 tablet, R-1, E-Prescribe         CONTINUE these medications which have CHANGED    Details   chlorthalidone (HYGROTON) 25 MG tablet Take 1 tablet (25 mg) by mouth daily, Disp-29 tablet, R-0, E-Prescribe      lisinopril (ZESTRIL) 5 MG tablet Take 1 tablet (5 mg) by mouth daily, Disp-30 tablet, R-1, E-Prescribe      metoprolol tartrate (LOPRESSOR) 100 MG tablet Take 1 tablet (100 mg) by mouth 2 times daily, Disp-60 tablet, R-1, E-Prescribe      potassium chloride ER (KLOR-CON M) 20 MEQ CR tablet Take 1 tablet (20 mEq) by mouth daily, Disp-30 tablet, R-1, E-Prescribe         CONTINUE these medications which have NOT CHANGED    Details   ASPIRIN LOW DOSE 81 MG chewable tablet CHEW AND SWALLOW 1 TABLET BY MOUTH DAILY, Disp-29 tablet, R-5, E-Prescribe      atorvastatin (LIPITOR) 40 MG tablet TAKE 1 TABLET BY MOUTH ONCE DAILY., Disp-29 tablet, R-2,  E-Prescribe      fluticasone (FLONASE) 50 MCG/ACT nasal spray Spray 1 spray into both nostrils daily, Disp-16 g,R-3, E-Prescribe      Specialty Vitamins Products (ICAPS LUTEIN & ZEAXANTHIN) TBEC Take 1 tablet by mouth 2 times daily , Historical      DULoxetine (CYMBALTA) 20 MG capsule TAKE (1) CAPSULE BY MOUTH TWICE DAILY., Disp-58 capsule, R-3, E-Prescribe      ELIQUIS ANTICOAGULANT 5 MG tablet TAKE (1) TABLET BY MOUTH TWICE A DAY., Disp-58 tablet, R-3, E-Prescribe      Multiple Vitamins-Minerals (TAB-A-MARIUSZ) TABS TAKE 1 TABLET BY MOUTH ONCE DAILY., Disp-29 tablet, R-3, E-Prescribe      omeprazole (PRILOSEC) 20 MG DR capsule TAKE 1 CAPSULE BY MOUTH DAILY., Disp-29 capsule, R-3, E-Prescribe      acetaminophen (TYLENOL) 325 MG tablet Take 1 tablet (325 mg) by mouth every 6 hours as needed for mild pain, Disp-100 tablet,R-3, E-Prescribe      !! blood glucose (NO BRAND SPECIFIED) test strip Use to test blood sugar 1 times daily or as directed.    Please dispense Accu-chek, Disp-100 each,R-0, Local Print      Blood Glucose Monitoring Suppl (GLUCOCARD VITAL MONITOR) w/Device KIT 1 kit daily Test blood Sugars 1 times daily, Disp-1 kit,R-0, E-Prescribe      diphenhydrAMINE (BENADRYL) 25 MG tablet Take 12.5 mg by mouth every 6 hours as needed for anxiety or other, Historical      polyethylene glycol-propylene glycol (SYSTANE) 0.4-0.3 % SOLN ophthalmic solution Place 1 drop into both eyes 4 times daily as needed for dry eyes, Disp-1 Bottle, R-3, E-Prescribe      SB BISMUTH 262 MG TABS CHEW AND SWALLOW 2 TABLETS EVERY 6 HOURS AS NEEDED FOR DIARRHEA, Disp-30 tablet,R-0, E-Prescribe      alendronate (FOSAMAX) 70 MG tablet TAKE 1 TABLET BY MOUTH WEEKLY AS DIRECTED., Disp-4 tablet, R-0, E-Prescribe      Assure [place] Lancets MISC TEST 1 TIME DAILY OR AS DIRECTED, Disp-100 each, R-3, E-Prescribe      !! GLUCOCARD VITAL TEST test strip TEST 1 TIME DAILY OR AS DIRECTED, Disp-100 strip, R-0, E-Prescribe       !! - Potential duplicate  medications found. Please discuss with provider.        Allergies   Allergies   Allergen Reactions     Ampicillin      Desvenlafaxine Other (See Comments)     Desvenlafaxine Succinate  Pristiq       Sertraline Hcl Other (See Comments) and Diarrhea     Zoloft       Sulfa Drugs Other (See Comments)     Sulfa(Sulfonamide Antibiotics)

## 2021-09-20 DIAGNOSIS — K21.00 GASTROESOPHAGEAL REFLUX DISEASE WITH ESOPHAGITIS WITHOUT HEMORRHAGE: ICD-10-CM

## 2021-09-20 DIAGNOSIS — M19.90 OSTEOARTHRITIS, UNSPECIFIED OSTEOARTHRITIS TYPE, UNSPECIFIED SITE: ICD-10-CM

## 2021-09-20 DIAGNOSIS — I50.23 ACUTE ON CHRONIC SYSTOLIC CONGESTIVE HEART FAILURE (H): ICD-10-CM

## 2021-09-20 DIAGNOSIS — E11.9 CONTROLLED TYPE 2 DIABETES MELLITUS WITHOUT COMPLICATION, WITHOUT LONG-TERM CURRENT USE OF INSULIN (H): ICD-10-CM

## 2021-09-20 DIAGNOSIS — R69 DIAGNOSIS UNKNOWN: ICD-10-CM

## 2021-09-20 DIAGNOSIS — I48.21 PERMANENT ATRIAL FIBRILLATION (H): ICD-10-CM

## 2021-09-20 DIAGNOSIS — I50.9 CONGESTIVE HEART FAILURE, UNSPECIFIED HF CHRONICITY, UNSPECIFIED HEART FAILURE TYPE (H): ICD-10-CM

## 2021-09-20 DIAGNOSIS — E78.2 MIXED HYPERLIPIDEMIA: ICD-10-CM

## 2021-09-20 DIAGNOSIS — I48.91 ATRIAL FIBRILLATION WITH RVR (H): ICD-10-CM

## 2021-09-21 ENCOUNTER — TELEPHONE (OUTPATIENT)
Dept: FAMILY MEDICINE | Facility: OTHER | Age: 86
End: 2021-09-21

## 2021-09-21 RX ORDER — APIXABAN 5 MG/1
TABLET, FILM COATED ORAL
Qty: 58 TABLET | Refills: 0 | Status: SHIPPED | OUTPATIENT
Start: 2021-09-21 | End: 2021-10-19

## 2021-09-21 RX ORDER — MULTIVITAMIN WITH FOLIC ACID 400 MCG
TABLET ORAL
Qty: 29 TABLET | Refills: 9 | Status: SHIPPED | OUTPATIENT
Start: 2021-09-21 | End: 2022-06-29

## 2021-09-21 RX ORDER — ATORVASTATIN CALCIUM 40 MG/1
TABLET, FILM COATED ORAL
Qty: 29 TABLET | Refills: 0 | Status: SHIPPED | OUTPATIENT
Start: 2021-09-21 | End: 2021-10-19

## 2021-09-21 RX ORDER — METOPROLOL TARTRATE 100 MG
TABLET ORAL
Qty: 58 TABLET | Refills: 9 | Status: SHIPPED | OUTPATIENT
Start: 2021-09-21 | End: 2022-06-29

## 2021-09-21 RX ORDER — ALENDRONATE SODIUM 70 MG/1
TABLET ORAL
Qty: 4 TABLET | Refills: 0 | Status: SHIPPED | OUTPATIENT
Start: 2021-09-21 | End: 2021-10-19

## 2021-09-21 RX ORDER — DULOXETIN HYDROCHLORIDE 20 MG/1
CAPSULE, DELAYED RELEASE ORAL
Qty: 58 CAPSULE | Refills: 9 | Status: SHIPPED | OUTPATIENT
Start: 2021-09-21 | End: 2022-06-29

## 2021-09-21 RX ORDER — LISINOPRIL 5 MG/1
TABLET ORAL
Qty: 29 TABLET | Refills: 0 | Status: SHIPPED | OUTPATIENT
Start: 2021-09-21 | End: 2021-10-19

## 2021-09-21 RX ORDER — POTASSIUM CHLORIDE 1500 MG/1
TABLET, EXTENDED RELEASE ORAL
Qty: 29 TABLET | Refills: 9 | Status: SHIPPED | OUTPATIENT
Start: 2021-09-21 | End: 2022-06-29

## 2021-09-21 RX ORDER — CHLORTHALIDONE 25 MG/1
TABLET ORAL
Qty: 29 TABLET | Refills: 0 | Status: SHIPPED | OUTPATIENT
Start: 2021-09-21 | End: 2021-10-19

## 2021-09-21 NOTE — TELEPHONE ENCOUNTER
Bailey [phone]  [facility]    Verbal order to extend the 1 week visit for one more week  and discharge would be next week.

## 2021-09-21 NOTE — TELEPHONE ENCOUNTER
Lisinopril      Last Written Prescription Date:  8.23.21  Last Fill Quantity: #29,   # refills: 0  Last Office Visit: 7.1.21  Future Office visit:       Routing refill request to provider for review/approval because:

## 2021-09-21 NOTE — TELEPHONE ENCOUNTER
Lipitor      Last Written Prescription Date:  8.23.21  Last Fill Quantity: #29,   # refills: 0  Last Office Visit: 7.1.21  Future Office visit:       Routing refill request to provider for review/approval because:

## 2021-09-21 NOTE — TELEPHONE ENCOUNTER
Omeprazole      Last Written Prescription Date:  8.25.21  Last Fill Quantity: #29,   # refills: 0  Last Office Visit: 7.1.21  Future Office visit:       Routing refill request to provider for review/approval because:

## 2021-09-21 NOTE — TELEPHONE ENCOUNTER
Eliquis, Fosamax, Hygroton      Last Written Prescription Date:  8.25.21, 8.18.21, 8.25.21  Last Fill Quantity: #58, #4, #29,   # refills: 0-  Last Office Visit: 7.1.21  Future Office visit:       Routing refill request to provider for review/approval because:

## 2021-10-03 ENCOUNTER — HEALTH MAINTENANCE LETTER (OUTPATIENT)
Age: 86
End: 2021-10-03

## 2021-10-11 ENCOUNTER — APPOINTMENT (OUTPATIENT)
Dept: LAB | Facility: OTHER | Age: 86
End: 2021-10-11
Payer: MEDICARE

## 2021-10-11 DIAGNOSIS — R30.0 DYSURIA: Primary | ICD-10-CM

## 2021-10-11 DIAGNOSIS — R41.0 CONFUSION: Primary | ICD-10-CM

## 2021-10-11 LAB
ALBUMIN UR-MCNC: NEGATIVE MG/DL
APPEARANCE UR: CLEAR
BACTERIA #/AREA URNS HPF: ABNORMAL /HPF
BILIRUB UR QL STRIP: NEGATIVE
COLOR UR AUTO: YELLOW
GLUCOSE UR STRIP-MCNC: 150 MG/DL
HGB UR QL STRIP: NEGATIVE
KETONES UR STRIP-MCNC: NEGATIVE MG/DL
LEUKOCYTE ESTERASE UR QL STRIP: ABNORMAL
MUCOUS THREADS #/AREA URNS LPF: PRESENT /LPF
NITRATE UR QL: NEGATIVE
PH UR STRIP: 6 [PH] (ref 4.7–8)
RBC URINE: 0 /HPF
SP GR UR STRIP: 1.01 (ref 1–1.03)
SQUAMOUS EPITHELIAL: 0 /HPF
UROBILINOGEN UR STRIP-MCNC: NORMAL MG/DL
WBC URINE: 2 /HPF

## 2021-10-11 PROCEDURE — 87086 URINE CULTURE/COLONY COUNT: CPT | Mod: ZL | Performed by: FAMILY MEDICINE

## 2021-10-11 PROCEDURE — 81001 URINALYSIS AUTO W/SCOPE: CPT | Mod: ZL | Performed by: FAMILY MEDICINE

## 2021-10-11 RX ORDER — CIPROFLOXACIN 250 MG/1
250 TABLET, FILM COATED ORAL 2 TIMES DAILY
Qty: 10 TABLET | Refills: 0 | Status: SHIPPED | OUTPATIENT
Start: 2021-10-11 | End: 2021-11-19

## 2021-10-11 NOTE — TELEPHONE ENCOUNTER
Fax from [facility] requesting UA/UC. States resident is exhibiting confusion, lethargy, discomfort with urination and urine is very dark in color. Fax sent and antibiotic suggested to be sent after UA/UC obtained.

## 2021-10-13 LAB — BACTERIA UR CULT: NORMAL

## 2021-10-18 DIAGNOSIS — I50.23 ACUTE ON CHRONIC SYSTOLIC CONGESTIVE HEART FAILURE (H): ICD-10-CM

## 2021-10-18 DIAGNOSIS — E78.2 MIXED HYPERLIPIDEMIA: ICD-10-CM

## 2021-10-18 DIAGNOSIS — I48.21 PERMANENT ATRIAL FIBRILLATION (H): ICD-10-CM

## 2021-10-18 DIAGNOSIS — I50.9 CONGESTIVE HEART FAILURE, UNSPECIFIED HF CHRONICITY, UNSPECIFIED HEART FAILURE TYPE (H): ICD-10-CM

## 2021-10-18 DIAGNOSIS — I48.91 ATRIAL FIBRILLATION WITH RVR (H): ICD-10-CM

## 2021-10-18 DIAGNOSIS — E11.9 CONTROLLED TYPE 2 DIABETES MELLITUS WITHOUT COMPLICATION, WITHOUT LONG-TERM CURRENT USE OF INSULIN (H): ICD-10-CM

## 2021-10-18 DIAGNOSIS — M19.90 OSTEOARTHRITIS, UNSPECIFIED OSTEOARTHRITIS TYPE, UNSPECIFIED SITE: ICD-10-CM

## 2021-10-18 DIAGNOSIS — K21.00 GASTROESOPHAGEAL REFLUX DISEASE WITH ESOPHAGITIS WITHOUT HEMORRHAGE: ICD-10-CM

## 2021-10-19 RX ORDER — LISINOPRIL 5 MG/1
TABLET ORAL
Qty: 29 TABLET | Refills: 0 | Status: SHIPPED | OUTPATIENT
Start: 2021-10-19 | End: 2021-11-16

## 2021-10-19 RX ORDER — ASPIRIN 81 MG
TABLET,CHEWABLE ORAL
Qty: 29 TABLET | Refills: 0 | Status: SHIPPED | OUTPATIENT
Start: 2021-10-19 | End: 2021-11-16

## 2021-10-19 RX ORDER — APIXABAN 5 MG/1
TABLET, FILM COATED ORAL
Qty: 58 TABLET | Refills: 0 | Status: SHIPPED | OUTPATIENT
Start: 2021-10-19 | End: 2021-11-16

## 2021-10-19 RX ORDER — ALENDRONATE SODIUM 70 MG/1
TABLET ORAL
Qty: 4 TABLET | Refills: 0 | Status: SHIPPED | OUTPATIENT
Start: 2021-10-19 | End: 2021-11-16

## 2021-10-19 RX ORDER — ATORVASTATIN CALCIUM 40 MG/1
TABLET, FILM COATED ORAL
Qty: 29 TABLET | Refills: 0 | Status: SHIPPED | OUTPATIENT
Start: 2021-10-19 | End: 2021-11-16

## 2021-10-19 RX ORDER — CHLORTHALIDONE 25 MG/1
TABLET ORAL
Qty: 29 TABLET | Refills: 0 | Status: SHIPPED | OUTPATIENT
Start: 2021-10-19 | End: 2021-11-16

## 2021-10-19 NOTE — TELEPHONE ENCOUNTER
Aspirin  Last Written Prescription Date: 5/4/21  Last Fill Quantity: 29 # of Refills: 5  Last Office Visit: 7/21/21    Omeprazole  Last Written Prescription Date: 9/21/21  Last Fill Quantity: 29 # of Refills: 0  Last Office Visit: 7/21/21    Lipitor  Last Written Prescription Date: 9/21/21  Last Fill Quantity: 29 # of Refills: 0  Last Office Visit: 7/21/21    Eliquis  Last Written Prescription Date: 9/21/21  Last Fill Quantity: 58 # of Refills: 0  Last Office Visit: 7/21/21    Chlorthalidone  Last Written Prescription Date: 9/21/21  Last Fill Quantity: 29 # of Refills: 0  Last Office Visit: 7/21/21    Fosamax  Last Written Prescription Date: 9/21/21  Last Fill Quantity: 4 # of Refills: 0  Last Office Visit: 7/21/21    Lisinopril  Last Written Prescription Date: 9/21/21  Last Fill Quantity: 29 # of Refills: 0  Last Office Visit: 7/21/21

## 2021-10-20 RX ORDER — DIGOXIN 125 MCG
TABLET ORAL
Qty: 15 TABLET | Refills: 0 | Status: SHIPPED | OUTPATIENT
Start: 2021-10-20 | End: 2021-11-16

## 2021-10-20 NOTE — TELEPHONE ENCOUNTER
digoxin (LANOXIN) 125 MCG tablet  Last Written Prescription Date:  7/13/21  Last Fill Quantity: 30,  # refills: 1   Last office visit: 7/21/21   Future Office Visit:      Routing refill request to provider for review/approval because:  Drug not on the G refill protocol   Last signed by Dr Acosta. Please advise. Thank you!

## 2021-11-04 DIAGNOSIS — E11.9 TYPE 2 DIABETES MELLITUS WITHOUT COMPLICATION, WITHOUT LONG-TERM CURRENT USE OF INSULIN (H): ICD-10-CM

## 2021-11-05 RX ORDER — BLOOD-GLUCOSE METER
KIT MISCELLANEOUS
Qty: 100 STRIP | Refills: 0 | Status: SHIPPED | OUTPATIENT
Start: 2021-11-05 | End: 2022-01-20

## 2021-11-16 DIAGNOSIS — I48.91 ATRIAL FIBRILLATION WITH RVR (H): ICD-10-CM

## 2021-11-16 DIAGNOSIS — K21.00 GASTROESOPHAGEAL REFLUX DISEASE WITH ESOPHAGITIS WITHOUT HEMORRHAGE: ICD-10-CM

## 2021-11-16 DIAGNOSIS — M19.90 OSTEOARTHRITIS, UNSPECIFIED OSTEOARTHRITIS TYPE, UNSPECIFIED SITE: ICD-10-CM

## 2021-11-16 DIAGNOSIS — E11.9 CONTROLLED TYPE 2 DIABETES MELLITUS WITHOUT COMPLICATION, WITHOUT LONG-TERM CURRENT USE OF INSULIN (H): ICD-10-CM

## 2021-11-16 DIAGNOSIS — I50.23 ACUTE ON CHRONIC SYSTOLIC CONGESTIVE HEART FAILURE (H): ICD-10-CM

## 2021-11-16 DIAGNOSIS — I50.9 CONGESTIVE HEART FAILURE, UNSPECIFIED HF CHRONICITY, UNSPECIFIED HEART FAILURE TYPE (H): ICD-10-CM

## 2021-11-16 DIAGNOSIS — E78.2 MIXED HYPERLIPIDEMIA: ICD-10-CM

## 2021-11-16 DIAGNOSIS — I48.21 PERMANENT ATRIAL FIBRILLATION (H): ICD-10-CM

## 2021-11-16 RX ORDER — ASPIRIN 81 MG
TABLET,CHEWABLE ORAL
Qty: 29 TABLET | Refills: 0 | Status: SHIPPED | OUTPATIENT
Start: 2021-11-16 | End: 2021-12-13

## 2021-11-16 RX ORDER — ATORVASTATIN CALCIUM 40 MG/1
TABLET, FILM COATED ORAL
Qty: 29 TABLET | Refills: 0 | Status: SHIPPED | OUTPATIENT
Start: 2021-11-16 | End: 2021-12-13

## 2021-11-16 RX ORDER — ALENDRONATE SODIUM 70 MG/1
TABLET ORAL
Qty: 4 TABLET | Refills: 0 | Status: SHIPPED | OUTPATIENT
Start: 2021-11-16 | End: 2021-12-13

## 2021-11-16 RX ORDER — DIGOXIN 125 MCG
TABLET ORAL
Qty: 15 TABLET | Refills: 0 | Status: SHIPPED | OUTPATIENT
Start: 2021-11-16 | End: 2021-12-13

## 2021-11-16 RX ORDER — CHLORTHALIDONE 25 MG/1
TABLET ORAL
Qty: 29 TABLET | Refills: 0 | Status: SHIPPED | OUTPATIENT
Start: 2021-11-16 | End: 2021-12-13

## 2021-11-16 RX ORDER — APIXABAN 5 MG/1
TABLET, FILM COATED ORAL
Qty: 58 TABLET | Refills: 0 | Status: SHIPPED | OUTPATIENT
Start: 2021-11-16 | End: 2021-12-14

## 2021-11-16 RX ORDER — LISINOPRIL 5 MG/1
TABLET ORAL
Qty: 29 TABLET | Refills: 0 | Status: SHIPPED | OUTPATIENT
Start: 2021-11-16 | End: 2021-12-13

## 2021-11-16 NOTE — TELEPHONE ENCOUNTER
Fosamax      Last Written Prescription Date:  10/19/21  Last Fill Quantity: 4,   # refills: 0  Last Office Visit: 7/21/21  Future Office visit:       Routing refill request to provider for review/approval because:        ASA      Last Written Prescription Date:  10/19/21  Last Fill Quantity: 29,   # refills: 0  Last Office Visit: 7/21/21  Future Office visit:       Routing refill request to provider for review/approval because:        Lipitor      Last Written Prescription Date:  10/19/21  Last Fill Quantity: 29,   # refills: 0  Last Office Visit: 7/21/21  Future Office visit:       Routing refill request to provider for review/approval because:        Hygroton      Last Written Prescription Date:  10/19/21  Last Fill Quantity: 29,   # refills: 0  Last Office Visit: 7/21/21  Future Office visit:       Routing refill request to provider for review/approval because:        Eliquis      Last Written Prescription Date:  10/19/21  Last Fill Quantity: 58,   # refills: 0  Last Office Visit: 7/21/21  Future Office visit:       Routing refill request to provider for review/approval because:        Lisinopril      Last Written Prescription Date:  10/19/21  Last Fill Quantity: 29,   # refills: 0  Last Office Visit: 7/21/21  Future Office visit:       Routing refill request to provider for review/approval because:        Omeprazole      Last Written Prescription Date:  10/19/21  Last Fill Quantity: 29,   # refills: 0  Last Office Visit: 7/21/21  Future Office visit:       Routing refill request to provider for review/approval because:

## 2021-11-16 NOTE — TELEPHONE ENCOUNTER
Digoxin      Last Written Prescription Date:  10/20/21  Last Fill Quantity: 15,   # refills: 0  Last Office Visit: 7/21/21  Future Office visit:       Routing refill request to provider for review/approval because:

## 2021-11-19 ENCOUNTER — HOSPITAL ENCOUNTER (EMERGENCY)
Facility: HOSPITAL | Age: 86
Discharge: HOME | End: 2021-11-19
Attending: PHYSICIAN ASSISTANT | Admitting: PHYSICIAN ASSISTANT
Payer: MEDICARE

## 2021-11-19 ENCOUNTER — NURSE TRIAGE (OUTPATIENT)
Dept: FAMILY MEDICINE | Facility: OTHER | Age: 86
End: 2021-11-19

## 2021-11-19 VITALS
RESPIRATION RATE: 16 BRPM | TEMPERATURE: 98.4 F | OXYGEN SATURATION: 100 % | HEART RATE: 95 BPM | DIASTOLIC BLOOD PRESSURE: 51 MMHG | SYSTOLIC BLOOD PRESSURE: 116 MMHG

## 2021-11-19 DIAGNOSIS — L03.317 CELLULITIS AND ABSCESS OF BUTTOCK: ICD-10-CM

## 2021-11-19 DIAGNOSIS — L02.31 CELLULITIS AND ABSCESS OF BUTTOCK: ICD-10-CM

## 2021-11-19 PROCEDURE — 10060 I&D ABSCESS SIMPLE/SINGLE: CPT

## 2021-11-19 PROCEDURE — 10060 I&D ABSCESS SIMPLE/SINGLE: CPT | Performed by: PHYSICIAN ASSISTANT

## 2021-11-19 PROCEDURE — 999N000104 HC STATISTIC NO CHARGE

## 2021-11-19 PROCEDURE — 87205 SMEAR GRAM STAIN: CPT | Performed by: PHYSICIAN ASSISTANT

## 2021-11-19 RX ORDER — DOXYCYCLINE HYCLATE 100 MG
100 TABLET ORAL 2 TIMES DAILY
Qty: 10 TABLET | Refills: 0 | Status: SHIPPED | OUTPATIENT
Start: 2021-11-19 | End: 2021-11-24

## 2021-11-19 ASSESSMENT — ENCOUNTER SYMPTOMS
DIARRHEA: 0
FATIGUE: 0
CARDIOVASCULAR NEGATIVE: 1
WOUND: 1
ABDOMINAL PAIN: 0
VOMITING: 0
RESPIRATORY NEGATIVE: 1
CONFUSION: 0
APPETITE CHANGE: 1
BLOOD IN STOOL: 0
FEVER: 0

## 2021-11-19 NOTE — ED PROVIDER NOTES
History     Chief Complaint   Patient presents with     Sore     c/o sore on rt buttocksm for the past week, notes from her pull ups. denies pain.      HPI  Annie Arguello is a 88 year old female with Hx CKD3, afib, DMII who presents with rash/possible sore on RT buttock x approximately 1 week. Annie denies pain today, but was painful yesterday. No fevers.     Allergies:  Allergies   Allergen Reactions     Ampicillin      Desvenlafaxine Other (See Comments)     Desvenlafaxine Succinate  Pristiq       Sertraline Hcl Other (See Comments) and Diarrhea     Zoloft       Sulfa Drugs Other (See Comments)     Sulfa(Sulfonamide Antibiotics)       Problem List:    Patient Active Problem List    Diagnosis Date Noted     Chronic kidney disease, stage 3 (H) 07/21/2021     Priority: Medium     Acute on chronic systolic congestive heart failure (H) 07/13/2021     Priority: Medium     Acute exacerbation of CHF (congestive heart failure) (H) 07/09/2021     Priority: Medium     Hematoma of arm, left, initial encounter 06/16/2021     Priority: Medium     Hemorrhagic shock (H) 06/16/2021     Priority: Medium     Traumatic hematoma of left hip 06/16/2021     Priority: Medium     Hyperglycemia 06/14/2021     Priority: Medium     Acute blood loss anemia 06/13/2021     Priority: Medium     Chronic anticoagulation 06/13/2021     Priority: Medium     Essential (primary) hypertension 06/13/2021     Priority: Medium     Falls 06/13/2021     Priority: Medium     Hypotension due to hypovolemia 07/02/2019     Priority: Medium     Generalized muscle weakness 07/02/2019     Priority: Medium     Sepsis (H) 06/29/2019     Priority: Medium     Lung nodule 10/30/2018     Priority: Medium     Controlled type 2 diabetes mellitus without complication, without long-term current use of insulin (H) 10/24/2017     Priority: Medium     Permanent atrial fibrillation (H) 10/24/2017     Priority: Medium     ACP (advance care planning) 01/04/2017      Priority: Medium     Advance Care Planning 1/4/2017: ACP Review of Chart / Resources Provided:  Reviewed chart for advance care plan.  Annie Arguello has no plan or code status on file. Discussed available resources and provided with information. Confirmed code status reflects current choices pending further ACP discussions.  Confirmed/documented legally designated decision makers.  Added by Kenzie Mancilla           Advanced care planning/counseling discussion 11/02/2012     Priority: Medium     Atrial fibrillation with RVR (H) 09/06/2012     Priority: Medium     Myalgia and myositis 05/10/2011     Priority: Medium     Problem list name updated by automated process. Provider to review       Diverticulosis of large intestine 05/02/2011     Priority: Medium     Problem list name updated by automated process. Provider to review       Osteoporosis 05/02/2011     Priority: Medium     Problem list name updated by automated process. Provider to review       Obesity 05/02/2011     Priority: Medium     Problem list name updated by automated process. 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 Provider to review       Sciatica 06/26/2007     Priority: Medium     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 05/16/2007     Priority: Medium     Calculus of kidney 03/14/2003     Priority: Medium        Past Medical History:    Past Medical History:   Diagnosis Date     A-fib (H)      Acute hyperglycemia      Acute systolic heart failure (H) 06/17/2021     Acute systolic heart failure (H)      Anemia due to blood loss, acute 06/17/2021     Atrial fibrillation (H) 09/06/2012     Calculus of kidney 03/14/2003     Chronic anticoagulation 06/17/2021     Chronic anticoagulation      Controlled type 2 diabetes mellitus without complication, without long-term current use of insulin (H) 10/24/2017     Displacement of lumbar  intervertebral disc without 2007     Diverticulosis 2011     Encounter for routine screening for malformation using ultrasonics 2021     Essential hypertension 2021     Fall 2021     Falls      Fibromyalgia 05/10/2011     Hematoma      Hemorrhagic shock (H) 2021     Hemorrhagic shock (H)      HTN (hypertension)      Hypotension      Hypotension, unspecified hypotension type 2021     Nonallopathic lesions of cervical region, not else 2001     Obesity 2011     Osteoporosis 2011     Other malaise and fatigue 2002     Renal cyst 2011     Sciatica 2007     Traumatic hematoma of hip, left, sequela      Traumatic hematoma of left upper arm        Past Surgical History:    Past Surgical History:   Procedure Laterality Date     Breast biopsy      LT, benign      SECTION       CHOLECYSTECTOMY      lap     COLONOSCOPY       LUCILLE / BSO         Family History:    Family History   Problem Relation Age of Onset     Myocardial Infarction Mother 59        myocardial infarction, cause of death     Other - See Comments Daughter         fibromyalgia     Thyroid Disease Sister         hypo     Thyroid Disease Sister         hypo     Thyroid Disease Sister         hypo     Myocardial Infarction Brother 63        myocardial infarction, cause of death       Social History:  Marital Status:   [5]  Social History     Tobacco Use     Smoking status: Never Smoker     Smokeless tobacco: Never Used     Tobacco comment: no passive exposure   Vaping Use     Vaping Use: Never used   Substance Use Topics     Alcohol use: No     Drug use: No        Medications:    acetaminophen (TYLENOL) 325 MG tablet  Alcohol Swabs (EASY TOUCH ALCOHOL PREP MEDIUM) 70 % PADS  alendronate (FOSAMAX) 70 MG tablet  ASPIRIN LOW DOSE 81 MG chewable tablet  Assure [place] Lancets MISC  atorvastatin (LIPITOR) 40 MG tablet  blood glucose (NO BRAND SPECIFIED) test strip  Blood Glucose  Monitoring Suppl (GLUCOCARD VITAL MONITOR) w/Device KIT  chlorthalidone (HYGROTON) 25 MG tablet  digoxin (LANOXIN) 125 MCG tablet  diphenhydrAMINE (BENADRYL) 25 MG tablet  doxycycline hyclate (VIBRA-TABS) 100 MG tablet  DULoxetine (CYMBALTA) 20 MG capsule  ELIQUIS ANTICOAGULANT 5 MG tablet  fluticasone (FLONASE) 50 MCG/ACT nasal spray  GLUCOCARD VITAL TEST test strip  lisinopril (ZESTRIL) 5 MG tablet  metoprolol tartrate (LOPRESSOR) 100 MG tablet  Multiple Vitamin (TAB-A-MARIUSZ) TABS  Multiple Vitamin (TAB-A-MARIUSZ/BETA CAROTENE) TABS  omeprazole (PRILOSEC) 20 MG DR capsule  polyethylene glycol-propylene glycol (SYSTANE) 0.4-0.3 % SOLN ophthalmic solution  potassium chloride ER (KLOR-CON M) 20 MEQ CR tablet  SB BISMUTH 262 MG TABS  Specialty Vitamins Products (ICAPS LUTEIN & ZEAXANTHIN) TBEC          Review of Systems   Constitutional: Positive for appetite change (decreased x 1 wk per daughters). Negative for fatigue and fever.   Respiratory: Negative.    Cardiovascular: Negative.    Gastrointestinal: Negative for abdominal pain, blood in stool, diarrhea (typically loose stools) and vomiting.   Skin: Positive for wound.   Psychiatric/Behavioral: Negative for confusion.       Physical Exam   BP: 116/51  Pulse: 95  Temp: 98.4  F (36.9  C)  Resp: 16  SpO2: 100 %      Physical Exam  Vitals and nursing note reviewed.   Constitutional:       General: She is not in acute distress.     Appearance: She is not toxic-appearing.   Cardiovascular:      Rate and Rhythm: Normal rate.   Pulmonary:      Effort: Pulmonary effort is normal.   Abdominal:      General: Abdomen is flat. Bowel sounds are normal. There is no distension.   Skin:     General: Skin is warm and dry.      Findings: Abscess, erythema and wound present.          Neurological:      Mental Status: She is alert.             ED Course        [facility]    PROCEDURE: -Incision/Drainage    Date/Time: 11/19/2021 1:18 PM  Performed by: Jaguar Coe,  "PA  Authorized by: Jaguar Coe PA     Risks, benefits and alternatives discussed.      LOCATION:      Type:  Abscess    Size:  Palpable 2 cm    Location:  Anogenital    Anogenital location:  Gluteal cleft    PRE-PROCEDURE DETAILS:     Skin preparation:  Chloraprep    PROCEDURE TYPE:     Complexity:  Simple    ANESTHESIA (see MAR for exact dosages):     Anesthesia method:  Local infiltration    Local anesthetic:  Lidocaine 2% WITH epi    PROCEDURE DETAILS:     Needle aspiration: no      Incision types:  Single straight    Incision depth:  Subcutaneous (fully to depth of #11 blade, 1 cm wide)    Scalpel blade:  11    Techniques: probed.    Drainage:  Bloody    Drainage amount:  Moderate    Wound treatment:  Wound left open    Packing materials:  1/4 in iodoform gauze    Amount 1/4\" iodoform:  Approx 6 cm    PROCEDURE  Describe Procedure: Only bloody drainage obtained despite depth of incision. I still followed with packing given depth. Dressed with sterile 4x4 and surgical tape.         No results found for this or any previous visit (from the past 24 hour(s)).  Medications - No data to display    Assessments & Plan (with Medical Decision Making)     I have reviewed the nursing notes.  I have reviewed the findings, diagnosis, plan and need for follow up with the patient.      Discharge Medication List as of 11/19/2021  1:20 PM      START taking these medications    Details   doxycycline hyclate (VIBRA-TABS) 100 MG tablet Take 1 tablet (100 mg) by mouth 2 times daily for 5 days, Disp-10 tablet, R-0, E-Prescribe             Final diagnoses:   Cellulitis and abscess of buttock   I was able to speak with wound care and recommendation is: antibiotic, packing, positioning, and barrier cream. I did apply zinc-containing cream from  after we area was cleansed and dressed. Contact information for wound care given to family and instructions are written for reference. I did advise to seek care over the weekend with " concern for worsening or onset fevers. Annie and her daughters are agreeable to plan and Annie is discharged home in their care.     11/19/2021   [facility]     Jaguar Coe, PA  11/19/21 2751

## 2021-11-19 NOTE — DISCHARGE INSTRUCTIONS
Change packing once daily for up to 1 week (if it appears that it has become extremely shallow, may stop.   Barrier ointment over the peeling tissue with every change of underwear   Positioning: when seated must change positioning hourly, when laying flat every 2 hrs.   Start doxycycline to cover infection: this is taken 2x daily for 5 days - wound care can request longer course or a change in antibiotic depending on follow up.   Call Wound Care for follow up early next week: tentative plan is to work Annie in on Tuesday for follow up.   Monitor and be seen with onset fevers.

## 2021-11-19 NOTE — ED TRIAGE NOTES
Patient presents to urgent care with her two daughters for bed sores. Patient was advised by Dr. Cox to be seen in Urgent Care.

## 2021-11-19 NOTE — TELEPHONE ENCOUNTER
", Ángela, from [facility] calling and states patient has two skin wounds or pressure ulcers that are \"raw and meaty\". States they have been treating these for about a week. Reports patient is on eloquis and has diabetes. Facility nurse is out on covid and staff is wanting patient to be seen for this. Per PCP Dr. MARGARETTE Cox, patient should be seen in ER today. Staff notified and verbalized understanding.    Additional Information    Negative: [1] Major bleeding (e.g., actively dripping or spurting) AND [2] can't be stopped    Negative: Amputation    Negative: Shock suspected (e.g., cold/pale/clammy skin, too weak to stand, low BP, rapid pulse)    Negative: Sounds like a life-threatening emergency to the triager    Negative: [1] Animal bite AND [2] broken skin    Negative: [1] Human bite AND [2] broken skin    Negative: Injury is a puncture wound    Negative: Foreign body in skin (e.g., splinter, sliver)    Negative: Bruises not from an injury    Negative: Wound looks infected    Negative: Electrical burn    Negative: Chemical burn    Negative: Thermal burn    Negative: [1] Bleeding AND [2] won't stop after 10 minutes of direct pressure (using correct technique)    Negative: Skin is split open or gaping (or length > 1/2 inch or 12 mm on the skin, 1/4 inch or 6 mm on the face)    Negative: [1] Deep cut AND [2] can see bone or tendons    Negative: [1] Dirt in the wound AND [2] not removed with 15 minutes of scrubbing    Negative: Skin loss involves more than 10% of surface area (Note: the palm of the hand = 1%)    Negative: High pressure injection injury (e.g., from paint gun, usually work-related)    Negative: Wound causes numbness (i.e., loss of sensation)    Negative: Wound causes weakness (i.e., decreased ability to move hand, finger, toe)    Negative: Sounds like a serious injury to the triager    Negative: [1] SEVERE pain AND [2] not improved 2 hours after pain medicine/ice packs    Negative: [1] " "Looks infected AND [2] large red area or streak (>2 inches or 5 cm)    Negative: [1] Fever AND [2] bright red area or streak    Negative: Suspicious history for the injury    Negative: [1] Raised bruise AND [2] size > 2 inches (5 cm) AND [3] expanding    Negative: [1] Looks infected (spreading redness, pus) AND [2] no fever    Negative: No prior tetanus shots (or is not fully vaccinated)    Negative: [1] HIV positive or severe immunodeficiency (severely weak immune system) AND [2] DIRTY cut or scrape    Negative: [1] Last tetanus shot > 5 years ago AND [2] DIRTY cut or scrape    Negative: [1] Last tetanus shot > 10 years ago AND [2] CLEAN cut or scrape (e.g., object AND skin were clean)    Negative: [1] After 14 days AND [2] wound isn't healed    Negative: [1] Has diabetes (diabetes mellitus) AND [2] wound on foot    Negative: Minor cut or scratch    Negative: Minor scrape (abrasion)    Negative: Minor bruise or swelling    Answer Assessment - Initial Assessment Questions  1. APPEARANCE of INJURY: \"What does the injury look like?\"      Skin break down that looks like a popped blister, raw and red looking.   2. SIZE: \"How large is the cut?\"      Two Quarter size on bottom of butt cheek and other one 2-3 inches below tailbone is bigger than half dollar  3. BLEEDING: \"Is it bleeding now?\" If so, ask: \"Is it difficult to stop?\"       no  4. LOCATION: \"Where is the injury located?\"       Bottom of butt cheek and below tail bone  5. ONSET: \"How long ago did the injury occur?\"       Treating it for about a week.  6. MECHANISM: \"Tell me how it happened.\"       Pressure wound   7. TETANUS: \"When was the last tetanus booster?\"      unknown  8. PREGNANCY: \"Is there any chance you are pregnant?\" \"When was your last menstrual period?\"      NA    Protocols used: SKIN INJURY-A-AH      "

## 2021-11-21 LAB
BACTERIA ABSC ANAEROBE+AEROBE CULT: ABNORMAL
GRAM STAIN RESULT: ABNORMAL
GRAM STAIN RESULT: ABNORMAL

## 2021-11-23 ENCOUNTER — HOSPITAL ENCOUNTER (OUTPATIENT)
Dept: WOUND CARE | Facility: HOSPITAL | Age: 86
Discharge: HOME | End: 2021-11-23
Attending: NURSE PRACTITIONER | Admitting: NURSE PRACTITIONER
Payer: MEDICARE

## 2021-11-23 VITALS
HEART RATE: 78 BPM | SYSTOLIC BLOOD PRESSURE: 116 MMHG | OXYGEN SATURATION: 98 % | RESPIRATION RATE: 16 BRPM | DIASTOLIC BLOOD PRESSURE: 86 MMHG | TEMPERATURE: 98.4 F

## 2021-11-23 DIAGNOSIS — B37.2 CANDIDIASIS OF SKIN: Primary | ICD-10-CM

## 2021-11-23 PROCEDURE — G0463 HOSPITAL OUTPT CLINIC VISIT: HCPCS

## 2021-11-23 NOTE — PROGRESS NOTES
"Chief Complaint   Patient presents with     WOUND CARE       Initial /86 (BP Location: Left arm)   Pulse 78   Temp 98.4  F (36.9  C) (Tympanic)   Resp 16   SpO2 98%  Estimated body mass index is 28.35 kg/m  as calculated from the following:    Height as of 7/9/21: 1.575 m (5' 2\").    Weight as of 7/21/21: 70.3 kg (155 lb).  Medication Reconciliation: complete  Elisabeth Rodriguez MA    "

## 2021-11-23 NOTE — PROGRESS NOTES
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     ACP (advance care planning)     Controlled type 2 diabetes mellitus without complication, without long-term current use of insulin (H)     Permanent atrial fibrillation (H)     Lung nodule     Sepsis (H)     Hypotension due to hypovolemia     Generalized muscle weakness     Acute exacerbation of CHF (congestive heart failure) (H)     Acute on chronic systolic congestive heart failure (H)     Acute blood loss anemia     Chronic anticoagulation     Essential (primary) hypertension     Falls     Hematoma of arm, left, initial encounter     Hemorrhagic shock (H)     Hyperglycemia     Traumatic hematoma of left hip     Chronic kidney disease, stage 3 (H)       Concerns/Comments:   Annie Arguello is here for re-evaluation and tx of pressure injuries and abscess to buttocks. She was seen in  on 11/19/21 by CARLOS Granados. Pt explains that the area was sore for approximately a week. There were multiple superficial and small open areas to the buttocks most likely PI's and also an abscess to the R inner buttock. Jaguar performed an I & D and packed with iodoform gauze packing strip. Pt prescribed doxycycline. Wound culture taken that visit grew out MRSA. Linda Krause NP contacted pharmacy to the doxy would cover MRSA.    Resides at [facility]. Pharmacy [facility]).    PMH: CKD, Afib on Eliquis, DM (A1C 7/9/21 6.8), CHF    11/23/21- (today) 1st appt in Wound Center:    Objective:       Location:   R upper inner buttock  Drainage:   Moderate, serosang    Odor:    "none  Measurement:   1.1x0.5x0.8cm  Edges:   open  Base:   Pink, slough to outer edges, tunnels at 12:00 1.7cm  Surrounding Skin:   Scattered superficial open areas and few raised, red, indurated areas without excessive warmth or drainage.    Wound debridement completed on: 11/19/21, debridement type: sharp (I & D) performed by CARLOS rGanados.    Yeast like rash to R groin fold.    Procedures:   Linda Krause NP present to evaluate buttocks and area of yeast to R groin fold.  Wound bed cleansed and evaluated.  Loosely packed with 1/4\" Iodoform packing strip.  Covered with dry gauze and secured with Medipore tape.    Cleansed groin fold and applied Miconazole powder.    Assessment/Response to tx:  Significant improvement to all areas since last seen on 11/19/21 by CARLOS Granados. See pictures.  Improvement to erythema and warmth.    Plan of care:   Daily dressing change to R upper inner buttock wound:  Cleanse with gentle soap (ex. Baby wash) and water, pat or air dry.  Loosely pack with 1/4\" iodoform gauze packing strip.  Cover with dry gauze and secure with Medipore tape.    Okay for pt to shower with dressing off.    Nystatin powder (lightly dusted) to areas of groin redness BID.    Treatment Goal:  Granulation tissue formation and epithelial migration without onset of infection.  Resolution of Candidiasis.    Supplies provided:  Iodoform, gauze, Medipore.  Script sent to pts pharmacy for nystatin powder    Education:  Wound care instructions/education provided. Patient verbalized understanding of instruction/education.    CC: DOC Cox                  "

## 2021-11-23 NOTE — DISCHARGE INSTRUCTIONS
"Daily dressing change to R upper inner buttock wound:  Cleanse with gentle soap (ex. Baby wash) and water, pat or air dry.  Loosely pack with 1/4\" iodoform gauze packing strip.  Cover with dry gauze and secure with Medipore tape.    Okay for pt to shower with dressing off.    Nystatin powder (lightly dusted) to areas of groin redness BID.  "

## 2021-11-24 RX ORDER — NYSTATIN 10B UNIT
POWDER (EA) MISCELLANEOUS
Qty: 1 EACH | Refills: 2 | Status: SHIPPED | OUTPATIENT
Start: 2021-11-24 | End: 2022-12-23

## 2021-11-29 DIAGNOSIS — E11.9 TYPE 2 DIABETES MELLITUS WITHOUT COMPLICATION, WITHOUT LONG-TERM CURRENT USE OF INSULIN (H): ICD-10-CM

## 2021-11-30 ENCOUNTER — HOSPITAL ENCOUNTER (OUTPATIENT)
Dept: WOUND CARE | Facility: HOSPITAL | Age: 86
Discharge: HOME | End: 2021-11-30
Attending: FAMILY MEDICINE | Admitting: PHYSICIAN ASSISTANT
Payer: MEDICARE

## 2021-11-30 VITALS
OXYGEN SATURATION: 98 % | TEMPERATURE: 97 F | HEART RATE: 93 BPM | DIASTOLIC BLOOD PRESSURE: 75 MMHG | SYSTOLIC BLOOD PRESSURE: 110 MMHG

## 2021-11-30 PROCEDURE — G0463 HOSPITAL OUTPT CLINIC VISIT: HCPCS

## 2021-11-30 RX ORDER — NYSTATIN 100000 [USP'U]/G
POWDER TOPICAL
COMMUNITY
Start: 2021-11-24 | End: 2022-12-23

## 2021-11-30 NOTE — PROGRESS NOTES
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     ACP (advance care planning)     Controlled type 2 diabetes mellitus without complication, without long-term current use of insulin (H)     Permanent atrial fibrillation (H)     Lung nodule     Sepsis (H)     Hypotension due to hypovolemia     Generalized muscle weakness     Acute exacerbation of CHF (congestive heart failure) (H)     Acute on chronic systolic congestive heart failure (H)     Acute blood loss anemia     Chronic anticoagulation     Essential (primary) hypertension     Falls     Hematoma of arm, left, initial encounter     Hemorrhagic shock (H)     Hyperglycemia     Traumatic hematoma of left hip     Chronic kidney disease, stage 3 (H)       Concerns/Comments:   Annie Arguello is here for re-evaluation and tx of pressure injuries and abscess to buttocks. She was seen in  on 11/19/21 by CARLOS Granados. Pt explains that the area was sore for approximately a week. There were multiple superficial and small open areas to the buttocks most likely PI's and also an abscess to the R inner buttock. Jaguar performed an I & D and packed with iodoform gauze packing strip. Pt prescribed doxycycline. Wound culture taken that visit grew out MRSA. Linda Krause NP contacted pharmacy to ensure that the doxycycline would cover MRSA.    Resides at [facility]. Pharmacy [facility]).    PMH: CKD, Afib on Eliquis, DM (A1C 7/9/21 6.8), CHF    11/23/21- 1st appt in Wound Center, Tx: Iodoform gauze packing    Objective:   Location:   R upper inner buttock  Drainage:   Moderate,  serosang    Odor:   none  Measurement:   0.6x0.4x0.7cm  Edges:   open  Base:   Pink, tunneling resolved  Surrounding Skin:   few raised, indurated areas without erythema, excessive warmth or drainage.    Wound debridement completed on: 11/19/21, debridement type: sharp (I & D) performed by CARLOS Granados.    Yeast like rash to R groin fold resolved    Procedures:   Wound bed cleansed and evaluated.  Loosely packed with Shannon  Covered with Mepilex bordered dressing.    Assessment/Response to tx:  Significant improvement to all areas since last seen.  Improvement to erythema and warmth.    Plan of care:   Every other day dressing change to R upper inner buttock wound:  Cleanse with gentle soap (ex. Baby wash) and water, pat or air dry.  Loosely pack with Shannon (the Shannon will usually dissolve).  Cover with Mepilex border dressing    Okay for pt to shower with dressing off.    Nystatin powder (lightly dusted) to areas of groin redness BID.    Return next Monday.    Treatment Goal:  Granulation tissue formation and epithelial migration without onset of infection.  Resolution of Candidiasis-met    Supplies provided:  Mepilex bordered dressings, Shannon    Education:  Wound care instructions/education provided. Patient verbalized understanding of instruction/education.    CC: DOC Cox

## 2021-12-01 RX ORDER — ISOPROPYL ALCOHOL 70 ML/100ML
SWAB TOPICAL
Qty: 100 EACH | Refills: 0 | Status: SHIPPED | OUTPATIENT
Start: 2021-12-01 | End: 2022-04-13

## 2021-12-01 NOTE — TELEPHONE ENCOUNTER
Alcohol swabs      Last Written Prescription Date:  7/29/21  Last Fill Quantity: 100,   # refills: 0  Last Office Visit: 7/21/21  Future Office visit:    Next 5 appointments (look out 90 days)    Dec 06, 2021 10:30 AM  Return Visit with [facility]  [facility] ([facility] [place] ) [address] [address]  [phone]           Routing refill request to provider for review/approval because:

## 2021-12-10 ENCOUNTER — HOSPITAL ENCOUNTER (OUTPATIENT)
Dept: WOUND CARE | Facility: HOSPITAL | Age: 86
Discharge: HOME | End: 2021-12-10
Attending: NURSE PRACTITIONER | Admitting: NURSE PRACTITIONER
Payer: MEDICARE

## 2021-12-10 VITALS
OXYGEN SATURATION: 98 % | DIASTOLIC BLOOD PRESSURE: 70 MMHG | HEART RATE: 90 BPM | TEMPERATURE: 98.7 F | SYSTOLIC BLOOD PRESSURE: 100 MMHG

## 2021-12-10 DIAGNOSIS — E11.9 CONTROLLED TYPE 2 DIABETES MELLITUS WITHOUT COMPLICATION, WITHOUT LONG-TERM CURRENT USE OF INSULIN (H): ICD-10-CM

## 2021-12-10 DIAGNOSIS — I50.23 ACUTE ON CHRONIC SYSTOLIC CONGESTIVE HEART FAILURE (H): ICD-10-CM

## 2021-12-10 DIAGNOSIS — S31.809A OPEN WOUND OF BUTTOCK: Primary | ICD-10-CM

## 2021-12-10 DIAGNOSIS — I50.9 CONGESTIVE HEART FAILURE, UNSPECIFIED HF CHRONICITY, UNSPECIFIED HEART FAILURE TYPE (H): ICD-10-CM

## 2021-12-10 DIAGNOSIS — E78.2 MIXED HYPERLIPIDEMIA: ICD-10-CM

## 2021-12-10 DIAGNOSIS — M19.90 OSTEOARTHRITIS, UNSPECIFIED OSTEOARTHRITIS TYPE, UNSPECIFIED SITE: ICD-10-CM

## 2021-12-10 DIAGNOSIS — K21.00 GASTROESOPHAGEAL REFLUX DISEASE WITH ESOPHAGITIS WITHOUT HEMORRHAGE: ICD-10-CM

## 2021-12-10 DIAGNOSIS — I48.91 ATRIAL FIBRILLATION WITH RVR (H): ICD-10-CM

## 2021-12-10 NOTE — PROGRESS NOTES
Annei Arguello, 11/14/1933  Chief Complaint   Patient presents with     WOUND CARE       Patient Active Problem List   Diagnosis     Advanced care planning/counseling discussion     Sciatica     Diverticulosis of large intestine     Osteoporosis     Obesity     Congenital cystic kidney disease     Myalgia and myositis     Atrial fibrillation with RVR (H)     Calculus of kidney     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     ACP (advance care planning)     Controlled type 2 diabetes mellitus without complication, without long-term current use of insulin (H)     Permanent atrial fibrillation (H)     Lung nodule     Sepsis (H)     Hypotension due to hypovolemia     Generalized muscle weakness     Acute exacerbation of CHF (congestive heart failure) (H)     Acute on chronic systolic congestive heart failure (H)     Acute blood loss anemia     Chronic anticoagulation     Essential (primary) hypertension     Falls     Hematoma of arm, left, initial encounter     Hemorrhagic shock (H)     Hyperglycemia     Traumatic hematoma of left hip     Chronic kidney disease, stage 3 (H)       Concerns/Comments:   Annie Arguello is here for re-evaluation and tx of pressure injuries and abscess to buttocks. She was seen in  on 11/19/21 by CARLOS Granados. Pt explains that the area was sore for approximately a week. There were multiple superficial and small open areas to the buttocks most likely PI's and also an abscess to the R inner buttock. [employer] performed an I & D and packed with iodoform gauze packing strip. Pt prescribed doxycycline. Wound culture taken that visit grew out MRSA. Linda Krause NP contacted pharmacy to ensure that the doxycycline would cover MRSA.    Resides at [facility]. Pharmacy [facility]).    PMH: CKD, Afib on Eliquis, DM (A1C 7/9/21 6.8), CHF    11/23/21- 1st appt in Wound Center, Tx: Iodoform gauze packing    Objective:   Location:   R upper inner buttock  Drainage:   Moderate,  serosang    Odor:   none  Measurement:   0.5x0.2x0.3cm  Edges:   open  Base:   Pink, tunneling resolved  Surrounding Skin:   few raised, indurated areas to buttocks without erythema, excessive warmth or drainage.    Wound debridement completed on: 11/19/21, debridement type: sharp (I & D) performed by CARLOS Granados.    Yeast like rash to R groin fold resolved, small area of intertrigo    Procedures:   Wound bed cleansed and evaluated.  Loosely packed with Shannon  Covered with Mepilex bordered dressing.    Assessment/Response to tx:  Significant improvement to all areas since last seen.    Plan of care:   Every other day dressing change to R upper inner buttock wound:  Cleanse with gentle soap (ex. Baby wash) and water, pat or air dry.  Loosely pack with Shannon (the Shannon will usually dissolve).  Cover with Mepilex border dressing    Okay for pt to shower with dressing off.    Nystatin powder (lightly dusted) to areas of groin redness BID.    Return in approx 10 days.    Treatment Goal:  Granulation tissue formation and epithelial migration without onset of infection.  Resolution of Candidiasis-met    Supplies provided:  Shannon    Education:  Wound care instructions/education provided. Patient verbalized understanding of instruction/education.    CC: DOC Cox

## 2021-12-10 NOTE — DISCHARGE INSTRUCTIONS
Every other day dressing change to R upper inner buttock wound:  Cleanse with gentle soap (ex. Baby wash) and water, pat or air dry.  Loosely pack with Shannon (the Shannon will usually dissolve).  Cover with Mepilex border dressing    Okay for pt to shower with dressing off.    Nystatin powder (lightly dusted) to areas of groin redness BID.

## 2021-12-10 NOTE — PROGRESS NOTES
"Chief Complaint   Patient presents with     WOUND CARE       Initial /70 (BP Location: Left arm)   Pulse 90   Temp 98.7  F (37.1  C) (Tympanic)   SpO2 98%  Estimated body mass index is 28.35 kg/m  as calculated from the following:    Height as of 7/9/21: 1.575 m (5' 2\").    Weight as of 7/21/21: 70.3 kg (155 lb).  Medication Reconciliation: complete  Rosa Cai  "

## 2021-12-11 NOTE — TELEPHONE ENCOUNTER
FOSAMAX      Last Written Prescription Date:  11-16-21  Last Fill Quantity: 4,   # refills: 0  Last Office Visit: 7-21-21  Future Office visit:      ASA      Last Written Prescription Date:  11-16-21  Last Fill Quantity: 29,   # refills: 0  Last Office Visit: 7-  Future Office visit:      LIPITOR      Last Written Prescription Date:  11-16-21  Last Fill Quantity: 29,   # refills: 0  Last Office Visit: 7-21-21  Future Office visit:      HYGROTON      Last Written Prescription Date:  11-16-21  Last Fill Quantity: 29,   # refills: 0  Last Office Visit: 7-21-21  Future Office visit:      ZESTRIL      Last Written Prescription Date:  11-16-21  Last Fill Quantity: 29,   # refills: 0  Last Office Visit: 7-21-21  Future Office visit:      PRILOSEC      Last Written Prescription Date:  11-16-21  Last Fill Quantity: 29,   # refills: 0  Last Office Visit: 7-21-21  Future Office visit:    Next 5 appointments (look out 90 days)    Dec 21, 2021  1:30 PM  Return Visit with [facility]  [facility] ([facility] [place] ) [address] [id number] [phone]           Routing refill request to provider for review/approval because:

## 2021-12-13 DIAGNOSIS — I48.21 PERMANENT ATRIAL FIBRILLATION (H): ICD-10-CM

## 2021-12-13 RX ORDER — CHLORTHALIDONE 25 MG/1
TABLET ORAL
Qty: 29 TABLET | Refills: 0 | Status: SHIPPED | OUTPATIENT
Start: 2021-12-13 | End: 2022-01-12

## 2021-12-13 RX ORDER — ATORVASTATIN CALCIUM 40 MG/1
TABLET, FILM COATED ORAL
Qty: 29 TABLET | Refills: 0 | Status: SHIPPED | OUTPATIENT
Start: 2021-12-13 | End: 2022-01-12

## 2021-12-13 RX ORDER — ASPIRIN 81 MG
TABLET,CHEWABLE ORAL
Qty: 29 TABLET | Refills: 0 | Status: SHIPPED | OUTPATIENT
Start: 2021-12-13 | End: 2022-01-12

## 2021-12-13 RX ORDER — ALENDRONATE SODIUM 70 MG/1
TABLET ORAL
Qty: 4 TABLET | Refills: 0 | Status: SHIPPED | OUTPATIENT
Start: 2021-12-13 | End: 2022-01-13

## 2021-12-13 RX ORDER — LISINOPRIL 5 MG/1
TABLET ORAL
Qty: 29 TABLET | Refills: 0 | Status: SHIPPED | OUTPATIENT
Start: 2021-12-13 | End: 2022-01-12

## 2021-12-13 RX ORDER — DIGOXIN 125 MCG
TABLET ORAL
Qty: 15 TABLET | Refills: 0 | Status: SHIPPED | OUTPATIENT
Start: 2021-12-13 | End: 2022-01-12

## 2021-12-14 RX ORDER — APIXABAN 5 MG/1
TABLET, FILM COATED ORAL
Qty: 58 TABLET | Refills: 0 | Status: SHIPPED | OUTPATIENT
Start: 2021-12-14 | End: 2022-01-12

## 2021-12-14 NOTE — TELEPHONE ENCOUNTER
Eliquis      Last Written Prescription Date:  11/16/21  Last Fill Quantity: 58,   # refills: 0  Last Office Visit: 7/21/21  Future Office visit:    Next 5 appointments (look out 90 days)    Dec 21, 2021  1:30 PM  Return Visit with [facility]  [facility] ([facility] [place] ) [address] [address]  [phone]           Routing refill request to provider for review/approval because:

## 2021-12-15 ENCOUNTER — TELEPHONE (OUTPATIENT)
Dept: EDUCATION SERVICES | Facility: HOSPITAL | Age: 86
End: 2021-12-15
Payer: MEDICARE

## 2021-12-15 DIAGNOSIS — E11.65 TYPE 2 DIABETES MELLITUS WITH HYPERGLYCEMIA, WITHOUT LONG-TERM CURRENT USE OF INSULIN (H): Primary | ICD-10-CM

## 2021-12-15 RX ORDER — INSULIN GLARGINE 100 [IU]/ML
INJECTION, SOLUTION SUBCUTANEOUS
Qty: 15 ML | Refills: 3 | Status: SHIPPED | OUTPATIENT
Start: 2021-12-15 | End: 2021-12-21

## 2021-12-15 NOTE — PROGRESS NOTES
Orders sent to be drawn when she's at wound care on 12/21/21    Linda GARNICA FNP-BC  Diabetes and Wound Care

## 2021-12-15 NOTE — TELEPHONE ENCOUNTER
Received request for referral from Dr. Oleksandr Cox due to patient's elevated BG readings. Patient is not taking any diabetes medications.    Readings from the last 2 weeks (21 to 21):  Fastin-295, 301, 302, 331 and post-supper: 440-566    Recommend current labs be drawn for A1C and basic metabolic panel. Patient has an appointment in Wound Care on 21. Request for labs to be done in Wound Care at that appointment.    Start Lantus 10 units daily. May need to consider a mealtime insulin.    Continue faxed labs from [facility].    Will send fax to [facility] regarding starting insulin.    Padmini Evans RN, [facility]

## 2021-12-15 NOTE — TELEPHONE ENCOUNTER
Order sent for labs.   Rx signed.     Message sent to Melissa GOEL RN CWOCN and Elisabeth Rodriguez MA to have hospital lab draw them.     Linda GARNICA P-BC  Diabetes and Wound Care

## 2021-12-21 ENCOUNTER — HOSPITAL ENCOUNTER (OUTPATIENT)
Dept: WOUND CARE | Facility: HOSPITAL | Age: 86
Discharge: HOME | End: 2021-12-21
Attending: NURSE PRACTITIONER | Admitting: NURSE PRACTITIONER
Payer: MEDICARE

## 2021-12-21 ENCOUNTER — TELEPHONE (OUTPATIENT)
Dept: EDUCATION SERVICES | Facility: HOSPITAL | Age: 86
End: 2021-12-21
Payer: MEDICARE

## 2021-12-21 VITALS
HEART RATE: 58 BPM | OXYGEN SATURATION: 97 % | TEMPERATURE: 97.6 F | DIASTOLIC BLOOD PRESSURE: 66 MMHG | SYSTOLIC BLOOD PRESSURE: 100 MMHG

## 2021-12-21 DIAGNOSIS — E11.65 TYPE 2 DIABETES MELLITUS WITH HYPERGLYCEMIA, WITHOUT LONG-TERM CURRENT USE OF INSULIN (H): ICD-10-CM

## 2021-12-21 LAB
ANION GAP SERPL CALCULATED.3IONS-SCNC: 8 MMOL/L (ref 3–14)
BUN SERPL-MCNC: 25 MG/DL (ref 7–30)
CALCIUM SERPL-MCNC: 9 MG/DL (ref 8.5–10.1)
CHLORIDE BLD-SCNC: 98 MMOL/L (ref 94–109)
CO2 SERPL-SCNC: 26 MMOL/L (ref 20–32)
CREAT SERPL-MCNC: 0.95 MG/DL (ref 0.52–1.04)
EST. AVERAGE GLUCOSE BLD GHB EST-MCNC: 266 MG/DL
GFR SERPL CREATININE-BSD FRML MDRD: 57 ML/MIN/1.73M2
GLUCOSE BLD-MCNC: 468 MG/DL (ref 70–99)
HBA1C MFR BLD: 10.9 % (ref 0–5.6)
POTASSIUM BLD-SCNC: 4.2 MMOL/L (ref 3.4–5.3)
SODIUM SERPL-SCNC: 132 MMOL/L (ref 133–144)

## 2021-12-21 PROCEDURE — G0463 HOSPITAL OUTPT CLINIC VISIT: HCPCS

## 2021-12-21 PROCEDURE — 83036 HEMOGLOBIN GLYCOSYLATED A1C: CPT

## 2021-12-21 PROCEDURE — 80048 BASIC METABOLIC PNL TOTAL CA: CPT

## 2021-12-21 PROCEDURE — 36415 COLL VENOUS BLD VENIPUNCTURE: CPT

## 2021-12-21 RX ORDER — INSULIN ASPART 100 [IU]/ML
INJECTION, SOLUTION INTRAVENOUS; SUBCUTANEOUS
Qty: 15 ML | Refills: 3 | Status: SHIPPED | OUTPATIENT
Start: 2021-12-21 | End: 2022-01-06

## 2021-12-21 RX ORDER — INSULIN GLARGINE 100 [IU]/ML
INJECTION, SOLUTION SUBCUTANEOUS
Qty: 15 ML | Refills: 3 | Status: SHIPPED | OUTPATIENT
Start: 2021-12-21 | End: 2022-01-28

## 2021-12-21 NOTE — PROGRESS NOTES
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     ACP (advance care planning)     Controlled type 2 diabetes mellitus without complication, without long-term current use of insulin (H)     Permanent atrial fibrillation (H)     Lung nodule     Sepsis (H)     Hypotension due to hypovolemia     Generalized muscle weakness     Acute exacerbation of CHF (congestive heart failure) (H)     Acute on chronic systolic congestive heart failure (H)     Acute blood loss anemia     Chronic anticoagulation     Essential (primary) hypertension     Falls     Hematoma of arm, left, initial encounter     Hemorrhagic shock (H)     Hyperglycemia     Traumatic hematoma of left hip     Chronic kidney disease, stage 3 (H)       Concerns/Comments:   Annie Arguello is here for re-evaluation and tx of pressure injuries and abscess to buttocks. She was seen in  on 11/19/21 by CARLOS Granados. Pt explains that the area was sore for approximately a week. There were multiple superficial and small open areas to the buttocks most likely PI's and also an abscess to the R inner buttock. [employer] performed an I & D and packed with iodoform gauze packing strip. Pt prescribed doxycycline. Wound culture taken that visit grew out MRSA. Linda Krause NP contacted pharmacy to ensure that the doxycycline would cover MRSA.    Resides at [facility]. Pharmacy [facility]).    PMH: CKD, Afib on Eliquis, DM (A1C 7/9/21 6.8), CHF    11/23/21- 1st appt in Wound Center, Tx: Iodoform gauze packing    11/30/21- Shannon and Mepilex bordered dressing    12/10/21- Same as  above    12/21/21- Due to elevated blood sugars labs were ordered to be drawn this visit for an A1C and BMP.    Objective:   Location:   R upper inner buttock  Drainage:   none   Odor:   none  Measurement:   healed  Edges:   open  Base:   epithelialized  Surrounding Skin:   few somewhat indurated areas and scar tissue to buttocks without erythema, excessive warmth or drainage.    Wound debridement completed on: 11/19/21, debridement type: sharp (I & D) performed by CARLOS Granados.    Procedures:   Wound bed area cleansed and evaluated.  Aloe Vesta applied.    Assessment/Response to tx:  All areas are healed.    Plan of care:   Discontinue dressing changes to buttocks.    Apply Aloe Vesta barrier cream BID to buttocks.    Return if needed.    Treatment Goal:  Met-Wound Epithelialized  Resolution of Candidiasis-met    Supplies provided:  Aloe Vesta    Education:  Wound care instructions/education provided. Patient verbalized understanding of instruction/education.    CC: DOC Cox

## 2021-12-21 NOTE — TELEPHONE ENCOUNTER
Received faxed BG readings from [facility]. Patient had been started on Lantus 10 units daily on 21.    Readings range as follows: fastin-308 and post-supper: 388-543.    Plan (faxed to Meghann Jones at [facility]):  Increase Lantus to 15 units daily  Add NovoLOG 4 units prior to supper  Test BG 4 times a day: fasting:, pre-lunch, pre-supper and 8 pm.  Fax BG readings on  and weekly    Padmini Evans RN, [facility]

## 2021-12-21 NOTE — PROGRESS NOTES
"Chief Complaint   Patient presents with     WOUND CARE       Initial /66 (BP Location: Left arm)   Pulse 58   Temp 97.6  F (36.4  C) (Tympanic)   SpO2 97%  Estimated body mass index is 28.35 kg/m  as calculated from the following:    Height as of 7/9/21: 1.575 m (5' 2\").    Weight as of 7/21/21: 70.3 kg (155 lb).  Medication Reconciliation: complete  Rosa Cai  "

## 2021-12-22 DIAGNOSIS — E11.65 TYPE 2 DIABETES MELLITUS WITH HYPERGLYCEMIA, WITHOUT LONG-TERM CURRENT USE OF INSULIN (H): Primary | ICD-10-CM

## 2021-12-22 RX ORDER — INSULIN LISPRO 100 [IU]/ML
INJECTION, SOLUTION INTRAVENOUS; SUBCUTANEOUS
Qty: 15 ML | Refills: 1 | Status: SHIPPED | OUTPATIENT
Start: 2021-12-22 | End: 2022-01-06

## 2021-12-22 NOTE — TELEPHONE ENCOUNTER
Fax received from [facility] pt's Novolog is not covered by her insurance, Humalog is covered if you would like to change. Pt is not allergic.

## 2021-12-30 ENCOUNTER — TELEPHONE (OUTPATIENT)
Dept: EDUCATION SERVICES | Facility: HOSPITAL | Age: 86
End: 2021-12-30
Payer: MEDICARE

## 2021-12-30 NOTE — TELEPHONE ENCOUNTER
Received faxed BG readings from [facility]. Lantus 15 units daily, HumaLOG before supper started 21.     BG readings FB-313, pre-lunch: 245-437, pre-supper: 379, 450 and 8 pm: 385, 489.     Plan (faxed to Yazmin Jonse at [facility])  Start HumaLOG 4 units prior to all meals plus correction: 200-250= 1 units, 251-300 = 2 units, greater than 300 = 3 units. No correction at 8 pm    Test BG 4 times daily: fasting, pre-lunch, pre-supper and 8 pm    Fax readings 1/3/21 and weekly.

## 2022-01-04 DIAGNOSIS — E11.9 CONTROLLED TYPE 2 DIABETES MELLITUS WITHOUT COMPLICATION, WITHOUT LONG-TERM CURRENT USE OF INSULIN (H): ICD-10-CM

## 2022-01-04 RX ORDER — LANCETS 21 GAUGE
EACH MISCELLANEOUS
Qty: 100 EACH | Refills: 3 | Status: SHIPPED | OUTPATIENT
Start: 2022-01-04 | End: 2022-01-19

## 2022-01-06 ENCOUNTER — TELEPHONE (OUTPATIENT)
Dept: EDUCATION SERVICES | Facility: HOSPITAL | Age: 87
End: 2022-01-06
Payer: MEDICARE

## 2022-01-06 DIAGNOSIS — E11.65 TYPE 2 DIABETES MELLITUS WITH HYPERGLYCEMIA, WITHOUT LONG-TERM CURRENT USE OF INSULIN (H): ICD-10-CM

## 2022-01-06 RX ORDER — INSULIN LISPRO 100 [IU]/ML
INJECTION, SOLUTION INTRAVENOUS; SUBCUTANEOUS
Qty: 15 ML | Refills: 1 | Status: SHIPPED | OUTPATIENT
Start: 2022-01-06 | End: 2022-01-28

## 2022-01-06 NOTE — TELEPHONE ENCOUNTER
Received faxed BG readings from [facility]. Lantus 15 units daily, HumaLOG 4 units prior to all meals plus correction: 200-250= 1 units, 251-300 = 2 units, greater than 300 = 3 units. No correction at 8 pm     BG readings FB-190, pre-lunch: 297-445, pre-supper: 236-292 and 8 pm: 287-530.     Plan (faxed to Yazmin Jones at [facility])  Increase HumaLOG to 8 units prior to all meals plus correction: 200-250= 1 units, 251-300 = 2 units, greater than 300 = 3 units. No correction at 8 pm     Test BG 4 times daily: fasting, pre-lunch, pre-supper and 8 pm     Fax readings 21 and weekly.

## 2022-01-10 DIAGNOSIS — E11.9 CONTROLLED TYPE 2 DIABETES MELLITUS WITHOUT COMPLICATION, WITHOUT LONG-TERM CURRENT USE OF INSULIN (H): ICD-10-CM

## 2022-01-11 DIAGNOSIS — I48.91 ATRIAL FIBRILLATION WITH RVR (H): ICD-10-CM

## 2022-01-11 DIAGNOSIS — I50.9 CONGESTIVE HEART FAILURE, UNSPECIFIED HF CHRONICITY, UNSPECIFIED HEART FAILURE TYPE (H): ICD-10-CM

## 2022-01-11 DIAGNOSIS — E11.9 TYPE 2 DIABETES MELLITUS WITHOUT COMPLICATION, WITHOUT LONG-TERM CURRENT USE OF INSULIN (H): ICD-10-CM

## 2022-01-11 DIAGNOSIS — I50.23 ACUTE ON CHRONIC SYSTOLIC CONGESTIVE HEART FAILURE (H): ICD-10-CM

## 2022-01-11 DIAGNOSIS — E11.9 CONTROLLED TYPE 2 DIABETES MELLITUS WITHOUT COMPLICATION, WITHOUT LONG-TERM CURRENT USE OF INSULIN (H): ICD-10-CM

## 2022-01-11 DIAGNOSIS — K21.00 GASTROESOPHAGEAL REFLUX DISEASE WITH ESOPHAGITIS WITHOUT HEMORRHAGE: ICD-10-CM

## 2022-01-11 DIAGNOSIS — I48.21 PERMANENT ATRIAL FIBRILLATION (H): ICD-10-CM

## 2022-01-11 DIAGNOSIS — E78.2 MIXED HYPERLIPIDEMIA: ICD-10-CM

## 2022-01-11 DIAGNOSIS — M19.90 OSTEOARTHRITIS, UNSPECIFIED OSTEOARTHRITIS TYPE, UNSPECIFIED SITE: ICD-10-CM

## 2022-01-12 NOTE — TELEPHONE ENCOUNTER
ASA      Last Written Prescription Date:  12/13/21  Last Fill Quantity: 29,   # refills: 0  Last Office Visit: 7/21/21  Future Office visit:       Routing refill request to provider for review/approval because:      Lipitor      Last Written Prescription Date:  12/13/21  Last Fill Quantity: 29,   # refills: 0  Last Office Visit: 7/21/21  Future Office visit:       Routing refill request to provider for review/approval because:      Hygroton      Last Written Prescription Date:  12/13/21  Last Fill Quantity: 29,   # refills: 0  Last Office Visit: 7/21/21  Future Office visit:       Routing refill request to provider for review/approval because:      Lanoxin      Last Written Prescription Date:  12/13/21  Last Fill Quantity: 15,   # refills: 0  Last Office Visit: 7/21/21  Future Office visit:       Routing refill request to provider for review/approval because:      Eliquis      Last Written Prescription Date:  12/14/21  Last Fill Quantity: 58,   # refills: 0  Last Office Visit: 7/21/21  Future Office visit:       Routing refill request to provider for review/approval because:      Test  Strips      Last Written Prescription Date:  11/5/21  Last Fill Quantity: 100,   # refills: 0  Last Office Visit: 7/21/21  Future Office visit:       Routing refill request to provider for review/approval because:      Lisinopril      Last Written Prescription Date:  12/13/21  Last Fill Quantity: 29,   # refills: 0  Last Office Visit: 7/21/21  Future Office visit:       Routing refill request to provider for review/approval because:      Omeprazole      Last Written Prescription Date:  12/13/21  Last Fill Quantity: 29,   # refills: 0  Last Office Visit: 7/21/21  Future Office visit:       Routing refill request to provider for review/approval because:

## 2022-01-19 RX ORDER — LANCETS 21 GAUGE
EACH MISCELLANEOUS
Qty: 100 EACH | Refills: 0 | Status: SHIPPED | OUTPATIENT
Start: 2022-01-19 | End: 2022-03-08

## 2022-01-20 ENCOUNTER — TELEPHONE (OUTPATIENT)
Dept: EDUCATION SERVICES | Facility: HOSPITAL | Age: 87
End: 2022-01-20
Payer: MEDICARE

## 2022-01-20 DIAGNOSIS — E11.65 TYPE 2 DIABETES MELLITUS WITH HYPERGLYCEMIA, WITH LONG-TERM CURRENT USE OF INSULIN (H): Primary | ICD-10-CM

## 2022-01-20 DIAGNOSIS — Z79.4 TYPE 2 DIABETES MELLITUS WITH HYPERGLYCEMIA, WITH LONG-TERM CURRENT USE OF INSULIN (H): Primary | ICD-10-CM

## 2022-01-20 RX ORDER — CHLORTHALIDONE 25 MG/1
25 TABLET ORAL DAILY
Qty: 29 TABLET | Refills: 0 | Status: SHIPPED | OUTPATIENT
Start: 2022-01-20 | End: 2022-03-09

## 2022-01-20 RX ORDER — ALENDRONATE SODIUM 70 MG/1
TABLET ORAL
Qty: 4 TABLET | Refills: 0 | Status: SHIPPED | OUTPATIENT
Start: 2022-01-20 | End: 2022-03-09

## 2022-01-20 RX ORDER — ASPIRIN 81 MG/1
TABLET, CHEWABLE ORAL
Qty: 29 TABLET | Refills: 0 | Status: SHIPPED | OUTPATIENT
Start: 2022-01-20 | End: 2022-03-09

## 2022-01-20 RX ORDER — LISINOPRIL 5 MG/1
5 TABLET ORAL DAILY
Qty: 29 TABLET | Refills: 0 | Status: SHIPPED | OUTPATIENT
Start: 2022-01-20 | End: 2022-03-09

## 2022-01-20 RX ORDER — DIGOXIN 125 MCG
TABLET ORAL
Qty: 15 TABLET | Refills: 0 | Status: SHIPPED | OUTPATIENT
Start: 2022-01-20 | End: 2022-03-09

## 2022-01-20 RX ORDER — BLOOD-GLUCOSE METER
KIT MISCELLANEOUS
Qty: 100 STRIP | Refills: 0 | Status: SHIPPED | OUTPATIENT
Start: 2022-01-20 | End: 2022-03-02

## 2022-01-20 RX ORDER — ATORVASTATIN CALCIUM 40 MG/1
40 TABLET, FILM COATED ORAL DAILY
Qty: 29 TABLET | Refills: 0 | Status: SHIPPED | OUTPATIENT
Start: 2022-01-20 | End: 2022-03-09

## 2022-01-20 NOTE — TELEPHONE ENCOUNTER
I have been working with Debbi in reviewing Annie's BG readings weekly and adjusting her insulin dosages. We had just started insulin in December.    It looks like Annie is establishing care with you. I am including a Diabetes Ed Referral in this encounter for your signature to continue working with Annie.    Thanks!  Padmini

## 2022-01-25 ENCOUNTER — OFFICE VISIT (OUTPATIENT)
Dept: PODIATRY | Facility: OTHER | Age: 87
End: 2022-01-25
Attending: PODIATRIST
Payer: MEDICARE

## 2022-01-25 VITALS
OXYGEN SATURATION: 98 % | TEMPERATURE: 96.8 F | SYSTOLIC BLOOD PRESSURE: 116 MMHG | HEART RATE: 101 BPM | DIASTOLIC BLOOD PRESSURE: 72 MMHG

## 2022-01-25 DIAGNOSIS — L85.3 XEROSIS OF SKIN: ICD-10-CM

## 2022-01-25 DIAGNOSIS — Z79.4 DIABETES MELLITUS TYPE 2, INSULIN DEPENDENT (H): ICD-10-CM

## 2022-01-25 DIAGNOSIS — L97.522 DIABETIC ULCER OF TOE OF LEFT FOOT ASSOCIATED WITH TYPE 2 DIABETES MELLITUS, WITH FAT LAYER EXPOSED (H): Primary | ICD-10-CM

## 2022-01-25 DIAGNOSIS — L60.3 ONYCHODYSTROPHY: ICD-10-CM

## 2022-01-25 DIAGNOSIS — E11.42 DIABETIC POLYNEUROPATHY ASSOCIATED WITH TYPE 2 DIABETES MELLITUS (H): ICD-10-CM

## 2022-01-25 DIAGNOSIS — E11.621 DIABETIC ULCER OF TOE OF LEFT FOOT ASSOCIATED WITH TYPE 2 DIABETES MELLITUS, WITH FAT LAYER EXPOSED (H): Primary | ICD-10-CM

## 2022-01-25 DIAGNOSIS — N18.31 STAGE 3A CHRONIC KIDNEY DISEASE (H): ICD-10-CM

## 2022-01-25 DIAGNOSIS — E11.9 DIABETES MELLITUS TYPE 2, INSULIN DEPENDENT (H): ICD-10-CM

## 2022-01-25 DIAGNOSIS — L84 CALLUS OF FOOT: ICD-10-CM

## 2022-01-25 PROCEDURE — 99213 OFFICE O/P EST LOW 20 MIN: CPT | Mod: 25 | Performed by: PODIATRIST

## 2022-01-25 PROCEDURE — 11042 DBRDMT SUBQ TIS 1ST 20SQCM/<: CPT | Performed by: PODIATRIST

## 2022-01-25 PROCEDURE — G0463 HOSPITAL OUTPT CLINIC VISIT: HCPCS | Mod: 25

## 2022-01-25 PROCEDURE — 11721 DEBRIDE NAIL 6 OR MORE: CPT | Performed by: PODIATRIST

## 2022-01-25 RX ORDER — PEN NEEDLE, DIABETIC 32GX 5/32"
NEEDLE, DISPOSABLE MISCELLANEOUS
COMMUNITY
Start: 2021-12-15 | End: 2022-02-09

## 2022-01-25 ASSESSMENT — PAIN SCALES - GENERAL: PAINLEVEL: WORST PAIN (10)

## 2022-01-25 NOTE — PROGRESS NOTES
Chief complaint: Patient presents with:  Toenail: DFE  Musculoskeletal Problem: Corn      History of Present Illness: This 88 year old female is seen at the request of No ref. provider found for evaluation and suggestions of management of a diabetic foot exam and high risk nail debridement. She has had a a wound on the top of her foot for a couple weeks. She says she has not had pain from the blister and she has not had a dressing on it. She says the blister is from her shoes rubbing on her feet. She has difficulty trimming her toenails.    She has also had pain the past week or two along her LEFT fifth toe. She thinks she had something puncture her foot when she was wearing a shoe, and now she says there is something on her toe that is causing her pain.    She occasionally gets a burning, tingling, and numbness in her feet.    She says she has noticed increased dry and flaking skin on her feet. She has not applied lotion to her feet and her care facility does not apply lotion on her feet. She is wondering how to treat the dry skin.     No further pedal complaints today.     Last HbA1C was 10.9% on 12/21/2021.        /72 (BP Location: Left arm, Patient Position: Sitting, Cuff Size: Adult Regular)   Pulse 101   Temp 96.8  F (36  C) (Tympanic)   SpO2 98%     Patient Active Problem List   Diagnosis     Advanced care planning/counseling discussion     Sciatica     Diverticulosis of large intestine     Osteoporosis     Obesity     Congenital cystic kidney disease     Myalgia and myositis     Atrial fibrillation with RVR (H)     Calculus of kidney     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     ACP (advance care planning)     Controlled type 2 diabetes mellitus without complication, without long-term current use of insulin (H)     Permanent atrial fibrillation (H)     Lung nodule     Sepsis (H)     Hypotension due to hypovolemia     Generalized muscle weakness     Acute exacerbation of CHF (congestive  heart failure) (H)     Acute on chronic systolic congestive heart failure (H)     Acute blood loss anemia     Chronic anticoagulation     Essential (primary) hypertension     Falls     Hematoma of arm, left, initial encounter     Hemorrhagic shock (H)     Hyperglycemia     Traumatic hematoma of left hip     Chronic kidney disease, stage 3 (H)       Past Surgical History:   Procedure Laterality Date     Breast biopsy      LT, benign      SECTION       CHOLECYSTECTOMY      lap     COLONOSCOPY       LUCILLE / BSO         Current Outpatient Medications   Medication     acetaminophen (TYLENOL) 325 MG tablet     Alcohol Swabs (EASY TOUCH ALCOHOL PREP MEDIUM) 70 % PADS     alendronate (FOSAMAX) 70 MG tablet     apixaban ANTICOAGULANT (ELIQUIS ANTICOAGULANT) 5 MG tablet     artificial tears OINT ophthalmic ointment     aspirin (ASPIRIN LOW DOSE) 81 MG chewable tablet     Assure [place] Lancets MISC     atorvastatin (LIPITOR) 40 MG tablet     blood glucose (GLUCOCARD VITAL TEST) test strip     blood glucose (NO BRAND SPECIFIED) test strip     Blood Glucose Monitoring Suppl (GLUCOCARD VITAL MONITOR) w/Device KIT     chlorthalidone (HYGROTON) 25 MG tablet     digoxin (LANOXIN) 125 MCG tablet     diphenhydrAMINE (BENADRYL) 25 MG tablet     DULoxetine (CYMBALTA) 20 MG capsule     fluticasone (FLONASE) 50 MCG/ACT nasal spray     insulin glargine (LANTUS SOLOSTAR) 100 UNIT/ML pen     insulin lispro (HUMALOG KWIKPEN) 100 UNIT/ML (1 unit dial) KWIKPEN     insulin pen needle 30G X 5 MM     lisinopril (ZESTRIL) 5 MG tablet     metoprolol tartrate (LOPRESSOR) 100 MG tablet     Multiple Vitamin (TAB-A-MARIUSZ) TABS     Multiple Vitamin (TAB-A-MARIUSZ/BETA CAROTENE) TABS     nystatin (MYCOSTATIN) 564297 UNIT/GM external powder     nystatin POWD     omeprazole (PRILOSEC) 20 MG DR capsule     polyethylene glycol-propylene glycol (SYSTANE) 0.4-0.3 % SOLN ophthalmic solution     potassium chloride ER (KLOR-CON M) 20 MEQ CR tablet     SB  BISMUTH 262 MG TABS     Specialty Vitamins Products (ICAPS LUTEIN & ZEAXANTHIN) TBEC     ULTICARE MICRO 32G X 4 MM insulin pen needle     No current facility-administered medications for this visit.          Allergies   Allergen Reactions     Ampicillin      Desvenlafaxine Other (See Comments)     Desvenlafaxine Succinate  Pristiq       Sertraline Hcl Other (See Comments) and Diarrhea     Zoloft       Sulfa Drugs Other (See Comments)     Sulfa(Sulfonamide Antibiotics)       Family History   Problem Relation Age of Onset     Myocardial Infarction Mother 59        myocardial infarction, cause of death     Other - See Comments Daughter         fibromyalgia     Thyroid Disease Sister         hypo     Thyroid Disease Sister         hypo     Thyroid Disease Sister         hypo     Myocardial Infarction Brother 63        myocardial infarction, cause of death       Social History     Socioeconomic History     Marital status:      Spouse name: None     Number of children: None     Years of education: None     Highest education level: None   Occupational History     None   Tobacco Use     Smoking status: Never Smoker     Smokeless tobacco: Never Used     Tobacco comment: no passive exposure   Vaping Use     Vaping Use: Never used   Substance and Sexual Activity     Alcohol use: No     Drug use: No     Sexual activity: Never   Other Topics Concern      Service Not Asked     Blood Transfusions Yes     Comment: 11/02/2012     Caffeine Concern Yes     Comment: coffee, 4 cups daily     Occupational Exposure Not Asked     Hobby Hazards Not Asked     Sleep Concern Not Asked     Stress Concern Not Asked     Weight Concern Not Asked     Special Diet Not Asked     Back Care Not Asked     Exercise Not Asked     Bike Helmet Not Asked     Seat Belt Yes     Self-Exams Not Asked     Parent/sibling w/ CABG, MI or angioplasty before 65F 55M? Yes   Social History Narrative     None     Social Determinants of Health     Financial  Resource Strain: Not on file   Food Insecurity: Not on file   Transportation Needs: Not on file   Physical Activity: Not on file   Stress: Not on file   Social Connections: Not on file   Intimate Partner Violence: Not on file   Housing Stability: Not on file       ROS: 10 point ROS neg other than the symptoms noted above in the HPI.  EXAM  Constitutional: healthy, alert and no distress    Psychiatric: mentation appears normal and affect normal/bright    VASCULAR:  -Dorsalis pedis pulse +1/4 b/l  -Posterior tibial pulse +1/4 b/l  -Capillary refill time < 3 seconds to b/l hallux  -Hair growth Absent to b/l anterior legs and ankles  NEURO:  -Positive for paresthesias to bilateral foot  -Protective sensation diminished with SWM +8/10 RIGHT and +6/10 LEFT on 01/25/2022  -Light touch sensation intact to b/l plantar forefoot  DERM:  -Skin temperature within normal limits bilaterally  -Skin diffusely dry and flaking to the bilateral foot  -Toenails elongated, thickened, dystrophic and discolored x 10    -Mild hyperkeratotic lesion with no open skin on the RIGHT dorsal medial midfoot and too superficial to par  Wound Location:  LEFT lateral fifth toe IPJ  01/25/2022  Measurement:  0.8cm x 0.8cm x 0.2cm to subcutaneous tissue  Drainage:  Moderate serous  Odor:  None  Undermining:  None  Edges:  Intact  Base:  100% significant hyperkeratotic lesion pre debridement and 100% viable post debridement  Surrounding Skin: Intact  No severe erythema, no ascending erythema, no calor, no purulence, no malodor, no other signs of infection.   MSK:  -Moderate decrease in arch height while patient is NWB  -Muscle strength of ankles +5/5 for dorsiflexion, plantarflexion, ABDUction and ADDuction b/l    ============================================================    ASSESSMENT:  (E11.621,  L97.522) Diabetic ulcer of toe of left foot associated with type 2 diabetes mellitus, with fat layer exposed (H)  (primary encounter diagnosis)    (L60.3)  Onychodystrophy    (L84) Callus of foot    (L85.3) Xerosis of skin    (N18.31) Stage 3a chronic kidney disease (H)    (E11.9,  Z79.4) Diabetes mellitus type 2, insulin dependent (H)    (E11.42) Diabetic polyneuropathy associated with type 2 diabetes mellitus (H)        PLAN:  -Patient evaluated and examined. Treatment options discussed with no educational barriers noted.  -High risk toenail debridement x 10 toenails without incident    -Excisional debridement of wound on LEFT lateral fifth toe with a sharp tissue nipper to the level of and including the subcutaneous tissue layer (debrided a total of less than 20 centimeters squared) with all non-viable soft tissue debrided from the wound bed.  -Debrided ulceration in attempt to decrease the biofilm layer, promote healing and in attempt to prevent infection  ---Dressed wound with Mepilex Ag and Medipore tape  ---Instructions provided for patient's care facility to change the dressing daily.    -Diabetic Foot Education provided. This included checking the feet daily looking for new new blisters or wounds, wearing shoes at all times when walking including around the house, and avoiding lotion application between the toes. If there are any signs of infection, the patient should present to the ED as soon as possible. Infections of the foot can be life threatening or lead to amputations of the foot or leg.    -Orthotist referral for DM shoes and inserts through the orthotist, Shyanne Alvarado per patient request  ---Discussed DM shoes and advantages to DM shoes. The shoes may help prevent the foot from developing thick calluses or blisters from rubbing on the top of her shoes.  from rubbing on the toes and causing blisters/wounds. Additionally, the shoes come with a custom molded insert that is designed to also attempt to prevent blisters or ulcers of the foot. Blisters and ulcers can still occur while wearing DM shoes (espeically when the shoes are new), but they are aimed at  helping prevent blisters and ulcers. The patient is in agreement with this plan and would like to try DM shoes.    -Xerosis of skin: Discussed xerosis of the skin including the risks of dry, cracking skin creating ulcerations on the feet. These can be painful or can become infected which can then potentially lead to life threatening wounds or amputation. It is important to continue with daily moisturizing lotion to prevent this. Patient expressed understanding.   ---Will add these instructions for patient's care facility after her wound has healed on her toe.    -Patient in agreement with the above treatment plan and all of patient's questions were answered.      RTC two weeks to evaluate LEFT lateral fifth digit ulceration  Return to clinic 63+ days for diabetic foot exam and high risk nail debridement (already scheduled for 02/09/2022) with DM shoe fitting appointment with the orthotist, Shyanne Alvarado, to follow her podiatry appointment in April, 2022        Marian Geller DPM

## 2022-01-25 NOTE — PATIENT INSTRUCTIONS
[facility] Instructions:  Patient has an ulcer on the top of the LEFT fifth toe. Please change dressing daily with Mepilex Ag (cut to the size of the wound -- sent home with patient) with the sticky side facing the wound. Secure with tape.  ---Please avoid tennis shoes / solid shoes whenever possible and wear a soft slipper at home to avoid pressure on the top of the toe.  ---If there are any signs of infection (redness, swelling, pain, purulence, fever, chills, nausea, vomiting), please call podiatry ([phone] -- you may leave a message at this number) or the emergency department immediately if there are any signs of infection.     ------------------------------------------------------    -Diabetic Foot Check Daily:  ---Please check the feet daily looking for new new blisters or wounds. Call podiatry or go the Emergency Department with any concerns.  ---Wear shoes at all times when walking including around the house (slippers when there is an open wound on the top of the foot).  ---Avoid lotion application between the toes. Please apply lotion to patient's feet daily but not to open wounds and not between the toes.  ---If you have any open wounds with signs of infection (redness, swelling, pain, purulence, fever, chills, nausea, vomiting), go to the Emergency Department as soon as possible.

## 2022-01-28 ENCOUNTER — TELEPHONE (OUTPATIENT)
Dept: EDUCATION SERVICES | Facility: HOSPITAL | Age: 87
End: 2022-01-28
Payer: MEDICARE

## 2022-01-28 DIAGNOSIS — E11.65 TYPE 2 DIABETES MELLITUS WITH HYPERGLYCEMIA, WITHOUT LONG-TERM CURRENT USE OF INSULIN (H): ICD-10-CM

## 2022-01-28 RX ORDER — INSULIN GLARGINE 100 [IU]/ML
INJECTION, SOLUTION SUBCUTANEOUS
Qty: 15 ML | Refills: 3 | Status: SHIPPED | OUTPATIENT
Start: 2022-01-28 | End: 2022-10-03

## 2022-01-28 RX ORDER — INSULIN LISPRO 100 [IU]/ML
INJECTION, SOLUTION INTRAVENOUS; SUBCUTANEOUS
Qty: 15 ML | Refills: 1 | Status: SHIPPED | OUTPATIENT
Start: 2022-01-28 | End: 2022-02-17

## 2022-01-28 NOTE — TELEPHONE ENCOUNTER
Received faxed BG readings from [facility]. Lantus 15 units daily, HumaLOG 8 units prior to all meals plus correction: 200-250= 1 units, 251-300 = 2 units, greater than 300 = 3 units. No correction at 8 pm     BG readings FB-230, pre-lunch: 168-273, pre-supper: 194-337 and 8 pm: 189-332.  BGs continue to trend downward.     Plan (faxed to Yazmin Jones at [facility]):  Increase Lantus to 18 units daily  Increase HumaLOG to 10 units prior to all meals plus correction: 200-250= 1 units, 251-300 = 2 units, greater than 300 = 3 units. No correction at 8 pm      Fax readings weekly.

## 2022-02-09 ENCOUNTER — OFFICE VISIT (OUTPATIENT)
Dept: PODIATRY | Facility: OTHER | Age: 87
End: 2022-02-09
Attending: PODIATRIST
Payer: MEDICARE

## 2022-02-09 VITALS
HEART RATE: 89 BPM | OXYGEN SATURATION: 98 % | TEMPERATURE: 96.3 F | SYSTOLIC BLOOD PRESSURE: 116 MMHG | DIASTOLIC BLOOD PRESSURE: 75 MMHG

## 2022-02-09 DIAGNOSIS — E11.42 DIABETIC POLYNEUROPATHY ASSOCIATED WITH TYPE 2 DIABETES MELLITUS (H): ICD-10-CM

## 2022-02-09 DIAGNOSIS — Z79.4 DIABETES MELLITUS TYPE 2, INSULIN DEPENDENT (H): ICD-10-CM

## 2022-02-09 DIAGNOSIS — L85.3 XEROSIS OF SKIN: ICD-10-CM

## 2022-02-09 DIAGNOSIS — E11.9 DIABETES MELLITUS TYPE 2, INSULIN DEPENDENT (H): ICD-10-CM

## 2022-02-09 DIAGNOSIS — L97.522 DIABETIC ULCER OF TOE OF LEFT FOOT ASSOCIATED WITH TYPE 2 DIABETES MELLITUS, WITH FAT LAYER EXPOSED (H): Primary | ICD-10-CM

## 2022-02-09 DIAGNOSIS — L84 CALLUS OF FOOT: ICD-10-CM

## 2022-02-09 DIAGNOSIS — N18.31 STAGE 3A CHRONIC KIDNEY DISEASE (H): ICD-10-CM

## 2022-02-09 DIAGNOSIS — E11.9 CONTROLLED TYPE 2 DIABETES MELLITUS WITHOUT COMPLICATION, WITHOUT LONG-TERM CURRENT USE OF INSULIN (H): Primary | ICD-10-CM

## 2022-02-09 DIAGNOSIS — L60.3 ONYCHODYSTROPHY: ICD-10-CM

## 2022-02-09 DIAGNOSIS — E11.621 DIABETIC ULCER OF TOE OF LEFT FOOT ASSOCIATED WITH TYPE 2 DIABETES MELLITUS, WITH FAT LAYER EXPOSED (H): Primary | ICD-10-CM

## 2022-02-09 PROCEDURE — 11042 DBRDMT SUBQ TIS 1ST 20SQCM/<: CPT | Performed by: PODIATRIST

## 2022-02-09 PROCEDURE — G0463 HOSPITAL OUTPT CLINIC VISIT: HCPCS | Mod: 25

## 2022-02-09 ASSESSMENT — PAIN SCALES - GENERAL: PAINLEVEL: WORST PAIN (10)

## 2022-02-09 NOTE — PATIENT INSTRUCTIONS
-Please continue with daily dressing change with Mepilex Ag (cut to size of wound) and cover with Medipore tape. Thank you

## 2022-02-09 NOTE — NURSING NOTE
"Chief Complaint   Patient presents with     Wound Check       Initial /75 (BP Location: Left arm, Patient Position: Sitting, Cuff Size: Adult Regular)   Pulse 89   Temp (!) 96.3  F (35.7  C) (Tympanic)   SpO2 98%  Estimated body mass index is 28.35 kg/m  as calculated from the following:    Height as of 7/9/21: 1.575 m (5' 2\").    Weight as of 7/21/21: 70.3 kg (155 lb).  Medication Reconciliation: kalina Waters  "

## 2022-02-09 NOTE — PROGRESS NOTES
Chief complaint: Patient presents with:  Wound Check      History of Present Illness: This 88 year old female is seen for follow-up management of a LEFT fifth toe ulceration. The little toe ulcer is not painful except when she moves her foot.    Her caretakers at [facility] are changing the dressing on the toe daily with Mepilex Ag and Medipore tape.    She occasionally gets a burning, tingling, and numbness in her feet.     No further pedal complaints today.     Last HbA1C was 10.9% on 2021.        /75 (BP Location: Left arm, Patient Position: Sitting, Cuff Size: Adult Regular)   Pulse 89   Temp (!) 96.3  F (35.7  C) (Tympanic)   SpO2 98%     Patient Active Problem List   Diagnosis     Advanced care planning/counseling discussion     Sciatica     Diverticulosis of large intestine     Osteoporosis     Obesity     Congenital cystic kidney disease     Myalgia and myositis     Atrial fibrillation with RVR (H)     Calculus of kidney     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     ACP (advance care planning)     Controlled type 2 diabetes mellitus without complication, without long-term current use of insulin (H)     Permanent atrial fibrillation (H)     Lung nodule     Sepsis (H)     Hypotension due to hypovolemia     Generalized muscle weakness     Acute exacerbation of CHF (congestive heart failure) (H)     Acute on chronic systolic congestive heart failure (H)     Acute blood loss anemia     Chronic anticoagulation     Essential (primary) hypertension     Falls     Hematoma of arm, left, initial encounter     Hemorrhagic shock (H)     Hyperglycemia     Traumatic hematoma of left hip     Chronic kidney disease, stage 3 (H)       Past Surgical History:   Procedure Laterality Date     Breast biopsy      LT, benign      SECTION       CHOLECYSTECTOMY      lap     COLONOSCOPY       LUCILLE / BSO         Current Outpatient Medications   Medication     acetaminophen (TYLENOL) 325 MG  tablet     Alcohol Swabs (EASY TOUCH ALCOHOL PREP MEDIUM) 70 % PADS     alendronate (FOSAMAX) 70 MG tablet     apixaban ANTICOAGULANT (ELIQUIS ANTICOAGULANT) 5 MG tablet     artificial tears OINT ophthalmic ointment     aspirin (ASPIRIN LOW DOSE) 81 MG chewable tablet     Assure [place] Lancets MISC     atorvastatin (LIPITOR) 40 MG tablet     blood glucose (GLUCOCARD VITAL TEST) test strip     blood glucose (NO BRAND SPECIFIED) test strip     Blood Glucose Monitoring Suppl (GLUCOCARD VITAL MONITOR) w/Device KIT     chlorthalidone (HYGROTON) 25 MG tablet     digoxin (LANOXIN) 125 MCG tablet     diphenhydrAMINE (BENADRYL) 25 MG tablet     DULoxetine (CYMBALTA) 20 MG capsule     fluticasone (FLONASE) 50 MCG/ACT nasal spray     insulin glargine (LANTUS SOLOSTAR) 100 UNIT/ML pen     insulin lispro (HUMALOG KWIKPEN) 100 UNIT/ML (1 unit dial) KWIKPEN     insulin pen needle 30G X 5 MM     lisinopril (ZESTRIL) 5 MG tablet     metoprolol tartrate (LOPRESSOR) 100 MG tablet     Multiple Vitamin (TAB-A-MARIUSZ) TABS     Multiple Vitamin (TAB-A-MARIUSZ/BETA CAROTENE) TABS     nystatin (MYCOSTATIN) 110676 UNIT/GM external powder     nystatin POWD     omeprazole (PRILOSEC) 20 MG DR capsule     polyethylene glycol-propylene glycol (SYSTANE) 0.4-0.3 % SOLN ophthalmic solution     potassium chloride ER (KLOR-CON M) 20 MEQ CR tablet     SB BISMUTH 262 MG TABS     Specialty Vitamins Products (ICAPS LUTEIN & ZEAXANTHIN) TBEC     ULTICARE MICRO 32G X 4 MM insulin pen needle     No current facility-administered medications for this visit.          Allergies   Allergen Reactions     Ampicillin      Desvenlafaxine Other (See Comments)     Desvenlafaxine Succinate  Pristiq       Sertraline Hcl Other (See Comments) and Diarrhea     Zoloft       Sulfa Drugs Other (See Comments)     Sulfa(Sulfonamide Antibiotics)       Family History   Problem Relation Age of Onset     Myocardial Infarction Mother 59        myocardial infarction, cause of death      Other - See Comments Daughter         fibromyalgia     Thyroid Disease Sister         hypo     Thyroid Disease Sister         hypo     Thyroid Disease Sister         hypo     Myocardial Infarction Brother 63        myocardial infarction, cause of death       Social History     Socioeconomic History     Marital status:      Spouse name: None     Number of children: None     Years of education: None     Highest education level: None   Occupational History     None   Tobacco Use     Smoking status: Never Smoker     Smokeless tobacco: Never Used     Tobacco comment: no passive exposure   Vaping Use     Vaping Use: Never used   Substance and Sexual Activity     Alcohol use: No     Drug use: No     Sexual activity: Never   Other Topics Concern      Service Not Asked     Blood Transfusions Yes     Comment: 11/02/2012     Caffeine Concern Yes     Comment: coffee, 4 cups daily     Occupational Exposure Not Asked     Hobby Hazards Not Asked     Sleep Concern Not Asked     Stress Concern Not Asked     Weight Concern Not Asked     Special Diet Not Asked     Back Care Not Asked     Exercise Not Asked     Bike Helmet Not Asked     Seat Belt Yes     Self-Exams Not Asked     Parent/sibling w/ CABG, MI or angioplasty before 65F 55M? Yes   Social History Narrative     None     Social Determinants of Health     Financial Resource Strain: Not on file   Food Insecurity: Not on file   Transportation Needs: Not on file   Physical Activity: Not on file   Stress: Not on file   Social Connections: Not on file   Intimate Partner Violence: Not on file   Housing Stability: Not on file       ROS: 10 point ROS neg other than the symptoms noted above in the HPI.  EXAM  Constitutional: healthy, alert and no distress    Psychiatric: mentation appears normal and affect normal/bright    LEFT FOOT FOCUSED    VASCULAR:  -Dorsalis pedis pulse +1/4   -Posterior tibial pulse +1/4   -Capillary refill time < 3 seconds to hallux  -Hair growth  Absent to anterior legs and ankles  NEURO:  -Positive for paresthesias to foot  -Protective sensation diminished with SWM +8/10 RIGHT and +6/10 LEFT on 01/25/2022  -Light touch sensation intact to plantar forefoot  DERM:  -Skin temperature within normal limits   -Skin diffusely dry and flaking to the foot  -Toenails thickened, dystrophic and discolored x 5    Wound Location:  LEFT lateral fifth toe IPJ  02/09/2022  Measurement:  0.4cm x 0.5cm x 0.2cm to subcutaneous tissue  Drainage:  Moderate serous  Odor:  None  Undermining:  None  Edges:  Intact  Base:  100% moderate hyperkeratotic lesion pre debridement and 100% viable post debridement  Surrounding Skin: Intact  No severe erythema, no ascending erythema, no calor, no purulence, no malodor, no other signs of infection    01/25/2022  Measurement:  0.8cm x 0.8cm x 0.2cm to subcutaneous tissue  Drainage:  Moderate serous  Odor:  None  Undermining:  None  Edges:  Intact  Base:  100% significant hyperkeratotic lesion pre debridement and 100% viable post debridement  Surrounding Skin: Intact  No severe erythema, no ascending erythema, no calor, no purulence, no malodor, no other signs of infection.   MSK:  -Moderate decrease in arch height while patient is NWB  -Muscle strength of ankles +5/5 for dorsiflexion, plantarflexion, ABDUction and ADDuction b/l    ============================================================    ASSESSMENT:  (E11.621,  L97.522) Diabetic ulcer of toe of left foot associated with type 2 diabetes mellitus, with fat layer exposed (H)  (primary encounter diagnosis)    (L60.3) Onychodystrophy    (L84) Callus of foot    (L85.3) Xerosis of skin    (N18.31) Stage 3a chronic kidney disease (H)    (E11.9,  Z79.4) Diabetes mellitus type 2, insulin dependent (H)    (E11.42) Diabetic polyneuropathy associated with type 2 diabetes mellitus (H)        PLAN:  -Patient evaluated and examined. Treatment options discussed with no educational barriers  noted.  -Toenails last debrided on 01/25/2022    -Excisional debridement of wound on LEFT lateral fifth toe with a sharp tissue nipper to the level of and including the subcutaneous tissue layer (debrided a total of less than 20 centimeters squared) with all non-viable soft tissue debrided from the wound bed.  -Debrided ulceration in attempt to decrease the biofilm layer, promote healing and in attempt to prevent infection  ---Dressed wound with Mepilex Ag and Medipore tape  ---Instructions provided for patient's care facility to change the dressing daily.    -Patient in agreement with the above treatment plan and all of patient's questions were answered.      RTC three weeks to evaluate LEFT lateral fifth digit ulceration  Return to clinic 63+ days for diabetic foot exam and high risk nail debridement (already scheduled for 02/09/2022) with DM shoe fitting appointment with the orthotist, Shyanne Alvarado, to follow her podiatry appointment in April, 2022        Marian Geller DPM

## 2022-02-11 NOTE — TELEPHONE ENCOUNTER
Pen needle      Last Written Prescription Date:  12/21/21  Last Fill Quantity: 100,   # refills: 3  Last Office Visit: 07/21/21  Future Office visit:    Next 5 appointments (look out 90 days)    Apr 05, 2022 10:30 AM  (Arrive by 10:15 AM)  Return Visit with Marian Geller DPM  [facility] ([facility] ) [address] [place] [id number] [phone]           Drug not active on patient's medication list

## 2022-02-17 ENCOUNTER — TELEPHONE (OUTPATIENT)
Dept: EDUCATION SERVICES | Facility: HOSPITAL | Age: 87
End: 2022-02-17
Payer: MEDICARE

## 2022-02-17 ENCOUNTER — TRANSFERRED RECORDS (OUTPATIENT)
Dept: HEALTH INFORMATION MANAGEMENT | Facility: HOSPITAL | Age: 87
End: 2022-02-17
Payer: MEDICARE

## 2022-02-17 DIAGNOSIS — E11.65 TYPE 2 DIABETES MELLITUS WITH HYPERGLYCEMIA, WITHOUT LONG-TERM CURRENT USE OF INSULIN (H): ICD-10-CM

## 2022-02-17 RX ORDER — INSULIN LISPRO 100 [IU]/ML
INJECTION, SOLUTION INTRAVENOUS; SUBCUTANEOUS
Qty: 15 ML | Refills: 1 | Status: SHIPPED | OUTPATIENT
Start: 2022-02-17 | End: 2022-03-31

## 2022-02-17 NOTE — TELEPHONE ENCOUNTER
Received faxed BG readings from [facility]. Lantus 18 units daily, HumaLOG 10 units prior to all meals plus correction: 200-250= 1 units, 251-300 = 2 units, greater than 300 = 3 units. No correction at 8 pm     BG readings FB, 131-171, pre-lunch: 156, 186-305, 320 (6 of 8 readings 220 or above), pre-supper: 141-174, 230, 268 and 8 pm: 124-348 (last 3 readings in 300s).  BGs continue to trend downward.     Plan (faxed to [facility]):    Increase HumaLOG to 12 units before breakfast and 10 units before lunch and supper -  continue correction with all meals: 200-250= 1 units, 251-300 = 2 units, greater than 300 = 3 units. No correction at 8 pm      Fax readings weekly.

## 2022-03-01 DIAGNOSIS — E11.9 TYPE 2 DIABETES MELLITUS WITHOUT COMPLICATION, WITHOUT LONG-TERM CURRENT USE OF INSULIN (H): ICD-10-CM

## 2022-03-02 RX ORDER — BLOOD-GLUCOSE METER
KIT MISCELLANEOUS
Qty: 100 STRIP | Refills: 0 | Status: SHIPPED | OUTPATIENT
Start: 2022-03-02 | End: 2022-04-13

## 2022-03-02 NOTE — TELEPHONE ENCOUNTER
Test strips      Last Written Prescription Date:  1/20/22/  Last Fill Quantity: 100,   # refills: 0  Last Office Visit: 12/15/21  Future Office visit:    Next 5 appointments (look out 90 days)    Apr 05, 2022 10:30 AM  (Arrive by 10:15 AM)  Return Visit with Marian Geller DPM  [facility] ([facility] ) [address] [place] [id number] [phone]

## 2022-03-03 ENCOUNTER — OFFICE VISIT (OUTPATIENT)
Dept: PODIATRY | Facility: OTHER | Age: 87
End: 2022-03-03
Attending: PODIATRIST
Payer: MEDICARE

## 2022-03-03 ENCOUNTER — ANCILLARY PROCEDURE (OUTPATIENT)
Dept: GENERAL RADIOLOGY | Facility: OTHER | Age: 87
End: 2022-03-03
Attending: PODIATRIST
Payer: MEDICARE

## 2022-03-03 VITALS
OXYGEN SATURATION: 96 % | WEIGHT: 155 LBS | DIASTOLIC BLOOD PRESSURE: 64 MMHG | SYSTOLIC BLOOD PRESSURE: 104 MMHG | BODY MASS INDEX: 28.52 KG/M2 | HEART RATE: 72 BPM | RESPIRATION RATE: 15 BRPM | HEIGHT: 62 IN | TEMPERATURE: 97.3 F

## 2022-03-03 DIAGNOSIS — E11.621 DIABETIC ULCER OF TOE OF LEFT FOOT ASSOCIATED WITH TYPE 2 DIABETES MELLITUS, WITH MUSCLE INVOLVEMENT WITHOUT EVIDENCE OF NECROSIS (H): ICD-10-CM

## 2022-03-03 DIAGNOSIS — L97.525 DIABETIC ULCER OF TOE OF LEFT FOOT ASSOCIATED WITH TYPE 2 DIABETES MELLITUS, WITH MUSCLE INVOLVEMENT WITHOUT EVIDENCE OF NECROSIS (H): Primary | ICD-10-CM

## 2022-03-03 DIAGNOSIS — M77.42 METATARSALGIA OF LEFT FOOT: ICD-10-CM

## 2022-03-03 DIAGNOSIS — L97.525 DIABETIC ULCER OF TOE OF LEFT FOOT ASSOCIATED WITH TYPE 2 DIABETES MELLITUS, WITH MUSCLE INVOLVEMENT WITHOUT EVIDENCE OF NECROSIS (H): ICD-10-CM

## 2022-03-03 DIAGNOSIS — E11.42 DIABETIC POLYNEUROPATHY ASSOCIATED WITH TYPE 2 DIABETES MELLITUS (H): ICD-10-CM

## 2022-03-03 DIAGNOSIS — E11.9 DIABETES MELLITUS TYPE 2, INSULIN DEPENDENT (H): ICD-10-CM

## 2022-03-03 DIAGNOSIS — Z79.4 DIABETES MELLITUS TYPE 2, INSULIN DEPENDENT (H): ICD-10-CM

## 2022-03-03 DIAGNOSIS — L84 CALLUS OF FOOT: ICD-10-CM

## 2022-03-03 DIAGNOSIS — L60.3 ONYCHODYSTROPHY: ICD-10-CM

## 2022-03-03 DIAGNOSIS — E11.628 DIABETIC FOOT INFECTION (H): ICD-10-CM

## 2022-03-03 DIAGNOSIS — L85.3 XEROSIS OF SKIN: ICD-10-CM

## 2022-03-03 DIAGNOSIS — E11.621 DIABETIC ULCER OF TOE OF LEFT FOOT ASSOCIATED WITH TYPE 2 DIABETES MELLITUS, WITH MUSCLE INVOLVEMENT WITHOUT EVIDENCE OF NECROSIS (H): Primary | ICD-10-CM

## 2022-03-03 DIAGNOSIS — L08.9 DIABETIC FOOT INFECTION (H): ICD-10-CM

## 2022-03-03 DIAGNOSIS — N18.31 STAGE 3A CHRONIC KIDNEY DISEASE (H): ICD-10-CM

## 2022-03-03 PROCEDURE — G0463 HOSPITAL OUTPT CLINIC VISIT: HCPCS | Mod: 25

## 2022-03-03 PROCEDURE — G0463 HOSPITAL OUTPT CLINIC VISIT: HCPCS

## 2022-03-03 PROCEDURE — 11043 DBRDMT MUSC&/FSCA 1ST 20/<: CPT | Performed by: PODIATRIST

## 2022-03-03 PROCEDURE — 99214 OFFICE O/P EST MOD 30 MIN: CPT | Mod: 25 | Performed by: PODIATRIST

## 2022-03-03 PROCEDURE — 73630 X-RAY EXAM OF FOOT: CPT | Mod: TC,LT

## 2022-03-03 RX ORDER — DOXYCYCLINE HYCLATE 100 MG
100 TABLET ORAL 2 TIMES DAILY
Qty: 14 TABLET | Refills: 0 | Status: SHIPPED | OUTPATIENT
Start: 2022-03-03 | End: 2022-03-10

## 2022-03-03 RX ORDER — DOXYCYCLINE HYCLATE 100 MG
100 TABLET ORAL 2 TIMES DAILY
Qty: 14 TABLET | Refills: 0 | Status: SHIPPED | OUTPATIENT
Start: 2022-03-03 | End: 2022-03-03

## 2022-03-03 ASSESSMENT — PAIN SCALES - GENERAL: PAINLEVEL: EXTREME PAIN (8)

## 2022-03-03 NOTE — NURSING NOTE
"Chief Complaint   Patient presents with     Wound Check     LEFT FOOT PINKY TOE       Initial /64 (BP Location: Left arm, Cuff Size: Adult Regular)   Pulse 72   Temp 97.3  F (36.3  C) (Tympanic)   Resp 15   Ht 1.575 m (5' 2\")   Wt 70.3 kg (155 lb)   SpO2 96%   BMI 28.35 kg/m   Estimated body mass index is 28.35 kg/m  as calculated from the following:    Height as of this encounter: 1.575 m (5' 2\").    Weight as of this encounter: 70.3 kg (155 lb).  Medication Reconciliation: complete  Amie Costello LPN  "

## 2022-03-03 NOTE — PROGRESS NOTES
"Ft Chief complaint: Patient presents with:  Wound Check: LEFT FOOT PINKY TOE    History of Present Illness: This 88 year old female is seen for follow-up management of a LEFT fifth toe ulceration. The little toe ulcer is not painful except when she moves her foot.    Her caretakers at [facility] are changing the dressing on the toe daily with Mepilex Ag and Medipore tape.    She has had increased pain on the LEFT lateral foot for the past two weeks since her last podiatry appointment. She has pain when she walks and puts weight on her foot.    She occasionally gets a burning, tingling, and numbness in her feet.     No further pedal complaints today.       Last HbA1C was 10.9% on 12/21/2021.        /64 (BP Location: Left arm, Cuff Size: Adult Regular)   Pulse 72   Temp 97.3  F (36.3  C) (Tympanic)   Resp 15   Ht 1.575 m (5' 2\")   Wt 70.3 kg (155 lb)   SpO2 96%   BMI 28.35 kg/m      Patient Active Problem List   Diagnosis     Advanced care planning/counseling discussion     Sciatica     Diverticulosis of large intestine     Osteoporosis     Obesity     Congenital cystic kidney disease     Myalgia and myositis     Atrial fibrillation with RVR (H)     Calculus of kidney     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     ACP (advance care planning)     Controlled type 2 diabetes mellitus without complication, without long-term current use of insulin (H)     Permanent atrial fibrillation (H)     Lung nodule     Sepsis (H)     Hypotension due to hypovolemia     Generalized muscle weakness     Acute exacerbation of CHF (congestive heart failure) (H)     Acute on chronic systolic congestive heart failure (H)     Acute blood loss anemia     Chronic anticoagulation     Essential (primary) hypertension     Falls     Hematoma of arm, left, initial encounter     Hemorrhagic shock (H)     Hyperglycemia     Traumatic hematoma of left hip     Chronic kidney disease, stage 3 (H)       Past Surgical History: "   Procedure Laterality Date     Breast biopsy      LT, benign      SECTION       CHOLECYSTECTOMY      lap     COLONOSCOPY       LUCILLE / BSO         Current Outpatient Medications   Medication     acetaminophen (TYLENOL) 325 MG tablet     Alcohol Swabs (EASY TOUCH ALCOHOL PREP MEDIUM) 70 % PADS     alendronate (FOSAMAX) 70 MG tablet     apixaban ANTICOAGULANT (ELIQUIS ANTICOAGULANT) 5 MG tablet     artificial tears OINT ophthalmic ointment     aspirin (ASPIRIN LOW DOSE) 81 MG chewable tablet     Assure [place] Lancets MISC     atorvastatin (LIPITOR) 40 MG tablet     blood glucose (NO BRAND SPECIFIED) test strip     Blood Glucose Monitoring Suppl (GLUCOCARD VITAL MONITOR) w/Device KIT     chlorthalidone (HYGROTON) 25 MG tablet     digoxin (LANOXIN) 125 MCG tablet     diphenhydrAMINE (BENADRYL) 25 MG tablet     DULoxetine (CYMBALTA) 20 MG capsule     fluticasone (FLONASE) 50 MCG/ACT nasal spray     GLUCOCARD VITAL TEST test strip     insulin glargine (LANTUS SOLOSTAR) 100 UNIT/ML pen     insulin lispro (HUMALOG KWIKPEN) 100 UNIT/ML (1 unit dial) KWIKPEN     insulin pen needle (29G X 12MM) 29G X 12MM miscellaneous     lisinopril (ZESTRIL) 5 MG tablet     metoprolol tartrate (LOPRESSOR) 100 MG tablet     Multiple Vitamin (TAB-A-MARIUSZ) TABS     Multiple Vitamin (TAB-A-MARIUSZ/BETA CAROTENE) TABS     nystatin (MYCOSTATIN) 313167 UNIT/GM external powder     nystatin POWD     omeprazole (PRILOSEC) 20 MG DR capsule     polyethylene glycol-propylene glycol (SYSTANE) 0.4-0.3 % SOLN ophthalmic solution     potassium chloride ER (KLOR-CON M) 20 MEQ CR tablet     SB BISMUTH 262 MG TABS     Specialty Vitamins Products (ICAPS LUTEIN & ZEAXANTHIN) TBEC     No current facility-administered medications for this visit.          Allergies   Allergen Reactions     Ampicillin      Desvenlafaxine Other (See Comments)     Desvenlafaxine Succinate  Pristiq       Sertraline Hcl Other (See Comments) and Diarrhea     Zoloft       Sulfa Drugs  Other (See Comments)     Sulfa(Sulfonamide Antibiotics)       Family History   Problem Relation Age of Onset     Myocardial Infarction Mother 59        myocardial infarction, cause of death     Other - See Comments Daughter         fibromyalgia     Thyroid Disease Sister         hypo     Thyroid Disease Sister         hypo     Thyroid Disease Sister         hypo     Myocardial Infarction Brother 63        myocardial infarction, cause of death       Social History     Socioeconomic History     Marital status:      Spouse name: None     Number of children: None     Years of education: None     Highest education level: None   Occupational History     None   Tobacco Use     Smoking status: Never Smoker     Smokeless tobacco: Never Used     Tobacco comment: no passive exposure   Vaping Use     Vaping Use: Never used   Substance and Sexual Activity     Alcohol use: No     Drug use: No     Sexual activity: Never   Other Topics Concern      Service Not Asked     Blood Transfusions Yes     Comment: 11/02/2012     Caffeine Concern Yes     Comment: coffee, 4 cups daily     Occupational Exposure Not Asked     Hobby Hazards Not Asked     Sleep Concern Not Asked     Stress Concern Not Asked     Weight Concern Not Asked     Special Diet Not Asked     Back Care Not Asked     Exercise Not Asked     Bike Helmet Not Asked     Seat Belt Yes     Self-Exams Not Asked     Parent/sibling w/ CABG, MI or angioplasty before 65F 55M? Yes   Social History Narrative     None     Social Determinants of Health     Financial Resource Strain: Not on file   Food Insecurity: Not on file   Transportation Needs: Not on file   Physical Activity: Not on file   Stress: Not on file   Social Connections: Not on file   Intimate Partner Violence: Not on file   Housing Stability: Not on file       ROS: 10 point ROS neg other than the symptoms noted above in the HPI.  EXAM  Constitutional: healthy, alert and no distress    Psychiatric: mentation  appears normal and affect normal/bright    LEFT FOOT FOCUSED    VASCULAR:  -Dorsalis pedis pulse +1/4   -Posterior tibial pulse +1/4   -Capillary refill time < 3 seconds to hallux  -Hair growth Absent to anterior legs and ankles  NEURO:  -Positive for paresthesias to foot  -Protective sensation diminished with SWM +8/10 RIGHT and +6/10 LEFT on 01/25/2022  -Light touch sensation intact to plantar forefoot  DERM:  -Skin temperature within normal limits   -Skin diffusely dry and flaking to the foot  -Toenails thickened, dystrophic and discolored x 5    Wound Location:  LEFT lateral fifth toe IPJ  03/03/2022  Measurement:  0.4cm x 0.4cm x 0.3cm to capsular layer over bone and undermines 0.4cm distally  Drainage:  Moderate serous  Odor:  None  Undermining:  None  Edges:  Intact  Base:  100% capsular layer of bone  Surrounding Skin: Intact with mild increase in erythema and edema  No severe erythema, no ascending erythema, no calor, no purulence, no malodor    02/09/2022  Measurement:  0.4cm x 0.5cm x 0.2cm to subcutaneous tissue  Drainage:  Moderate serous  Odor:  None  Undermining:  None  Edges:  Intact  Base:  100% moderate hyperkeratotic lesion pre debridement and 100% viable post debridement  Surrounding Skin: Intact  No severe erythema, no ascending erythema, no calor, no purulence, no malodor, no other signs of infection    01/25/2022  Measurement:  0.8cm x 0.8cm x 0.2cm to subcutaneous tissue  Drainage:  Moderate serous  Odor:  None  Undermining:  None  Edges:  Intact  Base:  100% significant hyperkeratotic lesion pre debridement and 100% viable post debridement  Surrounding Skin: Intact  No severe erythema, no ascending erythema, no calor, no purulence, no malodor, no other signs of infection.   MSK:  -Pain on palpation to mid dorsal shaft of the LEFT third, fourth and fifth metatarsal   -Moderate decrease in arch height while patient is NWB  -Muscle strength of ankles +5/5 for dorsiflexion, plantarflexion,  ABDUction and ADDuction b/l    RIGHT FOOT RADIOGRAPH 03/03/2022  IMPRESSION: No plain film evidence of osteomyelitis at this time. Recommend follow-up.    AL HERNANDEZ MD   ============================================================    ASSESSMENT:  (E11.621,  L97.525) Diabetic ulcer of toe of left foot associated with type 2 diabetes mellitus, with muscle involvement without evidence of necrosis (H)  (primary encounter diagnosis)    (E11.628,  L08.9) Diabetic foot infection (H)    (M77.42) Metatarsalgia of left foot    (L60.3) Onychodystrophy    (L84) Callus of foot    (L85.3) Xerosis of skin    (N18.31) Stage 3a chronic kidney disease (H)    (E11.9,  Z79.4) Diabetes mellitus type 2, insulin dependent (H)    (E11.42) Diabetic polyneuropathy associated with type 2 diabetes mellitus (H)      PLAN:  -Patient evaluated and examined. Treatment options discussed with no educational barriers noted.  -Toenails last debrided on 01/25/2022    -Excisional debridement of wound on LEFT lateral fifth toe with a sharp tissue nipper to the level of and including the capsular layer overlying the bone  (debrided a total of less than 20 centimeters squared) with all non-viable soft tissue debrided from the wound bed.  -Debrided ulceration in attempt to decrease the biofilm layer, promote healing and in attempt to prevent infection  ---Dressed wound with Mepilex Ag and Medipore tape  ---Instructions provided for patient's care facility to change the dressing daily.    -LEFT foot radiograph ordered -- No concerning changes at this time. Patient will be called with results.    Infection: The toe is deeper and very close to the bone. There was also a persistent erythema to the jesse-wound site and the fifth metatarsal head that did not resolve with elevation of the foot.  ---Due to decrease in kidney function, Doxycyline 100mg BID was prescribed  ---Discussed with patient and her mother that if the bone became infected, it could  lead to a toe amputation. Patient and her daughter understand the severity of the wound.  ---Instructions provided for patient's care facility to monitor the ulceration for any signs of infection and for signs of healing.    -Offloading: Dispensed a post-operative shoe for patient to wear at her care facility. This will help offload the forefoot and prevent pressure on the dorsal toe.    Metatarsalgia:  -Discussed potential causes and treatment options for metatarsalgia. Pain along the metatarsals can be caused from a number of factors, but is commonly caused by an imbalance of the biomechanics. This can include but is not limited to a flat foot, high arched foot, tendon imbalance, gastrocnemius equinus, and compensation for pain in other areas of the foot.  ---Conservative treatment options may include offloading the lateral foot in her new DM shoes. However, her pain may be related to the ulceration on the LEFT dorsal toe since the midfoot pain started when the toe ulcer worsened.  ---Ordered an x-ray to evaluate for a stress fracture. There was no evidence of a stress fracture, but this can develop later on x-ray in the process of a stress fracture.   ---Will focus on the worsening wound at this time and patient should wear the post-operative shoe which should decrease some of her foot pain until her follow-up appointment. She should call the clinic if her pain worsens.    -Patient in agreement with the above treatment plan and all of patient's questions were answered.      RTC one week to evaluate LEFT lateral fifth digit ulceration  Return to clinic 63+ days from last diabetic foot exam appointment for high risk nail debridement with DM shoe fitting appointment with the orthotist, Shyanne Alvarado, to follow her podiatry appointment in April, 2022        Marian Geller DPM

## 2022-03-03 NOTE — PATIENT INSTRUCTIONS
Debbi Instructions:    -Please apply Mepilex Ag and tape to the LEFT fifth toe ulcer.   -Patient is to take Doxycyline 100mg twice daily with a meal (prescribed on 03/03/2022 and asked to be delivered by [facility])  -Patient is to wear a post-op Velcro shoe whenever walking. She may wear a large boot when transferring to appointments.      -If toe gets more red, warm, swollen, painful, there is puruelnt drainage, or other concerning signs of infection, please call podiatry at [phone] or go to the Emergency Department

## 2022-03-07 DIAGNOSIS — E11.9 CONTROLLED TYPE 2 DIABETES MELLITUS WITHOUT COMPLICATION, WITHOUT LONG-TERM CURRENT USE OF INSULIN (H): ICD-10-CM

## 2022-03-08 DIAGNOSIS — M19.90 OSTEOARTHRITIS, UNSPECIFIED OSTEOARTHRITIS TYPE, UNSPECIFIED SITE: ICD-10-CM

## 2022-03-08 DIAGNOSIS — I50.23 ACUTE ON CHRONIC SYSTOLIC CONGESTIVE HEART FAILURE (H): ICD-10-CM

## 2022-03-08 DIAGNOSIS — E11.9 CONTROLLED TYPE 2 DIABETES MELLITUS WITHOUT COMPLICATION, WITHOUT LONG-TERM CURRENT USE OF INSULIN (H): ICD-10-CM

## 2022-03-08 DIAGNOSIS — E78.2 MIXED HYPERLIPIDEMIA: ICD-10-CM

## 2022-03-08 DIAGNOSIS — I48.21 PERMANENT ATRIAL FIBRILLATION (H): ICD-10-CM

## 2022-03-08 DIAGNOSIS — K21.00 GASTROESOPHAGEAL REFLUX DISEASE WITH ESOPHAGITIS WITHOUT HEMORRHAGE: ICD-10-CM

## 2022-03-08 DIAGNOSIS — I48.91 ATRIAL FIBRILLATION WITH RVR (H): ICD-10-CM

## 2022-03-08 DIAGNOSIS — I50.9 CONGESTIVE HEART FAILURE, UNSPECIFIED HF CHRONICITY, UNSPECIFIED HEART FAILURE TYPE (H): ICD-10-CM

## 2022-03-08 RX ORDER — LANCETS 21 GAUGE
EACH MISCELLANEOUS
Qty: 100 EACH | Refills: 0 | Status: SHIPPED | OUTPATIENT
Start: 2022-03-08 | End: 2022-04-13

## 2022-03-08 NOTE — TELEPHONE ENCOUNTER
Lancets      Last Written Prescription Date:  1/19/22  Last Fill Quantity: 100,   # refills: 0  Last Office Visit: 7/21/21  Future Office visit:    Next 5 appointments (look out 90 days)    Mar 14, 2022  2:30 PM  (Arrive by 2:15 PM)  Return Visit with Marian Geller DPM  [facility] ([facility] ) [address] [place] [id number] [phone]   Apr 05, 2022 10:30 AM  (Arrive by 10:15 AM)  Return Visit with Marian Geller DPM  [facility] ([facility] ) [address] [place] [id number] [phone]           Routing refill request to provider for review/approval because:

## 2022-03-09 RX ORDER — ATORVASTATIN CALCIUM 40 MG/1
TABLET, FILM COATED ORAL
Qty: 29 TABLET | Refills: 8 | Status: SHIPPED | OUTPATIENT
Start: 2022-03-09 | End: 2022-11-14

## 2022-03-09 RX ORDER — ALENDRONATE SODIUM 70 MG/1
TABLET ORAL
Qty: 4 TABLET | Refills: 8 | Status: SHIPPED | OUTPATIENT
Start: 2022-03-09 | End: 2022-11-14

## 2022-03-09 RX ORDER — ASPIRIN 81 MG/1
TABLET, CHEWABLE ORAL
Qty: 29 TABLET | Refills: 8 | Status: SHIPPED | OUTPATIENT
Start: 2022-03-09 | End: 2022-11-14

## 2022-03-09 RX ORDER — LISINOPRIL 5 MG/1
TABLET ORAL
Qty: 29 TABLET | Refills: 8 | Status: SHIPPED | OUTPATIENT
Start: 2022-03-09 | End: 2022-11-14

## 2022-03-09 RX ORDER — CHLORTHALIDONE 25 MG/1
TABLET ORAL
Qty: 29 TABLET | Refills: 8 | Status: SHIPPED | OUTPATIENT
Start: 2022-03-09 | End: 2022-11-14

## 2022-03-09 RX ORDER — DIGOXIN 125 MCG
TABLET ORAL
Qty: 15 TABLET | Refills: 4 | Status: SHIPPED | OUTPATIENT
Start: 2022-03-09 | End: 2022-07-26

## 2022-03-09 RX ORDER — APIXABAN 5 MG/1
TABLET, FILM COATED ORAL
Qty: 58 TABLET | Refills: 8 | Status: SHIPPED | OUTPATIENT
Start: 2022-03-09 | End: 2022-11-14

## 2022-03-09 NOTE — TELEPHONE ENCOUNTER
[facility] f/u [place] 7/21/21  Establishing care with Dr. Murray 12/23/22    omeprazole       Last Written Prescription Date:  1/20/22  Last Fill Quantity: 29,   # refills: 0  Last Office Visit: 7/1/22 Dr. Cox  Future Office visit:    Next 5 appointments (look out 90 days)    Mar 14, 2022  2:30 PM  (Arrive by 2:15 PM)  Return Visit with Marian Geller DPM  [facility] ([facility] ) [address] [place] [id number]  [phone]   Apr 05, 2022 10:30 AM  (Arrive by 10:15 AM)  Return Visit with Marian Geller DPM  [facility] ([address] [id number]  [phone]           Routing refill request to provider for review/approval because:  PPI Protocol Failed 03/08/2022 08:28 AM   Protocol Details  No diagnosis of osteoporosis on record     Can we refill until December? Will pend.     apixaban ANTICOAGULANT (ELIQUIS ANTICOAGULANT) 5 MG tablet      Last Written Prescription Date:  1/20/22  Last Fill Quantity: 58,   # refills: 0  Routing refill request to provider for review/approval because:  Direct Oral Anticoagulant Agents Failed 03/08/2022 08:28 AM   Protocol Details  Patient is 18-79 years of age    Recent (6 mo) or future (30 days) visit within the authorizing provider's specialty     Can we fill until December?     digoxin       Last Written Prescription Date:  1/20/22  Last Fill Quantity: 15,   # refills: 0  Routing refill request to provider for review/approval because:  Lab Results   Component Value Date    DIGOXIN 0.5 03/23/2018       chlorthalidone       Last Written Prescription Date:  1/20/22  Last Fill Quantity: 29,   # refills: 0          Routing refill request to provider for review/approval because:  Last Comprehensive Metabolic Panel:  Sodium   Date Value Ref Range Status   12/21/2021 132 (L) 133 - 144 mmol/L Final   07/10/2021 141 133 - 144 mmol/L Final     Potassium   Date Value Ref  Range Status   12/21/2021 4.2 3.4 - 5.3 mmol/L Final   07/10/2021 3.6 3.4 - 5.3 mmol/L Final     Chloride   Date Value Ref Range Status   12/21/2021 98 94 - 109 mmol/L Final   07/10/2021 107 94 - 109 mmol/L Final     Carbon Dioxide   Date Value Ref Range Status   07/10/2021 27 20 - 32 mmol/L Final     Carbon Dioxide (CO2)   Date Value Ref Range Status   12/21/2021 26 20 - 32 mmol/L Final     Anion Gap   Date Value Ref Range Status   12/21/2021 8 3 - 14 mmol/L Final   07/10/2021 7 3 - 14 mmol/L Final     Glucose   Date Value Ref Range Status   12/21/2021 468 (H) 70 - 99 mg/dL Final   07/10/2021 160 (H) 70 - 99 mg/dL Final     Urea Nitrogen   Date Value Ref Range Status   12/21/2021 25 7 - 30 mg/dL Final   07/10/2021 25 7 - 30 mg/dL Final     Creatinine   Date Value Ref Range Status   12/21/2021 0.95 0.52 - 1.04 mg/dL Final   07/10/2021 0.86 0.52 - 1.04 mg/dL Final     GFR Estimate   Date Value Ref Range Status   12/21/2021 57 (L) >60 mL/min/1.73m2 Final     Comment:     Effective December 21, 2021 eGFRcr in adults is calculated using the 2021 CKD-EPI creatinine equation which includes age and gender (Mike moser al., NEJ, DOI: 10.1056/NONPom0860696)   07/10/2021 60 (L) >60 mL/min/[1.73_m2] Final     Comment:     Non  GFR Calc  Starting 12/18/2018, serum creatinine based estimated GFR (eGFR) will be   calculated using the Chronic Kidney Disease Epidemiology Collaboration   (CKD-EPI) equation.       Calcium   Date Value Ref Range Status   12/21/2021 9.0 8.5 - 10.1 mg/dL Final   07/10/2021 8.7 8.5 - 10.1 mg/dL Final       Atorvastatin       Last Written Prescription Date:  1/20/22  Last Fill Quantity: 29,   # refills: 0    Routing refill request to provider for review/approval because:  Recent Labs   Lab Test 09/15/20  1000 07/17/19  0000 02/14/17  0805 06/30/15  1014 08/28/14  0936   CHOL 126 104*   < > 175 168   HDL 55 38*   < > 53 52   LDL 52 53   < > 101 100   TRIG 93 65   < > 105 80   CHOLHDLRATIO  --    --   --  3.3 3.2    < > = values in this interval not displayed.     alendronate       Last Written Prescription Date:  1/20/22  Last Fill Quantity: 4,   # refills: 0  Routing refill request to provider for review/approval because:  DEXA BONE DENSITY TESTING  dexa scan due

## 2022-03-14 ENCOUNTER — OFFICE VISIT (OUTPATIENT)
Dept: PODIATRY | Facility: OTHER | Age: 87
End: 2022-03-14
Attending: PODIATRIST
Payer: MEDICARE

## 2022-03-14 VITALS
TEMPERATURE: 97.2 F | DIASTOLIC BLOOD PRESSURE: 68 MMHG | SYSTOLIC BLOOD PRESSURE: 104 MMHG | HEART RATE: 76 BPM | OXYGEN SATURATION: 97 %

## 2022-03-14 DIAGNOSIS — L84 CALLUS OF FOOT: ICD-10-CM

## 2022-03-14 DIAGNOSIS — L97.525 DIABETIC ULCER OF TOE OF LEFT FOOT ASSOCIATED WITH TYPE 2 DIABETES MELLITUS, WITH MUSCLE INVOLVEMENT WITHOUT EVIDENCE OF NECROSIS (H): Primary | ICD-10-CM

## 2022-03-14 DIAGNOSIS — E11.9 DIABETES MELLITUS TYPE 2, INSULIN DEPENDENT (H): ICD-10-CM

## 2022-03-14 DIAGNOSIS — E11.628 DIABETIC FOOT INFECTION (H): ICD-10-CM

## 2022-03-14 DIAGNOSIS — E11.621 DIABETIC ULCER OF TOE OF LEFT FOOT ASSOCIATED WITH TYPE 2 DIABETES MELLITUS, WITH MUSCLE INVOLVEMENT WITHOUT EVIDENCE OF NECROSIS (H): Primary | ICD-10-CM

## 2022-03-14 DIAGNOSIS — L85.3 XEROSIS OF SKIN: ICD-10-CM

## 2022-03-14 DIAGNOSIS — L08.9 DIABETIC FOOT INFECTION (H): ICD-10-CM

## 2022-03-14 DIAGNOSIS — N18.31 STAGE 3A CHRONIC KIDNEY DISEASE (H): ICD-10-CM

## 2022-03-14 DIAGNOSIS — Z79.4 DIABETES MELLITUS TYPE 2, INSULIN DEPENDENT (H): ICD-10-CM

## 2022-03-14 DIAGNOSIS — E11.42 DIABETIC POLYNEUROPATHY ASSOCIATED WITH TYPE 2 DIABETES MELLITUS (H): ICD-10-CM

## 2022-03-14 DIAGNOSIS — M77.42 METATARSALGIA OF LEFT FOOT: ICD-10-CM

## 2022-03-14 DIAGNOSIS — L60.3 ONYCHODYSTROPHY: ICD-10-CM

## 2022-03-14 PROCEDURE — G0463 HOSPITAL OUTPT CLINIC VISIT: HCPCS | Mod: 25

## 2022-03-14 PROCEDURE — 99212 OFFICE O/P EST SF 10 MIN: CPT | Performed by: PODIATRIST

## 2022-03-14 ASSESSMENT — PAIN SCALES - GENERAL: PAINLEVEL: NO PAIN (0)

## 2022-03-14 NOTE — PROGRESS NOTES
Ft Chief complaint: Patient presents with:  Infection      History of Present Illness: This 88 year old female is seen for follow-up management of a LEFT fifth toe ulceration. The little toe ulcer is no longer painful.    Her caretakers at [facility] are changing the dressing on the toe daily with Mepilex Ag and Medipore tape.    She occasionally gets a burning, tingling, and numbness in her feet.     No further pedal complaints today.       Last HbA1C was 10.9% on 2021.        /68 (BP Location: Left arm, Patient Position: Sitting, Cuff Size: Adult Regular)   Pulse 76   Temp 97.2  F (36.2  C) (Tympanic)   SpO2 97%     Patient Active Problem List   Diagnosis     Advanced care planning/counseling discussion     Sciatica     Diverticulosis of large intestine     Osteoporosis     Obesity     Congenital cystic kidney disease     Myalgia and myositis     Atrial fibrillation with RVR (H)     Calculus of kidney     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     ACP (advance care planning)     Controlled type 2 diabetes mellitus without complication, without long-term current use of insulin (H)     Permanent atrial fibrillation (H)     Lung nodule     Sepsis (H)     Hypotension due to hypovolemia     Generalized muscle weakness     Acute exacerbation of CHF (congestive heart failure) (H)     Acute on chronic systolic congestive heart failure (H)     Acute blood loss anemia     Chronic anticoagulation     Essential (primary) hypertension     Falls     Hematoma of arm, left, initial encounter     Hemorrhagic shock (H)     Hyperglycemia     Traumatic hematoma of left hip     Chronic kidney disease, stage 3 (H)       Past Surgical History:   Procedure Laterality Date     Breast biopsy      LT, benign      SECTION       CHOLECYSTECTOMY      lap     COLONOSCOPY       LUCILLE / BSO         Current Outpatient Medications   Medication     acetaminophen (TYLENOL) 325 MG tablet     Alcohol Swabs  (EASY TOUCH ALCOHOL PREP MEDIUM) 70 % PADS     alendronate (FOSAMAX) 70 MG tablet     artificial tears OINT ophthalmic ointment     aspirin (ASPIRIN LOW DOSE) 81 MG chewable tablet     Assure [place] Lancets MISC     atorvastatin (LIPITOR) 40 MG tablet     blood glucose (NO BRAND SPECIFIED) test strip     Blood Glucose Monitoring Suppl (GLUCOCARD VITAL MONITOR) w/Device KIT     chlorthalidone (HYGROTON) 25 MG tablet     digoxin (LANOXIN) 125 MCG tablet     diphenhydrAMINE (BENADRYL) 25 MG tablet     DULoxetine (CYMBALTA) 20 MG capsule     ELIQUIS ANTICOAGULANT 5 MG tablet     fluticasone (FLONASE) 50 MCG/ACT nasal spray     GLUCOCARD VITAL TEST test strip     insulin glargine (LANTUS SOLOSTAR) 100 UNIT/ML pen     insulin lispro (HUMALOG KWIKPEN) 100 UNIT/ML (1 unit dial) KWIKPEN     insulin pen needle (29G X 12MM) 29G X 12MM miscellaneous     lisinopril (ZESTRIL) 5 MG tablet     metoprolol tartrate (LOPRESSOR) 100 MG tablet     Multiple Vitamin (TAB-A-MARIUSZ) TABS     Multiple Vitamin (TAB-A-MARIUSZ/BETA CAROTENE) TABS     nystatin (MYCOSTATIN) 636209 UNIT/GM external powder     nystatin POWD     omeprazole (PRILOSEC) 20 MG DR capsule     polyethylene glycol-propylene glycol (SYSTANE) 0.4-0.3 % SOLN ophthalmic solution     potassium chloride ER (KLOR-CON M) 20 MEQ CR tablet     SB BISMUTH 262 MG TABS     Specialty Vitamins Products (ICAPS LUTEIN & ZEAXANTHIN) TBEC     No current facility-administered medications for this visit.          Allergies   Allergen Reactions     Ampicillin      Desvenlafaxine Other (See Comments)     Desvenlafaxine Succinate  Pristiq       Sertraline Hcl Other (See Comments) and Diarrhea     Zoloft       Sulfa Drugs Other (See Comments)     Sulfa(Sulfonamide Antibiotics)       Family History   Problem Relation Age of Onset     Myocardial Infarction Mother 59        myocardial infarction, cause of death     Other - See Comments Daughter         fibromyalgia     Thyroid Disease Sister         hypo      Thyroid Disease Sister         hypo     Thyroid Disease Sister         hypo     Myocardial Infarction Brother 63        myocardial infarction, cause of death       Social History     Socioeconomic History     Marital status:      Spouse name: None     Number of children: None     Years of education: None     Highest education level: None   Occupational History     None   Tobacco Use     Smoking status: Never Smoker     Smokeless tobacco: Never Used     Tobacco comment: no passive exposure   Vaping Use     Vaping Use: Never used   Substance and Sexual Activity     Alcohol use: No     Drug use: No     Sexual activity: Never   Other Topics Concern      Service Not Asked     Blood Transfusions Yes     Comment: 11/02/2012     Caffeine Concern Yes     Comment: coffee, 4 cups daily     Occupational Exposure Not Asked     Hobby Hazards Not Asked     Sleep Concern Not Asked     Stress Concern Not Asked     Weight Concern Not Asked     Special Diet Not Asked     Back Care Not Asked     Exercise Not Asked     Bike Helmet Not Asked     Seat Belt Yes     Self-Exams Not Asked     Parent/sibling w/ CABG, MI or angioplasty before 65F 55M? Yes   Social History Narrative     None     Social Determinants of Health     Financial Resource Strain: Not on file   Food Insecurity: Not on file   Transportation Needs: Not on file   Physical Activity: Not on file   Stress: Not on file   Social Connections: Not on file   Intimate Partner Violence: Not on file   Housing Stability: Not on file       ROS: 10 point ROS neg other than the symptoms noted above in the HPI.  EXAM  Constitutional: healthy, alert and no distress    Psychiatric: mentation appears normal and affect normal/bright    LEFT FOOT FOCUSED    VASCULAR:  -Dorsalis pedis pulse +1/4   -Posterior tibial pulse +1/4   -Capillary refill time < 3 seconds to hallux  -Hair growth Absent to anterior legs and ankles  NEURO:  -Positive for paresthesias to foot  -Protective  sensation diminished with SWM +8/10 RIGHT and +6/10 LEFT on 01/25/2022  -Light touch sensation intact to plantar forefoot  DERM:  -Skin temperature within normal limits   -Skin diffusely dry and flaking to the foot  -Toenails thickened, dystrophic and discolored x 5    Wound Location:  LEFT lateral fifth toe IPJ  03/14/2022  Measurement:  0.2cm x 0.2cm x 0.3cm to capsular layer over bone and undermines 0.4cm distally  Drainage:  Moderate serous  Odor:  None  Undermining:  None  Edges:  Intact  Base:  100% capsular layer of bone  Surrounding Skin: Intact with mild increase in erythema and edema  No severe erythema, no ascending erythema, no calor, no purulence, no malodor    03/03/2022  Measurement:  0.4cm x 0.4cm x 0.3cm to capsular layer over bone and undermines 0.4cm distally  Drainage:  Moderate serous  Odor:  None  Undermining:  None  Edges:  Intact  Base:  100% capsular layer of bone  Surrounding Skin: Intact with mild increase in erythema and edema  No severe erythema, no ascending erythema, no calor, no purulence, no malodor    02/09/2022:  0.4cm x 0.5cm x 0.2cm to subcutaneous tissue  01/25/2022:  0.8cm x 0.8cm x 0.2cm to subcutaneous tissue    MSK:  -No pain on palpation to mid dorsal shaft of the LEFT third, fourth and fifth metatarsal   -Moderate decrease in arch height while patient is NWB  -Muscle strength of ankles +5/5 for dorsiflexion, plantarflexion, ABDUction and ADDuction b/l    RIGHT FOOT RADIOGRAPH 03/03/2022  IMPRESSION: No plain film evidence of osteomyelitis at this time. Recommend follow-up.    AL HERNANDEZ MD   ============================================================    ASSESSMENT:  (E11.621,  L97.525) Diabetic ulcer of toe of left foot associated with type 2 diabetes mellitus, with muscle involvement without evidence of necrosis (H)  (primary encounter diagnosis)    (E11.628,  L08.9) Diabetic foot infection (H)    (M77.42) Metatarsalgia of left foot    (L60.3)  Onychodystrophy    (L84) Callus of foot    (L85.3) Xerosis of skin    (N18.31) Stage 3a chronic kidney disease (H)    (E11.9,  Z79.4) Diabetes mellitus type 2, insulin dependent (H)    (E11.42) Diabetic polyneuropathy associated with type 2 diabetes mellitus (H)      PLAN:  -Patient evaluated and examined. Treatment options discussed with no educational barriers noted.  -Toenails last debrided on 01/25/2022    -Minimal debridement of wound required today.  -Applied Mepilex Ag and Medipore tape  -Patient's caretakers to continue to apply dressing daily  -Continue Doxycycline until regimen is complete. There are no further signs of infection today.  --Patient was instructed to look for signs of infection (redness, swelling, pain, purulence, fever, chills, nausea, vomiting) and to return to podiatry or the emergency department immediately if there are any signs of infection.     -LEFT foot radiographs from 03/03/2022-- No concerning changes at this time.    -Offloading: Keep tennis shoes and tight shoes off the foot while at home. Wear a large slipper to keep pressure off the toe.    -Patient in agreement with the above treatment plan and all of patient's questions were answered.      RTC  One months for scheduled diabetic foot ulcer and for DM shoe fitting appointment with the orthotist, Shyanne Alvarado, to follow the podiatry appointment.      Marian Geller DPM

## 2022-03-14 NOTE — PATIENT INSTRUCTIONS
Debbi Wood:    -Please change dressing every single day with Mepilex Ag (sticky side on the wound) and secured with tape.  -Please call or go to the Emergency Department if there are any signs of infection (redness, swelling, pain, purulence, fever, chills, nausea, vomiting).  -Continue antibiotics until the prescription is complete.

## 2022-03-14 NOTE — NURSING NOTE
"Chief Complaint   Patient presents with     Infection       Initial /68 (BP Location: Left arm, Patient Position: Sitting, Cuff Size: Adult Regular)   Pulse 76   Temp 97.2  F (36.2  C) (Tympanic)   SpO2 97%  Estimated body mass index is 28.35 kg/m  as calculated from the following:    Height as of 3/3/22: 1.575 m (5' 2\").    Weight as of 3/3/22: 70.3 kg (155 lb).  Medication Reconciliation: kalina Waters  "

## 2022-03-21 ENCOUNTER — TELEPHONE (OUTPATIENT)
Dept: PODIATRY | Facility: OTHER | Age: 87
End: 2022-03-21
Payer: MEDICARE

## 2022-03-21 NOTE — TELEPHONE ENCOUNTER
This writer received a VM from staff requesting patient to have Monday Wednesday and Friday dressing changes. Per provider-Yimi states she is okay with this change. Facility is aware.

## 2022-03-31 ENCOUNTER — TELEPHONE (OUTPATIENT)
Dept: EDUCATION SERVICES | Facility: HOSPITAL | Age: 87
End: 2022-03-31
Payer: MEDICARE

## 2022-03-31 DIAGNOSIS — E11.65 TYPE 2 DIABETES MELLITUS WITH HYPERGLYCEMIA, WITHOUT LONG-TERM CURRENT USE OF INSULIN (H): ICD-10-CM

## 2022-03-31 RX ORDER — INSULIN LISPRO 100 [IU]/ML
INJECTION, SOLUTION INTRAVENOUS; SUBCUTANEOUS
Qty: 15 ML | Refills: 1 | Status: SHIPPED | OUTPATIENT
Start: 2022-03-31 | End: 2022-07-18

## 2022-03-31 NOTE — TELEPHONE ENCOUNTER
Received faxed BG readings from Debbi Wood. Lantus 18 units daily, HumaLOG 12 units before breakfast and 10 units prior to lunch and supper plus correction: 200-250= 1 units, 251-300 = 2 units, greater than 300 = 3 units. No correction at 8 pm     BG readings FB-181, pre-lunch: 151-296, pre-supper: 117, 122, 135-185, 203, 275 and 8 pm: 154, 207-268.  BGs continue to trend downward.     Plan (faxed to Debbi Wood):     Increase HumaLOG to 13 units before breakfast, keep 10 units before lunch and 12 units before supper -  continue correction with all meals: 200-250= 1 units, 251-300 = 2 units, greater than 300 = 3 units. No correction at 8 pm      Fax readings weekly.

## 2022-04-05 ENCOUNTER — MEDICAL CORRESPONDENCE (OUTPATIENT)
Dept: HEALTH INFORMATION MANAGEMENT | Facility: CLINIC | Age: 87
End: 2022-04-05

## 2022-04-05 ENCOUNTER — OFFICE VISIT (OUTPATIENT)
Dept: PODIATRY | Facility: OTHER | Age: 87
End: 2022-04-05
Attending: PODIATRIST
Payer: MEDICARE

## 2022-04-05 VITALS
DIASTOLIC BLOOD PRESSURE: 63 MMHG | TEMPERATURE: 98.8 F | HEART RATE: 119 BPM | SYSTOLIC BLOOD PRESSURE: 104 MMHG | OXYGEN SATURATION: 96 % | RESPIRATION RATE: 16 BRPM

## 2022-04-05 DIAGNOSIS — E11.621 DIABETIC ULCER OF TOE OF LEFT FOOT ASSOCIATED WITH TYPE 2 DIABETES MELLITUS, WITH FAT LAYER EXPOSED (H): Primary | ICD-10-CM

## 2022-04-05 DIAGNOSIS — L60.3 ONYCHODYSTROPHY: ICD-10-CM

## 2022-04-05 DIAGNOSIS — L08.9 DIABETIC FOOT INFECTION (H): ICD-10-CM

## 2022-04-05 DIAGNOSIS — Z79.4 DIABETES MELLITUS TYPE 2, INSULIN DEPENDENT (H): ICD-10-CM

## 2022-04-05 DIAGNOSIS — N18.31 STAGE 3A CHRONIC KIDNEY DISEASE (H): ICD-10-CM

## 2022-04-05 DIAGNOSIS — E11.628 DIABETIC FOOT INFECTION (H): ICD-10-CM

## 2022-04-05 DIAGNOSIS — E11.42 DIABETIC POLYNEUROPATHY ASSOCIATED WITH TYPE 2 DIABETES MELLITUS (H): ICD-10-CM

## 2022-04-05 DIAGNOSIS — E11.9 DIABETES MELLITUS TYPE 2, INSULIN DEPENDENT (H): ICD-10-CM

## 2022-04-05 DIAGNOSIS — M77.42 METATARSALGIA OF LEFT FOOT: ICD-10-CM

## 2022-04-05 DIAGNOSIS — L84 CALLUS OF FOOT: ICD-10-CM

## 2022-04-05 DIAGNOSIS — L85.3 XEROSIS OF SKIN: ICD-10-CM

## 2022-04-05 DIAGNOSIS — L97.522 DIABETIC ULCER OF TOE OF LEFT FOOT ASSOCIATED WITH TYPE 2 DIABETES MELLITUS, WITH FAT LAYER EXPOSED (H): Primary | ICD-10-CM

## 2022-04-05 PROCEDURE — 11721 DEBRIDE NAIL 6 OR MORE: CPT | Performed by: PODIATRIST

## 2022-04-05 PROCEDURE — 11042 DBRDMT SUBQ TIS 1ST 20SQCM/<: CPT | Performed by: PODIATRIST

## 2022-04-05 PROCEDURE — G0463 HOSPITAL OUTPT CLINIC VISIT: HCPCS | Mod: 25

## 2022-04-05 ASSESSMENT — PAIN SCALES - GENERAL: PAINLEVEL: NO PAIN (0)

## 2022-04-05 NOTE — NURSING NOTE
"Chief Complaint   Patient presents with     Toenail     trimming     calluses       Initial /63 (BP Location: Left arm, Patient Position: Sitting, Cuff Size: Adult Regular)   Pulse 119   Temp 98.8  F (37.1  C) (Tympanic)   Resp 16   SpO2 96%  Estimated body mass index is 28.35 kg/m  as calculated from the following:    Height as of 3/3/22: 1.575 m (5' 2\").    Weight as of 3/3/22: 70.3 kg (155 lb).  Medication Reconciliation: complete  Pamella Allen LPN  "

## 2022-04-05 NOTE — PROGRESS NOTES
Ft Chief complaint: Patient presents with:  Toenail: trimming  calluses      History of Present Illness: This 88 year old female is seen for follow-up management of a LEFT fifth toe ulceration. The little toe ulcer is no longer painful.    Her caretakers at [facility] are changing the dressing on the toe every other day with Mepilex Ag and Medipore tape.    She is wearing a post op shoe to keep the pressure off the toe and she says this is working well for her.    She says she gets burning, tingling, and numbness in her feet.      No further pedal complaints today.       Last HbA1C was 10.9% on 2021.        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     ACP (advance care planning)     Controlled type 2 diabetes mellitus without complication, without long-term current use of insulin (H)     Permanent atrial fibrillation (H)     Lung nodule     Sepsis (H)     Hypotension due to hypovolemia     Generalized muscle weakness     Acute exacerbation of CHF (congestive heart failure) (H)     Acute on chronic systolic congestive heart failure (H)     Acute blood loss anemia     Chronic anticoagulation     Essential (primary) hypertension     Falls     Hematoma of arm, left, initial encounter     Hemorrhagic shock (H)     Hyperglycemia     Traumatic hematoma of left hip     Chronic kidney disease, stage 3 (H)       Past Surgical History:   Procedure Laterality Date     Breast biopsy      LT, benign      SECTION       CHOLECYSTECTOMY      lap      COLONOSCOPY  2005     LUCILLE / BSO         Current Outpatient Medications   Medication     acetaminophen (TYLENOL) 325 MG tablet     Alcohol Swabs (EASY TOUCH ALCOHOL PREP MEDIUM) 70 % PADS     alendronate (FOSAMAX) 70 MG tablet     artificial tears OINT ophthalmic ointment     aspirin (ASPIRIN LOW DOSE) 81 MG chewable tablet     Assure [place] Lancets MISC     atorvastatin (LIPITOR) 40 MG tablet     blood glucose (NO BRAND SPECIFIED) test strip     Blood Glucose Monitoring Suppl (GLUCOCARD VITAL MONITOR) w/Device KIT     chlorthalidone (HYGROTON) 25 MG tablet     digoxin (LANOXIN) 125 MCG tablet     diphenhydrAMINE (BENADRYL) 25 MG tablet     DULoxetine (CYMBALTA) 20 MG capsule     ELIQUIS ANTICOAGULANT 5 MG tablet     fluticasone (FLONASE) 50 MCG/ACT nasal spray     GLUCOCARD VITAL TEST test strip     insulin glargine (LANTUS SOLOSTAR) 100 UNIT/ML pen     insulin lispro (HUMALOG KWIKPEN) 100 UNIT/ML (1 unit dial) KWIKPEN     insulin pen needle (29G X 12MM) 29G X 12MM miscellaneous     lisinopril (ZESTRIL) 5 MG tablet     metoprolol tartrate (LOPRESSOR) 100 MG tablet     Multiple Vitamin (TAB-A-MARIUSZ) TABS     Multiple Vitamin (TAB-A-MARIUSZ/BETA CAROTENE) TABS     nystatin (MYCOSTATIN) 963203 UNIT/GM external powder     nystatin POWD     omeprazole (PRILOSEC) 20 MG DR capsule     polyethylene glycol-propylene glycol (SYSTANE) 0.4-0.3 % SOLN ophthalmic solution     potassium chloride ER (KLOR-CON M) 20 MEQ CR tablet     SB BISMUTH 262 MG TABS     Specialty Vitamins Products (ICAPS LUTEIN & ZEAXANTHIN) TBEC     No current facility-administered medications for this visit.          Allergies   Allergen Reactions     Ampicillin      Desvenlafaxine Other (See Comments)     Desvenlafaxine Succinate  Pristiq       Sertraline Hcl Other (See Comments) and Diarrhea     Zoloft       Sulfa Drugs Other (See Comments)     Sulfa(Sulfonamide Antibiotics)       Family History   Problem Relation Age of Onset     Myocardial Infarction Mother  59        myocardial infarction, cause of death     Other - See Comments Daughter         fibromyalgia     Thyroid Disease Sister         hypo     Thyroid Disease Sister         hypo     Thyroid Disease Sister         hypo     Myocardial Infarction Brother 63        myocardial infarction, cause of death       Social History     Socioeconomic History     Marital status:      Spouse name: None     Number of children: None     Years of education: None     Highest education level: None   Occupational History     None   Tobacco Use     Smoking status: Never Smoker     Smokeless tobacco: Never Used     Tobacco comment: no passive exposure   Vaping Use     Vaping Use: Never used   Substance and Sexual Activity     Alcohol use: No     Drug use: No     Sexual activity: Never   Other Topics Concern      Service Not Asked     Blood Transfusions Yes     Comment: 11/02/2012     Caffeine Concern Yes     Comment: coffee, 4 cups daily     Occupational Exposure Not Asked     Hobby Hazards Not Asked     Sleep Concern Not Asked     Stress Concern Not Asked     Weight Concern Not Asked     Special Diet Not Asked     Back Care Not Asked     Exercise Not Asked     Bike Helmet Not Asked     Seat Belt Yes     Self-Exams Not Asked     Parent/sibling w/ CABG, MI or angioplasty before 65F 55M? Yes   Social History Narrative     None     Social Determinants of Health     Financial Resource Strain: Not on file   Food Insecurity: Not on file   Transportation Needs: Not on file   Physical Activity: Not on file   Stress: Not on file   Social Connections: Not on file   Intimate Partner Violence: Not on file   Housing Stability: Not on file       ROS: 10 point ROS neg other than the symptoms noted above in the HPI.  EXAM  Constitutional: healthy, alert and no distress    Psychiatric: mentation appears normal and affect normal/bright    VASCULAR:  -Dorsalis pedis pulse +1/4   -Posterior tibial pulse +0/4 LEFT and +1/4 RIGHT     -Capillary refill time < 3 seconds to hallux  -Hair growth Absent to anterior legs and ankles  -Mild-to-moderate non-pitting edema to entire LEFT foot  NEURO:  -Positive for paresthesias to foot  -Positive for burning to bilateral foot  -Protective sensation diminished with SWM +8/10 RIGHT and +6/10 LEFT on 01/25/2022  -Light touch sensation diminished to bilateral plantar forefoot  DERM:  -Skin temperature within normal limits   -Skin diffusely dry and flaking to the foot  -Toenails thickened, dystrophic and discolored x 5    Wound Location:  LEFT lateral fifth toe IPJ    04/05/2022  Measurement:  0.2cm x 0.3cm x 0.1cm to the subcutaneous tissue layer  Drainage:  Moderate serous  Odor:  None  Undermining:  None  Edges:  Intact  Base:  100% subcutaneous tissue  Surrounding Skin: Intact   No severe erythema, no ascending erythema, no calor, no purulence, no malodor    03/14/2022  Measurement:  0.2cm x 0.2cm x 0.3cm to capsular layer over bone and undermines 0.4cm distally  Drainage:  Moderate serous  Odor:  None  Undermining:  None  Edges:  Intact  Base:  100% capsular layer of bone  Surrounding Skin: Intact with mild increase in erythema and edema  No severe erythema, no ascending erythema, no calor, no purulence, no malodor    03/03/2022:  0.4cm x 0.4cm x 0.3cm to capsular layer over bone and undermines 0.4cm distally  02/09/2022:  0.4cm x 0.5cm x 0.2cm to subcutaneous tissue  01/25/2022:  0.8cm x 0.8cm x 0.2cm to subcutaneous tissue    MSK:  -Moderate decrease in arch height while patient is NWB  -Muscle strength of ankles +5/5 for dorsiflexion, plantarflexion, ABDUction and ADDuction b/l    RIGHT FOOT RADIOGRAPH 03/03/2022  IMPRESSION: No plain film evidence of osteomyelitis at this time. Recommend follow-up.    AL HERNANDEZ MD   ============================================================    ASSESSMENT:  (E11.621,  L94.592) Diabetic ulcer of toe of left foot associated with type 2 diabetes mellitus,  with fat layer exposed (H)  (primary encounter diagnosis)    (M77.42) Metatarsalgia of left foot    (L60.3) Onychodystrophy    (L84) Callus of foot    (L85.3) Xerosis of skin    (N18.31) Stage 3a chronic kidney disease (H)    (E11.9,  Z79.4) Diabetes mellitus type 2, insulin dependent (H)    (E11.42) Diabetic polyneuropathy associated with type 2 diabetes mellitus (H)      PLAN:  -Patient evaluated and examined. Treatment options discussed with no educational barriers noted.  -High risk toenail debridement x 10 toenails without incident    -Excisional debridement of wound on LEFT lateral fifth toe with a sharp tissue nipper to the level of and including the subcutaneous tissue layer (debrided a total of less than 20 centimeters squared) with all non-viable soft tissue debrided from the wound bed.  -Debrided ulceration in attempt to decrease the biofilm layer, promote healing and in attempt to prevent infection  ---Dressed wound with Mepilex Ag  ---Patient's caretaker to continue with this every other day  ---No severe erythema, no ascending erythema, no calor, no purulence, no malodor, no other signs of infection. She will not need more doxycycline at this time.    --Patient was instructed to look for signs of infection (redness, swelling, pain, purulence, fever, chills, nausea, vomiting) and to return to podiatry or the emergency department immediately if there are any signs of infection.  --Instructions provided for patient's caretakers to continue monitoring for signs of infection and doing the above dressing change every other day.     -Offloading: Keep wearing the post-op shoe until the wound is healed.    -DM shoes: Patient was fit for DM shoes today on 04/05/2022 with the orthotist, Shyanne Alvarado.     -Patient in agreement with the above treatment plan and all of patient's questions were answered.      RTC one months for LEFT diabetic foot ulcer on the same day she is seeing the orthotist, Shyanne Alvarado, to be  dispensed her DM shoes and inserts      ENMA JoyceM

## 2022-04-05 NOTE — PATIENT INSTRUCTIONS
Please continue with a dressing change every other day on the left fifth toe. Please apply a Mepilex Ag (cut to dime size) and apply with Medipore tape to the LEFT fifth toe ulcer.   ----Please look for for signs of infection every day (redness, swelling, pain, purulence, fever, chills, nausea, vomiting) and to return to podiatry or the emergency department immediately if there are any signs of infection.   ----Please continue wearing post-op shoe daily on the LEFT foot to keep pressure off the ulcer site.    Thank you     Thank you for allowing  and our Podiatry team to participate in your care. Please call our office at [phone] with scheduling questions or with any other questions or concerns.

## 2022-04-11 ENCOUNTER — TELEPHONE (OUTPATIENT)
Dept: FAMILY MEDICINE | Facility: OTHER | Age: 87
End: 2022-04-11
Payer: MEDICARE

## 2022-04-11 NOTE — TELEPHONE ENCOUNTER
Called pt and spoke to daughter Cristel regarding upcoming appt with Andreea Schuster, questioning whether or not the DM shoes would be covered. Advise Cristel speak with Dr. Geller as pt has been seen for ulcers on feet caused by dm and needing the dm proper footwear and also will be seeing Shyanne Alvarado on 4/25/22 for orthotics, advise to contact [employer] as if pt does proceed with appt with Andreea Medicare will not cover the cost of shoes, will [employer]  coverage.  Transferred call to podiatry for further discussion

## 2022-04-11 NOTE — PROGRESS NOTES
"  Assessment & Plan     Controlled type 2 diabetes mellitus without complication, without long-term current use of insulin (H)    Type 2 diabetes mellitus with diabetic polyneuropathy, unspecified whether long term insulin use (H)  A1C pending. On statin. BP at goal. Eye exam encouraged. Due for follow-up with PCP. Encouraged to scheduled.     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 Will also continue follow-up with the wound center and podiatry.     Mixed hyperlipidemia  Tolerating statin. Lipids pending.     Stage 3 chronic kidney disease, unspecified whether stage 3a or 3b CKD (H)  Kidney function pending.     Permanent atrial fibrillation (H)  HR 63 today. Feels well. CHADS2-18.2 percent. On metoprolol and Eliquis. Will continue.  Does not appear to see cariology. Unsure if PCP wants this. Will refer back to PCP.     - CBC with platelets and differential; Future       BMI:   Estimated body mass index is 28.9 kg/m  as calculated from the following:    Height as of this encounter: 1.575 m (5' 2\").    Weight as of this encounter: 71.7 kg (158 lb).       Andreea Schuster NP  [facility] - [place]O Valencia is a 88 year old who presents for the following health issues  accompanied by her granddaughter .    HPI     Diabetic Shoe Request  Patient has diabetes with neuropathy. Currently following with Dr. Geller as she also has an ulcer on her left toe.     Needs diabetic shoes.     Last saw Dr. Geller on 4/5/22. Note was reviewed. Was referred to Shyanne Alvarado to fit her for orthotics.     Denies any fevers. No foot pain. No swelling.     Diabetes Follow-up    How often are you checking your blood sugar? Four or more times daily  Blood sugar testing frequency justification:  none  What time of day are you checking your blood sugars (select all that apply)?  Before meals  Have you had any blood sugars above 200?  No  Have you had any blood sugars " "below 70?  No    What symptoms do you notice when your blood sugar is low?  None    What concerns do you have today about your diabetes? None     Do you have any of these symptoms? (Select all that apply)  Numbness in feet    Have you had a diabetic eye exam in the last 12 months? No     A1C was 10.9 on 12/21/21. [facility] with the [facility].     Taking Lantus with Humalog.     Denies chest pain, shortness of breath, dizziness, syncope, or palpitations.     Hyperlipidemia Follow-Up      Are you regularly taking any medication or supplement to lower your cholesterol?   Yes- Lipitor 40 mg    Are you having muscle aches or other side effects that you think could be caused by your cholesterol lowering medication?  No   -Denies chest pain, shortness of breath, dizziness, syncope, or palpitations.    Hypertension Follow-up      Do you check your blood pressure regularly outside of the clinic? Yes     Are you following a low salt diet? Yes    Are your blood pressures ever more than 140 on the top number (systolic) OR more   than 90 on the bottom number (diastolic), for example 140/90? No   -Taking chlorthalidone 25 mg, metoprolol 100 mg BID, and lisinopril 5 mg without side effects.    -She does have A-fib, on Eliquis and metoprolol.     BP Readings from Last 2 Encounters:   04/13/22 110/66   04/05/22 104/63     Hemoglobin A1C POCT (%)   Date Value   07/09/2021 6.8 (H)   09/15/2020 8.0 (H)     Hemoglobin A1C (%)   Date Value   12/21/2021 10.9 (H)     LDL Cholesterol Calculated (mg/dL)   Date Value   09/15/2020 52   07/17/2019 53       Review of Systems   Constitutional, HEENT, cardiovascular, pulmonary, gi and gu systems are negative, except as otherwise noted.      Objective    /66 (BP Location: Right arm, Patient Position: Chair, Cuff Size: Adult Large)   Pulse 63   Temp 97.4  F (36.3  C) (Tympanic)   Ht 1.575 m (5' 2\")   Wt 71.7 kg (158 lb)   SpO2 98%   BMI 28.90 kg/m    Body mass index is 28.9 " kg/m .  Physical Exam   GENERAL: healthy, alert and no distress  EYES: Eyes grossly normal to inspection, PERRL and conjunctivae and sclerae normal  HENT: normal cephalic/atraumatic, ear canals and TM's normal, nose and mouth without ulcers or lesions, oropharynx clear and oral mucous membranes moist  NECK: no adenopathy, no asymmetry, masses, or scars and thyroid normal to palpation  RESP: lungs clear to auscultation - no rales, rhonchi or wheezes  CV: irregularly irregular, systolic murmur present, no lower leg swelling  ABDOMEN: soft, nontender, no hepatosplenomegaly, no masses and bowel sounds normal  NEURO: Normal strength and tone, mentation intact and speech normal  PSYCH: mentation appears normal, affect normal/bright  Diabetic foot exam: decreased DP and PT pulses, but they are present, left foot with ulcer-covered, did not remove bandage as following with the wound center and podiatry and decreased sensation throughout entire foot    Blood work pending

## 2022-04-12 DIAGNOSIS — E11.9 CONTROLLED TYPE 2 DIABETES MELLITUS WITHOUT COMPLICATION, WITHOUT LONG-TERM CURRENT USE OF INSULIN (H): ICD-10-CM

## 2022-04-12 DIAGNOSIS — E11.9 TYPE 2 DIABETES MELLITUS WITHOUT COMPLICATION, WITHOUT LONG-TERM CURRENT USE OF INSULIN (H): ICD-10-CM

## 2022-04-13 ENCOUNTER — OFFICE VISIT (OUTPATIENT)
Dept: FAMILY MEDICINE | Facility: OTHER | Age: 87
End: 2022-04-13
Attending: NURSE PRACTITIONER
Payer: MEDICARE

## 2022-04-13 VITALS
DIASTOLIC BLOOD PRESSURE: 66 MMHG | HEART RATE: 63 BPM | HEIGHT: 62 IN | SYSTOLIC BLOOD PRESSURE: 110 MMHG | OXYGEN SATURATION: 98 % | WEIGHT: 158 LBS | TEMPERATURE: 97.4 F | BODY MASS INDEX: 29.08 KG/M2

## 2022-04-13 DIAGNOSIS — E11.9 CONTROLLED TYPE 2 DIABETES MELLITUS WITHOUT COMPLICATION, WITHOUT LONG-TERM CURRENT USE OF INSULIN (H): Primary | ICD-10-CM

## 2022-04-13 DIAGNOSIS — L97.529 DIABETIC ULCER OF LEFT FOOT DUE TO TYPE 2 DIABETES MELLITUS (H): ICD-10-CM

## 2022-04-13 DIAGNOSIS — E11.42 TYPE 2 DIABETES MELLITUS WITH DIABETIC POLYNEUROPATHY, UNSPECIFIED WHETHER LONG TERM INSULIN USE (H): ICD-10-CM

## 2022-04-13 DIAGNOSIS — E11.621 DIABETIC ULCER OF LEFT FOOT DUE TO TYPE 2 DIABETES MELLITUS (H): ICD-10-CM

## 2022-04-13 DIAGNOSIS — E78.2 MIXED HYPERLIPIDEMIA: ICD-10-CM

## 2022-04-13 DIAGNOSIS — N18.30 STAGE 3 CHRONIC KIDNEY DISEASE, UNSPECIFIED WHETHER STAGE 3A OR 3B CKD (H): ICD-10-CM

## 2022-04-13 DIAGNOSIS — I48.21 PERMANENT ATRIAL FIBRILLATION (H): ICD-10-CM

## 2022-04-13 PROBLEM — A41.9 SEPSIS (H): Status: RESOLVED | Noted: 2019-06-29 | Resolved: 2022-04-13

## 2022-04-13 PROBLEM — R73.9 HYPERGLYCEMIA: Status: RESOLVED | Noted: 2021-06-14 | Resolved: 2022-04-13

## 2022-04-13 LAB
ANION GAP SERPL CALCULATED.3IONS-SCNC: 6 MMOL/L (ref 3–14)
BUN SERPL-MCNC: 23 MG/DL (ref 7–30)
CALCIUM SERPL-MCNC: 9.1 MG/DL (ref 8.5–10.1)
CHLORIDE BLD-SCNC: 104 MMOL/L (ref 94–109)
CHOLEST SERPL-MCNC: 104 MG/DL
CO2 SERPL-SCNC: 25 MMOL/L (ref 20–32)
CREAT SERPL-MCNC: 0.96 MG/DL (ref 0.52–1.04)
CREAT UR-MCNC: 121 MG/DL
EST. AVERAGE GLUCOSE BLD GHB EST-MCNC: 157 MG/DL
FASTING STATUS PATIENT QL REPORTED: YES
GFR SERPL CREATININE-BSD FRML MDRD: 57 ML/MIN/1.73M2
GLUCOSE BLD-MCNC: 110 MG/DL (ref 70–99)
HBA1C MFR BLD: 7.1 % (ref 0–5.6)
HDLC SERPL-MCNC: 47 MG/DL
LDLC SERPL CALC-MCNC: 40 MG/DL
MICROALBUMIN UR-MCNC: 18 MG/L
MICROALBUMIN/CREAT UR: 14.88 MG/G CR (ref 0–25)
NONHDLC SERPL-MCNC: 57 MG/DL
POTASSIUM BLD-SCNC: 4 MMOL/L (ref 3.4–5.3)
SODIUM SERPL-SCNC: 135 MMOL/L (ref 133–144)
TRIGL SERPL-MCNC: 87 MG/DL

## 2022-04-13 PROCEDURE — 80048 BASIC METABOLIC PNL TOTAL CA: CPT | Mod: ZL | Performed by: NURSE PRACTITIONER

## 2022-04-13 PROCEDURE — G0463 HOSPITAL OUTPT CLINIC VISIT: HCPCS

## 2022-04-13 PROCEDURE — 83036 HEMOGLOBIN GLYCOSYLATED A1C: CPT | Mod: ZL | Performed by: NURSE PRACTITIONER

## 2022-04-13 PROCEDURE — G0463 HOSPITAL OUTPT CLINIC VISIT: HCPCS | Mod: 25

## 2022-04-13 PROCEDURE — 99214 OFFICE O/P EST MOD 30 MIN: CPT | Performed by: NURSE PRACTITIONER

## 2022-04-13 PROCEDURE — 36415 COLL VENOUS BLD VENIPUNCTURE: CPT | Mod: ZL | Performed by: NURSE PRACTITIONER

## 2022-04-13 PROCEDURE — 80061 LIPID PANEL: CPT | Mod: ZL | Performed by: NURSE PRACTITIONER

## 2022-04-13 PROCEDURE — 82043 UR ALBUMIN QUANTITATIVE: CPT | Mod: ZL | Performed by: NURSE PRACTITIONER

## 2022-04-13 RX ORDER — BLOOD-GLUCOSE METER
KIT MISCELLANEOUS
Qty: 100 STRIP | Refills: 0 | Status: SHIPPED | OUTPATIENT
Start: 2022-04-13 | End: 2022-04-27

## 2022-04-13 RX ORDER — ISOPROPYL ALCOHOL 70 ML/100ML
SWAB TOPICAL
Qty: 100 EACH | Refills: 0 | Status: SHIPPED | OUTPATIENT
Start: 2022-04-13 | End: 2022-04-27

## 2022-04-13 RX ORDER — LANCETS 21 GAUGE
EACH MISCELLANEOUS
Qty: 100 EACH | Refills: 0 | Status: SHIPPED | OUTPATIENT
Start: 2022-04-13 | End: 2022-06-01

## 2022-04-13 ASSESSMENT — PAIN SCALES - GENERAL: PAINLEVEL: NO PAIN (0)

## 2022-04-13 NOTE — NURSING NOTE
"Chief Complaint   Patient presents with     Dme     Diabetic Shoes        Initial /66 (BP Location: Right arm, Patient Position: Chair, Cuff Size: Adult Large)   Pulse 63   Temp 97.4  F (36.3  C) (Tympanic)   Ht 1.575 m (5' 2\")   Wt 71.7 kg (158 lb)   SpO2 98%   BMI 28.90 kg/m   Estimated body mass index is 28.9 kg/m  as calculated from the following:    Height as of this encounter: 1.575 m (5' 2\").    Weight as of this encounter: 71.7 kg (158 lb).  Medication Reconciliation: complete  Elizabeth Munoz LPN  "

## 2022-04-13 NOTE — TELEPHONE ENCOUNTER
Lancets       Last Written Prescription Date:  3/8/22  Last Fill Quantity: 100,   # refills: 0    Test Strips      Last Written Prescription Date:  3/2/22  Last Fill Quantity: 100,   # refills: 0    Alcohol Swabs      Last Written Prescription Date:  12/1/21  Last Fill Quantity: 100,   # refills: 0  Last Office Visit: 7/21/21  Future Office visit:    Next 5 appointments (look out 90 days)    Apr 25, 2022 12:30 PM  (Arrive by 12:15 PM)  Return Visit with Marian Geller DPM  [facility] ([facility] ) [address] [place] [id number] [phone]   Jun 07, 2022  1:00 PM  (Arrive by 12:45 PM)  SHORT with Radha Murray MD  [facility] ([facility] ) [address] [place] [id number]  [phone]

## 2022-04-25 ENCOUNTER — OFFICE VISIT (OUTPATIENT)
Dept: PODIATRY | Facility: OTHER | Age: 87
End: 2022-04-25
Attending: PODIATRIST
Payer: MEDICARE

## 2022-04-25 VITALS
OXYGEN SATURATION: 99 % | HEIGHT: 62 IN | DIASTOLIC BLOOD PRESSURE: 78 MMHG | WEIGHT: 158 LBS | RESPIRATION RATE: 16 BRPM | HEART RATE: 82 BPM | BODY MASS INDEX: 29.08 KG/M2 | SYSTOLIC BLOOD PRESSURE: 137 MMHG | TEMPERATURE: 97.3 F

## 2022-04-25 DIAGNOSIS — L84 CALLUS OF FOOT: Primary | ICD-10-CM

## 2022-04-25 DIAGNOSIS — Z79.4 DIABETES MELLITUS TYPE 2, INSULIN DEPENDENT (H): ICD-10-CM

## 2022-04-25 DIAGNOSIS — L85.3 XEROSIS OF SKIN: ICD-10-CM

## 2022-04-25 DIAGNOSIS — E11.42 DIABETIC POLYNEUROPATHY ASSOCIATED WITH TYPE 2 DIABETES MELLITUS (H): ICD-10-CM

## 2022-04-25 DIAGNOSIS — L60.3 ONYCHODYSTROPHY: ICD-10-CM

## 2022-04-25 DIAGNOSIS — E11.9 DIABETES MELLITUS TYPE 2, INSULIN DEPENDENT (H): ICD-10-CM

## 2022-04-25 DIAGNOSIS — N18.31 STAGE 3A CHRONIC KIDNEY DISEASE (H): ICD-10-CM

## 2022-04-25 PROCEDURE — G0463 HOSPITAL OUTPT CLINIC VISIT: HCPCS

## 2022-04-25 PROCEDURE — 99213 OFFICE O/P EST LOW 20 MIN: CPT | Performed by: PODIATRIST

## 2022-04-25 ASSESSMENT — PAIN SCALES - GENERAL: PAINLEVEL: NO PAIN (0)

## 2022-04-25 NOTE — PATIENT INSTRUCTIONS
Debbi Wood:    -Annie's wound on the LEFT fifth toe is healed. I applied gauze and tape. Please continue with applying Mepilex Ag and tape every day until she gets offloading, donut-shaped corn pads. Once she has these, they can replace the Mepilex Ag pads. The hold of the pad should be applied at the previous location of the wound. You may need to double up two pads to make sure there is no rubbing on this toe in her new shoes.  ---This needs to be done daily with the corn pads removed at night so the skin can breathe. If the corn pads are still in good shape and are not dirty, then may be re-used the next day.    -Annie may start wearing her new DM shoes, but if the toe becomes red or irritated, please have her return to the post-op Velcro shoe until the skin looks healthy again.    -If the wound re-opens, please go back to using the Mepilex Ag and tape with every other day dressing changes.

## 2022-04-25 NOTE — NURSING NOTE
"Chief Complaint   Patient presents with     Toenail     TOENAILS AND CALLOUS       Initial /78   Pulse 82   Temp 97.3  F (36.3  C)   Resp 16   Ht 1.575 m (5' 2\")   Wt 71.7 kg (158 lb)   SpO2 99%   BMI 28.90 kg/m   Estimated body mass index is 28.9 kg/m  as calculated from the following:    Height as of this encounter: 1.575 m (5' 2\").    Weight as of this encounter: 71.7 kg (158 lb).  Medication Reconciliation: complete  Amie Costello LPN  "

## 2022-04-25 NOTE — PROGRESS NOTES
"Ft Chief complaint: Patient presents with:  Toenail: TOENAILS AND CALLOUS      History of Present Illness: This 88 year old female is seen for follow-up management of a LEFT fifth toe ulceration. The little toe ulcer is no longer painful.    She recently accidentally ran into a sharp object while in the bathroom and she developed bruising around her eyes. This occurred around 04/21/2022.    Her caretakers at [facility] are changing the dressing on the toe every other day with Mepilex Ag and Medipore tape.    She is wearing a post op shoe to keep the pressure off the toe and she says this is working well for her.She received her new DM shoes today on 04/25/2022.    She says she gets burning, tingling, and numbness in her feet.      No further pedal complaints today.       Last HbA1C was 10.9% on 12/21/2021.        /78   Pulse 82   Temp 97.3  F (36.3  C)   Resp 16   Ht 1.575 m (5' 2\")   Wt 71.7 kg (158 lb)   SpO2 99%   BMI 28.90 kg/m      Patient Active Problem List   Diagnosis     Advanced care planning/counseling discussion     Sciatica     Diverticulosis of large intestine     Osteoporosis     Obesity     Congenital cystic kidney disease     Myalgia and myositis     Atrial fibrillation with RVR (H)     Calculus of kidney     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     ACP (advance care planning)     Controlled type 2 diabetes mellitus without complication, without long-term current use of insulin (H)     Permanent atrial fibrillation (H)     Lung nodule     Hypotension due to hypovolemia     Generalized muscle weakness     Acute exacerbation of CHF (congestive heart failure) (H)     Acute on chronic systolic congestive heart failure (H)     Acute blood loss anemia     Chronic anticoagulation     Essential (primary) hypertension     Falls     Hematoma of arm, left, initial encounter     Hemorrhagic shock (H)     Traumatic hematoma of left hip     Chronic kidney disease, stage 3 (H) "       Past Surgical History:   Procedure Laterality Date     Breast biopsy      LT, benign      SECTION       CHOLECYSTECTOMY      lap     COLONOSCOPY       LUCILLE / BSO         Current Outpatient Medications   Medication     acetaminophen (TYLENOL) 325 MG tablet     Alcohol Swabs (EASY TOUCH ALCOHOL PREP MEDIUM) 70 % PADS     alendronate (FOSAMAX) 70 MG tablet     artificial tears OINT ophthalmic ointment     aspirin (ASPIRIN LOW DOSE) 81 MG chewable tablet     Assure [place] Lancets MISC     atorvastatin (LIPITOR) 40 MG tablet     blood glucose (NO BRAND SPECIFIED) test strip     Blood Glucose Monitoring Suppl (GLUCOCARD VITAL MONITOR) w/Device KIT     chlorthalidone (HYGROTON) 25 MG tablet     digoxin (LANOXIN) 125 MCG tablet     diphenhydrAMINE (BENADRYL) 25 MG tablet     DULoxetine (CYMBALTA) 20 MG capsule     ELIQUIS ANTICOAGULANT 5 MG tablet     fluticasone (FLONASE) 50 MCG/ACT nasal spray     GLUCOCARD VITAL TEST test strip     insulin glargine (LANTUS SOLOSTAR) 100 UNIT/ML pen     insulin lispro (HUMALOG KWIKPEN) 100 UNIT/ML (1 unit dial) KWIKPEN     insulin pen needle (29G X 12MM) 29G X 12MM miscellaneous     lisinopril (ZESTRIL) 5 MG tablet     metoprolol tartrate (LOPRESSOR) 100 MG tablet     Multiple Vitamin (TAB-A-MARIUSZ) TABS     Multiple Vitamin (TAB-A-MARIUSZ/BETA CAROTENE) TABS     nystatin (MYCOSTATIN) 791027 UNIT/GM external powder     nystatin POWD     omeprazole (PRILOSEC) 20 MG DR capsule     polyethylene glycol-propylene glycol (SYSTANE) 0.4-0.3 % SOLN ophthalmic solution     potassium chloride ER (KLOR-CON M) 20 MEQ CR tablet     SB BISMUTH 262 MG TABS     Specialty Vitamins Products (ICAPS LUTEIN & ZEAXANTHIN) TBEC     No current facility-administered medications for this visit.          Allergies   Allergen Reactions     Ampicillin      Desvenlafaxine Other (See Comments)     Desvenlafaxine Succinate  Pristiq       Sertraline Hcl Other (See Comments) and Diarrhea     Zoloft       Sulfa  Drugs Other (See Comments)     Sulfa(Sulfonamide Antibiotics)       Family History   Problem Relation Age of Onset     Myocardial Infarction Mother 59        myocardial infarction, cause of death     Other - See Comments Daughter         fibromyalgia     Thyroid Disease Sister         hypo     Thyroid Disease Sister         hypo     Thyroid Disease Sister         hypo     Myocardial Infarction Brother 63        myocardial infarction, cause of death       Social History     Socioeconomic History     Marital status:      Spouse name: None     Number of children: None     Years of education: None     Highest education level: None   Occupational History     None   Tobacco Use     Smoking status: Never Smoker     Smokeless tobacco: Never Used     Tobacco comment: no passive exposure   Vaping Use     Vaping Use: Never used   Substance and Sexual Activity     Alcohol use: No     Drug use: No     Sexual activity: Never   Other Topics Concern      Service Not Asked     Blood Transfusions Yes     Comment: 11/02/2012     Caffeine Concern Yes     Comment: coffee, 4 cups daily     Occupational Exposure Not Asked     Hobby Hazards Not Asked     Sleep Concern Not Asked     Stress Concern Not Asked     Weight Concern Not Asked     Special Diet Not Asked     Back Care Not Asked     Exercise Not Asked     Bike Helmet Not Asked     Seat Belt Yes     Self-Exams Not Asked     Parent/sibling w/ CABG, MI or angioplasty before 65F 55M? Yes   Social History Narrative     None     Social Determinants of Health     Financial Resource Strain: Not on file   Food Insecurity: Not on file   Transportation Needs: Not on file   Physical Activity: Not on file   Stress: Not on file   Social Connections: Not on file   Intimate Partner Violence: Not on file   Housing Stability: Not on file       ROS: 10 point ROS neg other than the symptoms noted above in the HPI.  EXAM  Constitutional: healthy, alert and no distress    Psychiatric:  mentation appears normal and affect normal/bright    VASCULAR:  -Dorsalis pedis pulse +1/4   -Posterior tibial pulse +0/4 LEFT and +1/4 RIGHT    -Capillary refill time < 3 seconds to hallux  -Hair growth Absent to anterior legs and ankles  -Mild-to-moderate non-pitting edema to entire LEFT foot  NEURO:  -Positive for paresthesias to foot  -Positive for burning to bilateral foot  -Protective sensation diminished with SWM +8/10 RIGHT and +6/10 LEFT on 01/25/2022  -Light touch sensation diminished to bilateral plantar forefoot  DERM:  -Skin temperature within normal limits   -Skin diffusely dry and flaking to the foot  -Toenails thickened, dystrophic and discolored x 5    Wound Location:  LEFT lateral fifth toe IPJ    04/25/2022  Measurement:  Healed with hyperkeratotic lesion overlying the previous wound site    04/05/2022  Measurement:  0.2cm x 0.3cm x 0.1cm to the subcutaneous tissue layer  Drainage:  Moderate serous  Odor:  None  Undermining:  None  Edges:  Intact  Base:  100% subcutaneous tissue  Surrounding Skin: Intact   No severe erythema, no ascending erythema, no calor, no purulence, no malodor    03/14/2022:  0.2cm x 0.2cm x 0.3cm to capsular layer over bone and undermines 0.4cm distally  03/03/2022:  0.4cm x 0.4cm x 0.3cm to capsular layer over bone and undermines 0.4cm distally  02/09/2022:  0.4cm x 0.5cm x 0.2cm to subcutaneous tissue  01/25/2022:  0.8cm x 0.8cm x 0.2cm to subcutaneous tissue    MSK:  -Moderate decrease in arch height while patient is NWB  -Muscle strength of ankles +5/5 for dorsiflexion, plantarflexion, ABDUction and ADDuction b/l    RIGHT FOOT RADIOGRAPH 03/03/2022  IMPRESSION: No plain film evidence of osteomyelitis at this time. Recommend follow-up.    AL HERNANDEZ MD   ============================================================    ASSESSMENT:  (L84) Callus of foot  (primary encounter diagnosis)    (L60.3) Onychodystrophy    (L85.3) Xerosis of skin    (N18.31) Stage 3a chronic  kidney disease (H)    (E11.9,  Z79.4) Diabetes mellitus type 2, insulin dependent (H)    (E11.42) Diabetic polyneuropathy associated with type 2 diabetes mellitus (H)      PLAN:  -Patient evaluated and examined. Treatment options discussed with no educational barriers noted.  -Toenails last debrided on 04/05/2022  -Minimal callus paring today -- the ulcer on the LEFT lateral fifth toe is healed today.     -DM shoes: Patient was dispensed DM shoes today on 04/05/2022 by the orthotist, Shyanne Alvarado.     ------------------------------------    -Patient was given the following instructions and the instructions were written for her care facility to follow at [facility]:    -Annie's wound on the LEFT fifth toe is healed. I applied gauze and tape. Please continue with applying Mepilex Ag and tape every day until she gets offloading, donut-shaped corn pads. Once she has these, they can replace the Mepilex Ag pads. The hold of the pad should be applied at the previous location of the wound. You may need to double up two pads to make sure there is no rubbing on this toe in her new shoes.  ---This needs to be done daily with the corn pads removed at night so the skin can breathe. If the corn pads are still in good shape and are not dirty, then may be re-used the next day.    -Annie may start wearing her new DM shoes, but if the toe becomes red or irritated, please have her return to the post-op Velcro shoe until the skin looks healthy again.    -If the wound re-opens, please go back to using the Mepilex Ag and tape with every other day dressing changes.    -Patient in agreement with the above treatment plan and all of patient's questions were answered.      RTC one months for LEFT diabetic foot ulcer on the same day she is seeing the orthotist, Shyanne Alvarado, to be dispensed her DM shoes and inserts      Marian Geller DPM

## 2022-04-27 DIAGNOSIS — E11.9 TYPE 2 DIABETES MELLITUS WITHOUT COMPLICATION, WITHOUT LONG-TERM CURRENT USE OF INSULIN (H): ICD-10-CM

## 2022-04-27 RX ORDER — BLOOD-GLUCOSE METER
KIT MISCELLANEOUS
Qty: 100 STRIP | Refills: 0 | Status: SHIPPED | OUTPATIENT
Start: 2022-04-27 | End: 2022-12-22

## 2022-04-27 RX ORDER — ISOPROPYL ALCOHOL 70 ML/100ML
SWAB TOPICAL
Qty: 100 EACH | Refills: 0 | Status: SHIPPED | OUTPATIENT
Start: 2022-04-27 | End: 2022-09-27

## 2022-04-27 NOTE — TELEPHONE ENCOUNTER
Alcohol Swabs      Last Written Prescription Date:  4/13/22  Last Fill Quantity: 100,   # refills: 0  Last Office Visit: 4/13/22  Future Office visit:    Next 5 appointments (look out 90 days)    Jun 07, 2022  1:00 PM  (Arrive by 12:45 PM)  SHORT with Radha Murray MD  [facility] ([facility] ) [address]  [place] [address]  [phone]   Jun 08, 2022  1:00 PM  (Arrive by 12:45 PM)  Return Visit with Marina Geller [facility] ([facility] ) [address] [place] [id number] [phone]           Test strips      Last Written Prescription Date:  4/13/22  Last Fill Quantity: 100,   # refills: 0  Last Office Visit: 4/13/22  Future Office visit:    Next 5 appointments (look out 90 days)    Jun 07, 2022  1:00 PM  (Arrive by 12:45 PM)  SHORT with Radha Murray MD  [facility] ([facility] ) [address]  [place] [address]  [phone]   Jun 08, 2022  1:00 PM  (Arrive by 12:45 PM)  Return Visit with Marian Geller [facility] ([facility] ) [address] [place] [id number] [phone]

## 2022-05-02 ENCOUNTER — TELEPHONE (OUTPATIENT)
Dept: EDUCATION SERVICES | Facility: HOSPITAL | Age: 87
End: 2022-05-02
Payer: MEDICARE

## 2022-05-02 DIAGNOSIS — Z79.4 TYPE 2 DIABETES MELLITUS WITH HYPERGLYCEMIA, WITH LONG-TERM CURRENT USE OF INSULIN (H): Primary | ICD-10-CM

## 2022-05-02 DIAGNOSIS — E11.65 TYPE 2 DIABETES MELLITUS WITH HYPERGLYCEMIA, WITH LONG-TERM CURRENT USE OF INSULIN (H): Primary | ICD-10-CM

## 2022-05-02 NOTE — TELEPHONE ENCOUNTER
Received fax from Meghann requesting order for Freestyle Emmy 2 for Pat. Pat does meet Medicare criteria.    I called Meghann and discussed with her that we can start by sending prescription as requested through [place], but it may be that we may need to go through a DME mail order company. So we will see how this turns out. We may need a clinic appointment with a provider that documents how she meets Medicare criteria and then send order and clinic note to DME..

## 2022-05-05 ENCOUNTER — TELEPHONE (OUTPATIENT)
Dept: EDUCATION SERVICES | Facility: HOSPITAL | Age: 87
End: 2022-05-05
Payer: MEDICARE

## 2022-05-05 NOTE — TELEPHONE ENCOUNTER
Fax received from [facility] [place] needed on the pt's Emmy Rx. PA completed on [email or web address]. Response from insurance listed below. Pharmacy notified. They will continue to work on this and suspect Medicare Part B needs to be billed.    Additional Information Required  Please advise the pharmacy to first submit request to members primary plan.

## 2022-05-10 NOTE — TELEPHONE ENCOUNTER
Called [facility] and they transferred me to [facility] as Martine does the Medicare billing which is still primarily on paper. Martine tells me that it was sent in on 5/5/22 and they are still waiting for a reply.

## 2022-05-13 DIAGNOSIS — E11.9 CONTROLLED TYPE 2 DIABETES MELLITUS WITHOUT COMPLICATION, WITHOUT LONG-TERM CURRENT USE OF INSULIN (H): ICD-10-CM

## 2022-05-14 ENCOUNTER — HEALTH MAINTENANCE LETTER (OUTPATIENT)
Age: 87
End: 2022-05-14

## 2022-05-16 RX ORDER — PEN NEEDLE, DIABETIC, SAFETY 30 GX3/16"
NEEDLE, DISPOSABLE MISCELLANEOUS
Qty: 100 EACH | Refills: 1 | Status: SHIPPED | OUTPATIENT
Start: 2022-05-16 | End: 2022-07-08

## 2022-05-31 DIAGNOSIS — E11.9 CONTROLLED TYPE 2 DIABETES MELLITUS WITHOUT COMPLICATION, WITHOUT LONG-TERM CURRENT USE OF INSULIN (H): ICD-10-CM

## 2022-06-01 RX ORDER — LANCETS 21 GAUGE
EACH MISCELLANEOUS
Qty: 100 EACH | Refills: 3 | Status: SHIPPED | OUTPATIENT
Start: 2022-06-01 | End: 2022-12-22

## 2022-06-08 ENCOUNTER — OFFICE VISIT (OUTPATIENT)
Dept: PODIATRY | Facility: OTHER | Age: 87
End: 2022-06-08
Attending: FAMILY MEDICINE
Payer: MEDICARE

## 2022-06-08 VITALS
TEMPERATURE: 96.9 F | HEART RATE: 78 BPM | DIASTOLIC BLOOD PRESSURE: 70 MMHG | SYSTOLIC BLOOD PRESSURE: 135 MMHG | OXYGEN SATURATION: 98 %

## 2022-06-08 DIAGNOSIS — Z79.4 DIABETES MELLITUS TYPE 2, INSULIN DEPENDENT (H): ICD-10-CM

## 2022-06-08 DIAGNOSIS — N18.31 STAGE 3A CHRONIC KIDNEY DISEASE (H): ICD-10-CM

## 2022-06-08 DIAGNOSIS — E11.621 DIABETIC ULCER OF TOE OF LEFT FOOT ASSOCIATED WITH TYPE 2 DIABETES MELLITUS, WITH FAT LAYER EXPOSED (H): Primary | ICD-10-CM

## 2022-06-08 DIAGNOSIS — L84 CALLUS OF FOOT: ICD-10-CM

## 2022-06-08 DIAGNOSIS — E11.9 DIABETES MELLITUS TYPE 2, INSULIN DEPENDENT (H): ICD-10-CM

## 2022-06-08 DIAGNOSIS — L97.522 DIABETIC ULCER OF TOE OF LEFT FOOT ASSOCIATED WITH TYPE 2 DIABETES MELLITUS, WITH FAT LAYER EXPOSED (H): Primary | ICD-10-CM

## 2022-06-08 DIAGNOSIS — E11.42 DIABETIC POLYNEUROPATHY ASSOCIATED WITH TYPE 2 DIABETES MELLITUS (H): ICD-10-CM

## 2022-06-08 DIAGNOSIS — L60.3 ONYCHODYSTROPHY: ICD-10-CM

## 2022-06-08 DIAGNOSIS — L85.3 XEROSIS OF SKIN: ICD-10-CM

## 2022-06-08 PROCEDURE — 11042 DBRDMT SUBQ TIS 1ST 20SQCM/<: CPT | Performed by: PODIATRIST

## 2022-06-08 PROCEDURE — 11721 DEBRIDE NAIL 6 OR MORE: CPT | Performed by: PODIATRIST

## 2022-06-08 PROCEDURE — G0463 HOSPITAL OUTPT CLINIC VISIT: HCPCS | Mod: 25

## 2022-06-08 ASSESSMENT — PAIN SCALES - GENERAL: PAINLEVEL: NO PAIN (0)

## 2022-06-08 NOTE — PATIENT INSTRUCTIONS
Jamaica Wood instructions:    -Please change the dressing daily with Mepilex Ag and tape on the LEFT lateral fifth toe.  -Please have patient wear a post-op shoe to keep pressure off the wound at all times. She may wear a regular shoe to appointments or when she has to leave the facility.    Thank you

## 2022-06-08 NOTE — NURSING NOTE
"Chief Complaint   Patient presents with     Toenail     DFE       Initial /70 (BP Location: Left arm, Patient Position: Sitting, Cuff Size: Adult Regular)   Pulse 78   Temp 96.9  F (36.1  C) (Tympanic)   SpO2 98%  Estimated body mass index is 28.9 kg/m  as calculated from the following:    Height as of 4/25/22: 1.575 m (5' 2\").    Weight as of 4/25/22: 71.7 kg (158 lb).  Medication Reconciliation: kalina Waters  "

## 2022-06-08 NOTE — PROGRESS NOTES
Ft Chief complaint: Patient presents with:  Toenail: DFE      History of Present Illness: This 88 year old female is seen for follow-up management of a LEFT fifth toe ulceration. The little toe ulcer is no longer painful.    She says her caretakers at [facility] are applying a corn pad to the LEFT lateral fifth toe daily. She has no pain from the toe.    She is wearing her DM shoes that she received on 04/25/2022. She wears them every day.      She says she gets burning, tingling, and numbness in her feet.      No further pedal complaints today.     Lab Results   Component Value Date    A1C 7.1 04/13/2022    A1C 10.9 12/21/2021    A1C 6.8 07/09/2021    A1C 8.0 09/15/2020    A1C 6.8 04/02/2019    A1C 6.3 10/30/2018    A1C 6.1 10/24/2017       /70 (BP Location: Left arm, Patient Position: Sitting, Cuff Size: Adult Regular)   Pulse 78   Temp 96.9  F (36.1  C) (Tympanic)   SpO2 98%     Patient Active Problem List   Diagnosis     Advanced care planning/counseling discussion     Sciatica     Diverticulosis of large intestine     Osteoporosis     Obesity     Congenital cystic kidney disease     Myalgia and myositis     Atrial fibrillation with RVR (H)     Calculus of kidney     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     ACP (advance care planning)     Controlled type 2 diabetes mellitus without complication, without long-term current use of insulin (H)     Permanent atrial fibrillation (H)     Lung nodule     Hypotension due to hypovolemia     Generalized muscle weakness     Acute exacerbation of CHF (congestive heart failure) (H)     Acute on chronic systolic congestive heart failure (H)     Acute blood loss anemia     Chronic anticoagulation     Essential (primary) hypertension     Falls     Hematoma of arm, left, initial encounter     Hemorrhagic shock (H)     Traumatic hematoma of left hip     Chronic kidney disease, stage 3 (H)       Past Surgical History:   Procedure Laterality Date      Breast biopsy      LT, benign      SECTION       CHOLECYSTECTOMY      lap     COLONOSCOPY       LUCILLE / BSO         Current Outpatient Medications   Medication     acetaminophen (TYLENOL) 325 MG tablet     Alcohol Swabs (EASY TOUCH ALCOHOL PREP MEDIUM) 70 % PADS     alendronate (FOSAMAX) 70 MG tablet     artificial tears OINT ophthalmic ointment     aspirin (ASPIRIN LOW DOSE) 81 MG chewable tablet     Assure Layton Lancets MISC     atorvastatin (LIPITOR) 40 MG tablet     BD AUTOSHIELD DUO 30G X 5 MM     blood glucose (NO BRAND SPECIFIED) test strip     Blood Glucose Monitoring Suppl (GLUCOCARD VITAL MONITOR) w/Device KIT     chlorthalidone (HYGROTON) 25 MG tablet     Continuous Blood Gluc  (FREESTYLE ALYSON 2 READER) MARC     Continuous Blood Gluc Sensor (FREESTYLE ALYSON 2 SENSOR) MISC     digoxin (LANOXIN) 125 MCG tablet     diphenhydrAMINE (BENADRYL) 25 MG tablet     DULoxetine (CYMBALTA) 20 MG capsule     ELIQUIS ANTICOAGULANT 5 MG tablet     fluticasone (FLONASE) 50 MCG/ACT nasal spray     GLUCOCARD VITAL TEST test strip     insulin glargine (LANTUS SOLOSTAR) 100 UNIT/ML pen     insulin lispro (HUMALOG KWIKPEN) 100 UNIT/ML (1 unit dial) KWIKPEN     lisinopril (ZESTRIL) 5 MG tablet     metoprolol tartrate (LOPRESSOR) 100 MG tablet     Multiple Vitamin (TAB-A-MARIUSZ) TABS     Multiple Vitamin (TAB-A-MARIUSZ/BETA CAROTENE) TABS     nystatin (MYCOSTATIN) 782960 UNIT/GM external powder     nystatin POWD     omeprazole (PRILOSEC) 20 MG DR capsule     polyethylene glycol-propylene glycol (SYSTANE) 0.4-0.3 % SOLN ophthalmic solution     potassium chloride ER (KLOR-CON M) 20 MEQ CR tablet     SB BISMUTH 262 MG TABS     Specialty Vitamins Products (ICAPS LUTEIN & ZEAXANTHIN) TBEC     No current facility-administered medications for this visit.          Allergies   Allergen Reactions     Ampicillin      Desvenlafaxine Other (See Comments)     Desvenlafaxine Succinate  Pristiq       Sertraline Hcl Other (See  Comments) and Diarrhea     Zoloft       Sulfa Drugs Other (See Comments)     Sulfa(Sulfonamide Antibiotics)       Family History   Problem Relation Age of Onset     Myocardial Infarction Mother 59        myocardial infarction, cause of death     Other - See Comments Daughter         fibromyalgia     Thyroid Disease Sister         hypo     Thyroid Disease Sister         hypo     Thyroid Disease Sister         hypo     Myocardial Infarction Brother 63        myocardial infarction, cause of death       Social History     Socioeconomic History     Marital status:      Spouse name: None     Number of children: None     Years of education: None     Highest education level: None   Occupational History     None   Tobacco Use     Smoking status: Never Smoker     Smokeless tobacco: Never Used     Tobacco comment: no passive exposure   Vaping Use     Vaping Use: Never used   Substance and Sexual Activity     Alcohol use: No     Drug use: No     Sexual activity: Never   Other Topics Concern      Service Not Asked     Blood Transfusions Yes     Comment: 11/02/2012     Caffeine Concern Yes     Comment: coffee, 4 cups daily     Occupational Exposure Not Asked     Hobby Hazards Not Asked     Sleep Concern Not Asked     Stress Concern Not Asked     Weight Concern Not Asked     Special Diet Not Asked     Back Care Not Asked     Exercise Not Asked     Bike Helmet Not Asked     Seat Belt Yes     Self-Exams Not Asked     Parent/sibling w/ CABG, MI or angioplasty before 65F 55M? Yes   Social History Narrative     None     Social Determinants of Health     Financial Resource Strain: Not on file   Food Insecurity: Not on file   Transportation Needs: Not on file   Physical Activity: Not on file   Stress: Not on file   Social Connections: Not on file   Intimate Partner Violence: Not on file   Housing Stability: Not on file       ROS: 10 point ROS neg other than the symptoms noted above in the HPI.  EXAM  Constitutional:  healthy, alert and no distress    Psychiatric: mentation appears normal and affect normal/bright    VASCULAR:  -Dorsalis pedis pulse +1/4   -Posterior tibial pulse +0/4 LEFT and +1/4 RIGHT    -Capillary refill time < 3 seconds to hallux  -Hair growth Absent to anterior legs and ankles  -Mild-to-moderate non-pitting edema to entire LEFT foot  NEURO:  -Positive for paresthesias to foot  -Positive for burning to bilateral foot  -Protective sensation diminished with SWM +8/10 RIGHT and +6/10 LEFT on 01/25/2022  -Light touch sensation diminished to bilateral plantar forefoot  DERM:  -Skin temperature within normal limits   -Skin diffusely dry and flaking to the foot  -Toenails thickened, dystrophic and discolored x 5    Wound Location:  LEFT lateral fifth toe IPJ    06/08/2022  Measurement:  0.4cm x 0.5cm x 0.1cm to the subcutaneous tissue layer  Drainage:  Moderate serous  Odor:  None  Undermining:  None  Edges:  Intact  Base:  100% subcutaneous tissue  Surrounding Skin: Intact   No severe erythema, no ascending erythema, no calor, no purulence, no malodor    04/25/2022  Measurement:  Healed with hyperkeratotic lesion overlying the previous wound site    04/05/2022:  0.2cm x 0.3cm x 0.1cm to the subcutaneous tissue layer  03/14/2022:  0.2cm x 0.2cm x 0.3cm to capsular layer over bone and undermines 0.4cm distally  03/03/2022:  0.4cm x 0.4cm x 0.3cm to capsular layer over bone and undermines 0.4cm distally  02/09/2022:  0.4cm x 0.5cm x 0.2cm to subcutaneous tissue  01/25/2022:  0.8cm x 0.8cm x 0.2cm to subcutaneous tissue    MSK:  -Moderate decrease in arch height while patient is NWB  -Muscle strength of ankles +5/5 for dorsiflexion, plantarflexion, ABDUction and ADDuction b/l    RIGHT FOOT RADIOGRAPH 03/03/2022  IMPRESSION: No plain film evidence of osteomyelitis at this time. Recommend follow-up.    AL HERNANDEZ MD   ============================================================    ASSESSMENT:  (U40.047,   L97.522) Diabetic ulcer of toe of left foot associated with type 2 diabetes mellitus, with fat layer exposed (H)  (primary encounter diagnosis)    (L60.3) Onychodystrophy    (L84) Callus of foot    (L85.3) Xerosis of skin    (N18.31) Stage 3a chronic kidney disease (H)    (E11.9,  Z79.4) Diabetes mellitus type 2, insulin dependent (H)    (E11.42) Diabetic polyneuropathy associated with type 2 diabetes mellitus (H)      PLAN:  -Patient evaluated and examined. Treatment options discussed with no educational barriers noted.  -High risk toenail debridement x 10 toenails without incident    -Excisional debridement of wound on LEFT lateral fifth toe with a sharp nipper to the level of and including the subcutaneous tissue layer (debrided a total of less than 20 centimeters squared) with all non-viable soft tissue debrided from the wound bed.  -Debrided ulceration in attempt to decrease the biofilm layer, promote healing and in attempt to prevent infection  ---Dressed wound with Mepilex Ag and Medipore tape    -DM shoes: Patient was dispensed DM shoes on 04/05/2022 by the orthotist, Shyanne Alvarado.   -Offloading: Resume the post-op shoe at the facility until the wound is healed. She may wear her regular Dm shoes to appointments and outside the facility.    ------------------------------------    -Patient in agreement with the above treatment plan and all of patient's questions were answered.      RTC three to four weeks to evaluate LEFT diabetic foot ulcer   Return to clinic 63+ days for LEFT diabetic foot ulcer and high risk nail debridement and diabetic foot exam       Marian Geller, MARY, DPM

## 2022-06-28 ENCOUNTER — OFFICE VISIT (OUTPATIENT)
Dept: PODIATRY | Facility: OTHER | Age: 87
End: 2022-06-28
Attending: PODIATRIST
Payer: MEDICARE

## 2022-06-28 VITALS
OXYGEN SATURATION: 98 % | HEART RATE: 74 BPM | TEMPERATURE: 96.6 F | SYSTOLIC BLOOD PRESSURE: 138 MMHG | DIASTOLIC BLOOD PRESSURE: 75 MMHG

## 2022-06-28 DIAGNOSIS — Z79.4 DIABETES MELLITUS TYPE 2, INSULIN DEPENDENT (H): ICD-10-CM

## 2022-06-28 DIAGNOSIS — E11.9 DIABETES MELLITUS TYPE 2, INSULIN DEPENDENT (H): ICD-10-CM

## 2022-06-28 DIAGNOSIS — Z86.31 PERSONAL HISTORY OF DIABETIC FOOT ULCER: ICD-10-CM

## 2022-06-28 DIAGNOSIS — E11.42 DIABETIC POLYNEUROPATHY ASSOCIATED WITH TYPE 2 DIABETES MELLITUS (H): ICD-10-CM

## 2022-06-28 DIAGNOSIS — L85.3 XEROSIS OF SKIN: ICD-10-CM

## 2022-06-28 DIAGNOSIS — N18.31 STAGE 3A CHRONIC KIDNEY DISEASE (H): ICD-10-CM

## 2022-06-28 DIAGNOSIS — L60.3 ONYCHODYSTROPHY: ICD-10-CM

## 2022-06-28 DIAGNOSIS — L84 CALLUS OF FOOT: Primary | ICD-10-CM

## 2022-06-28 PROCEDURE — G0463 HOSPITAL OUTPT CLINIC VISIT: HCPCS

## 2022-06-28 PROCEDURE — 99212 OFFICE O/P EST SF 10 MIN: CPT | Performed by: PODIATRIST

## 2022-06-28 PROCEDURE — G0463 HOSPITAL OUTPT CLINIC VISIT: HCPCS | Mod: 25

## 2022-06-28 ASSESSMENT — PAIN SCALES - GENERAL: PAINLEVEL: NO PAIN (0)

## 2022-06-28 NOTE — PATIENT INSTRUCTIONS
Instructions for Debbi Wood:  -Please discontinue the dressings on the left fifth toe.  -Please apply a silicone toe sleeve on the left fifth toe every day and remove it every night so that the skin can air out overnight. The toe sleeves are hand washable and re-usable. Please do not throw them away unless they are worn out. Two sleeves were dispensed to her in the podiatry clinic today on 06/28/2022.    -If the wound re-opens, please resume the Mepilex Ag dressing every day and call the clinic at [phone] or go to the Emergency Department if there are any signs of infection. Thank you

## 2022-06-28 NOTE — PROGRESS NOTES
Ft Chief complaint: Patient presents with:  Wound Check      History of Present Illness: This 88 year old female is seen for follow-up management of a LEFT fifth toe ulceration. The little toe ulcer is no longer painful.    She says her caretakers at [facility] are applying a Mepilex Ag pad to the LEFT lateral fifth toe daily. She has no pain from the toe.    She is wearing her DM shoes that she received on 04/25/2022. She wears them when she goes to appointments, but she has been wearing a post-op shoe at all other times. She says the Velcro is starting to wear out and the shoe is wearing out.    She says she gets burning, tingling, and numbness in her feet.    No further pedal complaints today.     Lab Results   Component Value Date    A1C 7.1 04/13/2022    A1C 10.9 12/21/2021    A1C 6.8 07/09/2021    A1C 8.0 09/15/2020    A1C 6.8 04/02/2019    A1C 6.3 10/30/2018    A1C 6.1 10/24/2017       /75 (BP Location: Left arm, Patient Position: Sitting, Cuff Size: Adult Regular)   Pulse 74   Temp (!) 96.6  F (35.9  C) (Tympanic)   SpO2 98%     Patient Active Problem List   Diagnosis     Advanced care planning/counseling discussion     Sciatica     Diverticulosis of large intestine     Osteoporosis     Obesity     Congenital cystic kidney disease     Myalgia and myositis     Atrial fibrillation with RVR (H)     Calculus of kidney     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     ACP (advance care planning)     Controlled type 2 diabetes mellitus without complication, without long-term current use of insulin (H)     Permanent atrial fibrillation (H)     Lung nodule     Hypotension due to hypovolemia     Generalized muscle weakness     Acute exacerbation of CHF (congestive heart failure) (H)     Acute on chronic systolic congestive heart failure (H)     Acute blood loss anemia     Chronic anticoagulation     Essential (primary) hypertension     Falls     Hematoma of arm, left, initial encounter      Hemorrhagic shock (H)     Traumatic hematoma of left hip     Chronic kidney disease, stage 3 (H)       Past Surgical History:   Procedure Laterality Date     Breast biopsy      LT, benign      SECTION       CHOLECYSTECTOMY      lap     COLONOSCOPY       LUCILLE / BSO         Current Outpatient Medications   Medication     acetaminophen (TYLENOL) 325 MG tablet     Alcohol Swabs (EASY TOUCH ALCOHOL PREP MEDIUM) 70 % PADS     alendronate (FOSAMAX) 70 MG tablet     artificial tears OINT ophthalmic ointment     aspirin (ASPIRIN LOW DOSE) 81 MG chewable tablet     Assure Layton Lancets MISC     atorvastatin (LIPITOR) 40 MG tablet     BD AUTOSHIELD DUO 30G X 5 MM     blood glucose (NO BRAND SPECIFIED) test strip     Blood Glucose Monitoring Suppl (GLUCOCARD VITAL MONITOR) w/Device KIT     chlorthalidone (HYGROTON) 25 MG tablet     Continuous Blood Gluc  (FREESTYLE ALYSON 2 READER) MARC     Continuous Blood Gluc Sensor (FREESTYLE ALYSON 2 SENSOR) MISC     digoxin (LANOXIN) 125 MCG tablet     diphenhydrAMINE (BENADRYL) 25 MG tablet     DULoxetine (CYMBALTA) 20 MG capsule     ELIQUIS ANTICOAGULANT 5 MG tablet     fluticasone (FLONASE) 50 MCG/ACT nasal spray     GLUCOCARD VITAL TEST test strip     insulin glargine (LANTUS SOLOSTAR) 100 UNIT/ML pen     insulin lispro (HUMALOG KWIKPEN) 100 UNIT/ML (1 unit dial) KWIKPEN     lisinopril (ZESTRIL) 5 MG tablet     metoprolol tartrate (LOPRESSOR) 100 MG tablet     Multiple Vitamin (TAB-A-MARIUSZ) TABS     Multiple Vitamin (TAB-A-MARIUSZ/BETA CAROTENE) TABS     nystatin (MYCOSTATIN) 933838 UNIT/GM external powder     nystatin POWD     omeprazole (PRILOSEC) 20 MG DR capsule     polyethylene glycol-propylene glycol (SYSTANE) 0.4-0.3 % SOLN ophthalmic solution     potassium chloride ER (KLOR-CON M) 20 MEQ CR tablet     SB BISMUTH 262 MG TABS     Specialty Vitamins Products (ICAPS LUTEIN & ZEAXANTHIN) TBEC     No current facility-administered medications for this visit.           Allergies   Allergen Reactions     Ampicillin      Desvenlafaxine Other (See Comments)     Desvenlafaxine Succinate  Pristiq       Sertraline Hcl Other (See Comments) and Diarrhea     Zoloft       Sulfa Drugs Other (See Comments)     Sulfa(Sulfonamide Antibiotics)       Family History   Problem Relation Age of Onset     Myocardial Infarction Mother 59        myocardial infarction, cause of death     Other - See Comments Daughter         fibromyalgia     Thyroid Disease Sister         hypo     Thyroid Disease Sister         hypo     Thyroid Disease Sister         hypo     Myocardial Infarction Brother 63        myocardial infarction, cause of death       Social History     Socioeconomic History     Marital status:      Spouse name: None     Number of children: None     Years of education: None     Highest education level: None   Occupational History     None   Tobacco Use     Smoking status: Never Smoker     Smokeless tobacco: Never Used     Tobacco comment: no passive exposure   Vaping Use     Vaping Use: Never used   Substance and Sexual Activity     Alcohol use: No     Drug use: No     Sexual activity: Never   Other Topics Concern      Service Not Asked     Blood Transfusions Yes     Comment: 11/02/2012     Caffeine Concern Yes     Comment: coffee, 4 cups daily     Occupational Exposure Not Asked     Hobby Hazards Not Asked     Sleep Concern Not Asked     Stress Concern Not Asked     Weight Concern Not Asked     Special Diet Not Asked     Back Care Not Asked     Exercise Not Asked     Bike Helmet Not Asked     Seat Belt Yes     Self-Exams Not Asked     Parent/sibling w/ CABG, MI or angioplasty before 65F 55M? Yes   Social History Narrative     None     Social Determinants of Health     Financial Resource Strain: Not on file   Food Insecurity: Not on file   Transportation Needs: Not on file   Physical Activity: Not on file   Stress: Not on file   Social Connections: Not on file   Intimate Partner  Violence: Not on file   Housing Stability: Not on file       ROS: 10 point ROS neg other than the symptoms noted above in the HPI.  EXAM  Constitutional: healthy, alert and no distress    Psychiatric: mentation appears normal and affect normal/bright    VASCULAR:  -Dorsalis pedis pulse +1/4   -Posterior tibial pulse +0/4 LEFT and +1/4 RIGHT    -Capillary refill time < 3 seconds to hallux  -Hair growth Absent to anterior legs and ankles  -Mild-to-moderate non-pitting edema to entire LEFT foot  NEURO:  -Positive for paresthesias to foot  -Positive for burning to bilateral foot  -Protective sensation diminished with SWM +8/10 RIGHT and +6/10 LEFT on 01/25/2022  -Light touch sensation diminished to bilateral plantar forefoot  DERM:  -Skin temperature within normal limits   -Skin diffusely dry and flaking to the foot  -Toenails thickened, dystrophic and discolored x 5    Wound Location:  LEFT lateral fifth toe IPJ  06/28/2022: Healed    06/08/2022  Measurement:  0.4cm x 0.5cm x 0.1cm to the subcutaneous tissue layer  Drainage:  Moderate serous  Odor:  None  Undermining:  None  Edges:  Intact  Base:  100% subcutaneous tissue  Surrounding Skin: Intact   No severe erythema, no ascending erythema, no calor, no purulence, no malodor    04/25/2022:  Healed with hyperkeratotic lesion overlying the previous wound site  04/05/2022:  0.2cm x 0.3cm x 0.1cm to the subcutaneous tissue layer  03/14/2022:  0.2cm x 0.2cm x 0.3cm to capsular layer over bone and undermines 0.4cm distally  03/03/2022:  0.4cm x 0.4cm x 0.3cm to capsular layer over bone and undermines 0.4cm distally  02/09/2022:  0.4cm x 0.5cm x 0.2cm to subcutaneous tissue  01/25/2022:  0.8cm x 0.8cm x 0.2cm to subcutaneous tissue    MSK:  -Moderate decrease in arch height while patient is NWB  -Muscle strength of ankles +5/5 for dorsiflexion, plantarflexion, ABDUction and ADDuction b/l    RIGHT FOOT RADIOGRAPH 03/03/2022  IMPRESSION: No plain film evidence of osteomyelitis  at this time. Recommend follow-up.    AL HERNANDEZ MD   ============================================================    ASSESSMENT:  (L84) Callus of foot  (primary encounter diagnosis)    (L60.3) Onychodystrophy    (L85.3) Xerosis of skin    (N18.31) Stage 3a chronic kidney disease (H)    (Z86.31) Personal history of diabetic foot ulcer    (E11.9,  Z79.4) Diabetes mellitus type 2, insulin dependent (H)    (E11.42) Diabetic polyneuropathy associated with type 2 diabetes mellitus (H)      PLAN:  -Patient evaluated and examined. Treatment options discussed with no educational barriers noted.  -Toenails last debrided on 06/08/2022    -The LEFT lateral fifth toe ulcer is healed.  ------Dispensed size small toe sleeve. Patient may find these at [facility] or [facility]. Patient should not wear the toe sleeve(s) at night, but only wear the sleeve in shoes and/or socks during the day. The sleeves are re-usable and hand washable until they wear out. Instructions written for patient's care facility to assist her with applying and removing the toe sleeve daily.    -DM shoes: Patient was dispensed DM shoes on 04/05/2022 by the orthotist, Shyanne Alvarado. She may resume wearing these at all times. The shoes have a soft, flexible material that will create less pressure on the toe than a firm, leather shoe.    -Patient in agreement with the above treatment plan and all of patient's questions were answered.      Return to clinic 63+ days for LEFT diabetic foot ulcer and high risk nail debridement and diabetic foot exam  (already scheduled for 08/16/2022)      Marian Geller DPM, MARY

## 2022-06-28 NOTE — NURSING NOTE
"Chief Complaint   Patient presents with     Wound Check       Initial /75 (BP Location: Left arm, Patient Position: Sitting, Cuff Size: Adult Regular)   Pulse 74   Temp (!) 96.6  F (35.9  C) (Tympanic)   SpO2 98%  Estimated body mass index is 28.9 kg/m  as calculated from the following:    Height as of 4/25/22: 1.575 m (5' 2\").    Weight as of 4/25/22: 71.7 kg (158 lb).  Medication Reconciliation: kalina Waters  "

## 2022-06-29 DIAGNOSIS — E11.9 CONTROLLED TYPE 2 DIABETES MELLITUS WITHOUT COMPLICATION, WITHOUT LONG-TERM CURRENT USE OF INSULIN (H): ICD-10-CM

## 2022-06-29 DIAGNOSIS — R69 DIAGNOSIS UNKNOWN: ICD-10-CM

## 2022-06-29 DIAGNOSIS — I48.91 ATRIAL FIBRILLATION WITH RVR (H): ICD-10-CM

## 2022-06-29 DIAGNOSIS — I50.23 ACUTE ON CHRONIC SYSTOLIC CONGESTIVE HEART FAILURE (H): ICD-10-CM

## 2022-06-29 RX ORDER — DULOXETIN HYDROCHLORIDE 20 MG/1
CAPSULE, DELAYED RELEASE ORAL
Qty: 58 CAPSULE | Refills: 9 | Status: SHIPPED | OUTPATIENT
Start: 2022-06-29 | End: 2023-01-01

## 2022-06-29 RX ORDER — POTASSIUM CHLORIDE 1500 MG/1
20 TABLET, EXTENDED RELEASE ORAL DAILY
Qty: 29 TABLET | Refills: 9 | Status: SHIPPED | OUTPATIENT
Start: 2022-06-29 | End: 2023-01-01

## 2022-06-29 RX ORDER — MULTIVITAMIN WITH FOLIC ACID 400 MCG
1 TABLET ORAL DAILY
Qty: 29 TABLET | Refills: 9 | Status: SHIPPED | OUTPATIENT
Start: 2022-06-29 | End: 2023-01-01

## 2022-06-29 RX ORDER — METOPROLOL TARTRATE 100 MG
TABLET ORAL
Qty: 58 TABLET | Refills: 9 | Status: SHIPPED | OUTPATIENT
Start: 2022-06-29 | End: 2023-01-01

## 2022-06-29 NOTE — TELEPHONE ENCOUNTER
Multi vit      Last Written Prescription Date:  9/21/21  Last Fill Quantity: 29,   # refills: 9  Last Office Visit: 4/13/22  Future Office visit:    Next 5 appointments (look out 90 days)    Aug 16, 2022  2:00 PM  (Arrive by 1:45 PM)  Return Visit with Marian Geller DPM  [facility] ([facility] ) [address] [place] [id number] [phone]         metoprolol      Last Written Prescription Date:  9/21/21  Last Fill Quantity: 58,   # refills: 9  Last Office Visit: 4/13/22  Future Office visit:      cymbalta      Last Written Prescription Date:  9/21/21  Last Fill Quantity: 58,   # refills: 9  Last Office Visit: 4/13/22  Future Office visit:      tino dior      Last Written Prescription Date:  9/21/21  Last Fill Quantity: 29,   # refills: 9  Last Office Visit: 4/13/22  Future Office visit:

## 2022-07-06 DIAGNOSIS — E11.9 CONTROLLED TYPE 2 DIABETES MELLITUS WITHOUT COMPLICATION, WITHOUT LONG-TERM CURRENT USE OF INSULIN (H): ICD-10-CM

## 2022-07-08 RX ORDER — PEN NEEDLE, DIABETIC, SAFETY 30 GX3/16"
NEEDLE, DISPOSABLE MISCELLANEOUS
Qty: 100 EACH | Refills: 0 | Status: SHIPPED | OUTPATIENT
Start: 2022-07-08 | End: 2022-07-15

## 2022-07-08 NOTE — TELEPHONE ENCOUNTER
BD autoshield needles      Last Written Prescription Date:  5/16/22  Last Fill Quantity: 100,   # refills: 1  Last Office Visit: 4/13/22  Future Office visit:    Next 5 appointments (look out 90 days)    Aug 16, 2022  2:00 PM  (Arrive by 1:45 PM)  Return Visit with Marian Geller DPM  [facility] ([facility] ) [address] [place] [id number] [phone]

## 2022-07-14 DIAGNOSIS — E11.9 CONTROLLED TYPE 2 DIABETES MELLITUS WITHOUT COMPLICATION, WITHOUT LONG-TERM CURRENT USE OF INSULIN (H): ICD-10-CM

## 2022-07-15 DIAGNOSIS — E11.65 TYPE 2 DIABETES MELLITUS WITH HYPERGLYCEMIA, WITHOUT LONG-TERM CURRENT USE OF INSULIN (H): ICD-10-CM

## 2022-07-15 RX ORDER — PEN NEEDLE, DIABETIC, SAFETY 30 GX3/16"
NEEDLE, DISPOSABLE MISCELLANEOUS
Qty: 100 EACH | Refills: 0 | Status: SHIPPED | OUTPATIENT
Start: 2022-07-15 | End: 2022-09-02

## 2022-07-15 NOTE — TELEPHONE ENCOUNTER
BD Autoshield DUO 30G x 5 MM  Use 5 pen needles daily  Last Written Prescription Date:  7-8-2022  Last Fill Quantity: 100 each,   # refills: 0  Last Office Visit: 4-  Future Office visit:    Next 5 appointments (look out 90 days)    Aug 16, 2022  2:00 PM  (Arrive by 1:45 PM)  Return Visit with Marian Geller DPM  [facility] ([facility] ) [address] [place] [id number] [phone]

## 2022-07-18 RX ORDER — INSULIN LISPRO 100 [IU]/ML
INJECTION, SOLUTION INTRAVENOUS; SUBCUTANEOUS
Qty: 15 ML | Refills: 0 | Status: SHIPPED | OUTPATIENT
Start: 2022-07-18 | End: 2022-09-08

## 2022-07-18 NOTE — TELEPHONE ENCOUNTER
Humalog      Last Written Prescription Date:  6.20.22  Last Fill Quantity: #15mL,   # refills: 0  Last Office Visit: 4.13.22 with Andreea Schuster  Future Office visit:    Next 5 appointments (look out 90 days)    Aug 16, 2022  2:00 PM  (Arrive by 1:45 PM)  Return Visit with Marian Geller DPM  [facility] ([facility] ) [address] [place] [id number] [phone]           Routing refill request to provider for review/approval because:

## 2022-07-25 DIAGNOSIS — I48.91 ATRIAL FIBRILLATION WITH RVR (H): ICD-10-CM

## 2022-07-26 RX ORDER — DIGOXIN 125 MCG
TABLET ORAL
Qty: 15 TABLET | Refills: 0 | Status: SHIPPED | OUTPATIENT
Start: 2022-07-26 | End: 2022-08-23

## 2022-07-26 NOTE — TELEPHONE ENCOUNTER
digoxin (LANOXIN) 125 MCG tablet      Last Written Prescription Date:  3/9/22  Last Fill Quantity: 15,   # refills: 4  Last Office Visit: 4/13/22  Future Office visit:    Next 5 appointments (look out 90 days)    Aug 16, 2022  2:00 PM  (Arrive by 1:45 PM)  Return Visit with Marian Geller DPM  [facility] ([facility] ) [address] [place] [id number] [phone]           Routing refill request to provider for review/approval because:  Normal digoxin level on file in past 12 mos        Recent Labs   Lab Test 03/23/18  0945   DIGOXIN 0.5

## 2022-08-22 DIAGNOSIS — I48.91 ATRIAL FIBRILLATION WITH RVR (H): ICD-10-CM

## 2022-08-23 RX ORDER — DIGOXIN 125 MCG
TABLET ORAL
Qty: 15 TABLET | Refills: 0 | Status: SHIPPED | OUTPATIENT
Start: 2022-08-23 | End: 2022-09-19

## 2022-08-23 NOTE — TELEPHONE ENCOUNTER
digoxin (LANOXIN) 125 MCG tablet      Last Written Prescription Date:  7-26-22  Last Fill Quantity: 15,   # refills: 0  Last Office Visit: 4-13-22  Future Office visit:    Next 5 appointments (look out 90 days)    Sep 15, 2022 11:30 AM  (Arrive by 11:15 AM)  Return Visit with Marian Geller DPM  [facility] ([facility] ) [address] [place] [id number] [phone]           Routing refill request to provider for review/approval because:   Normal digoxin level on file in past 12 mos    Lab Results   Component Value Date    DIGOXIN 0.5 03/23/2018       Has establish care appt on 12-23-22 with Dr. Murray

## 2022-09-01 DIAGNOSIS — E11.9 CONTROLLED TYPE 2 DIABETES MELLITUS WITHOUT COMPLICATION, WITHOUT LONG-TERM CURRENT USE OF INSULIN (H): ICD-10-CM

## 2022-09-02 RX ORDER — PEN NEEDLE, DIABETIC, SAFETY 30 GX3/16"
NEEDLE, DISPOSABLE MISCELLANEOUS
Qty: 100 EACH | Refills: 0 | Status: SHIPPED | OUTPATIENT
Start: 2022-09-02 | End: 2022-09-27

## 2022-09-02 NOTE — TELEPHONE ENCOUNTER
Duo Needle      Last Written Prescription Date:  7/15/22  Last Fill Quantity: 100,   # refills: 0  Last Office Visit: 6/7/22  Future Office visit:    Next 5 appointments (look out 90 days)    Sep 15, 2022 11:30 AM  (Arrive by 11:15 AM)  Return Visit with Marian Geller DPM  [facility] ([facility] [place] ) [address] [place] [phone]           Routing refill request to provider for review/approval because:

## 2022-09-04 ENCOUNTER — HEALTH MAINTENANCE LETTER (OUTPATIENT)
Age: 87
End: 2022-09-04

## 2022-09-06 DIAGNOSIS — E11.65 TYPE 2 DIABETES MELLITUS WITH HYPERGLYCEMIA, WITHOUT LONG-TERM CURRENT USE OF INSULIN (H): ICD-10-CM

## 2022-09-08 ENCOUNTER — PATIENT OUTREACH (OUTPATIENT)
Dept: EDUCATION SERVICES | Facility: HOSPITAL | Age: 87
End: 2022-09-08

## 2022-09-08 RX ORDER — INSULIN LISPRO 100 [IU]/ML
INJECTION, SOLUTION INTRAVENOUS; SUBCUTANEOUS
Qty: 15 ML | Refills: 0 | Status: SHIPPED | OUTPATIENT
Start: 2022-09-08 | End: 2022-10-18

## 2022-09-08 NOTE — PROGRESS NOTES
Received fax from [facility]: [facility] Wood-BG report.     on 8/29 BG was 116 in the am, 146 at noon, 107 at dinner time, and 213 at HS  on 8/30 BG was  248 in the am, 221 at noon, 117 at dinner time, and 199 at HS  on 8/31 BG was 184 in the am, 99 at noon, 97 at dinner time, and 130 at HS  on 9/1 BG was  111 in the am, 130 at noon, 102 at dinner time, and 55 at HS  on 9/2 BG was 131 in the am, 152 at noon, 43 at dinner time, and 181 at HS  on 9/3 BG was 94 in the am, 117 at noon, 136 at dinner time, and 161 at HS  on 9/4 BG was 91 in the am, 106 at noon, 69 at dinner time, and 181 at HS  on 9/5 BG was 102 in the am, 113 at noon, 143 at dinner time, and 139 at HS    No change at this time.   Rosaline Patrick RN Diabetes Educator,  [phone]  9/8/2022 at 3:31 PM

## 2022-09-08 NOTE — TELEPHONE ENCOUNTER
Humalog      Last Written Prescription Date:  7/18/22  Last Fill Quantity: 15 mL,   # refills: 0  Last Office Visit: 4/13/22  Future Office visit:    Next 5 appointments (look out 90 days)    Sep 15, 2022 11:30 AM  (Arrive by 11:15 AM)  Return Visit with Marian Geller DPM  [facility] ([facility] [place] ) [address] [place] [phone]           Routing refill request to provider for review/approval because:

## 2022-09-15 ENCOUNTER — OFFICE VISIT (OUTPATIENT)
Dept: PODIATRY | Facility: OTHER | Age: 87
End: 2022-09-15
Attending: PODIATRIST
Payer: MEDICARE

## 2022-09-15 VITALS
DIASTOLIC BLOOD PRESSURE: 84 MMHG | HEART RATE: 97 BPM | TEMPERATURE: 98.4 F | OXYGEN SATURATION: 97 % | SYSTOLIC BLOOD PRESSURE: 123 MMHG

## 2022-09-15 DIAGNOSIS — N18.31 STAGE 3A CHRONIC KIDNEY DISEASE (H): ICD-10-CM

## 2022-09-15 DIAGNOSIS — L60.3 ONYCHODYSTROPHY: ICD-10-CM

## 2022-09-15 DIAGNOSIS — E11.42 DIABETIC POLYNEUROPATHY ASSOCIATED WITH TYPE 2 DIABETES MELLITUS (H): ICD-10-CM

## 2022-09-15 DIAGNOSIS — E11.9 DIABETES MELLITUS TYPE 2, INSULIN DEPENDENT (H): ICD-10-CM

## 2022-09-15 DIAGNOSIS — Z79.4 DIABETES MELLITUS TYPE 2, INSULIN DEPENDENT (H): ICD-10-CM

## 2022-09-15 DIAGNOSIS — L85.3 XEROSIS OF SKIN: ICD-10-CM

## 2022-09-15 DIAGNOSIS — Z86.31 PERSONAL HISTORY OF DIABETIC FOOT ULCER: ICD-10-CM

## 2022-09-15 DIAGNOSIS — L84 CALLUS OF FOOT: Primary | ICD-10-CM

## 2022-09-15 PROCEDURE — 11055 PARING/CUTG B9 HYPRKER LES 1: CPT | Performed by: PODIATRIST

## 2022-09-15 PROCEDURE — G0463 HOSPITAL OUTPT CLINIC VISIT: HCPCS | Mod: 25

## 2022-09-15 PROCEDURE — 11721 DEBRIDE NAIL 6 OR MORE: CPT | Mod: 59 | Performed by: PODIATRIST

## 2022-09-15 ASSESSMENT — PAIN SCALES - GENERAL: PAINLEVEL: NO PAIN (0)

## 2022-09-15 NOTE — PROGRESS NOTES
Ft Chief complaint: Patient presents with:  Toenail: DFE      History of Present Illness: This 88 year old female is seen for follow-up management of a LEFT fifth toe ulceration, diabetic foot exam and high risk nail debridement.    Patient says he has not had any drainage or pain from her LEFT fifth toe. She applies a toe sleeve on her LEFT fifth toe every day and she removes it every evening. She wears a material type of DM shoe every day and all day that does not press on her toes. She received the shoes from the orthotist, Shyanne Alvarado, on 04/25/2022.    Patient lives at [facility].    She has a history of burning, tingling, and numbness in her feet.    No further pedal complaints today.         Lab Results   Component Value Date    A1C 7.1 04/13/2022    A1C 10.9 12/21/2021    A1C 6.8 07/09/2021    A1C 8.0 09/15/2020    A1C 6.8 04/02/2019    A1C 6.3 10/30/2018    A1C 6.1 10/24/2017       /84 (BP Location: Left arm, Patient Position: Sitting, Cuff Size: Adult Regular)   Pulse 97   Temp 98.4  F (36.9  C) (Tympanic)   SpO2 97%     Patient Active Problem List   Diagnosis     Advanced care planning/counseling discussion     Sciatica     Diverticulosis of large intestine     Osteoporosis     Obesity     Congenital cystic kidney disease     Myalgia and myositis     Atrial fibrillation with RVR (H)     Calculus of kidney     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     ACP (advance care planning)     Controlled type 2 diabetes mellitus without complication, without long-term current use of insulin (H)     Permanent atrial fibrillation (H)     Lung nodule     Hypotension due to hypovolemia     Generalized muscle weakness     Acute exacerbation of CHF (congestive heart failure) (H)     Acute on chronic systolic congestive heart failure (H)     Acute blood loss anemia     Chronic anticoagulation     Essential (primary) hypertension     Falls     Hematoma of arm, left, initial encounter      Hemorrhagic shock (H)     Traumatic hematoma of left hip     Chronic kidney disease, stage 3 (H)       Past Surgical History:   Procedure Laterality Date     Breast biopsy      LT, benign      SECTION       CHOLECYSTECTOMY      lap     COLONOSCOPY       LUCILLE / BSO         Current Outpatient Medications   Medication     acetaminophen (TYLENOL) 325 MG tablet     Alcohol Swabs (EASY TOUCH ALCOHOL PREP MEDIUM) 70 % PADS     alendronate (FOSAMAX) 70 MG tablet     artificial tears OINT ophthalmic ointment     aspirin (ASPIRIN LOW DOSE) 81 MG chewable tablet     Assure Layton Lancets MISC     atorvastatin (LIPITOR) 40 MG tablet     BD AUTOSHIELD DUO 30G X 5 MM     blood glucose (NO BRAND SPECIFIED) test strip     Blood Glucose Monitoring Suppl (GLUCOCARD VITAL MONITOR) w/Device KIT     chlorthalidone (HYGROTON) 25 MG tablet     Continuous Blood Gluc  (FREESTYLE ALYSON 2 READER) MARC     Continuous Blood Gluc Sensor (FREESTYLE ALYSON 2 SENSOR) MISC     digoxin (LANOXIN) 125 MCG tablet     diphenhydrAMINE (BENADRYL) 25 MG tablet     DULoxetine (CYMBALTA) 20 MG capsule     ELIQUIS ANTICOAGULANT 5 MG tablet     fluticasone (FLONASE) 50 MCG/ACT nasal spray     GLUCOCARD VITAL TEST test strip     insulin glargine (LANTUS SOLOSTAR) 100 UNIT/ML pen     insulin lispro (HUMALOG KWIKPEN) 100 UNIT/ML (1 unit dial) KWIKPEN     lisinopril (ZESTRIL) 5 MG tablet     metoprolol tartrate (LOPRESSOR) 100 MG tablet     Multiple Vitamin (TAB-A-MARIUSZ) TABS     Multiple Vitamin (TAB-A-MARIUSZ/BETA CAROTENE) TABS     nystatin (MYCOSTATIN) 531561 UNIT/GM external powder     nystatin POWD     omeprazole (PRILOSEC) 20 MG DR capsule     polyethylene glycol-propylene glycol (SYSTANE) 0.4-0.3 % SOLN ophthalmic solution     potassium chloride ER (KLOR-CON M) 20 MEQ CR tablet     SB BISMUTH 262 MG TABS     Specialty Vitamins Products (ICAPS LUTEIN & ZEAXANTHIN) TBEC     No current facility-administered medications for this visit.           Allergies   Allergen Reactions     Ampicillin      Desvenlafaxine Other (See Comments)     Desvenlafaxine Succinate  Pristiq       Sertraline Hcl Other (See Comments) and Diarrhea     Zoloft       Sulfa Drugs Other (See Comments)     Sulfa(Sulfonamide Antibiotics)       Family History   Problem Relation Age of Onset     Myocardial Infarction Mother 59        myocardial infarction, cause of death     Other - See Comments Daughter         fibromyalgia     Thyroid Disease Sister         hypo     Thyroid Disease Sister         hypo     Thyroid Disease Sister         hypo     Myocardial Infarction Brother 63        myocardial infarction, cause of death       Social History     Socioeconomic History     Marital status:      Spouse name: None     Number of children: None     Years of education: None     Highest education level: None   Occupational History     None   Tobacco Use     Smoking status: Never Smoker     Smokeless tobacco: Never Used     Tobacco comment: no passive exposure   Vaping Use     Vaping Use: Never used   Substance and Sexual Activity     Alcohol use: No     Drug use: No     Sexual activity: Never   Other Topics Concern      Service Not Asked     Blood Transfusions Yes     Comment: 11/02/2012     Caffeine Concern Yes     Comment: coffee, 4 cups daily     Occupational Exposure Not Asked     Hobby Hazards Not Asked     Sleep Concern Not Asked     Stress Concern Not Asked     Weight Concern Not Asked     Special Diet Not Asked     Back Care Not Asked     Exercise Not Asked     Bike Helmet Not Asked     Seat Belt Yes     Self-Exams Not Asked     Parent/sibling w/ CABG, MI or angioplasty before 65F 55M? Yes   Social History Narrative     None     Social Determinants of Health     Financial Resource Strain: Not on file   Food Insecurity: Not on file   Transportation Needs: Not on file   Physical Activity: Not on file   Stress: Not on file   Social Connections: Not on file   Intimate Partner  Violence: Not on file   Housing Stability: Not on file       ROS: 10 point ROS neg other than the symptoms noted above in the HPI.  EXAM  Constitutional: healthy, alert and no distress    Psychiatric: mentation appears normal and affect normal/bright    VASCULAR:  -Dorsalis pedis pulse +1/4   -Posterior tibial pulse +0/4 LEFT and +1/4 RIGHT    -Capillary refill time < 3 seconds to hallux  -Hair growth Absent to anterior legs and ankles  -Mild-to-moderate non-pitting edema to entire LEFT foot  NEURO:  -Positive for paresthesias to foot  -Epicritic and protective sensation diminished to the bilateral foot  DERM:  -Skin temperature within normal limits   -Skin diffusely dry and flaking to the foot  -Toenails thickened, dystrophic and discolored x 5    Wound Location:  LEFT lateral fifth toe IPJ  09/5/2022: Healed with overlying hyperkeratotic lesion     06/28/2022: Healed    06/08/2022:  0.4cm x 0.5cm x 0.1cm to the subcutaneous tissue layer  04/25/2022:  Healed with hyperkeratotic lesion overlying the previous wound site  04/05/2022:  0.2cm x 0.3cm x 0.1cm to the subcutaneous tissue layer  03/14/2022:  0.2cm x 0.2cm x 0.3cm to capsular layer over bone and undermines 0.4cm distally  03/03/2022:  0.4cm x 0.4cm x 0.3cm to capsular layer over bone and undermines 0.4cm distally  02/09/2022:  0.4cm x 0.5cm x 0.2cm to subcutaneous tissue  01/25/2022:  0.8cm x 0.8cm x 0.2cm to subcutaneous tissue    MSK:  -Moderate decrease in arch height while patient is NWB  -Muscle strength of ankles +5/5 for dorsiflexion, plantarflexion, ABDUction and ADDuction b/l    RIGHT FOOT RADIOGRAPH 03/03/2022  IMPRESSION: No plain film evidence of osteomyelitis at this time. Recommend follow-up.    AL HERNANDEZ MD   ============================================================    ASSESSMENT:  (L84) Callus of foot  (primary encounter diagnosis)    (L60.3) Onychodystrophy    (L85.3) Xerosis of skin    (N18.31) Stage 3a chronic kidney disease  (H)    (Z86.31) Personal history of diabetic foot ulcer    (E11.9,  Z79.4) Diabetes mellitus type 2, insulin dependent (H)    (E11.42) Diabetic polyneuropathy associated with type 2 diabetes mellitus (H)      PLAN:  -Patient evaluated and examined. Treatment options discussed with no educational barriers noted.  -High risk toenail debridement x 10 toenails without incident on 09/15/2022    -Callus pared x 1 to the LEFT lateral fifth toe IPJ without incident  ---Pinpoint bleeding -- applied triple antibiotic ointment and a band aid. Patient may remove later tonight and replace with a toe sleeve tomorrow as long as there is not an open wound or open drainage. If there is, she should resume the antibiotic ointment and a band aid until healed.  ---Patient reminded that the callus will likely return due to the underlying, prominent bone causing the callus while the patient is walking. Her care facility was given instructions to continue to check the toe daily for an open wound and to call podiatry with an open wound or go to podiatry or the Emergency Department with any signs of infection.      -DM shoes: Patient was dispensed DM shoes on 04/05/2022 by the orthotist, Shyanne Alvarado. The shoes have a soft, flexible material that creates less pressure on the toe than a firm, leather shoe. The shoes are very comfortable.    -Patient in agreement with the above treatment plan and all of patient's questions were answered.      Return to clinic 63+ days for LEFT diabetic foot ulcer and high risk nail debridement and diabetic foot exam       Marian Geller, MARY, MARY

## 2022-09-15 NOTE — NURSING NOTE
"Chief Complaint   Patient presents with     Toenail     DFE       Initial /84 (BP Location: Left arm, Patient Position: Sitting, Cuff Size: Adult Regular)   Pulse 97   Temp 98.4  F (36.9  C) (Tympanic)   SpO2 97%  Estimated body mass index is 28.9 kg/m  as calculated from the following:    Height as of 4/25/22: 1.575 m (5' 2\").    Weight as of 4/25/22: 71.7 kg (158 lb).  Medication Reconciliation: kalina Waters  "

## 2022-09-16 ENCOUNTER — PATIENT OUTREACH (OUTPATIENT)
Dept: EDUCATION SERVICES | Facility: HOSPITAL | Age: 87
End: 2022-09-16

## 2022-09-16 DIAGNOSIS — I48.91 ATRIAL FIBRILLATION WITH RVR (H): ICD-10-CM

## 2022-09-16 NOTE — PROGRESS NOTES
Diabetes Self-Management Education & Support    The pt calls in BG reading's:  on 9/5 BG was 102 in the am, 113 at noon, 143 at dinner time, and 139 at HS  on 9/6 BG was  161 in the am, 146 at noon, 120 at dinner time, and 92 at HS  on 9/7 BG was 103in the am, 83 at noon, 114 at dinner time, and 199 at HS  On 9/8  BG was  98 in the am, 124 at noon, 143 at dinner time, and 151 at HS  on 9/9 BG was 132 in the am, 231 at noon, 80 at dinner time, and 129 at HS  on 9/10 BG was 107 in the am, 192 at noon, 67 at dinner time, and 213 at HS  on 9/11 BG was 162 in the am, 146 at noon, 156 at dinner time, and 204 at HS  on 9/12 BG was 148  in the am, 97 at noon, 137 at dinner time, and 151 at HS    Per med list on file:   insulin lispro (HUMALOG KWIKPEN) 100 UNIT/ML (1 unit dial) KWIKPEN        Sig: INJECT 13 UNITS AT BREAKFAST, 10 UNITS AT LUNCH, 12 UNITS AT SUPPER + CORRECTION BEFORE ALL MEALS. CORRECTION: 200-250= 1 UNIT, 251-300=2 UNITS, >300= 3 UNITS. TDD 40 UNITS.       insulin glargine (LANTUS SOLOSTAR) 100 UNIT/ML pen        Sig: Inject 18 units daily at HS, may titrate up to 40 units daily under direction of Diabetes Ed     No changes.      Rosaline Patrick RN Diabetes Educator,  [phone]  9/16/2022 at 9:03 AM

## 2022-09-19 RX ORDER — DIGOXIN 125 MCG
TABLET ORAL
Qty: 15 TABLET | Refills: 0 | Status: SHIPPED | OUTPATIENT
Start: 2022-09-19 | End: 2022-10-18

## 2022-09-19 NOTE — TELEPHONE ENCOUNTER
Lanoxin       Last Written Prescription Date:  8/23/22  Last Fill Quantity: 15,   # refills: 0  Last Office Visit: 6/7/22  Future Office visit:    Next 5 appointments (look out 90 days)    Dec 15, 2022 11:30 AM  (Arrive by 11:15 AM)  Return Visit with Marian Geller DPM  [facility] ([facility] [place] ) [address] [place] [phone]

## 2022-09-22 DIAGNOSIS — E11.9 TYPE 2 DIABETES MELLITUS WITHOUT COMPLICATION, WITHOUT LONG-TERM CURRENT USE OF INSULIN (H): ICD-10-CM

## 2022-09-22 DIAGNOSIS — E11.9 CONTROLLED TYPE 2 DIABETES MELLITUS WITHOUT COMPLICATION, WITHOUT LONG-TERM CURRENT USE OF INSULIN (H): ICD-10-CM

## 2022-09-27 RX ORDER — PEN NEEDLE, DIABETIC, SAFETY 30 GX3/16"
NEEDLE, DISPOSABLE MISCELLANEOUS
Qty: 100 EACH | Refills: 0 | Status: SHIPPED | OUTPATIENT
Start: 2022-09-27 | End: 2022-10-18

## 2022-09-27 RX ORDER — ISOPROPYL ALCOHOL 70 ML/100ML
SWAB TOPICAL
Qty: 100 EACH | Refills: 0 | Status: SHIPPED | OUTPATIENT
Start: 2022-09-27 | End: 2022-10-28

## 2022-09-27 NOTE — TELEPHONE ENCOUNTER
Alcohol swabs      Last Written Prescription Date:  4/27/22  Last Fill Quantity: 100,   # refills: 0  Last Office Visit: 9/15/22  Future Office visit:    Next 5 appointments (look out 90 days)    Dec 15, 2022 11:30 AM  (Arrive by 11:15 AM)  Return Visit with Marian Geller DPM  [facility] ([facility] [place] ) [address] [place] [phone]           Routing refill request to provider for review/approval because:

## 2022-09-27 NOTE — TELEPHONE ENCOUNTER
Pen needles      Last Written Prescription Date:  9/2/22  Last Fill Quantity: 100,   # refills: 0  Last Office Visit: 9/2/22  Future Office visit:    Next 5 appointments (look out 90 days)    Dec 15, 2022 11:30 AM  (Arrive by 11:15 AM)  Return Visit with Marian Geller DPM  [facility] ([facility] ) [address] [place] [phone]

## 2022-09-29 DIAGNOSIS — E11.65 TYPE 2 DIABETES MELLITUS WITH HYPERGLYCEMIA, WITHOUT LONG-TERM CURRENT USE OF INSULIN (H): ICD-10-CM

## 2022-10-03 RX ORDER — INSULIN GLARGINE 100 [IU]/ML
INJECTION, SOLUTION SUBCUTANEOUS
Qty: 15 ML | Refills: 0 | Status: SHIPPED | OUTPATIENT
Start: 2022-10-03 | End: 2023-01-01

## 2022-10-03 NOTE — TELEPHONE ENCOUNTER
Willy      Last Written Prescription Date:  01/28/22  Last Fill Quantity: 15ml,   # refills: 3  Last Office Visit: 04/13/22  Future Office visit:    Next 5 appointments (look out 90 days)    Dec 15, 2022 11:30 AM  (Arrive by 11:15 AM)  Return Visit with Marian Geller DPM  [facility] ([facility] [place] ) [address] [place] [phone]

## 2022-10-04 ENCOUNTER — PATIENT OUTREACH (OUTPATIENT)
Dept: EDUCATION SERVICES | Facility: HOSPITAL | Age: 87
End: 2022-10-04

## 2022-10-04 NOTE — PROGRESS NOTES
The pt calls in BG reading's:  on 9/26 BG was 104 in the am, 132 at noon, 114 at dinner time, and 230 at HS  on 9/27 BG was  135  in the am, 128 at noon, 40 at dinner time and 260 at HS  on 9/28 BG was 219 in the am, 139  at noon, 178 at dinner time, and 117 at HS  on 9/29  BG was  153 in the am, 188 at noon, 97 at dinner time, and 132 at HS  on 9/30 BG was 140  in the am, 177 at noon, 207 at dinner time, and 205 at HS  on 10/1 BG was 132 in the am, 175 at noon, 211 at dinner time, and 166 at HS  on 10/2  BG was 159  in the am, 178 at noon, 179 at dinner time, and 186 at HS  on 10/3   BG was 108  in the am.      9/27 note RE: 40: indicates RN notified with instructions to recheck after snack     Per med list on file:   insulin lispro (HUMALOG KWIKPEN) 100 UNIT/ML (1 unit dial) KWIKPEN             Sig: INJECT 13 UNITS AT BREAKFAST, 10 UNITS AT LUNCH, 12 UNITS AT SUPPER + CORRECTION BEFORE ALL MEALS. CORRECTION: 200-250= 1 UNIT, 251-300=2 UNITS, >300= 3 UNITS. TDD 40 UNITS.         insulin glargine (LANTUS SOLOSTAR) 100 UNIT/ML pen             Sig: Inject 18 units daily at HS, may titrate up to 40 units daily under direction of Diabetes Ed        No changes, majority in target ranges. Will continue to monitor.   Rosaline Patrick RN Diabetes Educator,  [phone]  10/4/2022 at 2:18 PM

## 2022-10-12 DIAGNOSIS — Z79.4 TYPE 2 DIABETES MELLITUS WITH HYPERGLYCEMIA, WITH LONG-TERM CURRENT USE OF INSULIN (H): ICD-10-CM

## 2022-10-12 DIAGNOSIS — E11.65 TYPE 2 DIABETES MELLITUS WITH HYPERGLYCEMIA, WITH LONG-TERM CURRENT USE OF INSULIN (H): ICD-10-CM

## 2022-10-12 NOTE — TELEPHONE ENCOUNTER
Freestyle Sensor     Last Written Prescription Date:  5.2.2022  Last Fill Quantity: 2,   # refills: 11  Last Office Visit: 6.7.2022  Future Office visit:    Next 5 appointments (look out 90 days)    Dec 15, 2022 11:30 AM  (Arrive by 11:15 AM)  Return Visit with Marian Geller DPM  [facility] ([facility] ) [address] [place] [phone]           Routing refill request to provider for review/approval because:  Drug not on the [facility], [facility] or [facility] refill protocol or controlled substance    Sima Hightower RN

## 2022-10-13 ENCOUNTER — PATIENT OUTREACH (OUTPATIENT)
Dept: EDUCATION SERVICES | Facility: HOSPITAL | Age: 87
End: 2022-10-13

## 2022-10-13 NOTE — PROGRESS NOTES
Fax from [facility]:     The pt calls in BG reading's:  on 10/3 BG was 108 in the am, 158 at noon, 237 at dinner time, and 198 at HS  on 10/4 BG was  107 in the am, 251 at noon, 136 at dinner time, and 193 at HS  on 10/5 BG was 127 in the am, 247 at noon, 162 at dinner time, and 182 at HS  on 10/6 BG was  113 in the am, 220 at noon, 133 at dinner time, and 158 at HS  on 10/7 BG was 119 in the am, 256 at noon, 152 at dinner time, and 134 at HS  on 10/8 BG was 103 in the am, 173 at noon, 160 at dinner time, and 190 at HS  on 10/9 BG was 115 in the am, 156 at noon, 130 at dinner time, and 171 at HS  on 10/10 BG was 112 in the am, 146 at noon, 157 at dinner time.     Continue to monitor, noted 1/2 of noon BG elevated mid report and trended back down to pt's range comparing to previous reports.     Per med list on file:   insulin lispro (HUMALOG KWIKPEN) 100 UNIT/ML (1 unit dial) KWIKPEN             Sig: INJECT 13 UNITS AT BREAKFAST, 10 UNITS AT LUNCH, 12 UNITS AT SUPPER + CORRECTION BEFORE ALL MEALS. CORRECTION: 200-250= 1 UNIT, 251-300=2 UNITS, >300= 3 UNITS. TDD 40 UNITS.         insulin glargine (LANTUS SOLOSTAR) 100 UNIT/ML pen             Sig: Inject 18 units daily at HS, may titrate up to 40 units daily under direction of Diabetes Ed     Rosaline Patrick, RN Diabetes Educator,  [phone]  10/13/2022 at 8:23 AM

## 2022-10-14 RX ORDER — FLASH GLUCOSE SCANNING READER
EACH MISCELLANEOUS
Qty: 1 EACH | Refills: 0 | Status: SHIPPED | OUTPATIENT
Start: 2022-10-14 | End: 2023-01-01

## 2022-10-14 NOTE — TELEPHONE ENCOUNTER
I called [facility] and spoke to Meghann they do not need a refill on the pt's , they need sensor's.    Emmy sensor      Last Written Prescription Date:  10/13/22  Last Fill Quantity: 2,   # refills: 5  Last Office Visit: 06/07/22  Future Office visit:    Next 5 appointments (look out 90 days)    Dec 15, 2022 11:30 AM  (Arrive by 11:15 AM)  Return Visit with Marian Geller DPM  [facility] ([facility] ) [address] [place] [phone]

## 2022-10-17 DIAGNOSIS — E11.9 CONTROLLED TYPE 2 DIABETES MELLITUS WITHOUT COMPLICATION, WITHOUT LONG-TERM CURRENT USE OF INSULIN (H): ICD-10-CM

## 2022-10-17 DIAGNOSIS — E11.65 TYPE 2 DIABETES MELLITUS WITH HYPERGLYCEMIA, WITHOUT LONG-TERM CURRENT USE OF INSULIN (H): ICD-10-CM

## 2022-10-17 DIAGNOSIS — I48.91 ATRIAL FIBRILLATION WITH RVR (H): ICD-10-CM

## 2022-10-18 RX ORDER — DIGOXIN 125 MCG
TABLET ORAL
Qty: 15 TABLET | Refills: 0 | Status: SHIPPED | OUTPATIENT
Start: 2022-10-18 | End: 2022-11-14

## 2022-10-18 RX ORDER — PEN NEEDLE, DIABETIC, SAFETY 30 GX3/16"
NEEDLE, DISPOSABLE MISCELLANEOUS
Qty: 100 EACH | Refills: 0 | Status: SHIPPED | OUTPATIENT
Start: 2022-10-18 | End: 2022-11-09

## 2022-10-18 RX ORDER — INSULIN LISPRO 100 [IU]/ML
INJECTION, SOLUTION INTRAVENOUS; SUBCUTANEOUS
Qty: 15 ML | Refills: 0 | Status: SHIPPED | OUTPATIENT
Start: 2022-10-18 | End: 2022-11-18

## 2022-10-18 NOTE — TELEPHONE ENCOUNTER
BD AUTOSHIELD DUO      Last Written Prescription Date:  9/27/22  Last Fill Quantity: 100,   # refills: 0  Last Office Visit: 9/15/22  Future Office visit:    Next 5 appointments (look out 90 days)    Dec 15, 2022 11:30 AM  (Arrive by 11:15 AM)  [facility] [address] [place] [phone]  [phone]           Routing refill request to provider for review/approval because:      digoxin      Last Written Prescription Date:  9/19/22  Last Fill Quantity: 15,   # refills: 0  Last Office Visit: 9/15/22  Future Office visit:    Next 5 appointments (look out 90 days)    Dec 15, 2022 11:30 AM  (Arrive by 11:15 AM)  Return Visit with Marian Geller DPM  [facility] ([facility] ) [address] [place] [phone]  [phone]           Routing refill request to provider for review/approval because:      Insulin lispro      Last Written Prescription Date:  9/8/22  Last Fill Quantity: 15mL,   # refills: 0  Last Office Visit: 9/15/22  Future Office visit:    Next 5 appointments (look out 90 days)    Dec 15, 2022 11:30 AM  (Arrive by 11:15 AM)  Return Visit with Marian Geller DPM  [facility] ([facility] ) [address] [place] [phone]  [phone]           Routing refill request to provider for review/approval because:

## 2022-10-26 DIAGNOSIS — E11.9 TYPE 2 DIABETES MELLITUS WITHOUT COMPLICATION, WITHOUT LONG-TERM CURRENT USE OF INSULIN (H): ICD-10-CM

## 2022-10-28 RX ORDER — ISOPROPYL ALCOHOL 70 ML/100ML
SWAB TOPICAL
Qty: 100 EACH | Refills: 0 | Status: SHIPPED | OUTPATIENT
Start: 2022-10-28 | End: 2023-01-01

## 2022-10-28 NOTE — TELEPHONE ENCOUNTER
Alcohol swabs      Last Written Prescription Date:  9/27/22  Last Fill Quantity: 100,   # refills: 0  Last Office Visit: 10/13/22  Future Office visit:    Next 5 appointments (look out 90 days)    Dec 15, 2022 11:30 AM  (Arrive by 11:15 AM)  Return Visit with Marian Geller DPM  [facility] ([facility] ) [address] [place] [phone]

## 2022-10-31 ENCOUNTER — PATIENT OUTREACH (OUTPATIENT)
Dept: EDUCATION SERVICES | Facility: HOSPITAL | Age: 87
End: 2022-10-31

## 2022-10-31 NOTE — PROGRESS NOTES
Diabetes Self-Management Education & Support    Fax from [facility].     The pt calls in BG reading's:  on 10/24 BG was 168 in the am, 168 at noon, 89 at dinner time, and 226 at HS  on 10/25 BG was  205 in the am, 256 at noon, 147 at dinner time, and 107 at HS  on 10/26 BG was 116 in the am, 127 at noon, 157 at dinner time, and 270 at HS  on 10/27 BG was  131 in the am, 193 at noon, 170 at dinner time, and 100 at HS  on 10/28 BG was 131 in the am, 201 at noon, 109 at dinner time, and 174 at HS  on 10/29 BG was 167 in the am, 142 at noon, 146 at dinner time, and 223 at HS  on 10/30 BG was 115 in the am, 174 at noon, 156 at dinner time, and 166 at HS.  On 10/31 BG was 140 in the am.       Target goals: A1c <8.0 (last done April 7.1%).    -180  110-200 at HS.     Continue to monitor.   Pt lives at [facility]  Pt has follow up with PCP scheduled.  Fax returned to [facility] RN,.    Rosaline Patrick RN Diabetes Educator,  [phone]  10/31/2022 at 3:05 PM

## 2022-11-03 DIAGNOSIS — R19.5 LOOSE STOOLS: Primary | ICD-10-CM

## 2022-11-03 NOTE — TELEPHONE ENCOUNTER
Pharmacy requesting an RX for Kaopectate Suspension.  Not on med list.  Last office visit was 4.13.22 with Andreea Schuster.  Establishing care with you next month.

## 2022-11-07 DIAGNOSIS — E11.9 CONTROLLED TYPE 2 DIABETES MELLITUS WITHOUT COMPLICATION, WITHOUT LONG-TERM CURRENT USE OF INSULIN (H): ICD-10-CM

## 2022-11-08 ENCOUNTER — PATIENT OUTREACH (OUTPATIENT)
Dept: EDUCATION SERVICES | Facility: HOSPITAL | Age: 87
End: 2022-11-08

## 2022-11-08 NOTE — PROGRESS NOTES
Fax BG reading's from [facility]:  on 10/31 BG was 140 in the am, 105 at noon, 100 at dinner time, and 195 at HS  on 11/1 BG was  119 in the am, 199 at noon, 110 at dinner time, and 176 at HS  on 11/2 BG was 145 in the am, 162 at noon, 162 recheck 108 at dinner time, and 139 at HS  on 11/3 BG was  145 in the am, 125 at noon, 117 at dinner time, and 133 at HS  on 11/4 BG was 144 in the am, 216 at noon, 107 at dinner time, and 182 at HS  on 11/5 BG was 143 in the am, 197 at noon, 197 at dinner time, and 157 at HS  on 11/6 BG was 139 in the am, 139 at noon, 240 at dinner time, and 146 at HS  On 11/7 BG was 136 in the am, 111 at noon.    Target goals:   A1c <8.0 (last done April: 7.1%).    -180  110-200 at HS.      Continue to monitor.   Pt lives at [facility].   Pt has follow up with PCP scheduled 12/23/22.  Fax returned to [facility].     Rosaline Patrick RN Diabetes Educator,  [phone]  11/8/2022 at 8:58 AM

## 2022-11-09 RX ORDER — PEN NEEDLE, DIABETIC, SAFETY 30 GX3/16"
NEEDLE, DISPOSABLE MISCELLANEOUS
Qty: 100 EACH | Refills: 0 | Status: SHIPPED | OUTPATIENT
Start: 2022-11-09 | End: 2022-12-06

## 2022-11-09 NOTE — TELEPHONE ENCOUNTER
Pen Needles      Last Written Prescription Date:  10.19.22  Last Fill Quantity: #100,   # refills: 0  Last Office Visit: 4.13.22 with Andreea Schuster   Future Office visit:    Next 5 appointments (look out 90 days)    Dec 15, 2022 11:30 AM  (Arrive by 11:15 AM)  Return Visit with Marian Geller DPM  [facility] ([facility] ) [address] [place] [id number] [phone]           Routing refill request to provider for review/approval because:

## 2022-11-10 DIAGNOSIS — J31.0 CHRONIC RHINITIS: ICD-10-CM

## 2022-11-11 DIAGNOSIS — K21.00 GASTROESOPHAGEAL REFLUX DISEASE WITH ESOPHAGITIS WITHOUT HEMORRHAGE: ICD-10-CM

## 2022-11-11 DIAGNOSIS — I50.9 CONGESTIVE HEART FAILURE, UNSPECIFIED HF CHRONICITY, UNSPECIFIED HEART FAILURE TYPE (H): ICD-10-CM

## 2022-11-11 DIAGNOSIS — I48.91 ATRIAL FIBRILLATION WITH RVR (H): ICD-10-CM

## 2022-11-11 DIAGNOSIS — E78.2 MIXED HYPERLIPIDEMIA: ICD-10-CM

## 2022-11-11 DIAGNOSIS — M19.90 OSTEOARTHRITIS, UNSPECIFIED OSTEOARTHRITIS TYPE, UNSPECIFIED SITE: ICD-10-CM

## 2022-11-11 DIAGNOSIS — I48.21 PERMANENT ATRIAL FIBRILLATION (H): ICD-10-CM

## 2022-11-11 DIAGNOSIS — E11.9 CONTROLLED TYPE 2 DIABETES MELLITUS WITHOUT COMPLICATION, WITHOUT LONG-TERM CURRENT USE OF INSULIN (H): ICD-10-CM

## 2022-11-11 DIAGNOSIS — I50.23 ACUTE ON CHRONIC SYSTOLIC CONGESTIVE HEART FAILURE (H): ICD-10-CM

## 2022-11-11 RX ORDER — FLUTICASONE PROPIONATE 50 MCG
1 SPRAY, SUSPENSION (ML) NASAL DAILY
Qty: 16 G | Refills: 0 | Status: SHIPPED | OUTPATIENT
Start: 2022-11-11 | End: 2023-01-01

## 2022-11-14 RX ORDER — ATORVASTATIN CALCIUM 40 MG/1
TABLET, FILM COATED ORAL
Qty: 29 TABLET | Refills: 6 | Status: SHIPPED | OUTPATIENT
Start: 2022-11-14 | End: 2023-01-01

## 2022-11-14 RX ORDER — APIXABAN 5 MG/1
TABLET, FILM COATED ORAL
Qty: 56 TABLET | Refills: 6 | Status: SHIPPED | OUTPATIENT
Start: 2022-11-14

## 2022-11-14 RX ORDER — DIGOXIN 125 MCG
TABLET ORAL
Qty: 29 TABLET | Refills: 6 | Status: SHIPPED | OUTPATIENT
Start: 2022-11-14

## 2022-11-14 RX ORDER — ALENDRONATE SODIUM 70 MG/1
TABLET ORAL
Qty: 4 TABLET | Refills: 6 | Status: SHIPPED | OUTPATIENT
Start: 2022-11-14 | End: 2023-01-01

## 2022-11-14 RX ORDER — LISINOPRIL 5 MG/1
TABLET ORAL
Qty: 29 TABLET | Refills: 6 | Status: SHIPPED | OUTPATIENT
Start: 2022-11-14 | End: 2023-01-01

## 2022-11-14 RX ORDER — ASPIRIN 81 MG/1
TABLET, CHEWABLE ORAL
Qty: 29 TABLET | Refills: 11 | Status: SHIPPED | OUTPATIENT
Start: 2022-11-14

## 2022-11-14 RX ORDER — CHLORTHALIDONE 25 MG/1
TABLET ORAL
Qty: 29 TABLET | Refills: 6 | Status: SHIPPED | OUTPATIENT
Start: 2022-11-14

## 2022-11-14 NOTE — TELEPHONE ENCOUNTER
lov 06/07/22    Future Office visit:    Next 5 appointments (look out 90 days)    Dec 15, 2022 11:30 AM  (Arrive by 11:15 AM)  Return Visit with Marian Geller DPM  [facility] ([facility] ) [address] [place] [phone]

## 2022-11-15 ENCOUNTER — PATIENT OUTREACH (OUTPATIENT)
Dept: EDUCATION SERVICES | Facility: HOSPITAL | Age: 87
End: 2022-11-15

## 2022-11-15 NOTE — PROGRESS NOTES
Diabetes Self-Management Education & Support    [facility] faxed BG report:    on 11/7 BG was 136 in the am, 111 at noon, 100 at dinner time, and 159 at HS  on 11/8 BG was  125 in the am, 199 at noon, 185 at dinner time, and 154 at HS  on 11/9 BG was 125 in the am, 193 at noon, 132 at dinner time, and 175 at HS  on 11/10 BG was  172 in the am, 142 at noon, 197 at dinner time, and 218 at HS  on 11/11 BG was 218 in the am, 179 at noon, 113 at dinner time, and 121 at HS  on 11/12 BG was 129 in the am, 179 at noon, 169 at dinner time, and 196 at HS  on 11/13 BG was 189 in the am, 180 at noon, 164 at dinner time, and 170 at HS  On 11/14 BG was 95 in the am.     Target goals:   A1c <8.0 (last done April: 7.1%).    -180  110-200 at HS.      Continue to monitor.   Pt lives at [facility].   Pt has follow up with PCP scheduled 12/23/22.  Fax returned to [facility].   Rosaline Patrick RN Diabetes Educator,  [phone]  11/15/2022 at 9:40 AM

## 2022-11-16 DIAGNOSIS — E11.65 TYPE 2 DIABETES MELLITUS WITH HYPERGLYCEMIA, WITHOUT LONG-TERM CURRENT USE OF INSULIN (H): ICD-10-CM

## 2022-11-18 RX ORDER — INSULIN LISPRO 100 [IU]/ML
INJECTION, SOLUTION INTRAVENOUS; SUBCUTANEOUS
Qty: 15 ML | Refills: 0 | Status: SHIPPED | OUTPATIENT
Start: 2022-11-18 | End: 2022-12-22

## 2022-12-02 DIAGNOSIS — E11.9 CONTROLLED TYPE 2 DIABETES MELLITUS WITHOUT COMPLICATION, WITHOUT LONG-TERM CURRENT USE OF INSULIN (H): ICD-10-CM

## 2022-12-05 NOTE — TELEPHONE ENCOUNTER
Insulin needle      Last Written Prescription Date:  11/09/22  Last Fill Quantity: 100,   # refills: 0  Last Office Visit: 04/13/22  Future Office visit:    Next 5 appointments (look out 90 days)    Dec 15, 2022 11:30 AM  (Arrive by 11:15 AM)  Return Visit with Marian Geller DPM  [facility] ([facility] ) [address] [place] [id number] [phone]           Routing refill request to provider for review/approval because:  Over due for office visit.

## 2022-12-06 RX ORDER — PEN NEEDLE, DIABETIC, SAFETY 30 GX3/16"
NEEDLE, DISPOSABLE MISCELLANEOUS
Qty: 100 EACH | Refills: 0 | Status: SHIPPED | OUTPATIENT
Start: 2022-12-06 | End: 2023-01-01

## 2022-12-20 ENCOUNTER — PATIENT OUTREACH (OUTPATIENT)
Dept: EDUCATION SERVICES | Facility: HOSPITAL | Age: 87
End: 2022-12-20

## 2022-12-20 NOTE — PROGRESS NOTES
Diabetes Self-Management Education & Support    Faxed report from [facility]:    The pt calls in BG reading's:  on 12/12 BG was 139 in the am, 163 at noon, 165 at dinner time, and 212 at HS  on 12/13 BG was  160 in the am, 179 at noon, 170 at dinner time, and 187 at HS  on 12/14 BG was 122 in the am, 181 at noon, 130 at dinner time, and 214 at HS  on 12/15 BG was  126 in the am, 114 at noon, 205 at dinner time, and 180 at HS  on 12/16 BG was 150 in the am, 173 at noon, 69 at dinner time, and 170 at HS  on 12/17 BG was 115 in the am, 133 at noon, 127 at dinner time, and 125 at HS  on 12/18 BG was 136 in the am, 185 at noon, 110 at dinner time, and 109 at HS  On 12/19 BG was 146 in the am, 141 at noon.     Target goals:   A1c <8.0 (last done April: 7.1%).    -180  110-200 at HS.      Per med list on file/previous notes:   insulin lispro (HUMALOG KWIKPEN) 100 UNIT/ML (1 unit dial) KWIKPEN             Sig: INJECT 13 UNITS AT BREAKFAST, 10 UNITS AT LUNCH, 12 UNITS AT SUPPER + CORRECTION BEFORE ALL MEALS. CORRECTION: 200-250= 1 UNIT, 251-300=2 UNITS, >300= 3 UNITS. TDD 40 UNITS.         insulin glargine (LANTUS SOLOSTAR) 100 UNIT/ML pen             Sig: Inject 18 units daily at HS, may titrate up to 40 units daily under direction of Diabetes Ed     Continue to monitor.   Pt lives at [facility].   Pt has follow up with PCP scheduled 12/23/22.  Fax returned to [facility] RN.  Rosaline Patrick RN Diabetes Educator,  [phone]  12/20/2022 at 10:57 AM

## 2022-12-21 DIAGNOSIS — E11.9 CONTROLLED TYPE 2 DIABETES MELLITUS WITHOUT COMPLICATION, WITHOUT LONG-TERM CURRENT USE OF INSULIN (H): ICD-10-CM

## 2022-12-21 DIAGNOSIS — E11.65 TYPE 2 DIABETES MELLITUS WITH HYPERGLYCEMIA, WITHOUT LONG-TERM CURRENT USE OF INSULIN (H): ICD-10-CM

## 2022-12-21 DIAGNOSIS — E11.9 TYPE 2 DIABETES MELLITUS WITHOUT COMPLICATION, WITHOUT LONG-TERM CURRENT USE OF INSULIN (H): ICD-10-CM

## 2022-12-22 RX ORDER — INSULIN LISPRO 100 [IU]/ML
INJECTION, SOLUTION INTRAVENOUS; SUBCUTANEOUS
Qty: 15 ML | Refills: 0 | Status: SHIPPED | OUTPATIENT
Start: 2022-12-22 | End: 2022-12-27

## 2022-12-22 RX ORDER — BLOOD-GLUCOSE METER
KIT MISCELLANEOUS
Qty: 100 STRIP | Refills: 0 | Status: SHIPPED | OUTPATIENT
Start: 2022-12-22 | End: 2023-01-01

## 2022-12-22 RX ORDER — LANCETS 21 GAUGE
EACH MISCELLANEOUS
Qty: 100 EACH | Refills: 0 | Status: SHIPPED | OUTPATIENT
Start: 2022-12-22 | End: 2023-01-01

## 2022-12-22 NOTE — TELEPHONE ENCOUNTER
[facility]      Last Written Prescription Date:  6/1/2022  Last Fill Quantity: 100,   # refills: 3  Last Office Visit: 9/15/2022  Future Office visit:       Routing refill request to provider for review/approval because:      GLUCOCARD VITAL TEST test strip      Last Written Prescription Date:  4/27/2022  Last Fill Quantity: 100,   # refills: 0  Last Office Visit: 9/15/2022  Future Office visit:       Routing refill request to provider for review/approval because:      Insulin lispro 100 UNIT/ML      Last Written Prescription Date:  11/18/2022  Last Fill Quantity: 15mL,   # refills: 0  Last Office Visit: 9/15/2022  Future Office visit:       Routing refill request to provider for review/approval because:

## 2022-12-23 ENCOUNTER — OFFICE VISIT (OUTPATIENT)
Dept: FAMILY MEDICINE | Facility: OTHER | Age: 87
End: 2022-12-23
Attending: FAMILY MEDICINE
Payer: MEDICARE

## 2022-12-23 VITALS
SYSTOLIC BLOOD PRESSURE: 114 MMHG | OXYGEN SATURATION: 98 % | BODY MASS INDEX: 29.63 KG/M2 | TEMPERATURE: 98.1 F | HEART RATE: 77 BPM | DIASTOLIC BLOOD PRESSURE: 70 MMHG | WEIGHT: 162 LBS

## 2022-12-23 DIAGNOSIS — I48.21 PERMANENT ATRIAL FIBRILLATION (H): ICD-10-CM

## 2022-12-23 DIAGNOSIS — I50.22 CHRONIC SYSTOLIC CONGESTIVE HEART FAILURE (H): ICD-10-CM

## 2022-12-23 DIAGNOSIS — M81.8 OTHER OSTEOPOROSIS, UNSPECIFIED PATHOLOGICAL FRACTURE PRESENCE: ICD-10-CM

## 2022-12-23 DIAGNOSIS — Z79.899 POLYPHARMACY: ICD-10-CM

## 2022-12-23 DIAGNOSIS — B35.3 TINEA PEDIS OF BOTH FEET: ICD-10-CM

## 2022-12-23 DIAGNOSIS — I10 ESSENTIAL (PRIMARY) HYPERTENSION: ICD-10-CM

## 2022-12-23 DIAGNOSIS — E11.9 CONTROLLED TYPE 2 DIABETES MELLITUS WITHOUT COMPLICATION, WITHOUT LONG-TERM CURRENT USE OF INSULIN (H): Primary | ICD-10-CM

## 2022-12-23 DIAGNOSIS — R91.1 LUNG NODULE: ICD-10-CM

## 2022-12-23 DIAGNOSIS — E11.65 TYPE 2 DIABETES MELLITUS WITH HYPERGLYCEMIA, WITHOUT LONG-TERM CURRENT USE OF INSULIN (H): ICD-10-CM

## 2022-12-23 DIAGNOSIS — R04.0 EPISTAXIS: ICD-10-CM

## 2022-12-23 DIAGNOSIS — H90.3 BILATERAL SENSORINEURAL HEARING LOSS: ICD-10-CM

## 2022-12-23 DIAGNOSIS — N18.30 STAGE 3 CHRONIC KIDNEY DISEASE, UNSPECIFIED WHETHER STAGE 3A OR 3B CKD (H): ICD-10-CM

## 2022-12-23 DIAGNOSIS — E55.9 VITAMIN D DEFICIENCY: ICD-10-CM

## 2022-12-23 DIAGNOSIS — Q61.9 CONGENITAL CYSTIC KIDNEY DISEASE: ICD-10-CM

## 2022-12-23 PROBLEM — I50.9 ACUTE EXACERBATION OF CHF (CONGESTIVE HEART FAILURE) (H): Status: RESOLVED | Noted: 2021-07-09 | Resolved: 2022-12-23

## 2022-12-23 LAB
ALBUMIN SERPL BCG-MCNC: 3.9 G/DL (ref 3.5–5.2)
ALP SERPL-CCNC: 90 U/L (ref 35–104)
ALT SERPL W P-5'-P-CCNC: 23 U/L (ref 10–35)
ANION GAP SERPL CALCULATED.3IONS-SCNC: 11 MMOL/L (ref 7–15)
AST SERPL W P-5'-P-CCNC: 27 U/L (ref 10–35)
BASOPHILS # BLD AUTO: 0.1 10E3/UL (ref 0–0.2)
BASOPHILS NFR BLD AUTO: 1 %
BILIRUB SERPL-MCNC: 0.6 MG/DL
BUN SERPL-MCNC: 24.4 MG/DL (ref 8–23)
CALCIUM SERPL-MCNC: 9.4 MG/DL (ref 8.8–10.2)
CHLORIDE SERPL-SCNC: 101 MMOL/L (ref 98–107)
CREAT SERPL-MCNC: 1.15 MG/DL (ref 0.51–0.95)
DEPRECATED HCO3 PLAS-SCNC: 26 MMOL/L (ref 22–29)
DIGOXIN SERPL-MCNC: 0.6 NG/ML (ref 0.6–2)
EOSINOPHIL # BLD AUTO: 0.3 10E3/UL (ref 0–0.7)
EOSINOPHIL NFR BLD AUTO: 3 %
ERYTHROCYTE [DISTWIDTH] IN BLOOD BY AUTOMATED COUNT: 15 % (ref 10–15)
EST. AVERAGE GLUCOSE BLD GHB EST-MCNC: 143 MG/DL
GFR SERPL CREATININE-BSD FRML MDRD: 45 ML/MIN/1.73M2
GLUCOSE SERPL-MCNC: 188 MG/DL (ref 70–99)
HBA1C MFR BLD: 6.6 %
HCT VFR BLD AUTO: 38 % (ref 35–47)
HGB BLD-MCNC: 12.4 G/DL (ref 11.7–15.7)
IMM GRANULOCYTES # BLD: 0.1 10E3/UL
IMM GRANULOCYTES NFR BLD: 1 %
LYMPHOCYTES # BLD AUTO: 2.7 10E3/UL (ref 0.8–5.3)
LYMPHOCYTES NFR BLD AUTO: 23 %
MCH RBC QN AUTO: 28.5 PG (ref 26.5–33)
MCHC RBC AUTO-ENTMCNC: 32.6 G/DL (ref 31.5–36.5)
MCV RBC AUTO: 87 FL (ref 78–100)
MONOCYTES # BLD AUTO: 0.9 10E3/UL (ref 0–1.3)
MONOCYTES NFR BLD AUTO: 8 %
NEUTROPHILS # BLD AUTO: 7.7 10E3/UL (ref 1.6–8.3)
NEUTROPHILS NFR BLD AUTO: 64 %
NRBC # BLD AUTO: 0 10E3/UL
NRBC BLD AUTO-RTO: 0 /100
PLATELET # BLD AUTO: 220 10E3/UL (ref 150–450)
POTASSIUM SERPL-SCNC: 4.1 MMOL/L (ref 3.4–5.3)
PROT SERPL-MCNC: 7.5 G/DL (ref 6.4–8.3)
RBC # BLD AUTO: 4.35 10E6/UL (ref 3.8–5.2)
SODIUM SERPL-SCNC: 138 MMOL/L (ref 136–145)
T4 FREE SERPL-MCNC: 1.1 NG/DL (ref 0.9–1.7)
TSH SERPL DL<=0.005 MIU/L-ACNC: 4.74 UIU/ML (ref 0.3–4.2)
VIT B12 SERPL-MCNC: 708 PG/ML (ref 232–1245)
WBC # BLD AUTO: 11.8 10E3/UL (ref 4–11)

## 2022-12-23 PROCEDURE — 82306 VITAMIN D 25 HYDROXY: CPT | Mod: ZL | Performed by: FAMILY MEDICINE

## 2022-12-23 PROCEDURE — 80053 COMPREHEN METABOLIC PANEL: CPT | Mod: ZL | Performed by: FAMILY MEDICINE

## 2022-12-23 PROCEDURE — 85025 COMPLETE CBC W/AUTO DIFF WBC: CPT | Mod: ZL | Performed by: FAMILY MEDICINE

## 2022-12-23 PROCEDURE — G0463 HOSPITAL OUTPT CLINIC VISIT: HCPCS

## 2022-12-23 PROCEDURE — 84443 ASSAY THYROID STIM HORMONE: CPT | Mod: ZL | Performed by: FAMILY MEDICINE

## 2022-12-23 PROCEDURE — 80162 ASSAY OF DIGOXIN TOTAL: CPT | Mod: ZL | Performed by: FAMILY MEDICINE

## 2022-12-23 PROCEDURE — 83036 HEMOGLOBIN GLYCOSYLATED A1C: CPT | Mod: ZL | Performed by: FAMILY MEDICINE

## 2022-12-23 PROCEDURE — 36415 COLL VENOUS BLD VENIPUNCTURE: CPT | Mod: ZL | Performed by: FAMILY MEDICINE

## 2022-12-23 PROCEDURE — 84439 ASSAY OF FREE THYROXINE: CPT | Mod: ZL | Performed by: FAMILY MEDICINE

## 2022-12-23 PROCEDURE — 99215 OFFICE O/P EST HI 40 MIN: CPT | Performed by: FAMILY MEDICINE

## 2022-12-23 PROCEDURE — 82607 VITAMIN B-12: CPT | Mod: ZL | Performed by: FAMILY MEDICINE

## 2022-12-23 RX ORDER — NYSTATIN 100000 [USP'U]/G
POWDER TOPICAL 2 TIMES DAILY
Qty: 60 G | Refills: 3 | Status: SHIPPED | OUTPATIENT
Start: 2022-12-23 | End: 2023-01-01

## 2022-12-23 RX ORDER — DESONIDE 0.5 MG/ML
LOTION TOPICAL PRN
COMMUNITY
Start: 2022-07-27

## 2022-12-23 ASSESSMENT — PATIENT HEALTH QUESTIONNAIRE - PHQ9
SUM OF ALL RESPONSES TO PHQ QUESTIONS 1-9: 0
5. POOR APPETITE OR OVEREATING: NOT AT ALL

## 2022-12-23 ASSESSMENT — ANXIETY QUESTIONNAIRES
3. WORRYING TOO MUCH ABOUT DIFFERENT THINGS: NOT AT ALL
7. FEELING AFRAID AS IF SOMETHING AWFUL MIGHT HAPPEN: NOT AT ALL
1. FEELING NERVOUS, ANXIOUS, OR ON EDGE: NOT AT ALL
6. BECOMING EASILY ANNOYED OR IRRITABLE: NOT AT ALL
GAD7 TOTAL SCORE: 0
5. BEING SO RESTLESS THAT IT IS HARD TO SIT STILL: NOT AT ALL
IF YOU CHECKED OFF ANY PROBLEMS ON THIS QUESTIONNAIRE, HOW DIFFICULT HAVE THESE PROBLEMS MADE IT FOR YOU TO DO YOUR WORK, TAKE CARE OF THINGS AT HOME, OR GET ALONG WITH OTHER PEOPLE: NOT DIFFICULT AT ALL
2. NOT BEING ABLE TO STOP OR CONTROL WORRYING: NOT AT ALL
GAD7 TOTAL SCORE: 0

## 2022-12-23 NOTE — PROGRESS NOTES
Assessment & Plan     Controlled type 2 diabetes mellitus without complication, without long-term current use of insulin (H)  Recheck a1c, consider transitioning off sliding scale insulin. Will discuss with diabetes ed.   - Hemoglobin A1c  - Comprehensive metabolic panel (BMP + Alb, Alk Phos, ALT, AST, Total. Bili, TP)  - Comprehensive metabolic panel (BMP + Alb, Alk Phos, ALT, AST, Total. Bili, TP)  - Hemoglobin A1c    Lung nodule  Has been stable as of 2018, not mentioned again in 2021 CT, no other red flags, no further work up needed.     Permanent atrial fibrillation (H)  Rate controlled.   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 In the short term, may use yogurt or pudding. MTM to advise if these can be crushed or changed to liquid.   - Med Therapy Management Referral    Tinea pedis of both feet  Start nystatin between toes x 2 weeks. Gentle cleaning/drying between toes recommended for staff.   - nystatin (MYCOSTATIN) 974630 UNIT/GM external powder  Dispense: 60 g; Refill: 3    Epistaxis  Recurrent, every 3-5 days, on eliquis so bleeding can be extensive. Vaseline to nares daily to help with dryness. Consider ENT consultation for cautery, pt considering.     Bilateral sensorineural hearing loss  Has lost a hearing aide, hearing is poor. Audiology referral to get working aides again.   - Adult Audiology   Referral    35 minutes spent on the date of the encounter doing chart review, review of test results, interpretation of tests, patient visit, documentation and discussion with family     Return in about 3 years (around 12/23/2025) for CDM.    Radha Murray MD  [facility] - ROCÍO Valencia is a 89 year old accompanied by her daughter, presenting for the following health issues:    Establish Care    HPI     Diabetes Follow-up    How often are you checking your blood sugar? Continuous glucose monitor  What time of day are you checking your blood sugars (select all that apply)?  Before and after meals  Have you had any blood sugars above 200?  Yes   Have you had any blood sugars below 70?  unknown    What symptoms do you notice when your blood sugar is low?  Shaky and Weak    What concerns do you have today about your diabetes? Blood sugar is often over 200     Do you have any of these symptoms? (Select all that apply)  No numbness or tingling in feet.  No redness, sores or blisters on feet.  No complaints of excessive thirst.  No reports of blurry vision.  No significant changes to weight.    Have you had a diabetic eye exam in the last 12 months? No     Left little toe callus, sores between the toes.     Hyperlipidemia Follow-Up      Are you regularly taking any medication or supplement to lower your cholesterol?   Yes- lipitor    Are you having muscle aches or other side effects that you think could be caused by your cholesterol lowering medication?  No    Hypertension Follow-up      Do you check your blood pressure regularly outside of the clinic? Yes     Are you following a low salt diet? No    Are your blood pressures ever more than 140 on the top number (systolic) OR more   than 90 on the bottom number (diastolic), for example 140/90? No    BP Readings from Last 2 Encounters:   12/23/22 114/70   09/15/22 123/84     Hemoglobin A1C (%)   Date Value   12/23/2022 6.6 (H)   04/13/2022  7.1 (H)   07/09/2021 6.8 (H)   09/15/2020 8.0 (H)     LDL Cholesterol Calculated (mg/dL)   Date Value   04/13/2022 40   09/15/2020 52   07/17/2019 53       SOCIAL HISTORY:  Living Situation: Lives in [facility].   Supports/Family: All children are local, 5 total.   Smoking: Non smoker.   Drinking/Drugs: not using    FUNCTIONAL HISTORY:  ADLs: in [facility]  IADLS: dependent.   Safety/Falls: no recent fall, uses front wheeled walker.   Memory: some mild impairment.     GERIATRIC ROS:  Continence: no acute issues.   Nutrition: Appetite is very good.   Sleep: sleeping well.   Pain: Denies.   Vision/Hearing: vision issues since stroke. Hearing fair, lost one hearing aides.   Mood: does have periods of sadness.     Review of Systems   Constitutional, HEENT, cardiovascular, pulmonary, GI, , musculoskeletal, neuro, skin, endocrine and psych systems are negative, except as otherwise noted.      Objective    /70   Pulse 77   Temp 98.1  F (36.7  C) (Tympanic)   Wt 73.5 kg (162 lb)   SpO2 98%   BMI 29.63 kg/m    Body mass index is 29.63 kg/m .  Physical Exam   GENERAL: alert and no distress  EYES: Eyes grossly normal to inspection  RESP: lungs clear to auscultation - no rales, rhonchi or wheezes  CV: regular rates and rhythm, normal S1 S2, no S3 or S4, no murmur, click or rub and no peripheral edema  MS: some generalized weakness, slow gait  SKIN: no suspicious lesions or rashes on exposed skin  NEURO: mentation intact and speech normal  PSYCH: mentation appears normal, affect normal/bright  Diabetic foot exam: normal sensory exam and callus over left little toe, mildly painful, no surrounding erythema, there is lots of debris between the toes, maceration and some slight fissuring, healing/scabbed area between the left little and 4th toe noted.     Results for orders placed or performed in visit on 12/23/22   Digoxin level     Status: Normal   Result Value Ref Range    Digoxin 0.6 0.6 - 2.0 ng/mL   TSH with  free T4 reflex     Status: Abnormal   Result Value Ref Range    TSH 4.74 (H) 0.30 - 4.20 uIU/mL   Comprehensive metabolic panel (BMP + Alb, Alk Phos, ALT, AST, Total. Bili, TP)     Status: Abnormal   Result Value Ref Range    Sodium 138 136 - 145 mmol/L    Potassium 4.1 3.4 - 5.3 mmol/L    Chloride 101 98 - 107 mmol/L    Carbon Dioxide (CO2) 26 22 - 29 mmol/L    Anion Gap 11 7 - 15 mmol/L    Urea Nitrogen 24.4 (H) 8.0 - 23.0 mg/dL    Creatinine 1.15 (H) 0.51 - 0.95 mg/dL    Calcium 9.4 8.8 - 10.2 mg/dL    Glucose 188 (H) 70 - 99 mg/dL    Alkaline Phosphatase 90 35 - 104 U/L    AST 27 10 - 35 U/L    ALT 23 10 - 35 U/L    Protein Total 7.5 6.4 - 8.3 g/dL    Albumin 3.9 3.5 - 5.2 g/dL    Bilirubin Total 0.6 <=1.2 mg/dL    GFR Estimate 45 (L) >60 mL/min/1.73m2   Hemoglobin A1c     Status: Abnormal   Result Value Ref Range    Estimated Average Glucose 143 mg/dL    Hemoglobin A1C 6.6 (H) <5.7 %   CBC with platelets and differential     Status: Abnormal   Result Value Ref Range    WBC Count 11.8 (H) 4.0 - 11.0 10e3/uL    RBC Count 4.35 3.80 - 5.20 10e6/uL    Hemoglobin 12.4 11.7 - 15.7 g/dL    Hematocrit 38.0 35.0 - 47.0 %    MCV 87 78 - 100 fL    MCH 28.5 26.5 - 33.0 pg    MCHC 32.6 31.5 - 36.5 g/dL    RDW 15.0 10.0 - 15.0 %    Platelet Count 220 150 - 450 10e3/uL    % Neutrophils 64 %    % Lymphocytes 23 %    % Monocytes 8 %    % Eosinophils 3 %    % Basophils 1 %    % Immature Granulocytes 1 %    NRBCs per 100 WBC 0 <1 /100    Absolute Neutrophils 7.7 1.6 - 8.3 10e3/uL    Absolute Lymphocytes 2.7 0.8 - 5.3 10e3/uL    Absolute Monocytes 0.9 0.0 - 1.3 10e3/uL    Absolute Eosinophils 0.3 0.0 - 0.7 10e3/uL    Absolute Basophils 0.1 0.0 - 0.2 10e3/uL    Absolute Immature Granulocytes 0.1 <=0.4 10e3/uL    Absolute NRBCs 0.0 10e3/uL   T4 free     Status: Normal   Result Value Ref Range    Free T4 1.10 0.90 - 1.70 ng/dL   CBC with platelets and differential     Status: Abnormal    Narrative    The following orders were created  for panel order CBC with platelets and differential.  Procedure                               Abnormality         Status                     ---------                               -----------         ------                     CBC with platelets and d...[[id number]]  Abnormal            Final result                 Please view results for these tests on the individual orders.

## 2022-12-24 LAB — DEPRECATED CALCIDIOL+CALCIFEROL SERPL-MC: 22 UG/L (ref 20–75)

## 2022-12-27 ENCOUNTER — TELEPHONE (OUTPATIENT)
Dept: EDUCATION SERVICES | Facility: HOSPITAL | Age: 87
End: 2022-12-27

## 2022-12-27 ENCOUNTER — TELEPHONE (OUTPATIENT)
Dept: FAMILY MEDICINE | Facility: OTHER | Age: 87
End: 2022-12-27

## 2022-12-27 RX ORDER — INSULIN LISPRO 100 [IU]/ML
INJECTION, SOLUTION INTRAVENOUS; SUBCUTANEOUS
Qty: 15 ML | Refills: 0 | Status: SHIPPED | OUTPATIENT
Start: 2022-12-27 | End: 2023-01-01

## 2022-12-27 RX ORDER — CHOLECALCIFEROL (VITAMIN D3) 50 MCG
1 TABLET ORAL DAILY
Qty: 90 TABLET | Refills: 3 | Status: SHIPPED | OUTPATIENT
Start: 2022-12-27

## 2022-12-27 NOTE — TELEPHONE ENCOUNTER
"MTM referral from: [facility] visit (referral by provider) for Polypharmacy - would like to crush or get liquid versions.    MT referral outreach attempt on December 27, 2022 at 8:51 AM      Outcome: Patient kanu is not interested at this time because patient is at [place] and stated \"if provider wants to switch forms if they would like does not need appointment\", will route to [facility] Pharmacist/Provider as an FYI. Thank you for the referral.     Chris Haas, [facility]     Patient has [employer]-private pay 1/1/23 coverage  "

## 2022-12-27 NOTE — TELEPHONE ENCOUNTER
Please update daughter, this consultation was requested by her provider.     This was discussed with one of pts daughters in the visit.    Please schedule.

## 2022-12-29 ENCOUNTER — TELEPHONE (OUTPATIENT)
Dept: AUDIOLOGY | Facility: OTHER | Age: 87
End: 2022-12-29

## 2022-12-29 NOTE — TELEPHONE ENCOUNTER
I called patients daughter to make appt. She told me that patient is at [facility] and to call them to get this appointment scheduled. I called 3 times and left a message at the  to have someone call to get this scheduled.    12/29-Letter was sent to call and get appointment scheduled from referral      Estela Ham

## 2022-12-30 ENCOUNTER — TELEPHONE (OUTPATIENT)
Dept: EDUCATION SERVICES | Facility: HOSPITAL | Age: 87
End: 2022-12-30

## 2022-12-30 NOTE — TELEPHONE ENCOUNTER
Meghann from [place] [facility] called she received a  note Dr Murray that Dr Murray was going to talk to Dm Ed about decreasing pt's insulin and possibly discontinuing the insulin. They have not heard anything else regarding this. Does Rosaline Patrick have any information?

## 2022-12-30 NOTE — TELEPHONE ENCOUNTER
Follow up call with KEN Zavaleta RN.     Explained PCP ordered to stop the correction scale, continue her base insulin. Agree with her order. A1C is 6.6. Target is <8.5%     Will follow up next week with plan for patient's care, daughter declined office visit due to dementia.   Faxed note/order to DINORA Patrick RN Diabetes Educator,  [phone]  12/30/2022 at 3:50 PM

## 2023-01-01 ENCOUNTER — PATIENT OUTREACH (OUTPATIENT)
Dept: EDUCATION SERVICES | Facility: HOSPITAL | Age: 88
End: 2023-01-01

## 2023-01-01 ENCOUNTER — APPOINTMENT (OUTPATIENT)
Dept: CT IMAGING | Facility: HOSPITAL | Age: 88
End: 2023-01-01
Attending: STUDENT IN AN ORGANIZED HEALTH CARE EDUCATION/TRAINING PROGRAM
Payer: MEDICARE

## 2023-01-01 ENCOUNTER — APPOINTMENT (OUTPATIENT)
Dept: GENERAL RADIOLOGY | Facility: HOSPITAL | Age: 88
End: 2023-01-01
Attending: STUDENT IN AN ORGANIZED HEALTH CARE EDUCATION/TRAINING PROGRAM
Payer: MEDICARE

## 2023-01-01 ENCOUNTER — TELEPHONE (OUTPATIENT)
Dept: FAMILY MEDICINE | Facility: OTHER | Age: 88
End: 2023-01-01

## 2023-01-01 ENCOUNTER — MEDICAL CORRESPONDENCE (OUTPATIENT)
Dept: HEALTH INFORMATION MANAGEMENT | Facility: CLINIC | Age: 88
End: 2023-01-01

## 2023-01-01 ENCOUNTER — OFFICE VISIT (OUTPATIENT)
Dept: PODIATRY | Facility: OTHER | Age: 88
End: 2023-01-01
Attending: PODIATRIST
Payer: MEDICARE

## 2023-01-01 ENCOUNTER — MEDICAL CORRESPONDENCE (OUTPATIENT)
Dept: HEALTH INFORMATION MANAGEMENT | Facility: HOSPITAL | Age: 88
End: 2023-01-01

## 2023-01-01 ENCOUNTER — VIRTUAL VISIT (OUTPATIENT)
Dept: FAMILY MEDICINE | Facility: OTHER | Age: 88
End: 2023-01-01
Attending: FAMILY MEDICINE
Payer: MEDICARE

## 2023-01-01 ENCOUNTER — APPOINTMENT (OUTPATIENT)
Dept: ULTRASOUND IMAGING | Facility: HOSPITAL | Age: 88
End: 2023-01-01
Attending: NURSE PRACTITIONER
Payer: MEDICARE

## 2023-01-01 ENCOUNTER — APPOINTMENT (OUTPATIENT)
Dept: CT IMAGING | Facility: HOSPITAL | Age: 88
End: 2023-01-01
Attending: NURSE PRACTITIONER
Payer: MEDICARE

## 2023-01-01 ENCOUNTER — LAB (OUTPATIENT)
Dept: LAB | Facility: OTHER | Age: 88
End: 2023-01-01
Payer: MEDICARE

## 2023-01-01 ENCOUNTER — HEALTH MAINTENANCE LETTER (OUTPATIENT)
Age: 88
End: 2023-01-01

## 2023-01-01 ENCOUNTER — HOSPITAL ENCOUNTER (EMERGENCY)
Facility: HOSPITAL | Age: 88
Discharge: HOME | End: 2023-11-13
Attending: STUDENT IN AN ORGANIZED HEALTH CARE EDUCATION/TRAINING PROGRAM | Admitting: STUDENT IN AN ORGANIZED HEALTH CARE EDUCATION/TRAINING PROGRAM
Payer: MEDICARE

## 2023-01-01 ENCOUNTER — LAB REQUISITION (OUTPATIENT)
Dept: LAB | Facility: HOSPITAL | Age: 88
End: 2023-01-01
Payer: MEDICARE

## 2023-01-01 ENCOUNTER — APPOINTMENT (OUTPATIENT)
Dept: GENERAL RADIOLOGY | Facility: HOSPITAL | Age: 88
End: 2023-01-01
Attending: NURSE PRACTITIONER
Payer: MEDICARE

## 2023-01-01 ENCOUNTER — HOSPITAL ENCOUNTER (EMERGENCY)
Facility: HOSPITAL | Age: 88
Discharge: HOME | End: 2023-11-15
Attending: NURSE PRACTITIONER | Admitting: NURSE PRACTITIONER
Payer: MEDICARE

## 2023-01-01 ENCOUNTER — HOSPITAL ENCOUNTER (OUTPATIENT)
Dept: BONE DENSITY | Facility: HOSPITAL | Age: 88
Discharge: HOME | End: 2023-04-03
Attending: FAMILY MEDICINE | Admitting: FAMILY MEDICINE
Payer: MEDICARE

## 2023-01-01 VITALS
BODY MASS INDEX: 29.45 KG/M2 | WEIGHT: 166.23 LBS | TEMPERATURE: 97.4 F | HEART RATE: 71 BPM | OXYGEN SATURATION: 97 % | HEIGHT: 63 IN | DIASTOLIC BLOOD PRESSURE: 63 MMHG | SYSTOLIC BLOOD PRESSURE: 118 MMHG | RESPIRATION RATE: 21 BRPM

## 2023-01-01 VITALS
RESPIRATION RATE: 16 BRPM | BODY MASS INDEX: 29.81 KG/M2 | WEIGHT: 162 LBS | SYSTOLIC BLOOD PRESSURE: 102 MMHG | TEMPERATURE: 98.1 F | HEART RATE: 81 BPM | HEIGHT: 62 IN | DIASTOLIC BLOOD PRESSURE: 68 MMHG | OXYGEN SATURATION: 96 %

## 2023-01-01 VITALS
TEMPERATURE: 98.7 F | HEART RATE: 78 BPM | OXYGEN SATURATION: 97 % | WEIGHT: 168 LBS | BODY MASS INDEX: 30.91 KG/M2 | DIASTOLIC BLOOD PRESSURE: 64 MMHG | RESPIRATION RATE: 16 BRPM | HEIGHT: 62 IN | SYSTOLIC BLOOD PRESSURE: 102 MMHG

## 2023-01-01 VITALS
HEART RATE: 110 BPM | OXYGEN SATURATION: 97 % | DIASTOLIC BLOOD PRESSURE: 77 MMHG | SYSTOLIC BLOOD PRESSURE: 104 MMHG | TEMPERATURE: 99.2 F

## 2023-01-01 VITALS
TEMPERATURE: 98.4 F | SYSTOLIC BLOOD PRESSURE: 123 MMHG | DIASTOLIC BLOOD PRESSURE: 82 MMHG | OXYGEN SATURATION: 95 % | HEART RATE: 89 BPM

## 2023-01-01 VITALS
TEMPERATURE: 98.5 F | SYSTOLIC BLOOD PRESSURE: 117 MMHG | OXYGEN SATURATION: 95 % | HEART RATE: 77 BPM | DIASTOLIC BLOOD PRESSURE: 69 MMHG

## 2023-01-01 VITALS
HEART RATE: 78 BPM | RESPIRATION RATE: 16 BRPM | OXYGEN SATURATION: 95 % | TEMPERATURE: 98.9 F | SYSTOLIC BLOOD PRESSURE: 118 MMHG | DIASTOLIC BLOOD PRESSURE: 82 MMHG

## 2023-01-01 VITALS
DIASTOLIC BLOOD PRESSURE: 93 MMHG | TEMPERATURE: 97.9 F | OXYGEN SATURATION: 94 % | HEART RATE: 76 BPM | SYSTOLIC BLOOD PRESSURE: 130 MMHG

## 2023-01-01 VITALS
TEMPERATURE: 97.5 F | OXYGEN SATURATION: 93 % | SYSTOLIC BLOOD PRESSURE: 120 MMHG | HEART RATE: 83 BPM | DIASTOLIC BLOOD PRESSURE: 89 MMHG

## 2023-01-01 VITALS
TEMPERATURE: 98.8 F | HEART RATE: 71 BPM | WEIGHT: 164.8 LBS | DIASTOLIC BLOOD PRESSURE: 76 MMHG | OXYGEN SATURATION: 98 % | SYSTOLIC BLOOD PRESSURE: 117 MMHG | BODY MASS INDEX: 30.14 KG/M2

## 2023-01-01 DIAGNOSIS — Z79.4 TYPE 2 DIABETES MELLITUS WITH HYPERGLYCEMIA, WITH LONG-TERM CURRENT USE OF INSULIN (H): ICD-10-CM

## 2023-01-01 DIAGNOSIS — E11.42 DIABETIC POLYNEUROPATHY ASSOCIATED WITH TYPE 2 DIABETES MELLITUS (H): ICD-10-CM

## 2023-01-01 DIAGNOSIS — E11.9 CONTROLLED TYPE 2 DIABETES MELLITUS WITHOUT COMPLICATION, WITHOUT LONG-TERM CURRENT USE OF INSULIN (H): ICD-10-CM

## 2023-01-01 DIAGNOSIS — B35.3 TINEA PEDIS OF BOTH FEET: Primary | ICD-10-CM

## 2023-01-01 DIAGNOSIS — G30.1 MODERATE LATE ONSET ALZHEIMER'S DEMENTIA WITH MOOD DISTURBANCE (H): Primary | ICD-10-CM

## 2023-01-01 DIAGNOSIS — I50.22 CHRONIC SYSTOLIC CONGESTIVE HEART FAILURE (H): ICD-10-CM

## 2023-01-01 DIAGNOSIS — Z79.4 DIABETES MELLITUS TYPE 2, INSULIN DEPENDENT (H): ICD-10-CM

## 2023-01-01 DIAGNOSIS — E11.9 TYPE 2 DIABETES MELLITUS WITHOUT COMPLICATION, WITHOUT LONG-TERM CURRENT USE OF INSULIN (H): ICD-10-CM

## 2023-01-01 DIAGNOSIS — M81.8 OTHER OSTEOPOROSIS, UNSPECIFIED PATHOLOGICAL FRACTURE PRESENCE: ICD-10-CM

## 2023-01-01 DIAGNOSIS — N18.30 STAGE 3 CHRONIC KIDNEY DISEASE, UNSPECIFIED WHETHER STAGE 3A OR 3B CKD (H): ICD-10-CM

## 2023-01-01 DIAGNOSIS — N18.32 STAGE 3B CHRONIC KIDNEY DISEASE (H): ICD-10-CM

## 2023-01-01 DIAGNOSIS — E11.9 CONTROLLED TYPE 2 DIABETES MELLITUS WITHOUT COMPLICATION, WITHOUT LONG-TERM CURRENT USE OF INSULIN (H): Primary | ICD-10-CM

## 2023-01-01 DIAGNOSIS — N18.31 STAGE 3A CHRONIC KIDNEY DISEASE (H): ICD-10-CM

## 2023-01-01 DIAGNOSIS — E11.65 TYPE 2 DIABETES MELLITUS WITH HYPERGLYCEMIA, WITH LONG-TERM CURRENT USE OF INSULIN (H): ICD-10-CM

## 2023-01-01 DIAGNOSIS — G47.00 INSOMNIA: ICD-10-CM

## 2023-01-01 DIAGNOSIS — L60.3 ONYCHODYSTROPHY: Primary | ICD-10-CM

## 2023-01-01 DIAGNOSIS — G47.00 INSOMNIA: Primary | ICD-10-CM

## 2023-01-01 DIAGNOSIS — E11.9 DIABETES MELLITUS TYPE 2, INSULIN DEPENDENT (H): ICD-10-CM

## 2023-01-01 DIAGNOSIS — R09.89 ABSENT PEDAL PULSES: ICD-10-CM

## 2023-01-01 DIAGNOSIS — I50.23 ACUTE ON CHRONIC SYSTOLIC CONGESTIVE HEART FAILURE (H): ICD-10-CM

## 2023-01-01 DIAGNOSIS — L84 CALLUS OF FOOT: Primary | ICD-10-CM

## 2023-01-01 DIAGNOSIS — I10 ESSENTIAL (PRIMARY) HYPERTENSION: ICD-10-CM

## 2023-01-01 DIAGNOSIS — Z13.89 SCREENING FOR DIABETIC PERIPHERAL NEUROPATHY: ICD-10-CM

## 2023-01-01 DIAGNOSIS — S02.85XA CLOSED FRACTURE OF ORBITAL WALL, INITIAL ENCOUNTER (H): ICD-10-CM

## 2023-01-01 DIAGNOSIS — Z86.73 HISTORY OF CVA (CEREBROVASCULAR ACCIDENT): ICD-10-CM

## 2023-01-01 DIAGNOSIS — F07.81 POST CONCUSSION SYNDROME: ICD-10-CM

## 2023-01-01 DIAGNOSIS — I48.91 ATRIAL FIBRILLATION WITH RVR (H): ICD-10-CM

## 2023-01-01 DIAGNOSIS — S02.85XD CLOSED FRACTURE OF ORBIT WITH ROUTINE HEALING, SUBSEQUENT ENCOUNTER: ICD-10-CM

## 2023-01-01 DIAGNOSIS — Z79.01 CHRONIC ANTICOAGULATION: ICD-10-CM

## 2023-01-01 DIAGNOSIS — L85.3 XEROSIS OF SKIN: ICD-10-CM

## 2023-01-01 DIAGNOSIS — I48.21 PERMANENT ATRIAL FIBRILLATION (H): ICD-10-CM

## 2023-01-01 DIAGNOSIS — E11.65 TYPE 2 DIABETES MELLITUS WITH HYPERGLYCEMIA, WITHOUT LONG-TERM CURRENT USE OF INSULIN (H): ICD-10-CM

## 2023-01-01 DIAGNOSIS — L60.3 ONYCHODYSTROPHY: ICD-10-CM

## 2023-01-01 DIAGNOSIS — L97.521 DIABETIC ULCER OF TOE OF LEFT FOOT ASSOCIATED WITH TYPE 2 DIABETES MELLITUS, LIMITED TO BREAKDOWN OF SKIN (H): Primary | ICD-10-CM

## 2023-01-01 DIAGNOSIS — I50.22 CHRONIC SYSTOLIC CONGESTIVE HEART FAILURE (H): Primary | ICD-10-CM

## 2023-01-01 DIAGNOSIS — L84 CALLUS OF FOOT: ICD-10-CM

## 2023-01-01 DIAGNOSIS — R41.0 DELIRIUM: ICD-10-CM

## 2023-01-01 DIAGNOSIS — Z91.81 PERSONAL HISTORY OF FALL: ICD-10-CM

## 2023-01-01 DIAGNOSIS — R69 DIAGNOSIS UNKNOWN: ICD-10-CM

## 2023-01-01 DIAGNOSIS — E87.6 HYPOKALEMIA: ICD-10-CM

## 2023-01-01 DIAGNOSIS — Z86.31 PERSONAL HISTORY OF DIABETIC FOOT ULCER: ICD-10-CM

## 2023-01-01 DIAGNOSIS — M81.8 OTHER OSTEOPOROSIS, UNSPECIFIED PATHOLOGICAL FRACTURE PRESENCE: Primary | ICD-10-CM

## 2023-01-01 DIAGNOSIS — K21.00 GASTROESOPHAGEAL REFLUX DISEASE WITH ESOPHAGITIS WITHOUT HEMORRHAGE: ICD-10-CM

## 2023-01-01 DIAGNOSIS — J31.0 CHRONIC RHINITIS: ICD-10-CM

## 2023-01-01 DIAGNOSIS — S00.83XA CONTUSION OF FACE, INITIAL ENCOUNTER: ICD-10-CM

## 2023-01-01 DIAGNOSIS — B35.3 TINEA PEDIS OF BOTH FEET: ICD-10-CM

## 2023-01-01 DIAGNOSIS — L03.116 CELLULITIS OF LEFT LOWER EXTREMITY: ICD-10-CM
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
## 2023-01-01 DIAGNOSIS — E11.621 DIABETIC ULCER OF TOE OF LEFT FOOT ASSOCIATED WITH TYPE 2 DIABETES MELLITUS, LIMITED TO BREAKDOWN OF SKIN (H): Primary | ICD-10-CM

## 2023-01-01 DIAGNOSIS — R19.5 LOOSE STOOLS: ICD-10-CM

## 2023-01-01 DIAGNOSIS — R60.0 LOWER EXTREMITY EDEMA: ICD-10-CM

## 2023-01-01 DIAGNOSIS — R41.0 DELIRIUM: Primary | ICD-10-CM

## 2023-01-01 DIAGNOSIS — F02.B3 MODERATE LATE ONSET ALZHEIMER'S DEMENTIA WITH MOOD DISTURBANCE (H): Primary | ICD-10-CM

## 2023-01-01 DIAGNOSIS — L03.116 CELLULITIS OF LEFT LOWER EXTREMITY: Primary | ICD-10-CM

## 2023-01-01 LAB
ALBUMIN SERPL BCG-MCNC: 3.6 G/DL (ref 3.5–5.2)
ALBUMIN UR-MCNC: 10 MG/DL
ALBUMIN UR-MCNC: NEGATIVE MG/DL
ALP SERPL-CCNC: 98 U/L (ref 35–104)
ALT SERPL W P-5'-P-CCNC: 23 U/L (ref 10–35)
ANION GAP SERPL CALCULATED.3IONS-SCNC: 11 MMOL/L (ref 7–15)
ANION GAP SERPL CALCULATED.3IONS-SCNC: 12 MMOL/L (ref 7–15)
ANION GAP SERPL CALCULATED.3IONS-SCNC: 12 MMOL/L (ref 7–15)
ANION GAP SERPL CALCULATED.3IONS-SCNC: 9 MMOL/L (ref 7–15)
APPEARANCE UR: CLEAR
APPEARANCE UR: CLEAR
AST SERPL W P-5'-P-CCNC: 31 U/L (ref 10–35)
ATRIAL RATE - MUSE: NORMAL BPM
BACTERIA UR CULT: NORMAL
BASOPHILS # BLD AUTO: 0 10E3/UL (ref 0–0.2)
BASOPHILS # BLD AUTO: 0.1 10E3/UL (ref 0–0.2)
BASOPHILS NFR BLD AUTO: 0 %
BASOPHILS NFR BLD AUTO: 1 %
BILIRUB SERPL-MCNC: 0.5 MG/DL
BILIRUB UR QL STRIP: NEGATIVE
BILIRUB UR QL STRIP: NEGATIVE
BUN SERPL-MCNC: 19.2 MG/DL (ref 8–23)
BUN SERPL-MCNC: 22.6 MG/DL (ref 8–23)
BUN SERPL-MCNC: 22.8 MG/DL (ref 8–23)
BUN SERPL-MCNC: 23 MG/DL (ref 8–23)
CALCIUM SERPL-MCNC: 8.9 MG/DL (ref 8.2–9.6)
CALCIUM SERPL-MCNC: 9.5 MG/DL (ref 8.8–10.2)
CALCIUM SERPL-MCNC: 9.6 MG/DL (ref 8.8–10.2)
CALCIUM SERPL-MCNC: 9.6 MG/DL (ref 8.8–10.2)
CHLORIDE SERPL-SCNC: 103 MMOL/L (ref 98–107)
CHLORIDE SERPL-SCNC: 104 MMOL/L (ref 98–107)
CHLORIDE SERPL-SCNC: 96 MMOL/L (ref 98–107)
CHLORIDE SERPL-SCNC: 98 MMOL/L (ref 98–107)
CHOLEST SERPL-MCNC: 106 MG/DL
CK SERPL-CCNC: 77 U/L (ref 26–192)
COLOR UR AUTO: YELLOW
COLOR UR AUTO: YELLOW
CREAT SERPL-MCNC: 0.88 MG/DL (ref 0.51–0.95)
CREAT SERPL-MCNC: 0.98 MG/DL (ref 0.51–0.95)
CREAT SERPL-MCNC: 1.03 MG/DL (ref 0.51–0.95)
CREAT SERPL-MCNC: 1.06 MG/DL (ref 0.51–0.95)
DEPRECATED HCO3 PLAS-SCNC: 24 MMOL/L (ref 22–29)
DEPRECATED HCO3 PLAS-SCNC: 27 MMOL/L (ref 22–29)
DEPRECATED HCO3 PLAS-SCNC: 28 MMOL/L (ref 22–29)
DEPRECATED HCO3 PLAS-SCNC: 29 MMOL/L (ref 22–29)
DIASTOLIC BLOOD PRESSURE - MUSE: NORMAL MMHG
EGFRCR SERPLBLD CKD-EPI 2021: 55 ML/MIN/1.73M2
EGFRCR SERPLBLD CKD-EPI 2021: 62 ML/MIN/1.73M2
EOSINOPHIL # BLD AUTO: 0.1 10E3/UL (ref 0–0.7)
EOSINOPHIL # BLD AUTO: 0.3 10E3/UL (ref 0–0.7)
EOSINOPHIL NFR BLD AUTO: 1 %
EOSINOPHIL NFR BLD AUTO: 3 %
ERYTHROCYTE [DISTWIDTH] IN BLOOD BY AUTOMATED COUNT: 14 % (ref 10–15)
ERYTHROCYTE [DISTWIDTH] IN BLOOD BY AUTOMATED COUNT: 14.1 % (ref 10–15)
ERYTHROCYTE [DISTWIDTH] IN BLOOD BY AUTOMATED COUNT: 14.7 % (ref 10–15)
EST. AVERAGE GLUCOSE BLD GHB EST-MCNC: 151 MG/DL
ETHANOL SERPL-MCNC: <0.01 G/DL
GFR SERPL CREATININE-BSD FRML MDRD: 50 ML/MIN/1.73M2
GFR SERPL CREATININE-BSD FRML MDRD: 52 ML/MIN/1.73M2
GLUCOSE SERPL-MCNC: 138 MG/DL (ref 70–99)
GLUCOSE SERPL-MCNC: 148 MG/DL (ref 70–99)
GLUCOSE SERPL-MCNC: 170 MG/DL (ref 70–99)
GLUCOSE SERPL-MCNC: 175 MG/DL (ref 70–99)
GLUCOSE UR STRIP-MCNC: NEGATIVE MG/DL
GLUCOSE UR STRIP-MCNC: NEGATIVE MG/DL
HBA1C MFR BLD: 6.9 %
HCT VFR BLD AUTO: 35.1 % (ref 35–47)
HCT VFR BLD AUTO: 37 % (ref 35–47)
HCT VFR BLD AUTO: 38.6 % (ref 35–47)
HDLC SERPL-MCNC: 41 MG/DL
HGB BLD-MCNC: 11.8 G/DL (ref 11.7–15.7)
HGB BLD-MCNC: 12.4 G/DL (ref 11.7–15.7)
HGB BLD-MCNC: 12.4 G/DL (ref 11.7–15.7)
HGB UR QL STRIP: ABNORMAL
HGB UR QL STRIP: NEGATIVE
HOLD SPECIMEN: NORMAL
HYALINE CASTS: 1 /LPF
IMM GRANULOCYTES # BLD: 0.1 10E3/UL
IMM GRANULOCYTES # BLD: 0.1 10E3/UL
IMM GRANULOCYTES NFR BLD: 1 %
IMM GRANULOCYTES NFR BLD: 1 %
INR PPP: 1.57 (ref 0.85–1.15)
INTERPRETATION ECG - MUSE: NORMAL
KETONES UR STRIP-MCNC: 20 MG/DL
KETONES UR STRIP-MCNC: NEGATIVE MG/DL
LDLC SERPL CALC-MCNC: 47 MG/DL
LEUKOCYTE ESTERASE UR QL STRIP: ABNORMAL
LEUKOCYTE ESTERASE UR QL STRIP: NEGATIVE
LYMPHOCYTES # BLD AUTO: 2.1 10E3/UL (ref 0.8–5.3)
LYMPHOCYTES # BLD AUTO: 2.2 10E3/UL (ref 0.8–5.3)
LYMPHOCYTES NFR BLD AUTO: 17 %
LYMPHOCYTES NFR BLD AUTO: 22 %
MCH RBC QN AUTO: 28.5 PG (ref 26.5–33)
MCH RBC QN AUTO: 29 PG (ref 26.5–33)
MCH RBC QN AUTO: 29.6 PG (ref 26.5–33)
MCHC RBC AUTO-ENTMCNC: 32.1 G/DL (ref 31.5–36.5)
MCHC RBC AUTO-ENTMCNC: 33.5 G/DL (ref 31.5–36.5)
MCHC RBC AUTO-ENTMCNC: 33.6 G/DL (ref 31.5–36.5)
MCV RBC AUTO: 87 FL (ref 78–100)
MCV RBC AUTO: 88 FL (ref 78–100)
MCV RBC AUTO: 89 FL (ref 78–100)
MONOCYTES # BLD AUTO: 0.9 10E3/UL (ref 0–1.3)
MONOCYTES # BLD AUTO: 1 10E3/UL (ref 0–1.3)
MONOCYTES NFR BLD AUTO: 8 %
MONOCYTES NFR BLD AUTO: 9 %
MUCOUS THREADS #/AREA URNS LPF: PRESENT /LPF
MUCOUS THREADS #/AREA URNS LPF: PRESENT /LPF
NEUTROPHILS # BLD AUTO: 6.4 10E3/UL (ref 1.6–8.3)
NEUTROPHILS # BLD AUTO: 9.1 10E3/UL (ref 1.6–8.3)
NEUTROPHILS NFR BLD AUTO: 64 %
NEUTROPHILS NFR BLD AUTO: 73 %
NITRATE UR QL: NEGATIVE
NITRATE UR QL: NEGATIVE
NONHDLC SERPL-MCNC: 65 MG/DL
NRBC # BLD AUTO: 0 10E3/UL
NRBC # BLD AUTO: 0 10E3/UL
NRBC BLD AUTO-RTO: 0 /100
NRBC BLD AUTO-RTO: 0 /100
P AXIS - MUSE: NORMAL DEGREES
PH UR STRIP: 5.5 [PH] (ref 4.7–8)
PH UR STRIP: 7 [PH] (ref 4.7–8)
PLATELET # BLD AUTO: 175 10E3/UL (ref 150–450)
PLATELET # BLD AUTO: 176 10E3/UL (ref 150–450)
PLATELET # BLD AUTO: 214 10E3/UL (ref 150–450)
POTASSIUM SERPL-SCNC: 3.1 MMOL/L (ref 3.4–5.3)
POTASSIUM SERPL-SCNC: 3.4 MMOL/L (ref 3.4–5.3)
POTASSIUM SERPL-SCNC: 4.6 MMOL/L (ref 3.4–5.3)
POTASSIUM SERPL-SCNC: 4.8 MMOL/L (ref 3.4–5.3)
PR INTERVAL - MUSE: NORMAL MS
PROT SERPL-MCNC: 7.2 G/DL (ref 6.4–8.3)
QRS DURATION - MUSE: 102 MS
QT - MUSE: 392 MS
QTC - MUSE: 487 MS
R AXIS - MUSE: -59 DEGREES
RBC # BLD AUTO: 3.98 10E6/UL (ref 3.8–5.2)
RBC # BLD AUTO: 4.27 10E6/UL (ref 3.8–5.2)
RBC # BLD AUTO: 4.35 10E6/UL (ref 3.8–5.2)
RBC URINE: 1 /HPF
RBC URINE: 38 /HPF
SODIUM SERPL-SCNC: 135 MMOL/L (ref 135–145)
SODIUM SERPL-SCNC: 139 MMOL/L (ref 135–145)
SODIUM SERPL-SCNC: 139 MMOL/L (ref 136–145)
SODIUM SERPL-SCNC: 140 MMOL/L (ref 136–145)
SP GR UR STRIP: 1.02 (ref 1–1.03)
SP GR UR STRIP: 1.02 (ref 1–1.03)
SQUAMOUS EPITHELIAL: 0 /HPF
SQUAMOUS EPITHELIAL: 0 /HPF
SYSTOLIC BLOOD PRESSURE - MUSE: NORMAL MMHG
T AXIS - MUSE: 150 DEGREES
TRIGL SERPL-MCNC: 89 MG/DL
UROBILINOGEN UR STRIP-MCNC: 3 MG/DL
UROBILINOGEN UR STRIP-MCNC: NORMAL MG/DL
VENTRICULAR RATE- MUSE: 93 BPM
WBC # BLD AUTO: 12.2 10E3/UL (ref 4–11)
WBC # BLD AUTO: 12.4 10E3/UL (ref 4–11)
WBC # BLD AUTO: 9.8 10E3/UL (ref 4–11)
WBC URINE: 20 /HPF
WBC URINE: 4 /HPF

## 2023-01-01 PROCEDURE — 11721 DEBRIDE NAIL 6 OR MORE: CPT | Mod: XS | Performed by: PODIATRIST

## 2023-01-01 PROCEDURE — 99442 PR PHYSICIAN TELEPHONE EVALUATION 11-20 MIN: CPT | Mod: 95 | Performed by: FAMILY MEDICINE

## 2023-01-01 PROCEDURE — 73130 X-RAY EXAM OF HAND: CPT | Mod: RT

## 2023-01-01 PROCEDURE — 73030 X-RAY EXAM OF SHOULDER: CPT | Mod: LT

## 2023-01-01 PROCEDURE — 99291 CRITICAL CARE FIRST HOUR: CPT | Mod: 25 | Performed by: STUDENT IN AN ORGANIZED HEALTH CARE EDUCATION/TRAINING PROGRAM

## 2023-01-01 PROCEDURE — 36415 COLL VENOUS BLD VENIPUNCTURE: CPT | Performed by: NURSE PRACTITIONER

## 2023-01-01 PROCEDURE — 76512 OPH US DX B-SCAN: CPT | Mod: 26 | Performed by: NURSE PRACTITIONER

## 2023-01-01 PROCEDURE — 85610 PROTHROMBIN TIME: CPT | Performed by: STUDENT IN AN ORGANIZED HEALTH CARE EDUCATION/TRAINING PROGRAM

## 2023-01-01 PROCEDURE — 99284 EMERGENCY DEPT VISIT MOD MDM: CPT | Mod: 25 | Performed by: NURSE PRACTITIONER

## 2023-01-01 PROCEDURE — 10160 PNXR ASPIR ABSC HMTMA BULLA: CPT | Performed by: STUDENT IN AN ORGANIZED HEALTH CARE EDUCATION/TRAINING PROGRAM

## 2023-01-01 PROCEDURE — 81001 URINALYSIS AUTO W/SCOPE: CPT | Performed by: FAMILY MEDICINE

## 2023-01-01 PROCEDURE — 99213 OFFICE O/P EST LOW 20 MIN: CPT | Mod: 25 | Performed by: PODIATRIST

## 2023-01-01 PROCEDURE — G0463 HOSPITAL OUTPT CLINIC VISIT: HCPCS

## 2023-01-01 PROCEDURE — 80048 BASIC METABOLIC PNL TOTAL CA: CPT | Mod: ZL | Performed by: FAMILY MEDICINE

## 2023-01-01 PROCEDURE — 87086 URINE CULTURE/COLONY COUNT: CPT | Performed by: FAMILY MEDICINE

## 2023-01-01 PROCEDURE — 76512 OPH US DX B-SCAN: CPT | Mod: TC,LT

## 2023-01-01 PROCEDURE — G0463 HOSPITAL OUTPT CLINIC VISIT: HCPCS | Mod: 25

## 2023-01-01 PROCEDURE — 36415 COLL VENOUS BLD VENIPUNCTURE: CPT | Mod: ZL | Performed by: FAMILY MEDICINE

## 2023-01-01 PROCEDURE — 11055 PARING/CUTG B9 HYPRKER LES 1: CPT | Performed by: PODIATRIST

## 2023-01-01 PROCEDURE — 80061 LIPID PANEL: CPT | Mod: ZL | Performed by: FAMILY MEDICINE

## 2023-01-01 PROCEDURE — 11721 DEBRIDE NAIL 6 OR MORE: CPT | Performed by: PODIATRIST

## 2023-01-01 PROCEDURE — 82550 ASSAY OF CK (CPK): CPT | Performed by: STUDENT IN AN ORGANIZED HEALTH CARE EDUCATION/TRAINING PROGRAM

## 2023-01-01 PROCEDURE — 80048 BASIC METABOLIC PNL TOTAL CA: CPT | Performed by: STUDENT IN AN ORGANIZED HEALTH CARE EDUCATION/TRAINING PROGRAM

## 2023-01-01 PROCEDURE — 99213 OFFICE O/P EST LOW 20 MIN: CPT | Performed by: PODIATRIST

## 2023-01-01 PROCEDURE — 96365 THER/PROPH/DIAG IV INF INIT: CPT | Mod: XU | Performed by: STUDENT IN AN ORGANIZED HEALTH CARE EDUCATION/TRAINING PROGRAM

## 2023-01-01 PROCEDURE — G1010 CDSM STANSON: HCPCS

## 2023-01-01 PROCEDURE — 82077 ASSAY SPEC XCP UR&BREATH IA: CPT | Performed by: STUDENT IN AN ORGANIZED HEALTH CARE EDUCATION/TRAINING PROGRAM

## 2023-01-01 PROCEDURE — 99284 EMERGENCY DEPT VISIT MOD MDM: CPT | Mod: 25 | Performed by: STUDENT IN AN ORGANIZED HEALTH CARE EDUCATION/TRAINING PROGRAM

## 2023-01-01 PROCEDURE — 85027 COMPLETE CBC AUTOMATED: CPT | Performed by: STUDENT IN AN ORGANIZED HEALTH CARE EDUCATION/TRAINING PROGRAM

## 2023-01-01 PROCEDURE — 250N000011 HC RX IP 250 OP 636: Performed by: STUDENT IN AN ORGANIZED HEALTH CARE EDUCATION/TRAINING PROGRAM

## 2023-01-01 PROCEDURE — 80048 BASIC METABOLIC PNL TOTAL CA: CPT | Performed by: NURSE PRACTITIONER

## 2023-01-01 PROCEDURE — 70480 CT ORBIT/EAR/FOSSA W/O DYE: CPT | Mod: MG

## 2023-01-01 PROCEDURE — 93005 ELECTROCARDIOGRAM TRACING: CPT

## 2023-01-01 PROCEDURE — 72125 CT NECK SPINE W/O DYE: CPT | Mod: MF

## 2023-01-01 PROCEDURE — 85025 COMPLETE CBC W/AUTO DIFF WBC: CPT | Performed by: FAMILY MEDICINE

## 2023-01-01 PROCEDURE — 99214 OFFICE O/P EST MOD 30 MIN: CPT | Performed by: FAMILY MEDICINE

## 2023-01-01 PROCEDURE — 80053 COMPREHEN METABOLIC PANEL: CPT | Performed by: FAMILY MEDICINE

## 2023-01-01 PROCEDURE — 250N000009 HC RX 250: Performed by: STUDENT IN AN ORGANIZED HEALTH CARE EDUCATION/TRAINING PROGRAM

## 2023-01-01 PROCEDURE — 99207 PR FOOT EXAM NO CHARGE: CPT | Performed by: PODIATRIST

## 2023-01-01 PROCEDURE — 11721 DEBRIDE NAIL 6 OR MORE: CPT | Mod: XU | Performed by: PODIATRIST

## 2023-01-01 PROCEDURE — 85025 COMPLETE CBC W/AUTO DIFF WBC: CPT | Performed by: NURSE PRACTITIONER

## 2023-01-01 PROCEDURE — 83036 HEMOGLOBIN GLYCOSYLATED A1C: CPT | Mod: ZL | Performed by: FAMILY MEDICINE

## 2023-01-01 PROCEDURE — 93005 ELECTROCARDIOGRAM TRACING: CPT | Mod: RTG

## 2023-01-01 PROCEDURE — 77080 DXA BONE DENSITY AXIAL: CPT

## 2023-01-01 PROCEDURE — 71045 X-RAY EXAM CHEST 1 VIEW: CPT

## 2023-01-01 PROCEDURE — 81003 URINALYSIS AUTO W/O SCOPE: CPT | Performed by: NURSE PRACTITIONER

## 2023-01-01 PROCEDURE — 36415 COLL VENOUS BLD VENIPUNCTURE: CPT | Performed by: STUDENT IN AN ORGANIZED HEALTH CARE EDUCATION/TRAINING PROGRAM

## 2023-01-01 PROCEDURE — 99284 EMERGENCY DEPT VISIT MOD MDM: CPT | Mod: 25

## 2023-01-01 PROCEDURE — 93010 ELECTROCARDIOGRAM REPORT: CPT | Performed by: INTERNAL MEDICINE

## 2023-01-01 RX ORDER — ALENDRONATE SODIUM 70 MG/1
TABLET ORAL
COMMUNITY
Start: 2023-01-01 | End: 2023-01-01

## 2023-01-01 RX ORDER — METOPROLOL TARTRATE 100 MG
TABLET ORAL
Qty: 58 TABLET | Refills: 11 | Status: SHIPPED | OUTPATIENT
Start: 2023-01-01

## 2023-01-01 RX ORDER — BLOOD-GLUCOSE METER
KIT MISCELLANEOUS
Qty: 100 STRIP | Refills: 0 | Status: SHIPPED | OUTPATIENT
Start: 2023-01-01 | End: 2023-01-01

## 2023-01-01 RX ORDER — MIRTAZAPINE 7.5 MG/1
TABLET, FILM COATED ORAL
Qty: 29 TABLET | Refills: 0 | Status: SHIPPED | OUTPATIENT
Start: 2023-01-01

## 2023-01-01 RX ORDER — GLIPIZIDE 2.5 MG/1
TABLET, EXTENDED RELEASE ORAL
Qty: 90 TABLET | Refills: 1 | Status: SHIPPED | OUTPATIENT
Start: 2023-01-01 | End: 2023-01-01 | Stop reason: DRUGHIGH

## 2023-01-01 RX ORDER — POTASSIUM CHLORIDE 1500 MG/1
TABLET, EXTENDED RELEASE ORAL
Qty: 29 TABLET | Refills: 11 | Status: SHIPPED | OUTPATIENT
Start: 2023-01-01 | End: 2023-01-01

## 2023-01-01 RX ORDER — INSULIN LISPRO 100 [IU]/ML
INJECTION, SOLUTION INTRAVENOUS; SUBCUTANEOUS
Qty: 15 ML | Refills: 0 | Status: SHIPPED | OUTPATIENT
Start: 2023-01-01 | End: 2023-01-01

## 2023-01-01 RX ORDER — PEN NEEDLE, DIABETIC, SAFETY 30 GX3/16"
NEEDLE, DISPOSABLE MISCELLANEOUS
Qty: 100 EACH | Refills: 0 | Status: SHIPPED | OUTPATIENT
Start: 2023-01-01 | End: 2023-01-01

## 2023-01-01 RX ORDER — CEFTRIAXONE SODIUM 1 G/50ML
1 INJECTION, SOLUTION INTRAVENOUS ONCE
Status: COMPLETED | OUTPATIENT
Start: 2023-01-01 | End: 2023-01-01

## 2023-01-01 RX ORDER — GLIPIZIDE 2.5 MG/1
7.5 TABLET, EXTENDED RELEASE ORAL DAILY
Qty: 90 TABLET | Refills: 2 | Status: SHIPPED | OUTPATIENT
Start: 2023-01-01 | End: 2023-01-01

## 2023-01-01 RX ORDER — DOXYCYCLINE HYCLATE 100 MG
100 TABLET ORAL 2 TIMES DAILY
Qty: 14 TABLET | Refills: 0 | Status: SHIPPED | OUTPATIENT
Start: 2023-01-01 | End: 2023-01-01

## 2023-01-01 RX ORDER — GLIPIZIDE 10 MG/1
10 TABLET, FILM COATED, EXTENDED RELEASE ORAL DAILY
Qty: 30 TABLET | Refills: 5 | Status: SHIPPED | OUTPATIENT
Start: 2023-01-01

## 2023-01-01 RX ORDER — BLOOD-GLUCOSE METER
KIT MISCELLANEOUS
Qty: 100 STRIP | Refills: 4 | Status: SHIPPED | OUTPATIENT
Start: 2023-01-01 | End: 2023-01-01

## 2023-01-01 RX ORDER — LANCETS 21 GAUGE
EACH MISCELLANEOUS
Qty: 100 EACH | Refills: 0 | Status: SHIPPED | OUTPATIENT
Start: 2023-01-01 | End: 2023-01-01

## 2023-01-01 RX ORDER — ISOPROPYL ALCOHOL 70 ML/100ML
SWAB TOPICAL
Qty: 100 EACH | Refills: 0 | Status: SHIPPED | OUTPATIENT
Start: 2023-01-01 | End: 2023-01-01

## 2023-01-01 RX ORDER — ALENDRONATE SODIUM 70 MG/1
TABLET ORAL
Qty: 4 TABLET | Refills: 11 | Status: SHIPPED | OUTPATIENT
Start: 2023-01-01 | End: 2023-01-01

## 2023-01-01 RX ORDER — MIRTAZAPINE 7.5 MG/1
TABLET, FILM COATED ORAL
Qty: 33 TABLET | Refills: 0 | OUTPATIENT
Start: 2023-01-01

## 2023-01-01 RX ORDER — LANCETS 21 GAUGE
EACH MISCELLANEOUS
Qty: 100 EACH | Refills: 4 | Status: SHIPPED | OUTPATIENT
Start: 2023-01-01 | End: 2023-01-01

## 2023-01-01 RX ORDER — PEN NEEDLE, DIABETIC, SAFETY 30 GX3/16"
NEEDLE, DISPOSABLE MISCELLANEOUS
Qty: 100 EACH | Refills: 6 | Status: SHIPPED | OUTPATIENT
Start: 2023-01-01 | End: 2023-01-01

## 2023-01-01 RX ORDER — GLIPIZIDE 2.5 MG/1
2.5 TABLET, EXTENDED RELEASE ORAL DAILY
Qty: 30 TABLET | Refills: 3 | Status: SHIPPED | OUTPATIENT
Start: 2023-01-01 | End: 2023-01-01

## 2023-01-01 RX ORDER — LIDOCAINE HYDROCHLORIDE 10 MG/ML
10 INJECTION, SOLUTION INFILTRATION; PERINEURAL ONCE
Status: COMPLETED | OUTPATIENT
Start: 2023-01-01 | End: 2023-01-01

## 2023-01-01 RX ORDER — PETROLATUM,WHITE
OINTMENT IN PACKET (GRAM) TOPICAL DAILY
COMMUNITY
End: 2023-01-01

## 2023-01-01 RX ORDER — LOPERAMIDE HCL 2 MG
2 CAPSULE ORAL 4 TIMES DAILY PRN
COMMUNITY

## 2023-01-01 RX ORDER — INSULIN GLARGINE 100 [IU]/ML
INJECTION, SOLUTION SUBCUTANEOUS
Qty: 15 ML | Refills: 1 | Status: SHIPPED | OUTPATIENT
Start: 2023-01-01 | End: 2023-01-01

## 2023-01-01 RX ORDER — INSULIN GLARGINE 100 [IU]/ML
INJECTION, SOLUTION SUBCUTANEOUS
Qty: 15 ML | Refills: 0 | COMMUNITY
Start: 2023-01-01 | End: 2023-01-01 | Stop reason: ALTCHOICE

## 2023-01-01 RX ORDER — NYSTATIN 100000 [USP'U]/G
POWDER TOPICAL 2 TIMES DAILY
Qty: 60 G | Refills: 3 | Status: SHIPPED | OUTPATIENT
Start: 2023-01-01

## 2023-01-01 RX ORDER — ISOPROPYL ALCOHOL 70 ML/100ML
SWAB TOPICAL
Qty: 100 EACH | Refills: 0 | Status: SHIPPED | OUTPATIENT
Start: 2023-01-01

## 2023-01-01 RX ORDER — MAGNESIUM HYDROXIDE/ALUMINUM HYDROXICE/SIMETHICONE 120; 1200; 1200 MG/30ML; MG/30ML; MG/30ML
30 SUSPENSION ORAL PRN
COMMUNITY

## 2023-01-01 RX ORDER — INSULIN GLARGINE 100 [IU]/ML
INJECTION, SOLUTION SUBCUTANEOUS
Qty: 3 ML | Refills: 0 | Status: SHIPPED | OUTPATIENT
Start: 2023-01-01 | End: 2023-01-01

## 2023-01-01 RX ORDER — INSULIN LISPRO 100 [IU]/ML
INJECTION, SOLUTION INTRAVENOUS; SUBCUTANEOUS
Qty: 15 ML | Refills: 0 | COMMUNITY
Start: 2023-01-01 | End: 2023-01-01

## 2023-01-01 RX ORDER — DULOXETIN HYDROCHLORIDE 20 MG/1
CAPSULE, DELAYED RELEASE ORAL
Qty: 58 CAPSULE | Refills: 11 | Status: SHIPPED | OUTPATIENT
Start: 2023-01-01

## 2023-01-01 RX ORDER — POTASSIUM CHLORIDE 1500 MG/1
40 TABLET, EXTENDED RELEASE ORAL ONCE
Status: COMPLETED | OUTPATIENT
Start: 2023-01-01 | End: 2023-01-01

## 2023-01-01 RX ORDER — MIRTAZAPINE 7.5 MG/1
TABLET, FILM COATED ORAL
Qty: 29 TABLET | Refills: 0 | OUTPATIENT
Start: 2023-01-01

## 2023-01-01 RX ORDER — FLUTICASONE PROPIONATE 50 MCG
SPRAY, SUSPENSION (ML) NASAL
Qty: 16 G | Refills: 10 | Status: SHIPPED | OUTPATIENT
Start: 2023-01-01

## 2023-01-01 RX ORDER — GLIPIZIDE 2.5 MG/1
TABLET, EXTENDED RELEASE ORAL
Qty: 90 TABLET | Refills: 0 | Status: SHIPPED | OUTPATIENT
Start: 2023-01-01 | End: 2023-01-01

## 2023-01-01 RX ORDER — GLIPIZIDE 2.5 MG/1
5 TABLET, EXTENDED RELEASE ORAL DAILY
Qty: 60 TABLET | Refills: 2 | Status: SHIPPED | OUTPATIENT
Start: 2023-01-01 | End: 2023-01-01

## 2023-01-01 RX ORDER — MULTIVITAMIN WITH FOLIC ACID 400 MCG
TABLET ORAL
Qty: 29 TABLET | Refills: 11 | Status: SHIPPED | OUTPATIENT
Start: 2023-01-01 | End: 2023-01-01

## 2023-01-01 RX ORDER — MIRTAZAPINE 7.5 MG/1
7.5 TABLET, FILM COATED ORAL AT BEDTIME
Qty: 90 TABLET | Refills: 0 | Status: SHIPPED | OUTPATIENT
Start: 2023-01-01 | End: 2023-01-01

## 2023-01-01 RX ADMIN — CEFTRIAXONE SODIUM 1 G: 1 INJECTION, SOLUTION INTRAVENOUS at 08:49

## 2023-01-01 RX ADMIN — LIDOCAINE HYDROCHLORIDE 10 ML: 10 INJECTION, SOLUTION INFILTRATION; PERINEURAL at 13:00

## 2023-01-01 ASSESSMENT — ENCOUNTER SYMPTOMS
ALLERGIC/IMMUNOLOGIC NEGATIVE: 1
NEUROLOGICAL NEGATIVE: 1
HEMATOLOGIC/LYMPHATIC NEGATIVE: 1
FACIAL SWELLING: 1
MUSCULOSKELETAL NEGATIVE: 1
GASTROINTESTINAL NEGATIVE: 1
ENDOCRINE NEGATIVE: 1
CARDIOVASCULAR NEGATIVE: 1
PSYCHIATRIC NEGATIVE: 1
RESPIRATORY NEGATIVE: 1
CONSTITUTIONAL NEGATIVE: 1

## 2023-01-01 ASSESSMENT — PAIN SCALES - GENERAL
PAINLEVEL: NO PAIN (0)

## 2023-01-01 ASSESSMENT — ACTIVITIES OF DAILY LIVING (ADL)
ADLS_ACUITY_SCORE: 35

## 2023-01-06 NOTE — TELEPHONE ENCOUNTER
Spoke with Meghann, continue sending BG report as prior scheduled and DRC will continue to follow for BG/insulin adjustments. Working towards simpler regimen with minimal injections vs oral agent.   Rosaline Patrick RN Diabetes Educator,  [phone]  1/6/2023 at 2:37 PM

## 2023-01-10 NOTE — PROGRESS NOTES
Diabetes Self-Management Education & Support      Faxed Glucose report from [facility]:     on 12/26 BG was 158 in the am, 219 at noon, 161 at dinner time, and 130 at HS  on 12/27 BG was  176 in the am, 199 at noon, 95 at dinner time, and 114 at HS  on 12/28 BG was 125 in the am, 112 at noon, 172 at dinner time, and 192 at HS  on 12/29 BG was  88 in the am, 237 at noon, 159 at dinner time, and 127 at HS  on 12/30 BG was 213 in the am, 213 at noon, 122 at dinner time, and 153 at HS Correction scale stopped.   on 12/31 BG was 104 in the am, 206 at noon, 149 at dinner time, and 136 at HS  on 1/1 BG was      143 in the am, 141 at noon, 246 at dinner time, and 191 at HS  on 1/2 BG was       166 in the am    Per CDCES Protocol:   Target goals: Long term care > 65 years of age.    A1c <8.5   -180  110-200 at HS.     April 2022 : 7.1%  December 2022 6.6%      Pt seen PCP recently, sliding scale stopped. Continuing base insulin with meals.   Goal to decrease insulin injections. Consider oral agent.      insulin lispro (HUMALOG KWIKPEN) 100 UNIT/ML (1 unit dial) KWIKPEN             Sig: INJECT 13 UNITS AT BREAKFAST, 10 UNITS AT LUNCH, 12 UNITS AT SUPPER         insulin glargine (LANTUS SOLOSTAR) 100 UNIT/ML pen             Sig: Inject 18 units daily at HS, may titrate up to 40 units daily under direction of Diabetes Ed     May consider GLP vs DPP4 with Lantus only.   SGLT2- has CV benefit but may not be practical given ADL dependence.     Continue current plan, sliding scale was stopped on Friday 12/30/2022.  Need additional data to make recommendations for goals.     Rosaline Patrick, RN Diabetes Educator,  [phone]

## 2023-01-17 NOTE — PROGRESS NOTES
Diabetes Self-Management Education & Support    [facility] faxed bg report.     The pt calls in BG reading's:  on 1/9 BG was 105 in the am, 177 at noon, 132 at dinner time, and 178 at HS  on 1/10 BG was  221 in the am, 257 at noon, 131 at dinner time, and 147 at HS  on 1/11 BG was 150 in the am, 170 at noon, 177 at dinner time, and 154 at HS  on 1/12 BG was  141 in the am, 149 at noon, 208 at dinner time, and 209 at HS  on 1/13 BG was 153 in the am, 207 at noon, 150 at dinner time, and 134 at HS  on 1/14 BG was 136 in the am, 149 at noon, 157 at dinner time, and 159 at HS  on 1/15 BG was 142 in the am, 148 at noon, 254 at dinner time, and 139 at HS    Per CDCES Protocol:   Target goals: Long term care > 65 years of age.    A1c <8.5   -180  110-200 at HS.     April 2022 : 7.1%  December 2022 6.6%      Pt seen PCP recently, sliding scale stopped 12/30/2022. Continuing base insulin with meals.   Goal to decrease insulin injections. Consider oral agent.     GLP- consider Trulicity, once weekly injection. Likely with once daily Lantus. Would decrease glucose checks upon Humalog discontinuation to Fasting and bedtime.   SGLT2- but may not be practical given ADL dependence. Concern for UTI or yeast infections. Fall risk.   DPP4  -  Not recommended with Heart Failure. GFR 45     insulin lispro (HUMALOG KWIKPEN) 100 UNIT/ML (1 unit dial) KWIKPEN             Sig: INJECT 13 UNITS AT BREAKFAST, 10 UNITS AT LUNCH, 12 UNITS AT SUPPER         insulin glargine (LANTUS SOLOSTAR) 100 UNIT/ML pen             Sig: Inject 18 units daily at HS, may titrate up to 40 units daily under direction of Diabetes Ed       Wt Readings from Last 10 Encounters:   12/23/22 73.5 kg (162 lb)   04/25/22 71.7 kg (158 lb)   04/13/22 71.7 kg (158 lb)   03/03/22 70.3 kg (155 lb)   07/21/21 70.3 kg (155 lb)   07/13/21 73.6 kg (162 lb 4.1 oz)   07/01/21 79.8 kg (176 lb)   06/13/21 68 kg (150 lb)   02/09/21 79.6 kg (175 lb 7.8 oz)   02/18/20 69.9 kg (154 lb)      Continue current plan. Will discuss options with PCP. Fax sent back to [facility].   Rosaline Patrick, RN Diabetes Educator,  [phone]  1/17/2023 at 4:00 PM

## 2023-01-31 NOTE — PROGRESS NOTES
Diabetes Self-Management Education & Support    Recent BG report from [facility] received, refer to encounter 1/27/2023. Correction scale with meal time was stopped 12/30/2022. Indicating increase in glucose.   Goal to decrease amount of finger sticks and injections, as these cause patient agitation per [facility], dementia.      PCP agrees with plan to start Glipizide XL, 2.5 mg once daily, increase per CDCES protocol. Sent to pharmacy.   Decrease meal time insulin- Humalog by 2 units. Breakfast 13 to 11 units. Lunch 10 to 8 units. Supper/Dinner meal 12 to 10units.   Continue Lantus at 18 units daily.   Continue glucose checks. Working towards decreasing frequency.   Glipizide XLTitration per CDCES protocol up to 20 mg daily dose max.     Faxed to [facility] RN with changes.   Continue to follow for Diabetes support.  Rosaline Patrick, [facility] Diabetes Educator,  [phone]  1/31/2023 at 8:25 AM

## 2023-02-07 NOTE — PROGRESS NOTES
Diabetes Self-Management Education & Support    Faxed BG report from [facility] RN.     on  BG was  219 in the am, 177 at noon, 164 at dinner time, and 133 at HS  on  BG was 166 in the am, 224 at noon, 168 at dinner time, and  163 at HS  on  BG was   164 in the am, 133 at noon, 171 at dinner time, and  218 at HS  on  BG was  143 in the am, 147 at noon, 118 at dinner time, and 158 at HS  on 2/3 BG was    92 in the am, 147 at noon, 143 at dinner time, and   110 at HS  on  BG was   125 in the am, 171 at noon, 103 at dinner time, and 129 at HS  on  BG was    97 in the am, 240 at noon, 229 at dinner time, and 160 at HS  On  BG was  185 in the am, 140 at noon.     Per [facility] Protocol:   Target goals: Long term care > 65 years of age.    A1c <8.5   -180  110-200 at HS.       2022 : 7.1%  2022 6.6%       Pt seen PCP, sliding scale stopped 2022.   Continuing base insulin with meals.   Goal to decrease insulin injections.   Lantus 18 units once daily.   Glipizide XL, started 2.5 mg daily, titration up to 20 mg/day. (order sent 2023 to pharmacy, faxed to [facility]).   Meal time Insulin plan decreased : 11 units at breakfast, 8 units at lunch 10 units at supper.        insulin lispro (HUMALOG KWIKPEN) 100 UNIT/ML (1 unit dial) KWIKPEN                      insulin glargine (LANTUS SOLOSTAR) 100 UNIT/ML pen             Sig: Inject 18 units daily at HS, may titrate up to 40 units daily under direction of Diabetes Ed        Most recent GFR 45. 2022.      Wt Readings from Last 10 Encounters:   22 73.5 kg (162 lb)   22 71.7 kg (158 lb)   22 71.7 kg (158 lb)   22 70.3 kg (155 lb)   21 70.3 kg (155 lb)   21 73.6 kg (162 lb 4.1 oz)   21 79.8 kg (176 lb)   21 68 kg (150 lb)   21 79.6 kg (175 lb 7.8 oz)   20 69.9 kg (154 lb)     PLAN:   Continue Lantus 18 units once daily  Decrease Humalo units at breakfast, 6 units at lunch  8 at supper. Updated med list.  Glipizide XL 2.5 mg once daily.   Continue blood glucose checks   Fax return to [facility] RN.   Rosaline Patrick, RN Diabetes Educator,  [phone]  2/7/2023 at 8:49 AM

## 2023-02-10 NOTE — TELEPHONE ENCOUNTER
LANTUS SOLOSTAR 100 UNIT/ML soln      Last Written Prescription Date:  10/03/2023  Last Fill Quantity: 15ml,   # refills: 0  Last Office Visit: 12/23/2022  Future Office visit:    Next 5 appointments (look out 90 days)    Feb 16, 2023 12:00 PM  (Arrive by 11:45 AM)  Return Visit with Marian Geller DPM  [facility] ([facility] ) [address] [place] [id number] [phone]   Mar 24, 2023  3:00 PM  (Arrive by 2:45 PM)  SHORT with Radha Murray MD  [facility] ([facility] ) [address] [place] [id number]  [phone]

## 2023-02-14 NOTE — TELEPHONE ENCOUNTER
Alcohol Swabs (EASY TOUCH ALCOHOL PREP MEDIUM) 70 % PADS    Last Written Prescription Date:  10-28-22  Last Fill Quantity: 100,   # refills: 0  Last Office Visit: 12-23-22  Future Office visit:    Next 5 appointments (look out 90 days)    Feb 16, 2023 12:00 PM  (Arrive by 11:45 AM)  Return Visit with Marian Geller DPM  [facility] ([facility] ) [address] [place] [id number] [phone]   Mar 24, 2023  3:00 PM  (Arrive by 2:45 PM)  SHORT with Radha Murray MD  [facility] ([facility] ) [address] [place] [id number]  [phone]           Routing refill request to provider for review/approval because:

## 2023-02-16 NOTE — PATIENT INSTRUCTIONS
Please continue with Dr. Murray's instructions: Wash and dry between toes and apply very small amount between toes daily. Please apply the Nystatin powder between the toes every day before apply new socks every day. Thank you

## 2023-02-16 NOTE — PROGRESS NOTES
Ft Chief complaint: Patient presents with:  Toenail: DFE      History of Present Illness: This 89 year old female is seen with two of her daughters for follow-up management of a LEFT fifth toe ulceration, diabetic foot exam and high risk nail debridement.    She received her most recent DM shoes from the orthotist, Shyanne Alvarado, on 04/25/2022. She is scheduled to be fit for new ones on 02/16/2023.    She develops a LEFT lateral fifth toe callus that has ulcerated in the past, but she has not noticed a recent wound. The softer DM shoes have prevented it from re-opening.    Patient lives at [facility]. Her daughters state that recently, the patient had a foul smell to her feet. She developed a lot of redness and moisture between her toes. She saw Dr. Murray who ordered Nystatin powder to be applied between the toes. She is supposed to have this done daily after a gentle cleaning and drying between her toes and this has resolved the smell.    Her daughters also said she recently had increased edema in her feet, but it is more controlled today.    She has a history of burning, tingling, and numbness in her feet. She has recently had a prickly, sleepy feeling in her feet.    No further pedal complaints today.         Lab Results   Component Value Date    A1C 7.1 04/13/2022    A1C 10.9 12/21/2021    A1C 6.8 07/09/2021    A1C 8.0 09/15/2020    A1C 6.8 04/02/2019    A1C 6.3 10/30/2018    A1C 6.1 10/24/2017       /69 (BP Location: Left arm, Patient Position: Sitting, Cuff Size: Adult Regular)   Pulse 77   Temp 98.5  F (36.9  C) (Tympanic)   SpO2 95%     Patient Active Problem List   Diagnosis     Advanced care planning/counseling discussion     Sciatica     Diverticulosis of large intestine     Osteoporosis     Obesity     Congenital cystic kidney disease     Myalgia and myositis     Calculus of kidney     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     ACP (advance care planning)     Controlled type 2  diabetes mellitus without complication, without long-term current use of insulin (H)     Permanent atrial fibrillation (H)     Lung nodule     Generalized muscle weakness     Chronic systolic congestive heart failure (H)     Acute blood loss anemia     Chronic anticoagulation     Essential (primary) hypertension     Falls     Hematoma of arm, left, initial encounter     Hemorrhagic shock (H)     Traumatic hematoma of left hip     Chronic kidney disease, stage 3 (H)     Bilateral sensorineural hearing loss       Past Surgical History:   Procedure Laterality Date     Breast biopsy      LT, benign      SECTION       CHOLECYSTECTOMY      lap     COLONOSCOPY       LUCILLE / BSO         Current Outpatient Medications   Medication     acetaminophen (TYLENOL) 325 MG tablet     Alcohol Swabs (EASY TOUCH ALCOHOL PREP MEDIUM) 70 % PADS     alendronate (FOSAMAX) 70 MG tablet     artificial tears OINT ophthalmic ointment     aspirin (ASPIRIN LOW DOSE) 81 MG chewable tablet     Assure [place] Lancets MISC     atorvastatin (LIPITOR) 40 MG tablet     BD AUTOSHIELD DUO 30G X 5 MM     bismuth subsalicylate (PEPTO BISMOL) 262 MG/15ML suspension     blood glucose (NO BRAND SPECIFIED) test strip     Blood Glucose Monitoring Suppl (GLUCOCARD VITAL MONITOR) w/Device KIT     chlorthalidone (HYGROTON) 25 MG tablet     Continuous Blood Gluc  (FREESTYLE ALYSON 14 DAY READER) MARC     Continuous Blood Gluc Sensor (FREESTYLE ALYSON 2 SENSOR) MISC     desonide (DESOWEN) 0.05 % external lotion     digoxin (LANOXIN) 125 MCG tablet     diphenhydrAMINE (BENADRYL) 25 MG tablet     DULoxetine (CYMBALTA) 20 MG capsule     ELIQUIS ANTICOAGULANT 5 MG tablet     fluticasone (FLONASE) 50 MCG/ACT nasal spray     glipiZIDE (GLUCOTROL XL) 2.5 MG 24 hr tablet     GLUCOCARD VITAL TEST test strip     insulin lispro (HUMALOG KWIKPEN) 100 UNIT/ML (1 unit dial) KWIKPEN     LANTUS SOLOSTAR 100 UNIT/ML soln     lisinopril (ZESTRIL) 5 MG tablet      metoprolol tartrate (LOPRESSOR) 100 MG tablet     mirtazapine (REMERON) 7.5 MG tablet     Multiple Vitamin (TAB-A-MARIUSZ) TABS     omeprazole (PRILOSEC) 20 MG DR capsule     polyethylene glycol-propylene glycol (SYSTANE) 0.4-0.3 % SOLN ophthalmic solution     potassium chloride ER (KLOR-CON M) 20 MEQ CR tablet     SB BISMUTH 262 MG TABS     Specialty Vitamins Products (ICAPS LUTEIN & ZEAXANTHIN) TBEC     vitamin D3 (CHOLECALCIFEROL) 50 mcg (2000 units) tablet     No current facility-administered medications for this visit.          Allergies   Allergen Reactions     Ampicillin      Desvenlafaxine Other (See Comments)     Desvenlafaxine Succinate  Pristiq       Sertraline Hcl Other (See Comments) and Diarrhea     Zoloft       Sulfa Drugs Other (See Comments)     Sulfa(Sulfonamide Antibiotics)       Family History   Problem Relation Age of Onset     Myocardial Infarction Mother 59        myocardial infarction, cause of death     Other - See Comments Daughter         fibromyalgia     Thyroid Disease Sister         hypo     Thyroid Disease Sister         hypo     Thyroid Disease Sister         hypo     Myocardial Infarction Brother 63        myocardial infarction, cause of death       Social History     Socioeconomic History     Marital status:      Spouse name: None     Number of children: None     Years of education: None     Highest education level: None   Occupational History     None   Tobacco Use     Smoking status: Never Smoker     Smokeless tobacco: Never Used     Tobacco comment: no passive exposure   Vaping Use     Vaping Use: Never used   Substance and Sexual Activity     Alcohol use: No     Drug use: No     Sexual activity: Never   Other Topics Concern      Service Not Asked     Blood Transfusions Yes     Comment: 11/02/2012     Caffeine Concern Yes     Comment: coffee, 4 cups daily     Occupational Exposure Not Asked     Hobby Hazards Not Asked     Sleep Concern Not Asked     Stress Concern Not  Asked     Weight Concern Not Asked     Special Diet Not Asked     Back Care Not Asked     Exercise Not Asked     Bike Helmet Not Asked     Seat Belt Yes     Self-Exams Not Asked     Parent/sibling w/ CABG, MI or angioplasty before 65F 55M? Yes   Social History Narrative     None     Social Determinants of Health     Financial Resource Strain: Not on file   Food Insecurity: Not on file   Transportation Needs: Not on file   Physical Activity: Not on file   Stress: Not on file   Social Connections: Not on file   Intimate Partner Violence: Not on file   Housing Stability: Not on file       ROS: 10 point ROS neg other than the symptoms noted above in the HPI.  EXAM  Constitutional: healthy, alert and no distress    Psychiatric: mentation appears normal and affect normal/bright    VASCULAR:  -Dorsalis pedis pulse +1/4   -Posterior tibial pulse +0/4 LEFT and +1/4 RIGHT    -Capillary refill time < 3 seconds to hallux  -Hair growth Absent to anterior legs and ankles  -Mild-to-moderate non-pitting edema to entire LEFT foot  NEURO:  -Positive for paresthesias to foot  -Epicritic and protective sensation diminished to the bilateral foot  DERM:  -Skin temperature within normal limits   -Skin diffusely dry and flaking to the foot  -Toenails thickened, dystrophic and discolored x 5 bilaterally   -Deeply seeded callus on the RIGHT lateral fifth toe IPJ -- no open wounds post paring  -No interdigital maceration, no erythema, no skin breakdown and no malodor between the toes    MSK:  -Moderate decrease in arch height while patient is NWB  -Muscle strength of ankles +5/5 for dorsiflexion, plantarflexion, ABDUction and ADDuction b/l    RIGHT FOOT RADIOGRAPH 03/03/2022  IMPRESSION: No plain film evidence of osteomyelitis at this time. Recommend follow-up.    AL HERNANDEZ MD   ============================================================    ASSESSMENT:  (L84) Callus of foot  (primary encounter diagnosis)    (L60.3)  Onychodystrophy    (L85.3) Xerosis of skin    (N18.31) Stage 3a chronic kidney disease (H)    (Z86.31) Personal history of diabetic foot ulcer    (E11.9,  Z79.4) Diabetes mellitus type 2, insulin dependent (H)    (E11.42) Diabetic polyneuropathy associated with type 2 diabetes mellitus (H)      PLAN:  -Patient evaluated and examined. Treatment options discussed with no educational barriers noted.    -High risk toenail debridement x 10 toenails without incident on 09/15/2022    -Callus pared x 1 to the LEFT lateral fifth toe IPJ without incident    -DM shoes: Patient was dispensed DM shoes on 04/05/2022 by the orthotist, Shyanne Alvarado. The shoes have a soft, flexible material that creates less pressure on the toe than a firm, leather shoe. The shoes are very comfortable. She is being fit for new shoes on 02/16/2023 by the orthotist, Shyanne Alvarado.  ---Patient's caretakers were given instructions to continue cleaning between the toes and apply foot powder daily. There was no interdigital maceration, no erythema, no skin breakdown and no malodor between the toes today.  ---Patient has stage 3a CKD which causes edema in her lower extremities which will increase pressure points on her feet and the risk of developing blisters. She has her feet checked daily at her care facility.    -Patient in agreement with the above treatment plan and all of patient's questions were answered.      Return to clinic 3 months for a diabetic foot exam and high risk nail debridement      Marian Geller DPM

## 2023-02-22 NOTE — PROGRESS NOTES
Diabetes Self-Management Education & Support  The pt calls in BG reading's:  on  BG was 112 in the am, 114 at noon, 194 at dinner time, and 197 at HS  on  BG was  184 in the am, 236 at noon, 189 at dinner time, and 126 at HS  on 2/15 BG was 110 in the am, 157 at noon, 202 at dinner time, and 177 at HS  on  BG was  172 in the am, 81 at noon, 178 at dinner time, and 167 at HS  on  BG was 200 in the am, 125 at noon, 180 at dinner time, and 119 at HS  on  BG was 114 in the am, 179 at noon, 181 at dinner time, and 234 at HS  on  BG was 174 in the am, 203 at noon, 234 at dinner time, and 208 at HS  On  BG was 99 in the am, 150 at noon.     Per [facility] Protocol:   Target goals: Long term care > 65 years of age.    A1c <8.5   -180  110-200 at HS.       2022 : 7.1%  2022 6.6%       Pt seen PCP, sliding scale stopped 2022.   Continuing base insulin with meals.   Goal to decrease insulin injections.   Lantus 18 units once daily.   Glipizide XL, started 2.5 mg daily, titration up to 20 mg/day. (order sent 2023 to pharmacy, faxed to [facility]).   Meal time Insulin decrease weekly.       insulin lispro (HUMALOG KWIKPEN) 100 UNIT/ML (1 unit dial) KWIKPEN                       insulin glargine (LANTUS SOLOSTAR) 100 UNIT/ML pen             Sig: Inject 18 units daily at HS, may titrate up to 40 units daily under direction of Diabetes Ed         Most recent GFR 45. 2022.            Wt Readings from Last 10 Encounters:   22 73.5 kg (162 lb)   22 71.7 kg (158 lb)   22 71.7 kg (158 lb)   22 70.3 kg (155 lb)   21 70.3 kg (155 lb)   21 73.6 kg (162 lb 4.1 oz)   21 79.8 kg (176 lb)   21 68 kg (150 lb)   21 79.6 kg (175 lb 7.8 oz)   20 69.9 kg (154 lb)      PLAN:   Continue Lantus 18 units once daily  Decrease Humalo units at breakfast, 4 units at lunch 6 at supper.   Glipizide XL 2.5 mg once daily.   Continue blood  glucose checks   Fax return to [facility] RN.   Rosaline Patrick, RN Diabetes Educator,  [phone]  2/22/2023 at 4:00 PM

## 2023-03-07 NOTE — PROGRESS NOTES
Diabetes Self-Management Education & Support    The pt calls in BG reading's:  on  BG was 78 in the am, 119 at noon, 133 at dinner time, and 226 at HS  on  BG was  176 in the am, 167 at noon, 106 at dinner time, and 205 at HS  on 3/1 BG was 92 in the am, 149 at noon, 121 at dinner time, and 192 at HS  on 3/2 BG was  107 in the am, 140 at noon, 181 at dinner time, and 225 at HS  on 3/3 BG was 118 in the am, 177 at noon, 209 at dinner time, and 171 at HS  on 3/4 BG was 175 in the am, 139 at noon, 101 at dinner time, and 137 at HS  on 3/5 BG was 164 in the am, 221 at noon, 246 at dinner time, and 150 at HS  On 3/6 BG was 99 in the am, 140 at noon.       Per [facility] Protocol:   Target goals: Long term care > 65 years of age.    A1c <8.5   -180  110-200 at HS.       2022 : 7.1%  2022 6.6%       Pt seen PCP, sliding scale stopped 2022.   Continuing base insulin with meals.   Goal to decrease insulin injections.   Lantus 18 units once daily.   Glipizide XL, started 2.5 mg daily, titration up to 20 mg/day. (order sent 2023 to pharmacy, faxed to [facility]).   Meal time Insulin decrease weekly.       insulin lispro (HUMALOG KWIKPEN) 100 UNIT/ML (1 unit dial) KWIKPEN                       insulin glargine (LANTUS SOLOSTAR) 100 UNIT/ML pen             Sig: Inject 18 units daily at HS, may titrate up to 40 units daily under direction of Diabetes Ed         Most recent GFR 45. 2022.        Wt Readings from Last 10 Encounters:   22 73.5 kg (162 lb)   22 71.7 kg (158 lb)   22 71.7 kg (158 lb)   22 70.3 kg (155 lb)   21 70.3 kg (155 lb)   21 73.6 kg (162 lb 4.1 oz)   21 79.8 kg (176 lb)   21 68 kg (150 lb)   21 79.6 kg (175 lb 7.8 oz)   20 69.9 kg (154 lb)        PLAN:   Continue Lantus 18 units once daily  Decrease Humalo units at breakfast, 0 units at lunch 3 at supper.   Glipizide XL 2.5 mg once daily.   Continue blood glucose  checks as scheduled.   Fax return to [facility] RN.     Rosaline Patrick, RN Diabetes Educator,  [phone]  3/7/2023 at 8:51 AM

## 2023-03-14 NOTE — TELEPHONE ENCOUNTER
Pharmacy is calling stating that the Emmy 1 has been discontinued. Please send a new order for a Emmy 2.

## 2023-03-14 NOTE — PROGRESS NOTES
Diabetes Self-Management Education & Support    Faxed Glucose report from [facility]:     The pt calls in BG reading's:  on 3/6 BG was 99 in the am, 140 at noon, 190 at dinner time, and 170 at HS  on 3/7 BG was 160 in the am, 240 at 1040- unscheduled, 251 at noon, 158 at dinner time, and 183 at HS  on 3/8 BG was 98 in the am, 161 at noon, 174 at dinner time, and 207 at HS  on 3/9 BG was  94 in the am, 185 at noon, 101 at dinner time, and 181 at HS  on 3/10 BG was 91 in the am, 191 at noon, 152 at dinner time, and 201 at HS  on 3/11 BG was 110 in the am, 160 at noon, 203 at dinner time, and 148 at HS  on 3/12 BG was 121 in the am, 158 at noon, 166 at dinner time, and 187 at HS  On 3/13 BG was 191 in the am, 140 at noon.     Per [facility] Protocol:   Target goals: Long term care > 65 years of age.    A1c <8.5   -180  110-200 at HS.       2022 : 7.1%  2022 6.6%       Pt seen PCP, sliding scale stopped 2022.   Continuing base insulin with meals.   Goal to decrease insulin injections.   Lantus 18 units once daily.   Glipizide XL, started 2.5 mg daily, titration up to 20 mg/day. (order sent 2023 to pharmacy, faxed to [facility]).   Meal time Insulin decrease weekly.       insulin lispro (HUMALOG KWIKPEN) 100 UNIT/ML (1 unit dial) KWIKPEN                       insulin glargine (LANTUS SOLOSTAR) 100 UNIT/ML pen             Sig: Inject 18 units daily at HS, may titrate up to 40 units daily under direction of Diabetes Ed         Most recent GFR 45. 2022.         Wt Readings from Last 10 Encounters:   22 73.5 kg (162 lb)   22 71.7 kg (158 lb)   22 71.7 kg (158 lb)   22 70.3 kg (155 lb)   21 70.3 kg (155 lb)   21 73.6 kg (162 lb 4.1 oz)   21 79.8 kg (176 lb)   21 68 kg (150 lb)   21 79.6 kg (175 lb 7.8 oz)   20 69.9 kg (154 lb)      Last dose adjustment: Lantus 18 units daily (continued dose) Glipizide XL 2.5 mg (continued dose).   Humalo units  with breakfast. 0 at lunch. 3 units at supper.      PLAN:   Continue Lantus 18 units once daily  Stop Humalog/meal time insulin.  Increase Glipizide XL 5 mg once daily.   Continue blood glucose checks as scheduled.   Fax return to long term RN.   Follow up 1 week with faxed glucose report. Call sooner if needed.     Rosaline Patrick RN Diabetes Educator,  [phone]  3/14/2023 at 4:02 PM

## 2023-03-15 NOTE — TELEPHONE ENCOUNTER
----- Message from Keyonna Hardy sent at 3/15/2023  2:24 PM CDT -----  Patient sent her from [place] requesting a UA/UC due to increased confusion,auditory and visual hallucinations.    Can you please place order as needed, thank you.

## 2023-03-17 NOTE — TELEPHONE ENCOUNTER
Called and spoke to the floor nurse. She is having a nurse come today to do the blood draw and a urine sample that should be dropped off here today shortly after taken. She said that she faxed the orders to the lab 2x before she brought the patient in and the lab only did the UA not the draw. She was frustrated about this so set up for a nurse to come to them

## 2023-03-17 NOTE — TELEPHONE ENCOUNTER
Can you please reach out to facility, I have not been able to get through, provider inquiring about lab draws. Thank you.  Silvia Portillo LPN      I had faxed yesterday to have blood drawn as well, did they do this from the facility?

## 2023-03-20 NOTE — TELEPHONE ENCOUNTER
Meghann with [facility] calling and requesting results of patient's UA.     Meghann notified on the following results and verbalized understanding.   Radha Murray MD   3/17/2023  5:45 PM CDT       To [facility] - labs are reassuring, urine ok       Patient has an appt this Friday with PCP.   Next 5 appointments (look out 90 days)    Mar 24, 2023  3:00 PM  (Arrive by 2:45 PM)  SHORT with Radha Murray MD  [facility] ([facility] ) [address] [place] [id number]  [phone]   May 16, 2023 12:30 PM  (Arrive by 12:15 PM)  Return Visit with Marian Geller DPM  [facility] ([facility] ) [address] [facility] [id number] [phone]

## 2023-03-21 NOTE — PROGRESS NOTES
Assessment & Plan     Chronic systolic congestive heart failure (H)  Fluid status wnl.     Permanent atrial fibrillation (H)  On warfarin and ASA due to stroke history. Discussed risk/benefit of anticoagulation at this point (see risk scores below exam). At this point risk of bleeding and risk of stroke seem to hold in favor of continuation of anticoagulation for now, but will continue to discuss with family given dementia and LTC status. No recent falls.     Essential (primary) hypertension  Low today, consider reduction in medication, [facility] to send BPs for review.     Other osteoporosis, unspecified pathological fracture presence  Has been on fosamax for > 5 years. Reasonable to stop this and check dexa.     Stage 3 chronic kidney disease, unspecified whether stage 3a or 3b CKD (H)  Stable    Tinea pedis of both feet  Continue nystatin powder, encouarged regular foot cares with staff.     Gastroesophageal reflux disease with esophagitis without hemorrhage  Continue ppi for now, stopping fosmax which may help. Will keep ppi despite this due to anticoag for now. If stopping warfarin, consider reduction and cessation of ppi    Controlled type 2 diabetes mellitus without complication, without long-term current use of insulin (H)  Continue to work with diabetes ed to reduce mealtime injections. Goal to be getting once daily long acting insulin.     30 minutes spent on the date of the encounter doing chart review, review of test results, interpretation of tests, patient visit and documentation     Return in about 3 months (around 6/24/2023) for CDM.    Radha Murray MD  [facility] - ROCÍO Salinas is a 89 year old accompanied by her daughter, presenting for the following health issues:  Hypertension, Lipids, and Diabetes      HPI     Diabetes Follow-up    How often are you checking your blood sugar? 3Xdaily  What time of day are you checking your blood sugars (select all that apply)?   Before meals  Have you had any blood sugars above 200?  Yes   Have you had any blood sugars below 70?  No    What symptoms do you notice when your blood sugar is low?  None    What concerns do you have today about your diabetes? None and Other: patient believes she is getting too much insulin.      Do you have any of these symptoms? (Select all that apply)  Redness, sores, or blisters on feet    Have you had a diabetic eye exam in the last 12 months? No      Hyperlipidemia Follow-Up      Are you regularly taking any medication or supplement to lower your cholesterol?   Yes- atorvastatin     Are you having muscle aches or other side effects that you think could be caused by your cholesterol lowering medication?  No    Hypertension Follow-up      Do you check your blood pressure regularly outside of the clinic? Yes     Are you following a low salt diet? Yes    Are your blood pressures ever more than 140 on the top number (systolic) OR more   than 90 on the bottom number (diastolic), for example 140/90? No    BP Readings from Last 2 Encounters:   03/24/23 102/68   02/16/23 117/69     Hemoglobin A1C (%)   Date Value   12/23/2022 6.6 (H)   04/13/2022 7.1 (H)   07/09/2021 6.8 (H)   09/15/2020 8.0 (H)     LDL Cholesterol Calculated (mg/dL)   Date Value   04/13/2022 40   09/15/2020 52   07/17/2019 53       Atrial Fibrillation Follow-up      Symptoms: no recent chest pain, significant palpitations, dizziness/lightheadedness, dyspnea, or increased peripheral edema., no chest pain, no palpitations, no dizziness/lightheadedness, dyspnea and increased edema in feet and ankles    Stroke prevention: DOAC (Eliquis, Xarelto, Pradaxa)    Date 6/28/2022 9/15/2022 12/23/2022 2/16/2023 3/24/2023   Pulse 74 97 77 77 81     Current [id number] Score: 6 points - A score of 5 or greater represents a 7.2 - 12.2% annual risk of major embolic event, without anti-coagulation or an LAAO device.    Heart Failure Follow-up    Are you experiencing  "any shortness of breath? No    Are you experiencing any swelling in your legs or feet?  Stable    Are you using more pillows than usual? No    Do you cough at night?  No    Do you check your weight daily?  Yes    Have you had a weight change recently?  No    Are you having any of the following side effects from your medications? (Select all that apply)  Fatigue and Swelling    Since your last visit, how many times have you gone to the cardiologist, urgent care, emergency room, or hospital because of your heart failure?   None    Chronic Kidney Disease Follow-up    Do you take any over the counter pain medicine?: Yes  What over the counter medicine are you taking for your pain?:  Tylenol       How often do you take this medicine?:  A few times a month        Review of Systems         Objective    /68 (BP Location: Left arm, Patient Position: Sitting, Cuff Size: Adult Large)   Pulse 81   Temp 98.1  F (36.7  C) (Tympanic)   Resp 16   Ht 1.575 m (5' 2\")   Wt 73.5 kg (162 lb)   SpO2 96%   BMI 29.63 kg/m    Body mass index is 29.63 kg/m .  Physical Exam       No results found for any visits on 03/24/23.     [id number] Score - 8  - 10% per year stroke risk    [id number] Score - 4  - 10% risk of bleeding per 100 pt years of warfarin    HASBLED score - 3   - 5.8% risk of major bleeding    ATRIA Score - 3  - 0.76% annual risk of major bleeding        "

## 2023-03-24 PROBLEM — R57.8 HEMORRHAGIC SHOCK (H): Status: RESOLVED | Noted: 2021-06-16 | Resolved: 2023-01-01

## 2023-03-28 NOTE — PROGRESS NOTES
Diabetes Self-Management Education & Support     [facility]:     on 3/13 BG was 191 in the am, 140 at noon, 130 at dinner time, and 203 at HS  on 3/14 BG was  100 in the am, 194 at noon, 118 at dinner time, and 147 at HS  on 3/15 BG was 143 in the am, 240 at noon, 137 at dinner time, and 143 at HS  on 3/16 BG was  110 in the am, 137 at noon, 168 at dinner time, and 222 at HS  on 3/17 BG was 92 in the am, 173 at noon, 145 at dinner time, and 232 at HS  on 3/18 BG was 200 in the am, 194 at noon, 202 at dinner time, and 202 at HS  on 3/19 BG was 103 in the am, 137 at noon, 277 at dinner time, and 183 at HS  On 3/20 BG was 129 in the am, 152 at noon, 146 at dinner time and 191 at HS    Per CDCES Protocol:   Target goals: Long term care > 65 years of age.    A1c <8.5   -180  110-200 at HS.       April 2022 : 7.1%  December 2022 6.6%        insulin glargine (LANTUS SOLOSTAR) 100 UNIT/ML pen             Sig: Inject 18 units daily at HS, may titrate up to 40 units daily under direction of Diabetes Ed         glipiZIDE (GLUCOTROL XL) 2.5 MG 24 hr tablet        Sig - Route: Take 2 tablets (5 mg) by mouth daily - Oral       Most recent eGFR 52 3/17/2023.           Wt Readings from Last 10 Encounters:   12/23/22 73.5 kg (162 lb)   04/25/22 71.7 kg (158 lb)   04/13/22 71.7 kg (158 lb)   03/03/22 70.3 kg (155 lb)   07/21/21 70.3 kg (155 lb)   07/13/21 73.6 kg (162 lb 4.1 oz)   07/01/21 79.8 kg (176 lb)   06/13/21 68 kg (150 lb)   02/09/21 79.6 kg (175 lb 7.8 oz)   02/18/20 69.9 kg (154 lb)     Assessment:  FBG 8 readings 2 outside of upper target. 1 reading less than 100.   HS 8 readings 4 within target, 4 above target range.   3/14 instructions faxed to [facility]: Stop meal time insulin. Increase Glipizide XL to 5 mg once daily. Continue Lantus 18 units once daily.        PLAN:   Continue Lantus 18 units once daily  Continue Glipizide XL 5 mg once daily. - watch next week for trends. May increase Glipizide XL  to 7.5 mg daily.   Continue blood glucose checks as scheduled.   Follow up 1 week with faxed glucose report. Call sooner if needed.   Route to PCP to review plan.   Fax recommendations to [facility].     Rosaline Patrick, RN Diabetes Educator,  [phone]  3/28/2023 at 3:58 PM

## 2023-03-30 NOTE — PROGRESS NOTES
Late entry: PCP response to routing message, same day as encounter 3/28/2023:     Radha Murray MD Staydohar, Jo, RN  I agree, consider slow increase in glipizide. I am okay with readings where they are (would love < 200 fasting, but only 1 I see)      Rosaline Patrick, RN Diabetes Educator,  [phone]

## 2023-03-30 NOTE — PROGRESS NOTES
Diabetes Self-Management Education & Support    BG report, fax, from [facility] Nurse:     on 3/20 BG was 129 in the am, 152 at noon, 142 at dinner time, and 191 at HS  on 3/21 BG was  191 in the am, 258 at noon, 189 at dinner time, and 213 at HS  on 3/22 BG was 89 in the am, 208 at noon, 187 at dinner time, and 194 at HS  on 3/23 BG was  121 in the am, 181 at noon, 296 at dinner time, and 259 at HS  on 3/24 BG was 116 in the am, 224 at noon, 200 at dinner time, and 236 at HS  on 3/25 BG was 128 in the am, 201 at noon, 137 at dinner time, and 229 at HS  on 3/26 BG was 90 in the am, 205 at noon, 195 at dinner time, and 176 at HS   On 3/27 BG was 89 in the am, 199 at noon, 200 at dinner time and 231 at HS    Per CDCES Protocol:   Target goals: Long term care > 65 years of age.    A1c <8.5   -180  110-200 at HS.       April 2022 : 7.1%  December 2022 6.6%        insulin glargine (LANTUS SOLOSTAR) 100 UNIT/ML pen             Sig: Inject 18 units daily at HS, may titrate up to 40 units daily under direction of Diabetes Ed         glipiZIDE (GLUCOTROL XL) 2.5 MG 24 hr tablet             Sig - Route: Take 2 tablets (5 mg) by mouth daily - Oral         Most recent eGFR 52 3/17/2023.           Wt Readings from Last 10 Encounters:   12/23/22 73.5 kg (162 lb)   04/25/22 71.7 kg (158 lb)   04/13/22 71.7 kg (158 lb)   03/03/22 70.3 kg (155 lb)   07/21/21 70.3 kg (155 lb)   07/13/21 73.6 kg (162 lb 4.1 oz)   07/01/21 79.8 kg (176 lb)   06/13/21 68 kg (150 lb)   02/09/21 79.6 kg (175 lb 7.8 oz)   02/18/20 69.9 kg (154 lb)      Assessment:  FBG 8 readings 2 outside of upper target. 1 reading less than 100.   HS 8 readings 4 within target, 4 above target range.   3/14 instructions faxed to [facility]: Stop meal time insulin. Increase Glipizide XL to 5 mg once daily. Continue Lantus 18 units once daily.         PLAN:   Continue Lantus 18 units once daily  Continue Glipizide XL 5 mg once daily. - watch next week for trends.   Continue blood  glucose checks as scheduled.   Follow up 1 week with faxed glucose report. Call sooner if needed.   PCP recommendation, increase Glipizide dose slowly. FBG<200.  Fax recommendations to [facility].       Rosaline Patrick RN Diabetes Educator,  [phone]  3/30/2023 at 8:40 AM

## 2023-03-31 NOTE — TELEPHONE ENCOUNTER
Duloxetine (Cymbalta) 20 MG capsule   Last Written Prescription Date:  06/29/2022  Last Fill Quantity: 58,   # refills: 0  Last Office Visit: 03/24/2023        Metoprolol Tartrate (Lopressor) 100 MG tablet    Last Written Prescription Date:  06/29/2022  Last Fill Quantity: 58,   # refills: 0     Multiple Vitamin (Tab-a-Aileen) tablets    Last Written Prescription Date:  06/09/2022  Last Fill Quantity: 29,   # refills: 0        Potassium Chloride ER (Klor-Con M) 20 MEQ CR tablet    Last Written Prescription Date:  06/29/2022  Last Fill Quantity: 29,   # refills: 0

## 2023-04-06 NOTE — PROGRESS NOTES
BG report, fax, from [facility] Nurse:      The pt calls in BG reading's:  on 3/27 BG was 89 in the am, 199 at noon, 200 at dinner time, and 231 at HS  on 3/28 BG was  203 in the am, 245 at noon, 133 at dinner time, and 282 at HS  on 3/29 BG was 183 in the am, 208 at noon, 156 at dinner time, and 188 at HS  on 3/30 BG was  84 in the am, 224 at noon, 160 at dinner time, and 238 at HS  on 3/31 BG was 94 in the am, 222 at noon, 176 at dinner time, and 164 at HS  on 4/1 BG was 78 in the am, 160 at noon, 257 at dinner time, and 172 at HS  on 4/2 BG was 130 in the am, 196 at noon, 232 at dinner time, and 230 at HS  On 4/3 BG was 119 in the am, 121 at noon, 99 at dinner time and 182 at HS     Per CDCES Protocol:   Target goals: Long term care > 65 years of age.    A1c <8.5   -180  110-200 at HS.       April 2022 : 7.1%  December 2022 6.6%        insulin glargine (LANTUS SOLOSTAR) 100 UNIT/ML pen             Sig: Inject 18 units daily at HS, may titrate up to 40 units daily under direction of Diabetes Ed         glipiZIDE (GLUCOTROL XL) 2.5 MG 24 hr tablet             Sig - Route: Take 2 tablets (5 mg) by mouth daily - Oral         Most recent eGFR 52 3/17/2023.           Wt Readings from Last 10 Encounters:   12/23/22 73.5 kg (162 lb)   04/25/22 71.7 kg (158 lb)   04/13/22 71.7 kg (158 lb)   03/03/22 70.3 kg (155 lb)   07/21/21 70.3 kg (155 lb)   07/13/21 73.6 kg (162 lb 4.1 oz)   07/01/21 79.8 kg (176 lb)   06/13/21 68 kg (150 lb)   02/09/21 79.6 kg (175 lb 7.8 oz)   02/18/20 69.9 kg (154 lb)      Glipizide XL to 5 mg once daily.  Lantus 18 units once daily.         PLAN:   Continue Lantus 18 units once daily  Continue Glipizide XL 5 mg once daily.   Continue blood glucose checks as scheduled.   Follow up 1 week with faxed glucose report. Call sooner if needed.   PCP recommendation, increase Glipizide dose slowly. FBG<200.  Fax recommendations to [facility].     Rosaline Patrick RN Diabetes Educator,  [phone]  4/6/2023 at  1:13 PM

## 2023-04-11 NOTE — TELEPHONE ENCOUNTER
Alcohol swabs      Last Written Prescription Date:  2/16/23  Last Fill Quantity: 100,   # refills: 0  Last Office Visit: 4/6/23  Future Office visit:    Next 5 appointments (look out 90 days)    May 16, 2023 12:30 PM  (Arrive by 12:15 PM)  Return Visit with Marian Geller DPM  [facility] ([facility] ) [address] [place] [id number] [phone]   Jun 30, 2023  3:00 PM  (Arrive by 2:45 PM)  SHORT with Radha Murray MD  [facility] ([facility] ) [address] [place] [address]  [phone]

## 2023-04-13 NOTE — TELEPHONE ENCOUNTER
FreeStyle Emmy      Last Written Prescription Date:  3/14/23  Last Fill Quantity: 1,   # refills: 0  Last Office Visit: 3/24/23  Future Office visit:    Next 5 appointments (look out 90 days)    May 16, 2023 12:30 PM  (Arrive by 12:15 PM)  Return Visit with Marian Geller DPM  [facility] ([facility] ) [address] [place] [id number] [phone]   Jun 30, 2023  3:00 PM  (Arrive by 2:45 PM)  SHORT with Radha Murray MD  [facility] ([facility] ) [address] [place] [address]  [phone]

## 2023-04-14 NOTE — PROGRESS NOTES
Diabetes Self-Management Education & Support    Faxed BG report from [facility]:     on 4/3 BG was 119 in the am, 121 at noon, 99 at dinner time, and 182 at HS  on 4/4 BG was  106 in the am, 187 at noon, 114 at dinner time, and 240 at HS  on 4/5 BG was 76 in the am, 166 at noon, 257 at dinner time, and 224 at HS  on 4/6 BG was  166 in the am, 186 at noon, 161 at dinner time, and 209 at HS  on 4/7 BG was 111 in the am, 111 at noon, 224 at dinner time, and 147 at HS  on 4/8 BG was 174 in the am, 201 at noon, 156 at dinner time, and 238 at HS  on 4/9 BG was 163 in the am, 170 at noon, 219 at dinner time, and 260 at HS  On 4/10 bg was 80 in the am, 185 at noon, 219 at dinner time, and 226 at hs.     Per CDCES Protocol:   Target goals: Long term care > 65 years of age.    A1c <8.5   -180  110-200 at HS.       April 2022 : 7.1%  December 2022 6.6%        insulin glargine (LANTUS SOLOSTAR) 100 UNIT/ML pen             Sig: Inject 18 units daily at HS, may titrate up to 40 units daily under direction of Diabetes Ed         glipiZIDE (GLUCOTROL XL) 2.5 MG 24 hr tablet             Sig - Route: Take 2 tablets (5 mg) by mouth daily - Oral         Most recent eGFR 52 3/17/2023.           Wt Readings from Last 10 Encounters:   12/23/22 73.5 kg (162 lb)   04/25/22 71.7 kg (158 lb)   04/13/22 71.7 kg (158 lb)   03/03/22 70.3 kg (155 lb)   07/21/21 70.3 kg (155 lb)   07/13/21 73.6 kg (162 lb 4.1 oz)   07/01/21 79.8 kg (176 lb)   06/13/21 68 kg (150 lb)   02/09/21 79.6 kg (175 lb 7.8 oz)   02/18/20 69.9 kg (154 lb)      Glipizide XL to 5 mg once daily.  Lantus 18 units once daily.         PLAN:   Continue Lantus 18 units once daily  Continue Glipizide XL 5 mg once daily.   Continue blood glucose checks as scheduled.   Follow up 1 week with faxed glucose report. Call sooner if needed.   PCP recommendation, increase Glipizide dose slowly. FBG<200.  Fax recommendations to [facility].     Rosaline Patrick RN Diabetes Educator,   [phone]  4/14/2023 at 12:46 PM

## 2023-04-15 NOTE — TELEPHONE ENCOUNTER
BISMUTH      Last Written Prescription Date:  UNKNOWN  Last Fill Quantity: UNKNOWN,   # refills: 0  Last Office Visit: 3- VIRTUAL  Future Office visit:       Routing refill request to provider for review/approval because:  Drug not active on patient's medication list     Unknown

## 2023-04-25 NOTE — PROGRESS NOTES
Diabetes Self-Management Education & Support    The pt calls in BG reading's:  on 4/10 BG was 80 in the am, 185 at noon, 219 at dinner time, and 226 at HS  on 4/11 BG was  134 in the am, x  at noon, 237 at dinner time, and 182 at HS  on 4/12 BG was 189 in the am, 186 at noon, 250 at dinner time, and 228 at HS  on 4/13 BG was  133 in the am, 189 at noon, 213 at dinner time, and 233 at HS  on 4/14 BG was 162 in the am, 258 at noon, 166 at dinner time, and 207 at HS  on 4/15 BG was 95 in the am, 159 at noon, 162 at dinner time, and 210 at HS  on 4/16 BG was 76 in the am, 76 at noon, 192 at dinner time, and 209 at HS  On 4/17 BG was 110 in the am, 198 at noon, 207 at dinner time, and 73 at HS.     Per CDCES Protocol:   Target goals: Long term care > 65 years of age.    A1c <8.5   -180  110-200 at HS.       April 2022 : 7.1%  December 2022 6.6%        insulin glargine (LANTUS SOLOSTAR) 100 UNIT/ML pen             Sig: Inject 18 units daily at HS, may titrate up to 40 units daily under direction of Diabetes Ed         glipiZIDE (GLUCOTROL XL) 2.5 MG 24 hr tablet             Sig - Route: Take 2 tablets (5 mg) by mouth daily - Oral         Most recent eGFR 52 3/17/2023.           Wt Readings from Last 10 Encounters:   12/23/22 73.5 kg (162 lb)   04/25/22 71.7 kg (158 lb)   04/13/22 71.7 kg (158 lb)   03/03/22 70.3 kg (155 lb)   07/21/21 70.3 kg (155 lb)   07/13/21 73.6 kg (162 lb 4.1 oz)   07/01/21 79.8 kg (176 lb)   06/13/21 68 kg (150 lb)   02/09/21 79.6 kg (175 lb 7.8 oz)   02/18/20 69.9 kg (154 lb)      Glipizide XL to 5 mg once daily.  Lantus 18 units once daily.         PLAN:   Continue Lantus 18 units once daily  Continue Glipizide XL 5 mg once daily.   Continue blood glucose checks as scheduled.   Follow up 1 week with faxed glucose report. Call sooner if needed.   PCP recommendation, increase Glipizide dose slowly. FBG<200.  Fax recommendations to [facility].      Rosaline Patrick RN Diabetes Educator,   [phone]  4/24/2023 at 11:32 AM

## 2023-04-25 NOTE — PROGRESS NOTES
Diabetes Self-Management Education & Support    [facility]:  on 4/17 BG was 110 in the am, 198 at noon, 207 at dinner time, and 73 at HS  on 4/18 BG was  174 in the am, 239 at noon, 148 at dinner time, and 210 at HS  on 4/19 BG was 73 in the am, 177 at noon, 265 at dinner time, and 278 at HS  on 4/20 BG was  77 in the am, 228 at noon, 184 at dinner time, and 223 at HS  on 4/21 BG was 108 in the am, 170 at noon, 147 at dinner time, and 256 at HS  on 4/22 BG was 100 in the am, 157 at noon, 192 at dinner time, and 209 at HS  on 4/23 BG was 103 in the am, 208 at noon, 230 at dinner time, and 196 at HS  On 4/24 BG was 144 in the am.         Per PCP FBG target: <200     Per CDCES Protocol:   Target goals: Long term care > 65 years of age.    A1c <8.5   -180  110-200 at HS.       April 2022 : 7.1%  December 2022 6.6%        insulin glargine (LANTUS SOLOSTAR) 100 UNIT/ML pen             Sig: Inject 18 units daily at HS, may titrate up to 40 units daily under direction of Diabetes Ed         glipiZIDE (GLUCOTROL XL) 2.5 MG 24 hr tablet             Sig - Route: Take 2 tablets (5 mg) by mouth daily - Oral         Most recent eGFR 52 3/17/2023.           Wt Readings from Last 10 Encounters:   12/23/22 73.5 kg (162 lb)   04/25/22 71.7 kg (158 lb)   04/13/22 71.7 kg (158 lb)   03/03/22 70.3 kg (155 lb)   07/21/21 70.3 kg (155 lb)   07/13/21 73.6 kg (162 lb 4.1 oz)   07/01/21 79.8 kg (176 lb)   06/13/21 68 kg (150 lb)   02/09/21 79.6 kg (175 lb 7.8 oz)   02/18/20 69.9 kg (154 lb)      Glipizide XL to 5 mg once daily.  Lantus 18 units once daily.      PLAN:   Continue Lantus 18 units once daily  Continue Glipizide XL 5 mg once daily.   Continue blood glucose checks as scheduled.   Follow up 1 week with faxed glucose report. Call sooner if needed.   PCP recommendation, increase Glipizide dose slowly. FBG<200.  [facility].     Has PCP visit- 6/30/2023.    Rosaline Patrick RN Diabetes  Educator,  [phone]  4/25/2023 at 11:36 AM

## 2023-05-09 NOTE — PROGRESS NOTES
Diabetes Self-Management Education & Support    Faxed BG report from [facility].    on 5/1 BG was 96 in the am, 190 at noon, 155 at dinner time, and 220 at HS  on 5/2 BG was  103 in the am, 189 at noon, 136 at dinner time, and 153 at HS  on 5/3 BG was 103 in the am, 186 at noon, 168 at dinner time, and 227 at HS  on 5/4 BG was  98 in the am, 169 at noon, 198 at dinner time, and 211 at HS  on 5/5 BG was 118 in the am, 238 at noon, 198 at dinner time, and 256 at HS  on 5/6 BG was 161 in the am, 150 at noon, 183 at dinner time, and 227 at HS  on 5/7 BG was 125 in the am, 252 at noon, 207 at dinner time, and 266 at HS  On 5/8 Bg was 131 in the am, 246 at noon, 159 at dinner time, and 184 at HS  On 5/9 BG was 171 in the am.       Per PCP FBG target: <200     Per CDCES Protocol:   Target goals: Long term care > 65 years of age.    A1c <8.5   -180  110-200 at HS.       April 2022 : 7.1%  December 2022 6.6%        insulin glargine (LANTUS SOLOSTAR) 100 UNIT/ML pen             Sig: Inject 18 units daily at HS, may titrate up to 40 units daily under direction of Diabetes Ed         glipiZIDE (GLUCOTROL XL) 2.5 MG 24 hr tablet             Sig - Route: Take 2 tablets (5 mg) by mouth daily - Oral         Most recent eGFR 52 3/17/2023.           Wt Readings from Last 10 Encounters:   12/23/22 73.5 kg (162 lb)   04/25/22 71.7 kg (158 lb)   04/13/22 71.7 kg (158 lb)   03/03/22 70.3 kg (155 lb)   07/21/21 70.3 kg (155 lb)   07/13/21 73.6 kg (162 lb 4.1 oz)   07/01/21 79.8 kg (176 lb)   06/13/21 68 kg (150 lb)   02/09/21 79.6 kg (175 lb 7.8 oz)   02/18/20 69.9 kg (154 lb)      Glipizide XL to 5 mg once daily.  Lantus 18 units once daily.      PLAN:   Continue Lantus 18 units once daily  Continue Glipizide XL 5 mg once daily. may consider increase next review given HS readings this report.   Continue blood glucose checks as scheduled.   Follow up 1 week with faxed glucose report. Call sooner if needed.   PCP recommendation, increase  Glipizide dose slowly. FBG<200.  Fax recommendations to [facility] RN.      Has PCP visit- 6/30/2023.     Rosaline Patrick RN Diabetes Educator,  [phone]  5/9/2023 at 4:53 PM

## 2023-05-16 NOTE — PATIENT INSTRUCTIONS
Debbi Wood Instructions:    Daily instructions for wound until healed:  -Please apply a pinpoint amount of an antibiotic ointment (such as Neosporin or Bacitracin) on the side of hte LEFT fifth toe with a band aid. Change this every single day until it is healed without any drainage.      Daily Instructions for both feet after the LEFT fifth toe wound is healed:  -Once the wound is healed, please apply Aveeno Lotion (patient says she has this in her room) to both feet (especially on the side of the LEFT fifth toe) every morning before she applies her socks and every evening after the socks are removed. Do not apply the lotion between the toes.  ---Please monitor the LEFT fifth toe every day for any open wounds or signs of infection (redness, swelling, pain, purulence, fever, chills, nausea, vomiting). If you see this, call podiatry at [phone] or go to the Emergency Department if podiatry is not immediately reachable. Thank you

## 2023-05-17 NOTE — PROGRESS NOTES
Diabetes Self-Management Education & Support    BG readings from [facility], via fax.     on 5/8 BG was 131 in the am, 246 at noon, 159 at dinner time, and 184 at HS  on 5/9 BG was  171 in the am, 278 at noon, 164 at dinner time, and 245 at HS  on 5/10 BG was 136 in the am, 239 at noon, 196 at dinner time, and 212 at HS  on 5/11 BG was  128 in the am, 207 at noon, 217 at dinner time, and 235 at HS  on 5/12 BG was 118 in the am, 242 at noon, 102 at dinner time, and 217 at HS  on 5/13 BG was 146 in the am, 177 at noon, 192 at dinner time, and 193 at HS  on 5/14 BG was 178 in the am, 187 at noon, 241 at dinner time, and 215 at HS  On 5/15 BG was 101 in the am, 96 at noon.     Per PCP FBG target: <200     Per CDCES Protocol:   Target goals: Long term care > 65 years of age.    A1c <8.5   -180  110-200 at HS.       April 2022 : 7.1%  December 2022 6.6%        insulin glargine (LANTUS SOLOSTAR) 100 UNIT/ML pen             Sig: Inject 18 units daily at HS, may titrate up to 40 units daily under direction of Diabetes Ed         glipiZIDE (GLUCOTROL XL) 2.5 MG 24 hr tablet             Sig - Route: Take 2 tablets (5 mg) by mouth daily - Oral            Wt Readings from Last 10 Encounters:   12/23/22 73.5 kg (162 lb)   04/25/22 71.7 kg (158 lb)   04/13/22 71.7 kg (158 lb)   03/03/22 70.3 kg (155 lb)   07/21/21 70.3 kg (155 lb)   07/13/21 73.6 kg (162 lb 4.1 oz)   07/01/21 79.8 kg (176 lb)   06/13/21 68 kg (150 lb)   02/09/21 79.6 kg (175 lb 7.8 oz)   02/18/20 69.9 kg (154 lb)        Most recent eGFR 52 3/17/2023.  Glipizide XL to 5 mg once daily.  Lantus 18 units once daily.     PCP recommendation, increase Glipizide dose slowly. FBG<200.     PLAN:   Review with PCP- consider increase Glipizide or defer increase for PCP visit June 30th, pending A1C result.   Follow up 1 week with faxed glucose report. Call sooner if needed.   Has PCP visit- 6/30/2023.  Rosaline Patrick RN Diabetes Educator,  [phone]  5/17/2023 at 3:44 PM

## 2023-05-18 NOTE — PROGRESS NOTES
Radha Murray MD  You 12 hours ago (7:41 PM)     KK  Yes, I am okay with that!        Fax returned to [facility] with instructions to increase Glipizide XL to 7.5 mg once daily.   New order sent to pharmacy.     Continue Lantus 18 units daily.   Continue fax BG report.   Rosaline Patrick RN Diabetes Educator,  [phone]  5/18/2023 at 8:10 AM

## 2023-05-22 NOTE — PROGRESS NOTES
Diabetes Self-Management Education & Support    Faxed report from [facility]  The pt calls in BG reading's:  on 5/8 BG was 131 in the am, 246 at noon, 159 at dinner time, and 184 at HS  on 5/9 BG was  171 in the am, 278 at noon, 164 at dinner time, and 245 at HS  on 5/10 BG was 136 in the am, 239 at noon, 196 at dinner time, and 212 at HS  on 5/11 BG was  128 in the am, 207 at noon, 217 at dinner time, and 235 at HS  on 5/12 BG was 118 in the am, 242 at noon, 102 at dinner time, and 217 at HS  on 5/13 BG was 146 in the am, 177 at noon, 192 at dinner time, and 193 at HS  on 5/14 BG was 178 in the am, 187 at noon, 241 at dinner time, and 215 at HS  On 5/15 BG was 101 in the am, 96 at noon 165 at dinner time, and 181 at HS   on 5/16 BG was 192 in the am, 207 at noon, 150 at dinner time, and 174 at HS  on 5/17 BG was  70 in the am, 122 at noon, 269 at dinner time, and 195 at HS  on 5/18 BG was 65 in the am, 205 at noon, 195 at dinner time, and 157 at HS  on 5/19 BG was  58 in the am, 327 at noon, 127 at dinner time, and 268 at HS  on 5/20 BG was 292 in the am, 292 at noon, 186 at dinner time, and 192 at HS  on 5/21 BG was 81 in the am, 208 at noon, 200 at dinner time, and 263 at HS    Per PCP FBG target: <200     Per CDCES Protocol:   Target goals: Long term care > 65 years of age.    A1c <8.5   -180  110-200 at HS.       April 2022 : 7.1%  December 2022 6.6%        insulin glargine (LANTUS SOLOSTAR) 100 UNIT/ML pen             Sig: Inject 18 units daily at HS, may titrate up to 40 units daily under direction of Diabetes Ed         glipiZIDE (GLUCOTROL XL) 2.5 MG 24 hr tablet             Sig - Route: Take 2 tablets (5 mg) by mouth daily - Oral            Wt Readings from Last 10 Encounters:   12/23/22 73.5 kg (162 lb)   04/25/22 71.7 kg (158 lb)   04/13/22 71.7 kg (158 lb)   03/03/22 70.3 kg (155 lb)   07/21/21 70.3 kg (155 lb)   07/13/21 73.6 kg (162 lb 4.1 oz)   07/01/21 79.8 kg (176 lb)   06/13/21 68 kg (150 lb)    02/09/21 79.6 kg (175 lb 7.8 oz)   02/18/20 69.9 kg (154 lb)         Most recent eGFR 52 3/17/2023.  Glipizide XL 7.5 mg once daily.  Lantus 18 units once daily.    PCP recommendation, increase Glipizide dose slowly. FBG<200.     Since increase in Glipizide dose, fasting low BG.    Plan:  Decrease Lantus to 14 units once daily. Continue Glipizide XL 7.5 mg daily,      Follow up BG report fax 1 week   Rosaline Patrick RN Diabetes Educator,  [phone]  5/22/2023 at 3:32 PM

## 2023-05-30 NOTE — TELEPHONE ENCOUNTER
Alendronate      Last Written Prescription Date:  4/28/23  Last Fill Quantity: n/a,   # refills: n/a  Last Office Visit: 3/24/23  Future Office visit:    Next 5 appointments (look out 90 days)    Jun 30, 2023  3:00 PM  (Arrive by 2:45 PM)  SHORT with Radha Murray MD  [facility] ([facility] ) [address] [place] [address]  [phone]   Aug 22, 2023 10:00 AM  (Arrive by 9:45 AM)  Return Visit with Marian Geller DPM  [facility] ([facility] ) [address] [place] [id number] [phone]           Routing refill request to provider for review/approval because:  Medication is reported/historical

## 2023-05-31 NOTE — PROGRESS NOTES
Diabetes Self-Management Education & Support    [facility] faxed report:   on 5/22 BG was 97 in the am, 175 at noon, 173 at dinner time, and 240 at HS  on 5/23 BG was  59 in the am, 327 at noon, 103 at dinner time, and 186 at HS  *  Low was treated with couple pieces of candy and PB toast until breakfast.   on 5/24 BG was 53 in the am, 206 at noon, 222 at dinner time, and 195 at HS   * Low was treated with mini hersheys chocolate bar and PB lazarus butter cookie.  on 5/25 BG was  181 in the am, 181 at noon, 179 at dinner time, and 237 at HS  on 5/26 BG was 92 in the am, 203 at noon, 186 at dinner time, and 261 at HS  on 5/27 BG was 103 in the am, 184 at noon, 200 at dinner time, and 302 at HS  on 5/28 BG was 100 in the am, 151 at noon, 307 at dinner time, and 273 at HS  On 5/29 BG was 82 in the am, 141 at noon, 231 at dinner time, and 262 at HS.   On 5/30 BG was 87 in the am.        Per PCP FBG target: <200     Per CDCES Protocol:   Target goals: Long term care > 65 years of age.    A1c <8.5   -180  110-200 at HS.       April 2022 : 7.1%  December 2022 6.6%        insulin glargine (LANTUS SOLOSTAR) 100 UNIT/ML pen             Sig: Inject 14 units daily at HS, may titrate up to 40 units daily under direction of Diabetes Ed         glipiZIDE (GLUCOTROL XL) 2.5 MG 24 hr tablet             Sig - Route: Take 3 tablets (5 mg) by mouth daily - Oral            Wt Readings from Last 10 Encounters:   12/23/22 73.5 kg (162 lb)   04/25/22 71.7 kg (158 lb)   04/13/22 71.7 kg (158 lb)   03/03/22 70.3 kg (155 lb)   07/21/21 70.3 kg (155 lb)   07/13/21 73.6 kg (162 lb 4.1 oz)   07/01/21 79.8 kg (176 lb)   06/13/21 68 kg (150 lb)   02/09/21 79.6 kg (175 lb 7.8 oz)   02/18/20 69.9 kg (154 lb)         Most recent eGFR 52 3/17/2023.  Glipizide XL 7.5 mg once daily.  Lantus 14 units once daily.    PCP recommendation: increase Glipizide dose slowly. FBG<200.     Since increase in Glipizide dose, fasting low BG.   Lantus dose was decreased  rom 18 units to 14 units once daily 5/22/23. Noted improved FBG since Lantus decrease.     Plan:   Decrease Lantus to 12 units once daily.   Continue to monitor BG.   Continue Glipizide XL 7.5 mg once daily.   Follow up BG report fax 1 week   Has PCP visit scheduled 6/30.    Rosaline Patrick RN Diabetes Educator,  [phone]  5/31/2023 at 3:47 PM

## 2023-06-13 NOTE — PROGRESS NOTES
Diabetes Self-Management Education & Support    The pt calls in BG reading's:  on 6/5 BG was 106 in the am, 131 at noon, 135 at dinner time, and 202 at HS  on 6/6 BG was  64 in the am, 189 at noon, 121 at dinner time, and 194 at HS  on 6/7 BG was 81 in the am, 154 at noon, 183 at dinner time, and 175 at HS  on 6/8 BG was  67 in the am, 142 at noon, 108 at dinner time, and 219 at HS  on 6/9 BG was 62 in the am, 211 at noon, 188 at dinner time, and 214 at HS  on 6/10 BG was 67 in the am, 248 at noon, 98 at dinner time, and 170 at HS  on 6/11 BG was 80 in the am, 121 at noon, 205 at dinner time, and 240 at HS  On 6/12 Bg was 65 in the am, 132 at noon, 183 at dinner time.      Per PCP FBG target: <200     Per CDCES Protocol:   Target goals: Long term care > 65 years of age.    A1c <8.5   -180  110-200 at HS.       April 2022 : 7.1%  December 2022 6.6%        insulin glargine (LANTUS SOLOSTAR) 100 UNIT/ML pen                   glipiZIDE (GLUCOTROL XL) 2.5 MG 24 hr tablet             Sig - Route: Take 3 tablets (5 mg) by mouth daily - Oral            Wt Readings from Last 10 Encounters:   12/23/22 73.5 kg (162 lb)   04/25/22 71.7 kg (158 lb)   04/13/22 71.7 kg (158 lb)   03/03/22 70.3 kg (155 lb)   07/21/21 70.3 kg (155 lb)   07/13/21 73.6 kg (162 lb 4.1 oz)   07/01/21 79.8 kg (176 lb)   06/13/21 68 kg (150 lb)   02/09/21 79.6 kg (175 lb 7.8 oz)   02/18/20 69.9 kg (154 lb)         Most recent eGFR 52 3/17/2023.  Glipizide XL 7.5 mg once daily.  Lantus 12 units once daily.    PCP recommendation: increase Glipizide dose slowly. FBG<200.     Plan:   Decrease Lantus to 10 units once daily.   Continue to monitor BG.   Continue Glipizide XL 7.5 mg once daily.   Follow up BG report fax 1 week   Has PCP visit scheduled 6/30.  Fax reply sent.   Rosaline Patrick RN Diabetes Educator,  [phone]  6/13/2023 at 1:16 PM

## 2023-06-16 NOTE — TELEPHONE ENCOUNTER
Glipizide 2.5 mg       Last Written Prescription Date:  5-18-23  Last Fill Quantity: 90,   # refills: 2    lantus 100 units      Last Written Prescription Date:  2-10-23  Last Fill Quantity: 15ML,   # refills: 1  Last Office Visit: 3-23-23  Future Office visit:    Next 5 appointments (look out 90 days)    Jun 30, 2023  3:00 PM  (Arrive by 2:45 PM)  SHORT with Radha Murray MD  [facility] ([facility] ) [address] [place] [id number]  [phone]   Aug 22, 2023 10:00 AM  (Arrive by 9:45 AM)  Return Visit with Marian Geller DPM  [facility] ([facility] ) [address] [place] [phone]

## 2023-06-23 NOTE — TELEPHONE ENCOUNTER
[facility]isc.   Last Written Prescription Date:  12/22/2022  Last Fill Quantity: 100,   # refills: 0  Last Office Visit: 03/24/2023

## 2023-06-27 NOTE — PROGRESS NOTES
Diabetes Self-Management Education & Support    Faxed BG reading's from [facility]:    on 6/19 BG was 102 in the am, 234 at noon, 144 at dinner time, and 163 at HS  on 6/20 BG was  132 in the am, 135 at noon, 187 at dinner time, and 206 at HS  on 6/21 BG was 118 in the am, 152 at noon, 183 at dinner time, and 208 at HS  on 6/22 BG was  85 in the am, 182 at noon, 169 at dinner time, and 148 at HS  on 6/23 BG was 96 in the am, 132 at noon, 53 at dinner time, and 158 at HS  on 6/24 BG was 77 in the am, 164 at noon, 147 at dinner time, and 164 at HS  on 6/25 BG was 70 in the am, 192 at noon, 221 at dinner time, and 180 at HS  On 6/26 BG was 67 in the am, 132 at noon.     Per PCP FBG target: <200     Per CDCES Protocol:   Target goals: Long term care > 65 years of age.    A1c <8.5   -180  110-200 at HS.       April 2022 : 7.1%  December 2022 6.6%        insulin glargine (LANTUS SOLOSTAR) 100 UNIT/ML pen                    glipiZIDE (GLUCOTROL XL) 2.5 MG 24 hr tablet             Sig - Route: Take 3 tablets (5 mg) by mouth daily - Oral            Wt Readings from Last 10 Encounters:   12/23/22 73.5 kg (162 lb)   04/25/22 71.7 kg (158 lb)   04/13/22 71.7 kg (158 lb)   03/03/22 70.3 kg (155 lb)   07/21/21 70.3 kg (155 lb)   07/13/21 73.6 kg (162 lb 4.1 oz)   07/01/21 79.8 kg (176 lb)   06/13/21 68 kg (150 lb)   02/09/21 79.6 kg (175 lb 7.8 oz)   02/18/20 69.9 kg (154 lb)         Most recent eGFR 52 3/17/2023.  Glipizide XL 7.5 mg once daily.  Lantus 10 units once daily.    PCP recommendation: increase Glipizide dose slowly. FBG<200.     FBG mostly <100, decrease Lantus, 1 unit. (9 units once daily).     Plan:   Decrease Lantus to 9 units once daily.   Continue to monitor BG.   Continue Glipizide XL 7.5 mg once daily.   Follow up BG report fax 1 week   Has PCP visit scheduled 6/30.  Fax reply sent.     Rosaline Patrick RN Diabetes Educator,  [phone]  6/27/2023 at 3:10 PM

## 2023-06-30 NOTE — Clinical Note
I will wait for you to be able to review next round of BG, but wonder about increase glipizide to 10 and stopping insulin altogether. Let me know your thoughts.

## 2023-06-30 NOTE — PROGRESS NOTES
Assessment & Plan     Controlled type 2 diabetes mellitus without complication, without long-term current use of insulin (H)  Remains over controlled. Working on down titration of medications with diabetes ed. Will recommend we stop insulin and increase glipizide to 10mg depending on how next month's BG look. I believe her control will remain at goal for age.   - Hemoglobin A1c  - Lipid Profile (Chol, Trig, HDL, LDL calc)  - Hemoglobin A1c  - Lipid Profile (Chol, Trig, HDL, LDL calc)    Permanent atrial fibrillation (H)  Rate controlled, continue dig, bblker.     Essential (primary) hypertension  Low over the last several months. Will stop acei, lisinopril 5mg daily.     Osteoporosis  DEXA reviewed, now on drug holiday from fosamax as of 4/2023.     PROCEDURE: DX HIP/PELVIS/SPINE 4/3/2023 10:23 AM     HISTORY: Other osteoporosis, unspecified pathological fracture  presence     COMPARISONS: None.     TECHNIQUE: Axial bone mineral density study of the left hip and lumbar  spine     FINDINGS: Bone mineral density in the left hip measures 0.859 g/sq cm  with a T score of -0.7. There has been a 7.8% decline from 2013 which  is statistically significant. Bone mineral density of the femoral neck  measured 0.716 g/sq cm with a T score of -1.2. Bone mineral density in  the lumbar spine to L4 measured 0.931 g/sq cm with a T score of -1.3.  There has been a 2.3% decline from 2013 which is not statistically  significant. 10 year major osteoporotic fracture risk is 9.7%. The 10  year hip fracture risk is 2.6%                                                                        IMPRESSION: The patient is osteopenic and fracture risk is moderate     ODILIA CARO MD     Stage 3 chronic kidney disease, unspecified whether stage 3a or 3b CKD (H)  Stable  - Basic metabolic panel  - Basic metabolic panel    Loose stools  Chronic since stroke years ago per family. Uses prn imodium rarely, but always on the loose side. Start  daily fiber supplement  - psyllium (METAMUCIL/KONSYL) 58.6 % powder  Dispense: 1040 g; Refill: 3    Chronic systolic congestive heart failure (H)  Continue lasix and chlorthalidone, fluid status today appears stable    History of CVA (cerebrovascular accident)  At this point, continue asa and blood thinner. Feel statin is of low benefit, okay to stope    35 minutes spent by me on the date of the encounter doing chart review, review of test results, interpretation of tests, patient visit and documentation     No follow-ups on file.    Radha Murray MD  [facility] - ROCÍO Salinas is a 89 year old, presenting for the following health issues:  No chief complaint on file.        3/24/2023     3:04 PM   Additional Questions   Roomed by rodrigo   Accompanied by daughters     HPI     Diabetes Follow-up    How often are you checking your blood sugar? One time daily  What time of day are you checking your blood sugars (select all that apply)?  Before and after meals  Have you had any blood sugars above 200?  Not applicable  Have you had any blood sugars below 70?  Not applicable    What symptoms do you notice when your blood sugar is low?  None    What concerns do you have today about your diabetes? None     Do you have any of these symptoms? (Select all that apply)  No numbness or tingling in feet.  No redness, sores or blisters on feet.  No complaints of excessive thirst.  No reports of blurry vision.  No significant changes to weight.    Have you had a diabetic eye exam in the last 12 months? No    Hyperlipidemia Follow-Up      Are you regularly taking any medication or supplement to lower your cholesterol?   Yes- Lipitor 40 mg    Are you having muscle aches or other side effects that you think could be caused by your cholesterol lowering medication?  No    Hypertension Follow-up      Do you check your blood pressure regularly outside of the clinic? No     Are you following a low salt diet?  No    Are your blood pressures ever more than 140 on the top number (systolic) OR more   than 90 on the bottom number (diastolic), for example 140/90? Yes    BP Readings from Last 2 Encounters:   06/30/23 102/64   05/16/23 104/77     Hemoglobin A1C (%)   Date Value   06/30/2023 6.9 (H)   12/23/2022 6.6 (H)   07/09/2021 6.8 (H)   09/15/2020 8.0 (H)     LDL Cholesterol Calculated (mg/dL)   Date Value   06/30/2023 47   04/13/2022 40   09/15/2020 52   07/17/2019 53       Atrial Fibrillation Follow-up      Symptoms: no recent chest pain, significant palpitations, dizziness/lightheadedness, dyspnea, or increased peripheral edema.    Stroke prevention: None        12/23/2022    10:04 AM 2/16/2023    11:56 AM 3/24/2023     3:08 PM 5/16/2023    12:29 PM 6/30/2023     3:12 PM   Date   Pulse 77 77 81 110 78     Current [id number] Score: 6 points - A score of 5 or greater represents a 7.2 - 12.2% annual risk of major embolic event, without anti-coagulation or an LAAO device.     Heart Failure Follow-up      Are you experiencing any shortness of breath? No    Are you experiencing any swelling in your legs or feet?  Worse than usual    Are you using more pillows than usual? No    Do you cough at night?  No    Do you check your weight daily?  Yes    Have you had a weight change recently?  No    Are you having any of the following side effects from your medications? (Select all that apply)  The patient does not report symptoms of dizziness, fatigue, cough, swelling, or slow heart beat.    Since your last visit, how many times have you gone to the cardiologist, urgent care, emergency room, or hospital because of your heart failure?   None    Chronic Kidney Disease Follow-up    Do you take any over the counter pain medicine?: Yes  What over the counter medicine are you taking for your pain?:  Tylenol      How often do you take this medicine?:  NA        Review of Systems   Constitutional, HEENT, cardiovascular, pulmonary, gi and gu  "systems are negative, except as otherwise noted.      Objective    /64 (BP Location: Left arm, Patient Position: Sitting, Cuff Size: Adult Regular)   Pulse 78   Temp 98.7  F (37.1  C) (Tympanic)   Resp 16   Ht 1.575 m (5' 2\")   Wt 76.2 kg (168 lb)   SpO2 97%   BMI 30.73 kg/m    Body mass index is 30.73 kg/m .  Physical Exam       Results for orders placed or performed in visit on 06/30/23   Hemoglobin A1c     Status: Abnormal   Result Value Ref Range    Estimated Average Glucose 151 mg/dL    Hemoglobin A1C 6.9 (H) <5.7 %   Basic metabolic panel     Status: Abnormal   Result Value Ref Range    Sodium 139 136 - 145 mmol/L    Potassium 4.8 3.4 - 5.3 mmol/L    Chloride 104 98 - 107 mmol/L    Carbon Dioxide (CO2) 24 22 - 29 mmol/L    Anion Gap 11 7 - 15 mmol/L    Urea Nitrogen 22.6 8.0 - 23.0 mg/dL    Creatinine 1.06 (H) 0.51 - 0.95 mg/dL    Calcium 9.5 8.8 - 10.2 mg/dL    Glucose 138 (H) 70 - 99 mg/dL    GFR Estimate 50 (L) >60 mL/min/1.73m2   Lipid Profile (Chol, Trig, HDL, LDL calc)     Status: Abnormal   Result Value Ref Range    Cholesterol 106 <200 mg/dL    Triglycerides 89 <150 mg/dL    Direct Measure HDL 41 (L) >=50 mg/dL    LDL Cholesterol Calculated 47 <=100 mg/dL    Non HDL Cholesterol 65 <130 mg/dL    Narrative    Cholesterol  Desirable:  <200 mg/dL    Triglycerides  Normal:  Less than 150 mg/dL  Borderline High:  150-199 mg/dL  High:  200-499 mg/dL  Very High:  Greater than or equal to 500 mg/dL    Direct Measure HDL  Female:  Greater than or equal to 50 mg/dL   Male:  Greater than or equal to 40 mg/dL    LDL Cholesterol  Desirable:  <100mg/dL  Above Desirable:  100-129 mg/dL   Borderline High:  130-159 mg/dL   High:  160-189 mg/dL   Very High:  >= 190 mg/dL    Non HDL Cholesterol  Desirable:  130 mg/dL  Above Desirable:  130-159 mg/dL  Borderline High:  160-189 mg/dL  High:  190-219 mg/dL  Very High:  Greater than or equal to 220 mg/dL         "

## 2023-07-07 NOTE — PROGRESS NOTES
Diabetes Self-Management Education & Support    Faxed BG report from [facility].   on 6/26 BG was 67 in the am, 132at noon, 197 at dinner time, and 139 at HS  on 6/27 BG was  80 in the am, 80 at noon, 194 at dinner time, and 188 at HS  on 6/28 BG was 57 in the am, 150 at noon, 174 at dinner time, and 170 at HS  on 6/29 BG was  89 in the am, 242 at noon, 79 at dinner time, and 158 at HS  on 6/30 BG was 67 in the am, 235 at noon, 111 at dinner time, and 202 at HS  on 7/1 BG was 97 in the am, 139 at noon, 84 at dinner time, and 202 at HS  on 7/2 BG was 73 in the am, 225 at noon, 226 at dinner time, and 229 at HS  On 7/3 BG was 91 in the am, 165 at noon. No reading at dinner. 198 at HS. Was out of facility.   On 7/4 BG was 81 in the am, 226 at noon, 106 at dinner time, 226 at HS.   On 7/5 BG was 83 in the am, 255 at lunch 106 at dinner time.     Per PCP FBG target: <200     Per CDCES Protocol:   Target goals: Long term care > 65 years of age.    A1c <8.5   -180  110-200 at HS.       April 2022 : 7.1%  December 2022 6.6%        insulin glargine (LANTUS SOLOSTAR) 100 UNIT/ML pen                    glipiZIDE (GLUCOTROL XL) 2.5 MG 24 hr tablet             Sig - Route: Take 3 tablets (5 mg) by mouth daily - Oral            Wt Readings from Last 10 Encounters:   12/23/22 73.5 kg (162 lb)   04/25/22 71.7 kg (158 lb)   04/13/22 71.7 kg (158 lb)   03/03/22 70.3 kg (155 lb)   07/21/21 70.3 kg (155 lb)   07/13/21 73.6 kg (162 lb 4.1 oz)   07/01/21 79.8 kg (176 lb)   06/13/21 68 kg (150 lb)   02/09/21 79.6 kg (175 lb 7.8 oz)   02/18/20 69.9 kg (154 lb)         Most recent eGFR 52 3/17/2023.  Glipizide XL 7.5 mg once daily.  Lantus 9 units once daily.    PCP recommendation: increase Glipizide dose slowly. FBG<200.     FBG all <100. Will stop Lantus.   Consider increase of Glipizide next BG review if needed.      Plan:   Stop Lantus.   Continue to monitor BG.   Continue Glipizide XL 7.5 mg once daily. May increase in future if  indicated per BG trends.   Follow up BG report fax 1 week   Fax reply sent.     Rosaline Patrick RN Diabetes Educator,  [phone]  7/7/2023 at 8:06 AM

## 2023-07-17 NOTE — PROGRESS NOTES
Diabetes Self-Management Education & Support    The pt calls in BG reading's:  on 7/10 BG was 152 in the am, 125 at noon, 151 at dinner time, and 201 at HS  on 7/11 BG was  96 in the am, 204 at noon, 168 at dinner time, and 196 at HS  on 7/12 BG was 96 in the am, 229 at noon, 143 at dinner time, and 232 at HS  on 7/13 BG was  91 in the am, 187 at noon, 159 at dinner time, and 222 at HS  on 7/14 BG was 88 in the am, 226 at noon, 165 at dinner time, and 165 at HS  on 7/15 BG was 231 in the am, 135 at noon, 210 at dinner time, and 219 at HS  on 7/16 BG was 107 in the am, 179 at noon, 153 at dinner time, and 143 at HS  On 7/17 BG was 73 in the am, 147 at noon.     Per PCP FBG target: <200     Per CDCES Protocol:   Target goals: Long term care > 65 years of age.    A1c <8.5   -180  110-200 at HS.       April 2022 : 7.1%  December 2022 6.6%        insulin glargine (LANTUS SOLOSTAR) 100 UNIT/ML pen                    glipiZIDE (GLUCOTROL XL) 2.5 MG 24 hr tablet             Sig - Route: Take 3 tablets (5 mg) by mouth daily - Oral            Wt Readings from Last 10 Encounters:   12/23/22 73.5 kg (162 lb)   04/25/22 71.7 kg (158 lb)   04/13/22 71.7 kg (158 lb)   03/03/22 70.3 kg (155 lb)   07/21/21 70.3 kg (155 lb)   07/13/21 73.6 kg (162 lb 4.1 oz)   07/01/21 79.8 kg (176 lb)   06/13/21 68 kg (150 lb)   02/09/21 79.6 kg (175 lb 7.8 oz)   02/18/20 69.9 kg (154 lb)         Most recent eGFR 50 6/30/2023.  Glipizide XL 7.5 mg once daily.  PCP recommendation: increase Glipizide dose slowly. FBG<200.      Plan:   Continue to monitor BG.   Continue Glipizide XL 7.5 mg once daily. Refill plus 1 additional refill sent.  * May increase in future if indicated per BG trends.   Follow up BG report fax 1 week   Fax reply sent.     Rosaline Patrick RN Diabetes Educator,  [phone]  7/17/2023 at 3:51 PM

## 2023-08-01 NOTE — PROGRESS NOTES
Diabetes Self-Management Education & Support    BG faxed report from [facility]:    The pt calls in BG reading's:  on 7/17 BG was 73 in the am, 147 at noon, 295 at dinner time, and 263 at HS  on 7/18 BG was  76 in the am, 233 at noon, 185 at dinner time, and 398 at HS  on 7/19 BG was 109 in the am, 228 at noon, 257 at dinner time, and 232 at HS  on 7/20 BG was  87 in the am, 158 at noon, 137 at dinner time, and 212 at HS  on 7/21 BG was 128 in the am, 198 at noon, 123 at dinner time, and 212 at HS  on 7/22 BG was 141 in the am, 136 at noon, 154 at dinner time, and 245 at HS  on 7/23 BG was 96 in the am, 225 at noon, 269 at dinner time, and 183 at HS  on 7/24 BG was 98 in the am, 168 at noon, 202 at dinner time, and 265 at HS  on 7/25 BG was  120 in the am, 221 at noon, 165 at dinner time, and 251 at HS  on 7/26 BG was 94 in the am, 218 at noon, 198 at dinner time, and 234 at HS  on 7/27 BG was  127 in the am, 229 at noon, 230 at dinner time, and 257 at HS  on 7/28 BG was 97 in the am, 207 at noon, 198 at dinner time, and 259 at HS  on 7/29 BG was 259 in the am, 222 at noon, 272 at dinner time, and 237 at HS  on 7/30 BG was 210 in the am, 234 at noon, 281 at dinner time, and 237 at HS  On 7/31 BG was 129 in the am, 226 at noon.       Per PCP FBG target: <200     Per CDCES Protocol:   Target goals: Long term care > 65 years of age.    A1c <8.5   -180  110-200 at HS.       April 2022 : 7.1%  December 2022 6.6%    June 2023  6.9%       glipiZIDE (GLUCOTROL XL) 2.5 MG 24 hr tablet             Sig - Route: Take 3 tablets (5 mg) by mouth daily - Oral     Wt Readings from Last 10 Encounters:   06/30/23 76.2 kg (168 lb)   03/24/23 73.5 kg (162 lb)   12/23/22 73.5 kg (162 lb)   04/25/22 71.7 kg (158 lb)   04/13/22 71.7 kg (158 lb)   03/03/22 70.3 kg (155 lb)   07/21/21 70.3 kg (155 lb)   07/13/21 73.6 kg (162 lb 4.1 oz)   07/01/21 79.8 kg (176 lb)   06/13/21 68 kg (150 lb)        Most recent eGFR 50 6/30/2023.  Glipizide XL  7.5 mg once daily.  PCP recommendation: increase Glipizide dose slowly. FBG<200.      Plan:   Reviewed with Dr. Murray, no change in plan. Continue to monitor. Fax response back to [facility].  Rosaline Patrick RN Diabetes Educator,  [phone]  8/2/2023 at 4:07 PM

## 2023-08-08 NOTE — PROGRESS NOTES
Diabetes Self-Management Education & Support    Fax report from [facility]:     The pt calls in BG reading's:  on 7/31 BG was 129 in the am, 226 at noon, 210 at dinner time, and 253 at HS  on 8/1 BG was  117 in the am, 193 at noon, 138 at dinner time, and 244 at HS  on 8/2 BG was 131 in the am, 240 at noon, 184 at dinner time, and 224 at HS  on 8/3 BG was  125 in the am, 139 at noon, 116 at dinner time, and 277 at HS  on 8/4 BG was 138 in the am, 127 at noon, 148 at dinner time, and 220 at HS  on 8/5 BG was 107 in the am, 170 at noon, 230 at dinner time, and 237 at HS  on 8/6 BG was 110 in the am, 217 at noon, 162 at dinner time, and 195 at HS  On 8/7 BG was 94 in the am, 207 at noon.     Per PCP FBG target: <200     Per CDCES Protocol:   Target goals: Long term care > 65 years of age.    A1c <8.5   -180  110-200 at HS.       April 2022 : 7.1%  December 2022 6.6%    June 2023  6.9%       glipiZIDE (GLUCOTROL XL) 2.5 MG 24 hr tablet             Sig - Route: Take 3 tablets (5 mg) by mouth daily - Oral          Wt Readings from Last 10 Encounters:   06/30/23 76.2 kg (168 lb)   03/24/23 73.5 kg (162 lb)   12/23/22 73.5 kg (162 lb)   04/25/22 71.7 kg (158 lb)   04/13/22 71.7 kg (158 lb)   03/03/22 70.3 kg (155 lb)   07/21/21 70.3 kg (155 lb)   07/13/21 73.6 kg (162 lb 4.1 oz)   07/01/21 79.8 kg (176 lb)   06/13/21 68 kg (150 lb)         Most recent eGFR 50 6/30/2023.  Glipizide XL 7.5 mg once daily.  PCP recommendation: increase Glipizide dose slowly. FBG<200.      Plan:   Continue to monitor. Fax response back to [facility].    Rosaline Patrick RN Diabetes Educator,  [phone]  8/8/2023 at 8:58 AM

## 2023-08-15 NOTE — PROGRESS NOTES
Diabetes Self-Management Education & Support    The pt calls in BG reading's:  on 8/7 BG was 94 in the am, 207 at noon, 205 at dinner time, and 163 at HS  on 8/8 BG was  121 in the am, 175 at noon, 138 at dinner time, and 207 at HS  on 8/9 BG was 75 in the am, 189 at noon, 253 at dinner time, and 254 at HS  on 8/10 BG was  125 in the am, 180 at noon, 167 at dinner time, and 199 at HS  on 8/11 BG was 128 in the am, 219 at noon, 138 at dinner time, and 226 at HS  on 8/12 BG was - in the am, - at noon, 164 at dinner time, and 247 at HS  on 8/13 BG was - in the am, 197 at noon, 230 at dinner time, and 195 at HS  On 8/14 BG was 109 in the am.     Per PCP FBG target: <200     Per CDCES Protocol:   Target goals: Long term care > 65 years of age.    A1c <8.5   -180  110-200 at HS.       April 2022 : 7.1%  December 2022 6.6%    June 2023  6.9%       glipiZIDE (GLUCOTROL XL) 2.5 MG 24 hr tablet             Sig - Route: Take 3 tablets (5 mg) by mouth daily - Oral            Wt Readings from Last 10 Encounters:   06/30/23 76.2 kg (168 lb)   03/24/23 73.5 kg (162 lb)   12/23/22 73.5 kg (162 lb)   04/25/22 71.7 kg (158 lb)   04/13/22 71.7 kg (158 lb)   03/03/22 70.3 kg (155 lb)   07/21/21 70.3 kg (155 lb)   07/13/21 73.6 kg (162 lb 4.1 oz)   07/01/21 79.8 kg (176 lb)   06/13/21 68 kg (150 lb)         Most recent eGFR 50 6/30/2023.  Glipizide XL 7.5 mg once daily.  PCP recommendation: increase Glipizide dose slowly. FBG<200.      Plan:   Continue to monitor. Fax response back to [facility].  Updated med list: removed Humalog and Lantus from list, both were stopped. Lantus: 7/7/2023. Humalog stopped in March 2023.  Fax response back to [facility].    Rosaline Patrick RN Diabetes Educator,  [phone]  8/15/2023 at 4:10 PM

## 2023-08-16 NOTE — PROGRESS NOTES
Diabetes Self-Management Education & Support    Received fax from [facility] RN, they didn't receive orders for discontinuing Lantus or Humalog previously. Requesting new orders.     Called [facility] RN, confirmed:  Pt is taking Lantus 9 units at HS.  Humalog 4 units at HS.     Using Freestyle sensor CGM.     Reviewed with PCP. Stop Humalog 4 units HS.   Continue Lantus 9 units at HS.  Added back to med list.   Continue to monitor glucose.     Will plan to stop Lantus and increase Glipizide XL.    Call returned to [facility], updated of plan.   Fax orders to facility.     Rosaline Patrick RN Diabetes Educator,  [phone]  8/16/2023 at 3:12 PM

## 2023-08-22 NOTE — PROGRESS NOTES
Ft Chief complaint: Patient presents with:  Toenail: DFE      History of Present Illness: This 89 year old female is seen with two of her daughters for follow-up management of a a painful LEFT lateral fifth toe callus (previously an ulceration), a diabetic foot exam and high risk nail debridement.    The patient fell and hurt her LEFT knee a couple weeks ago, but she has not had other pain in her knee. Her daughters have noticed her LEFT leg swelling has been increased.    She received her most recent DM shoes from the orthotist, Shyanne Alvarado, in February/March, 2023. Her previous shoes had been creating too much moisture in her shoes and she was developing mold around her feet. Her new shoes are very comfortable and she has not had any moisture build up in the shoes.    She has a history of burning, tingling, and numbness in her feet. She has noticed an increase in numbness recently and it feels like her feet are a block of wood when she gets up in the morning.    No further pedal complaints today.     Lab Results   Component Value Date    A1C 6.9 06/30/2023    A1C 6.6 12/23/2022    A1C 7.1 04/13/2022    A1C 10.9 12/21/2021    A1C 6.8 07/09/2021    A1C 8.0 09/15/2020    A1C 6.8 04/02/2019    A1C 6.3 10/30/2018    A1C 6.1 10/24/2017           /89 (BP Location: Left arm, Patient Position: Sitting, Cuff Size: Adult Regular)   Pulse 83   Temp 97.5  F (36.4  C) (Tympanic)   SpO2 93%     Patient Active Problem List   Diagnosis    Advanced care planning/counseling discussion    Sciatica    Diverticulosis of large intestine    Osteoporosis    Obesity    Congenital cystic kidney disease    Myalgia and myositis    Calculus of kidney    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    ACP (advance care planning)    Controlled type 2 diabetes mellitus without complication, without long-term current use of insulin (H)    Permanent atrial fibrillation (H)    Lung nodule    Generalized muscle weakness    Chronic  systolic congestive heart failure (H)    Acute blood loss anemia    Chronic anticoagulation    Essential (primary) hypertension    Falls    Hematoma of arm, left, initial encounter    Traumatic hematoma of left hip    Chronic kidney disease, stage 3 (H)    Bilateral sensorineural hearing loss       Past Surgical History:   Procedure Laterality Date    Breast biopsy      LT, benign     SECTION      CHOLECYSTECTOMY      lap    COLONOSCOPY      LUCILLE / BSO         Current Outpatient Medications   Medication    acetaminophen (TYLENOL) 325 MG tablet    Alcohol Swabs (EASY TOUCH ALCOHOL PREP MEDIUM) 70 % PADS    artificial tears OINT ophthalmic ointment    aspirin (ASPIRIN LOW DOSE) 81 MG chewable tablet    Assure Layton Lancets MISC    BD AUTOSHIELD DUO 30G X 5 MM    bismuth subsalicylate (PEPTO BISMOL) 262 MG/15ML suspension    blood glucose (NO BRAND SPECIFIED) test strip    Blood Glucose Monitoring Suppl (GLUCOCARD VITAL MONITOR) w/Device KIT    chlorthalidone (HYGROTON) 25 MG tablet    Continuous Blood Gluc  (FREESTYLE ALYSON 14 DAY READER) MARC    Continuous Blood Gluc  (FREESTYLE ALYSON 2 READER) MARC    Continuous Blood Gluc Sensor (FREESTYLE ALYSON 2 SENSOR) [facility]    desonide (DESOWEN) 0.05 % external lotion    digoxin (LANOXIN) 125 MCG tablet    diphenhydrAMINE (BENADRYL) 25 MG tablet    DULoxetine (CYMBALTA) 20 MG capsule    fluticasone (FLONASE) 50 MCG/ACT nasal spray    glipiZIDE (GLUCOTROL XL) 2.5 MG 24 hr tablet    GLUCOCARD VITAL TEST test strip    insulin glargine (LANTUS SOLOSTAR) 100 UNIT/ML pen    metoprolol tartrate (LOPRESSOR) 100 MG tablet    mirtazapine (REMERON) 7.5 MG tablet    Multiple Vitamin (TAB-A-MARIUSZ) TABS    omeprazole (PRILOSEC) 20 MG DR capsule    polyethylene glycol-propylene glycol (SYSTANE) 0.4-0.3 % SOLN ophthalmic solution    potassium chloride ER (KLOR-CON M) 20 MEQ CR tablet    psyllium (METAMUCIL/KONSYL) 58.6 % powder    SB BISMUTH 262 MG TABS    Specialty  Vitamins Products (ICAPS LUTEIN & ZEAXANTHIN) TBEC    vitamin D3 (CHOLECALCIFEROL) 50 mcg (2000 units) tablet    alendronate (FOSAMAX) 70 MG tablet    ELIQUIS ANTICOAGULANT 5 MG tablet     No current facility-administered medications for this visit.          Allergies   Allergen Reactions    Ampicillin     Desvenlafaxine Other (See Comments)     Desvenlafaxine Succinate  Pristiq      Sertraline Hcl Other (See Comments) and Diarrhea     Zoloft      Sulfa Antibiotics Other (See Comments)     Sulfa(Sulfonamide Antibiotics)       Family History   Problem Relation Age of Onset    Myocardial Infarction Mother 59        myocardial infarction, cause of death    Other - See Comments Daughter         fibromyalgia    Thyroid Disease Sister         hypo    Thyroid Disease Sister         hypo    Thyroid Disease Sister         hypo    Myocardial Infarction Brother 63        myocardial infarction, cause of death       Social History     Socioeconomic History    Marital status:      Spouse name: None    Number of children: None    Years of education: None    Highest education level: None   Occupational History    None   Tobacco Use    Smoking status: Never Smoker    Smokeless tobacco: Never Used    Tobacco comment: no passive exposure   Vaping Use    Vaping Use: Never used   Substance and Sexual Activity    Alcohol use: No    Drug use: No    Sexual activity: Never   Other Topics Concern     Service Not Asked    Blood Transfusions Yes     Comment: 11/02/2012    Caffeine Concern Yes     Comment: coffee, 4 cups daily    Occupational Exposure Not Asked    Hobby Hazards Not Asked    Sleep Concern Not Asked    Stress Concern Not Asked    Weight Concern Not Asked    Special Diet Not Asked    Back Care Not Asked    Exercise Not Asked    Bike Helmet Not Asked    Seat Belt Yes    Self-Exams Not Asked    Parent/sibling w/ CABG, MI or angioplasty before 65F 55M? Yes   Social History Narrative    None     Social Determinants of  Health     Financial Resource Strain: Not on file   Food Insecurity: Not on file   Transportation Needs: Not on file   Physical Activity: Not on file   Stress: Not on file   Social Connections: Not on file   Intimate Partner Violence: Not on file   Housing Stability: Not on file       ROS: 10 point ROS neg other than the symptoms noted above in the HPI.  EXAM  Constitutional: healthy, alert and no distress    Psychiatric: mentation appears normal and affect normal/bright    VASCULAR:  -Dorsalis pedis pulse +1/4   -Posterior tibial pulse +0/4 LEFT and +1/4 RIGHT    -Capillary refill time < 3 seconds to hallux  -Hair growth Absent to anterior legs and ankles  NEURO:  -Positive for paresthesias to foot  -Epicritic and protective sensation diminished to the bilateral foot  DERM:  -Skin temperature within normal limits   -Skin diffusely dry and flaking to the foot  -Toenails thickened, dystrophic and discolored x 5 bilaterally   -Hyperkeratotic lesion on the lateral distal aspect of the proximal phalanx of the LEFT fifth toe  -LEFT lateral and anterior distal 1/3 of the leg is erythematous with calor compare to RIGHT leg  ---No open wounds  ---2+ pitting edema to LEFT leg and 1+ pitting edema to RIGHT leg  MSK:  -Moderate decrease in arch height while patient is NWB  -Muscle strength of ankles +5/5 for dorsiflexion, plantarflexion, ABDUction and ADDuction b/l    RIGHT FOOT RADIOGRAPH 03/03/2022  IMPRESSION: No plain film evidence of osteomyelitis at this time. Recommend follow-up.    AL HERNANDEZ MD   ============================================================    ASSESSMENT:    (L60.3) Onychodystrophy  (primary encounter diagnosis)    (L84) Callus of foot    (L03.116) Cellulitis of left lower extremity    (N18.31) Stage 3a chronic kidney disease (H)    (E11.9,  Z79.4) Diabetes mellitus type 2, insulin dependent (H)    (E11.42) Diabetic polyneuropathy associated with type 2 diabetes mellitus  (H)      PLAN:  -Patient evaluated and examined. Treatment options discussed with no educational barriers noted.    -High risk toenail debridement x 10 toenails without incident     Diabetes Mellitus: Patient's DM is managed by their PCP. The DM appears to be stable. Patient's last HbA1C was 6.9% on 06/30/2023.    -Callus pared x 1 to the lateral distal aspect of the proximal phalanx of the LEFT fifth toe   without incident  ---Patient reminded that the callus will likely return due to the underlying, prominent bone causing the callus while the patient is walking.  ---No open woun d post paring    -----------------------------------    Cellulitis of LEFT lower extremity:  -Patient says her LEFT leg has had increased edema since she fell and hurt her knee. She is not having pain in the LEFT leg, but she presented today with her LEFT leg red, warm and swollen. There are no obvious open wounds. Her daughters said they didn't initially see her leg, but they said her leg has not been that red before. The patient has cellulitis. Due to her history of MRSA and her reduced kidney function, patient was placed on Doxycycline 100mg BID for seven days. She should call the clinic or go to the Emergency Department if her signs of infection do not improve or if they worsen over the next couple of days. She is in agreement with this plan.  -This is an acute, uncomplicated illness/injury with prescription drug management.    -DM shoes: Patient was dispensed DM shoes in February / March, 2023 by the orthotist, Shyanne Alvarado. The shoes are very comfortable.    -Patient in agreement with the above treatment plan and all of patient's questions were answered.      Return to clinic one week to evaluate LEFT leg cellulitis  Return to clinic 3 months for a diabetic foot exam and high risk nail debridement      Marian Geller DPM

## 2023-08-22 NOTE — PATIENT INSTRUCTIONS
Debbi Wood:    -Please check patient's feet and legs every day for worsening signs of infection (redness, swelling, pain, purulence, fever, chills, nausea, vomiting).  -Patient is being placed on Doxycycline for cellulitis of the LEFT ankle/leg. She is to take 100mg twice a day with food. If the redness or warmth worsens before her follow-up with podiatry, please have her go to Urgent Care or the Emergency Department. Thank you

## 2023-08-22 NOTE — PROGRESS NOTES
Diabetes Self-Management Education & Support    Fax report from [facility]:     on 8/14 BG was 109 in the am, 207 at noon, 223 at dinner time, and 2223 at HS  on 8/15 BG was  103 in the am, 250 at noon, 229 at dinner time, and 225 at HS  on 8/16 BG was 146 in the am, 150 at noon, 130 at dinner time, and 215 at HS  on 8/17 BG was  123 in the am, 208 at noon, 185 at dinner time, and 210 at HS  on 8/18 BG was 123 in the am, 294 at noon, 193 at dinner time, and 217 at HS  on 8/19 BG was 181 in the am, 239 at noon, 264 at dinner time, and 264 at HS  on 8/20 BG was 168 in the am, 168 at noon, 223 at dinner time, and 224 at HS    Per PCP FBG target: <200     Per CDCES Protocol:   Target goals: Long term care > 65 years of age.    A1c <8.5   -180  110-200 at HS.       April 2022 : 7.1%  December 2022 6.6%    June 2023  6.9%       glipiZIDE (GLUCOTROL XL) 2.5 MG 24 hr tablet             Sig - Route: Take 3 tablets (5 mg) by mouth daily - Oral       insulin glargine (LANTUS SOLOSTAR) 100 UNIT/ML pen               Wt Readings from Last 10 Encounters:   06/30/23 76.2 kg (168 lb)   03/24/23 73.5 kg (162 lb)   12/23/22 73.5 kg (162 lb)   04/25/22 71.7 kg (158 lb)   04/13/22 71.7 kg (158 lb)   03/03/22 70.3 kg (155 lb)   07/21/21 70.3 kg (155 lb)   07/13/21 73.6 kg (162 lb 4.1 oz)   07/01/21 79.8 kg (176 lb)   06/13/21 68 kg (150 lb)         Most recent eGFR 50 6/30/2023.  Glipizide XL 7.5 mg once daily.  PCP recommendation: increase Glipizide dose slowly. FBG<200.      Plan:   Continue to monitor. Fax response back to [facility].  Glipizide 7.5 mg daily  Lantus 9 units once daily. - plan to stop in future.   Fax response back to [facility].     Rosaline Patrick RN Diabetes Educator,  [phone]  8/22/2023 at 9:36 AM

## 2023-08-28 NOTE — PROGRESS NOTES
Ft Chief complaint: Patient presents with:  Infection: cellulitis      History of Present Illness: This 89 year old female is seen with two of her daughters for follow-up management of LEFT leg cellulitis. She started taking Doxycycline 100mg BID for seven days on 08/22/2023. She has taken it twice a day without concerns. Her LEFT ankle feels much better and the redness resolved.    She received her most recent DM shoes from the orthotist, Shyanne Alvarado, in February/March, 2023. She has not tried wearing compression socks. She sits in her wheelchair a lot with her feet in a dependent position throughout the day.    No severe erythema, no ascending erythema, no calor, no purulence, no malodor, no other signs of infection.       Lab Results   Component Value Date    A1C 6.9 06/30/2023    A1C 6.6 12/23/2022    A1C 7.1 04/13/2022    A1C 10.9 12/21/2021    A1C 6.8 07/09/2021    A1C 8.0 09/15/2020    A1C 6.8 04/02/2019    A1C 6.3 10/30/2018    A1C 6.1 10/24/2017           BP (!) 130/93 (BP Location: Left arm, Patient Position: Sitting, Cuff Size: Adult Regular)   Pulse 76   Temp 97.9  F (36.6  C) (Tympanic)   SpO2 94%     Patient Active Problem List   Diagnosis    Advanced care planning/counseling discussion    Sciatica    Diverticulosis of large intestine    Osteoporosis    Obesity    Congenital cystic kidney disease    Myalgia and myositis    Calculus of kidney    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    ACP (advance care planning)    Controlled type 2 diabetes mellitus without complication, without long-term current use of insulin (H)    Permanent atrial fibrillation (H)    Lung nodule    Generalized muscle weakness    Chronic systolic congestive heart failure (H)    Acute blood loss anemia    Chronic anticoagulation    Essential (primary) hypertension    Falls    Hematoma of arm, left, initial encounter    Traumatic hematoma of left hip    Chronic kidney disease, stage 3 (H)    Bilateral sensorineural  hearing loss       Past Surgical History:   Procedure Laterality Date    Breast biopsy      LT, benign     SECTION      CHOLECYSTECTOMY      lap    COLONOSCOPY      LUCILLE / BSO         Current Outpatient Medications   Medication    acetaminophen (TYLENOL) 325 MG tablet    Alcohol Swabs (EASY TOUCH ALCOHOL PREP MEDIUM) 70 % PADS    artificial tears OINT ophthalmic ointment    aspirin (ASPIRIN LOW DOSE) 81 MG chewable tablet    Assure [place]ton Lancets MISC    BD AUTOSHIELD DUO 30G X 5 MM    bismuth subsalicylate (PEPTO BISMOL) 262 MG/15ML suspension    blood glucose (NO BRAND SPECIFIED) test strip    Blood Glucose Monitoring Suppl (GLUCOCARD VITAL MONITOR) w/Device KIT    chlorthalidone (HYGROTON) 25 MG tablet    Continuous Blood Gluc  (FREESTYLE ALYSON 14 DAY READER) MARC    Continuous Blood Gluc  (FREESTYLE ALYSON 2 READER) MARC    Continuous Blood Gluc Sensor (FREESTYLE ALYSON 2 SENSOR) [facility]    desonide (DESOWEN) 0.05 % external lotion    digoxin (LANOXIN) 125 MCG tablet    diphenhydrAMINE (BENADRYL) 25 MG tablet    doxycycline hyclate (VIBRA-TABS) 100 MG tablet    DULoxetine (CYMBALTA) 20 MG capsule    fluticasone (FLONASE) 50 MCG/ACT nasal spray    glipiZIDE (GLUCOTROL XL) 2.5 MG 24 hr tablet    GLUCOCARD VITAL TEST test strip    insulin glargine (LANTUS SOLOSTAR) 100 UNIT/ML pen    metoprolol tartrate (LOPRESSOR) 100 MG tablet    mirtazapine (REMERON) 7.5 MG tablet    Multiple Vitamin (TAB-A-MARIUSZ) TABS    omeprazole (PRILOSEC) 20 MG DR capsule    polyethylene glycol-propylene glycol (SYSTANE) 0.4-0.3 % SOLN ophthalmic solution    potassium chloride ER (KLOR-CON M) 20 MEQ CR tablet    psyllium (METAMUCIL/KONSYL) 58.6 % powder    SB BISMUTH 262 MG TABS    Specialty Vitamins Products (ICAPS LUTEIN & ZEAXANTHIN) TBEC    vitamin D3 (CHOLECALCIFEROL) 50 mcg (2000 units) tablet    alendronate (FOSAMAX) 70 MG tablet    ELIQUIS ANTICOAGULANT 5 MG tablet     No current facility-administered medications  for this visit.          Allergies   Allergen Reactions    Ampicillin     Desvenlafaxine Other (See Comments)     Desvenlafaxine Succinate  Pristiq      Sertraline Hcl Other (See Comments) and Diarrhea     Zoloft      Sulfa Antibiotics Other (See Comments)     Sulfa(Sulfonamide Antibiotics)       Family History   Problem Relation Age of Onset    Myocardial Infarction Mother 59        myocardial infarction, cause of death    Other - See Comments Daughter         fibromyalgia    Thyroid Disease Sister         hypo    Thyroid Disease Sister         hypo    Thyroid Disease Sister         hypo    Myocardial Infarction Brother 63        myocardial infarction, cause of death       Social History     Socioeconomic History    Marital status:      Spouse name: None    Number of children: None    Years of education: None    Highest education level: None   Occupational History    None   Tobacco Use    Smoking status: Never Smoker    Smokeless tobacco: Never Used    Tobacco comment: no passive exposure   Vaping Use    Vaping Use: Never used   Substance and Sexual Activity    Alcohol use: No    Drug use: No    Sexual activity: Never   Other Topics Concern     Service Not Asked    Blood Transfusions Yes     Comment: 11/02/2012    Caffeine Concern Yes     Comment: coffee, 4 cups daily    Occupational Exposure Not Asked    Hobby Hazards Not Asked    Sleep Concern Not Asked    Stress Concern Not Asked    Weight Concern Not Asked    Special Diet Not Asked    Back Care Not Asked    Exercise Not Asked    Bike Helmet Not Asked    Seat Belt Yes    Self-Exams Not Asked    Parent/sibling w/ CABG, MI or angioplasty before 65F 55M? Yes   Social History Narrative    None     Social Determinants of Health     Financial Resource Strain: Not on file   Food Insecurity: Not on file   Transportation Needs: Not on file   Physical Activity: Not on file   Stress: Not on file   Social Connections: Not on file   Intimate Partner Violence:  Not on file   Housing Stability: Not on file       ROS: 10 point ROS neg other than the symptoms noted above in the HPI.  EXAM  Constitutional: healthy, alert and no distress    Psychiatric: mentation appears normal and affect normal/bright    VASCULAR:  -Dorsalis pedis pulse +1/4   -Posterior tibial pulse +0/4 LEFT and +1/4 RIGHT    -Capillary refill time < 3 seconds to hallux  -Hair growth Absent to anterior legs and ankles  NEURO:  -Positive for paresthesias to foot  -Epicritic and protective sensation diminished to the bilateral foot  DERM:  -Skin temperature within normal limits   -Skin diffusely dry and flaking to the foot  -Toenails thickened, dystrophic and discolored x 5 bilaterally     -LEFT lateral and anterior distal 1/3 of the leg -- the erythema has fully resolved  ---Mild 1+ pitting edema  ---No calor  ---No open wounds  MSK:  -Moderate decrease in arch height while patient is NWB  -Muscle strength of ankles +5/5 for dorsiflexion, plantarflexion, ABDUction and ADDuction b/l    RIGHT FOOT RADIOGRAPH 03/03/2022  IMPRESSION: No plain film evidence of osteomyelitis at this time. Recommend follow-up.    AL HERNANDEZ MD   ============================================================    ASSESSMENT:  (L03.116) Cellulitis of left lower extremity  (primary encounter diagnosis)    (R60.0) Lower extremity edema    (R09.89) Absent pedal pulses    (N18.31) Stage 3a chronic kidney disease (H)    (E11.9,  Z79.4) Diabetes mellitus type 2, insulin dependent (H)    (E11.42) Diabetic polyneuropathy associated with type 2 diabetes mellitus (H)        PLAN:  -Patient evaluated and examined. Treatment options discussed with no educational barriers noted.    -Toenails last debrided on 08/22/2023    -----------------------------------    Cellulitis of LEFT lower extremity:  -Resolved today. Patient to continue her Doxycycline until the prescription is out (should be today on 08/29/2023.  -Discussed preventing recurrence  of cellulitis with compression socks by controlling the edema. She would benefit from compression socks, but she has absent and diminished pedal pulses. Will order an NBA and order compression socks through the orthotist, Shyanne Alvarado, if her NBA results are within normal limits. The patient is in agreement with this plan.    -This is an acute, uncomplicated illness/injury with OTC treatment options and prescription drug management reviewed.    -DM shoes: Patient was dispensed DM shoes in February / March, 2023 by the orthotist, Shyanne Alvarado. The shoes are very comfortable.    -Patient in agreement with the above treatment plan and all of patient's questions were answered.      Return to clinic one week to evaluate LEFT leg cellulitis  Return to clinic 3 months for a diabetic foot exam and high risk nail debridement      Marian Geller DPM

## 2023-09-27 NOTE — PROGRESS NOTES
Diabetes Self-Management Education & Support    Fax report with BG:     on 9/18 BG was 112 in the am, 258 at noon, 226 at dinner time, and 226 at HS  on 9/19 BG was  109 in the am, 197 at noon, 192 at dinner time, and 207 at HS  on 9/20 BG was 167 in the am, 164 at noon, 231 at dinner time, and 148 at HS  on 9/21 BG was  92 in the am, 167 at noon, 163 at dinner time, and 198 at HS  on 9/22 BG was 102 in the am, 173 at noon, 140 at dinner time, and 215 at HS  on 9/23 BG was 128 in the am, 256 at noon, 101 at dinner time, and 172 at HS  on 9/24 BG was 138 in the am, 241 at noon, 212 at dinner time, and 223 at HS  On 9/25 BG was 151 in the am, 144 at noon, 238 at dinner time, and 211 at hs.     Per PCP FBG target: <200     Per CDCES Protocol:   Target goals: Long term care > 65 years of age.    A1c <8.5   -180  110-200 at HS.       April 2022 : 7.1%  December 2022 6.6%    June 2023  6.9%       glipiZIDE (GLUCOTROL XL) 2.5 MG 24 hr tablet             Sig - Route: Take 3 tablets (5 mg) by mouth daily - Oral        insulin glargine (LANTUS SOLOSTAR) 100 UNIT/ML pen          Wt Readings from Last 10 Encounters:   06/30/23 76.2 kg (168 lb)   03/24/23 73.5 kg (162 lb)   12/23/22 73.5 kg (162 lb)   04/25/22 71.7 kg (158 lb)   04/13/22 71.7 kg (158 lb)   03/03/22 70.3 kg (155 lb)   07/21/21 70.3 kg (155 lb)   07/13/21 73.6 kg (162 lb 4.1 oz)   07/01/21 79.8 kg (176 lb)   06/13/21 68 kg (150 lb)       Most recent eGFR 50 6/30/2023.  Glipizide XL 7.5 mg once daily.  PCP recommendation: increase Glipizide dose slowly. FBG<200.     Plan- consider stopping Lantus, increase Glipizide XL to 10 mg daily. Will consult with PCP prior.   Pt will not longer qualify for CGM once insulin injections stopped. Would recommend BG testing once daily.   Pt has PCP visit 10/3/23.  Updated DM Ed referral requested.     Rosaline Patrick RN Diabetes Educator,  [phone]  9/27/2023 at 9:12 AM    Radha Murray MD  You 41 minutes ago (3:39  I looked at the visit with Dr. Bryan on 9/2021.  Dr. Bryan did not put an order in that day for a cataract consult.  I looked into the chart to see if an order was placed after the exam and no order was placed.  Pt called in March 2022 to make the appointment.    The patient wants to talk directly to Dr. Bryan about this matter.  I am sending this to Dr. Bryan so he can call the patient.   PM)     KK  I love that idea, thank you!!       New order for Glipizide 10 mg XL sent to pharmacy.   Stop Lantus.   Fax instructions to [facility].     Rosaline Patrick RN Diabetes Educator,  [phone]  9/27/2023 at 4:21 PM

## 2023-10-03 NOTE — PROGRESS NOTES
Assessment & Plan     Chronic anticoagulation / Atrial Fibrillationj  Per previous discussions, will continue anticoagulation given stoke history. No recent falls, monitor.     Stage 3a chronic kidney disease (H)  Stable per last labs.     Other osteoporosis, unspecified pathological fracture presence  On drug holiday, fosamax off the medication list.     Chronic systolic congestive heart failure (H)  Stable    Essential (primary) hypertension  Controlled. Did stop potassium supplement today as levels are upper normal and on low dose despite chlorthalidone. Will need potassium recheck at follow up. Consider stopping chlorthalidone if BP remain low normal    Controlled type 2 diabetes mellitus without complication, without long-term current use of insulin (H)  Off insulin, continue glipizide only.     35 minutes spent by me on the date of the encounter doing chart review, review of test results, interpretation of tests, patient visit, and documentation     No follow-ups on file.    Radha Murray MD  [facility]   Pat is a 89 year old, presenting for the following health issues:  Hypertension, Diabetes, and Kidney Problem        10/3/2023     2:56 PM   Additional Questions   Roomed by Chioma Jay   Accompanied by granddaughter and daughter         10/3/2023     2:56 PM   Patient Reported Additional Medications   Patient reports taking the following new medications none       HPI     Diabetes Follow-up    How often are you checking your blood sugar? Continuous glucose monitor  What time of day are you checking your blood sugars (select all that apply)?   unknown  Have you had any blood sugars above 200?  No  Have you had any blood sugars below 70?  No  What symptoms do you notice when your blood sugar is low?  None  What concerns do you have today about your diabetes? Other: is wondering about not having to take insulin any more; was told she doesn't need to take it and she is  concerned    Do you have any of these symptoms? (Select all that apply)  No numbness or tingling in feet.  No redness, sores or blisters on feet.  No complaints of excessive thirst.  No reports of blurry vision.  No significant changes to weight.  Have you had a diabetic eye exam in the last 12 months? No        BP Readings from Last 2 Encounters:   10/03/23 117/76   08/28/23 (!) 130/93     Hemoglobin A1C (%)   Date Value   06/30/2023 6.9 (H)   12/23/2022 6.6 (H)   07/09/2021 6.8 (H)   09/15/2020 8.0 (H)     LDL Cholesterol Calculated (mg/dL)   Date Value   06/30/2023 47   04/13/2022 40   09/15/2020 52   07/17/2019 53       Hypertension Follow-up    Do you check your blood pressure regularly outside of the clinic? Yes   Are you following a low salt diet? No  Are your blood pressures ever more than 140 on the top number (systolic) OR more   than 90 on the bottom number (diastolic), for example 140/90? Yes    Chronic Kidney Disease Follow-up    Do you take any over the counter pain medicine?: Yes  What over the counter medicine are you taking for your pain?:  tylenol    How often do you take this medicine?:  A few times a month      Heart Failure Follow-up  Are you experiencing any shortness of breath? No  Are you experiencing any swelling in your legs or feet?  Stable  Are you using more pillows than usual? No  Do you check your weight daily?  No  Have you had a weight change recently?  No  Are you having any of the following side effects from your medications? (Select all that apply)  The patient does not report symptoms of dizziness, fatigue, cough, swelling, or slow heart beat.  Since your last visit, how many times have you gone to the cardiologist, urgent care, emergency room, or hospital because of your heart failure?   None  Last Echo: Echo result w/o MOPS: Interpretation SummaryNo pericardial effusion is present.Left ventricular function is decreased. The ejection fraction is 30-35%(moderately  reduced).Anterior wall hypokinesis is present.Pt is tachycardic with rates 130-150 bpm making estimation of LV function lessreliable.The right ventricle is normal size.Moderate to severe left atrial enlargement is present.Moderate to severe right atrial enlargement is present.Mild to moderate mitral insufficiency is present.Moderate aortic valve calcification is present.Mild aortic insufficiency is present.The peak aortic velocity is 2.74 m/sec.The mean gradient across the aortic valve is18 mmHg.Mild to moderate aortic stenosis is present.Moderate to severe tricuspid insufficiency is present.Right ventricular systolic pressure is 39mmHg above the right atrial pressure.The pulmonic valve is normal.      Review of Systems   Constitutional, HEENT, cardiovascular, pulmonary, gi and gu systems are negative, except as otherwise noted.      Objective    /76 (BP Location: Left arm, Patient Position: Sitting, Cuff Size: Adult Regular)   Pulse 71   Temp 98.8  F (37.1  C) (Tympanic)   Wt 74.8 kg (164 lb 12.8 oz)   SpO2 98%   BMI 30.14 kg/m    Body mass index is 30.14 kg/m .  Physical Exam   GENERAL: alert and no distress  EYES: Eyes grossly normal to inspection  NECK: no adenopathy, no asymmetry, masses, or scars and thyroid normal to palpation  RESP: lungs clear to auscultation - no rales, rhonchi or wheezes  CV: regular rates and rhythm, normal S1 S2, no S3 or S4, no murmur, click or rub, and no peripheral edema  MS: no gross musculoskeletal defects noted, no edema  SKIN: no suspicious lesions or rashes  NEURO: mentation intact and speech normal  PSYCH: mentation appears normal, affect normal/bright    No results found for any visits on 10/03/23.

## 2023-10-05 NOTE — TELEPHONE ENCOUNTER
"LVM calling daughter Lavinia about mag below.  Silvia Portillo LPN        Please call daughter and offer formulation change per PharmD recs. If agreeable, pend for sig  MD Nelida Mcfadden Erik, [facility] sent to Radha Murray MD  Not exactly the same - but Preservision AREDS 2 has a \"mini\" soft gels formulation as well as a chewable formulation.     "

## 2023-10-05 NOTE — TELEPHONE ENCOUNTER
This is probably fine. I know she was having difficulty with swallowing. I was hoping to keep her on something to see if we can slow the progression of her vision troubles. Given our conversation, however, not sure how much we will be able to save regardless. I am okay with stying off.

## 2023-10-05 NOTE — TELEPHONE ENCOUNTER
"Please call daughter and offer formulation change per PharmD recs. If agreeable, pend for sig  MD Nelida Mcfadden Erik, [facility] sent to Radha Murray MD  Not exactly the same - but Preservision AREDS 2 has a \"mini\" soft gels formulation as well as a chewable formulation.    Kelby Krause, PharmD  Medication Therapy Management Pharmacist  [facility]  Office phone: [phone]  "

## 2023-10-05 NOTE — TELEPHONE ENCOUNTER
Patients daughter, Lavinia calling  States per her & patients understanding with PCP, the Preservision is discontinued    Pended to PCP to review & advise

## 2023-10-09 PROBLEM — D62 ACUTE BLOOD LOSS ANEMIA: Status: RESOLVED | Noted: 2021-06-13 | Resolved: 2023-01-01

## 2023-10-25 NOTE — PATIENT INSTRUCTIONS
Debbi Wood 10/25/2023:    Patient's tongue of her shoe was pushed into her shoes today which created an indentation of the skin. Please help her pull the tongue out of the shoe all the way when she applies her shoes every day. Please also assist her with applying lotion to her feet every night (not between the toes) and applying new socks every morning. Thank you

## 2023-10-25 NOTE — PROGRESS NOTES
Ft Chief complaint: Patient presents with:  Toenail: DFE      History of Present Illness: This 89 year old female is seen with two of her daughters for follow-up management of a a painful LEFT lateral fifth toe callus (previously an ulceration), a diabetic foot exam and high risk nail debridement.    She received her most recent DM shoes from the orthotist, Shyanne Alvarado, in February/March, 2023. Her shoes have been comfortable. She would like an appointment to be fit for new shoes.    She has a history of burning, tingling, and numbness in her feet and she sometimes feels like her feet are a block of wood.    No further pedal complaints today.     Lab Results   Component Value Date    A1C 6.9 06/30/2023    A1C 6.6 12/23/2022    A1C 7.1 04/13/2022    A1C 10.9 12/21/2021    A1C 6.8 07/09/2021    A1C 8.0 09/15/2020    A1C 6.8 04/02/2019    A1C 6.3 10/30/2018    A1C 6.1 10/24/2017           /82 (BP Location: Left arm, Patient Position: Sitting, Cuff Size: Adult Regular)   Pulse 89   Temp 98.4  F (36.9  C) (Tympanic)   SpO2 95%     Patient Active Problem List   Diagnosis    Advanced care planning/counseling discussion    Sciatica    Diverticulosis of large intestine    Osteoporosis    Obesity    Congenital cystic kidney disease    Myalgia and myositis    Calculus of kidney    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    ACP (advance care planning)    Controlled type 2 diabetes mellitus without complication, without long-term current use of insulin (H)    Permanent atrial fibrillation (H)    Lung nodule    Generalized muscle weakness    Chronic systolic congestive heart failure (H)    Chronic anticoagulation    Essential (primary) hypertension    Falls    Hematoma of arm, left, initial encounter    Traumatic hematoma of left hip    Chronic kidney disease, stage 3 (H)    Bilateral sensorineural hearing loss       Past Surgical History:   Procedure Laterality Date    Breast biopsy      LT, benign      SECTION      CHOLECYSTECTOMY      lap    COLONOSCOPY      LUCILLE / BSO         Current Outpatient Medications   Medication    acetaminophen (TYLENOL) 325 MG tablet    Alcohol Swabs (EASY TOUCH ALCOHOL PREP MEDIUM) 70 % PADS    artificial tears OINT ophthalmic ointment    aspirin (ASPIRIN LOW DOSE) 81 MG chewable tablet    Assure [place] Lancets MISC    BD AUTOSHIELD DUO 30G X 5 MM    bismuth subsalicylate (PEPTO BISMOL) 262 MG/15ML suspension    blood glucose (NO BRAND SPECIFIED) test strip    Blood Glucose Monitoring Suppl (GLUCOCARD VITAL MONITOR) w/Device KIT    chlorthalidone (HYGROTON) 25 MG tablet    Continuous Blood Gluc  (FREESTYLE ALYSON 14 DAY READER) MARC    Continuous Blood Gluc  (FREESTYLE ALYSON 2 READER) MARC    Continuous Blood Gluc Sensor (FREESTYLE ALYSON 2 SENSOR) [facility]    desonide (DESOWEN) 0.05 % external lotion    digoxin (LANOXIN) 125 MCG tablet    diphenhydrAMINE (BENADRYL) 25 MG tablet    DULoxetine (CYMBALTA) 20 MG capsule    ELIQUIS ANTICOAGULANT 5 MG tablet    fluticasone (FLONASE) 50 MCG/ACT nasal spray    glipiZIDE (GLUCOTROL XL) 10 MG 24 hr tablet    GLUCOCARD VITAL TEST test strip    metoprolol tartrate (LOPRESSOR) 100 MG tablet    mirtazapine (REMERON) 7.5 MG tablet    omeprazole (PRILOSEC) 20 MG DR capsule    polyethylene glycol-propylene glycol (SYSTANE) 0.4-0.3 % SOLN ophthalmic solution    psyllium (METAMUCIL/KONSYL) 58.6 % powder    SB BISMUTH 262 MG TABS    Specialty Vitamins Products (ICAPS LUTEIN & ZEAXANTHIN) TBEC    vitamin D3 (CHOLECALCIFEROL) 50 mcg (2000 units) tablet     No current facility-administered medications for this visit.          Allergies   Allergen Reactions    Ampicillin     Desvenlafaxine Other (See Comments)     Desvenlafaxine Succinate  Pristiq      Sertraline Hcl Other (See Comments) and Diarrhea     Zoloft      Sulfa Antibiotics Other (See Comments)     Sulfa(Sulfonamide Antibiotics)       Family History   Problem Relation Age of  Onset    Myocardial Infarction Mother 59        myocardial infarction, cause of death    Other - See Comments Daughter         fibromyalgia    Thyroid Disease Sister         hypo    Thyroid Disease Sister         hypo    Thyroid Disease Sister         hypo    Myocardial Infarction Brother 63        myocardial infarction, cause of death       Social History     Socioeconomic History    Marital status:      Spouse name: None    Number of children: None    Years of education: None    Highest education level: None   Occupational History    None   Tobacco Use    Smoking status: Never Smoker    Smokeless tobacco: Never Used    Tobacco comment: no passive exposure   Vaping Use    Vaping Use: Never used   Substance and Sexual Activity    Alcohol use: No    Drug use: No    Sexual activity: Never   Other Topics Concern     Service Not Asked    Blood Transfusions Yes     Comment: 11/02/2012    Caffeine Concern Yes     Comment: coffee, 4 cups daily    Occupational Exposure Not Asked    Hobby Hazards Not Asked    Sleep Concern Not Asked    Stress Concern Not Asked    Weight Concern Not Asked    Special Diet Not Asked    Back Care Not Asked    Exercise Not Asked    Bike Helmet Not Asked    Seat Belt Yes    Self-Exams Not Asked    Parent/sibling w/ CABG, MI or angioplasty before 65F 55M? Yes   Social History Narrative    None     Social Determinants of Health     Financial Resource Strain: Not on file   Food Insecurity: Not on file   Transportation Needs: Not on file   Physical Activity: Not on file   Stress: Not on file   Social Connections: Not on file   Intimate Partner Violence: Not on file   Housing Stability: Not on file       ROS: 10 point ROS neg other than the symptoms noted above in the HPI.  EXAM  Constitutional: healthy, alert and no distress    Psychiatric: mentation appears normal and affect normal/bright    VASCULAR:  -Dorsalis pedis pulse +1/4   -Posterior tibial pulse +0/4 LEFT and +1/4 RIGHT     -Capillary refill time < 3 seconds to hallux  -Hair growth Absent to anterior legs and ankles  NEURO:  -Positive for paresthesias to foot  -Epicritic and protective sensation diminished to the bilateral foot  DERM:  -Skin temperature within normal limits   -Skin diffusely dry and flaking to the foot  -Toenails elongated, thickened, dystrophic and discolored x 5 bilaterally   -Hyperkeratotic lesion on the lateral distal aspect of the proximal phalanx of the LEFT fifth toe    MSK:  -Moderate decrease in arch height while patient is NWB  -Muscle strength of ankles +5/5 for dorsiflexion, plantarflexion, ABDUction and ADDuction b/l    ============================================================    ASSESSMENT:    (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 Treatment options discussed with no educational barriers noted.    -High risk toenail debridement x 10 toenails without incident     -Patient's shoe tongue was pushed distally onto her distal foot which created an indentation of her bilateral dorsal distal foot (no break in skin). They are asked to assist her pulling the tongue all the way out when she applies her shoes every day.    Diabetes Mellitus: Patient's DM is managed by their PCP. The DM appears to be stable. Patient's last HbA1C was 6.9% on 06/30/2023.    -Callus pared x 1 to the lateral distal aspect of the proximal phalanx of the LEFT fifth toe   without incident  ---Patient reminded that the callus will likely return due to the underlying, prominent bone causing the callus while the patient is walking.  ---No open wound post paring    -DM shoes: Patient was dispensed DM shoes in February  / March, 2023 by the orthotist, Shyanne Alvarado. The shoes are very comfortable.  ---New referral placed on 10/25/2023 -- patient to be fit right after her follow-up podiatry appointment    CKD: Patient's CKDis managed by their PCP. The kidney function appears to be stable. Patient's last GFR was 50 on 06/30/2023. The reduced kidney function contributes to increased edema in the foot.     -Patient in agreement with the above treatment plan and all of patient's questions were answered.      Return to clinic 3 months for a diabetic foot exam and high risk nail debridement with orthotist appointment to follow      Marian Geller DPM

## 2023-10-30 NOTE — TELEPHONE ENCOUNTER
's pharmacy called [facility] nursing home requesting nystatin be refilled.    nystatin      Last Written Prescription Date:  12/23/22  Last Fill Quantity: 60,   # refills: 3  Last Office Visit: 10/3/23  Future Office visit:    Next 5 appointments (look out 90 days)      Jan 23, 2024 10:30 AM  (Arrive by 10:15 AM)  Return Visit with Marian Geller DPM  [facility] ([facility] ) [address] [id number] [phone]             Routing refill request to provider for review/approval because:  Drug not active on patient's medication list

## 2023-11-07 NOTE — PROGRESS NOTES
Diabetes Self-Management Education & Support  Faxed BG report from [facility]:    on 10/23 BG was 165 in the am  on 10/24 BG was  165 in the am  on 10/25 BG was 193 in the am  on 10/26 BG was  183 in the am  on 10/27 BG was 197 in the am  on 10/28 BG was 155 in the am  on 10/29 BG was 217 in the am  on 10/30 BG was 85 in the am  on 10/31 BG was 177 in the am  on 11/1 BG was 216 in the am  on 11/2 BG was 136 in the am  on 11/3 BG was 157 in the am  on 11/4 BG was 155 in the am  on 11/5 BG was 181 in the am    Glipizide XL 10 mg once daily.   No insulin.   Continue bg test once daily.     Per PCP FBG target: <200     Per CDCES Protocol:   Target goals: Long term care > 65 years of age.    A1c <8.5   -180  110-200 at HS.       April 2022 : 7.1%  December 2022 6.6%    June 2023  6.9%      Continue plan. Fax response to [facility] RN.   Send BG report monthly or sooner with concerns, questions.    Rosaline Patrick RN Diabetes Educator,  [phone]  11/7/2023 at 1:52 PM

## 2023-11-13 NOTE — DISCHARGE INSTRUCTIONS
It was a pleasure taking care of Annie Arguello today. We saw her for facial injuries. We recommend that she take acetaminophen for her symptoms. You may take 1 gram of acetaminophen every 6 hours. Do not exceed 4 grams in 24 hours.  Hold her Eliquis today, and you can resume it on 11/14 in the morning.    The swelling to her face may expand, and may cause some worsening bruising over the next couple of days.  You may address any cuts or abrasions with wet gauze, or bacitracin.  Otherwise, you may let the bruising rest without dressing.    Please arrange follow up with her primary care provider in 1 week for a recheck.  We have also scheduled an appointment with [facility] in [place] on Thursday of this week, at 2:20 PM for recheck of her orbital fractures. Please return to the emergency department if she develop symptoms like worsening pain, double vision, nausea, vomiting, fever, redness around the area, or if her symptoms persist or get worse. Take care. Feel better.

## 2023-11-13 NOTE — ED NOTES
Back from imaging.  Able continues to move all extremities after movement.  Pt GCS 14, no change, hx of dementia.  Pt  able to open right eye unable to open left eye due to swelling.  Pt denies pain. Call light placed in hand.  Warm blankets applied and room temp increased.      Bruising noted on anterior shoulder, left knee abrasion, left hand bruise and right ring finger knuckle bruising noted-pictures taken and are in media tab   Yes

## 2023-11-13 NOTE — ED TRIAGE NOTES
Patient arrived via [facility] with patient having an unwitnessed fall. Pt GCS on arrival is 14 pt at baseline.  Hx of dementia.  Denies nay pain, provider updated on trauma eval

## 2023-11-13 NOTE — ED NOTES
Updated [place] nurseMeghann, on plan of care for discharge. Waiting for [facility] to arrive for transport back to home.

## 2023-11-13 NOTE — ED NOTES
Information Faxed via [employer] to [facility] @ [phone].
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
## 2023-11-13 NOTE — ED NOTES
Contacted [facility] about patient and spoke to the charge nurse.  She had report that pt was last checking on at 0600 and then again at 0650 which is when the patient was found on the ground between the bathroom door, with her walker by the toilet still.  They reported her meds were last all taken last evening on 11/12/23

## 2023-11-13 NOTE — ED NOTES
Ring removed from right hand ring finger, slight swelling and bruising noted, placed in green container on counter

## 2023-11-13 NOTE — ED NOTES
Patient had dried blood in the groin area/ inner thighs. Unknown where this came from as no blood in vaginal area or in urine. Cleaned area and no active bleeding noted. This was noted by staff prior in the shift as well. Changed into gown and paper pants for discharge back to home via [facility]. Patient remains denying pain. Transferred well with assist for safety to commode at bedside and to wheelchair. Patient moves well although is a bit fast in her transitions when moving. Educated on slow movements and asking for help when transferring or ambulating to avoid falls.

## 2023-11-13 NOTE — ED NOTES
Provider attempted to aspirate hematoma from left cheek area, scant amount of blood aspirated. Patient tolerated well. Denies pain or needs. Adjusted BP cuff for more accurate reading.

## 2023-11-15 NOTE — ED PROVIDER NOTES
History     Chief Complaint   Patient presents with    Altered Mental Status    Fever    Eye Pain     HPI  Annie Arguello is a 89 year old individual with history of osteoporosis, cystic kidney disease, stage III chronic kidney disease, DMT2, atrial fibrillation on anticoagulation with apixaban chronic systolic congestive heart failure, hypertension, is brought in via EMS from nursing home with confusion.  Patient had significant fall with facial injury on 11/13/2023.  Patient did have noted orbital wall fracture on the left with significant ecchymosis of face.  Since, patient has had increased confusion and complaints of left eye pain.  Denies headache.  Unable to tell if there is visual disturbance.  No neck pain reported.  Denies difficulty breathing or shortness of breath.  Denies any wheezes or stridor.    Allergies:  Allergies   Allergen Reactions    Ampicillin     Desvenlafaxine Other (See Comments)     Desvenlafaxine Succinate  Pristiq      Sertraline Hcl Other (See Comments) and Diarrhea     Zoloft      Sulfa Antibiotics Other (See Comments)     Sulfa(Sulfonamide Antibiotics)       Problem List:    Patient Active Problem List    Diagnosis Date Noted    Bilateral sensorineural hearing loss 12/23/2022     Priority: Medium    Chronic kidney disease, stage 3 (H) 07/21/2021     Priority: Medium    Chronic systolic congestive heart failure (H) 07/13/2021     Priority: Medium    Hematoma of arm, left, initial encounter 06/16/2021     Priority: Medium    Traumatic hematoma of left hip 06/16/2021     Priority: Medium    Chronic anticoagulation 06/13/2021     Priority: Medium    Essential (primary) hypertension 06/13/2021     Priority: Medium    Falls 06/13/2021     Priority: Medium    Generalized muscle weakness 07/02/2019     Priority: Medium    Lung nodule 10/30/2018     Priority: Medium    Controlled type 2 diabetes mellitus without complication, without long-term current use of insulin (H) 10/24/2017      Priority: Medium    Permanent atrial fibrillation (H) 10/24/2017     Priority: Medium    ACP (advance care planning) 01/04/2017     Priority: Medium     Advance Care Planning 1/4/2017: ACP Review of Chart / Resources Provided:  Reviewed chart for advance care plan.  Annie Arguello has no plan or code status on file. Discussed available resources and provided with information. Confirmed code status reflects current choices pending further ACP discussions.  Confirmed/documented legally designated decision makers.  Added by Kenzie Mancilla          Advanced care planning/counseling discussion 11/02/2012     Priority: Medium    Myalgia and myositis 05/10/2011     Priority: Medium     Problem list name updated by automated process. Provider to review      Diverticulosis of large intestine 05/02/2011     Priority: Medium     Problem list name updated by automated process. Provider to review      Osteoporosis 05/02/2011     Priority: Medium     Problem list name updated by automated process. Provider to review      Obesity 05/02/2011     Priority: Medium     Problem list name updated by automated process. 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 Provider to review      Sciatica 06/26/2007     Priority: Medium    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 05/16/2007     Priority: Medium    Calculus of kidney 03/14/2003     Priority: Medium        Past Medical History:    Past Medical History:   Diagnosis Date    A-fib (H)     Acute hyperglycemia     Acute systolic heart failure (H) 06/17/2021    Acute systolic heart failure (H)     Anemia due to blood loss, acute 06/17/2021    Atrial fibrillation (H) 09/06/2012    Calculus of kidney 03/14/2003    Cerebral infarction (H)     Chronic anticoagulation 06/17/2021    Chronic anticoagulation     Congestive heart failure (H)     Controlled type 2 diabetes mellitus without  complication, without long-term current use of insulin (H) 10/24/2017    Depressive disorder     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 2007    Diverticulosis 2011    Encounter for routine screening for malformation using ultrasonics 2021    Essential hypertension 2021    Fall 2021    Falls     Fibromyalgia 05/10/2011    Hematoma     Hemorrhagic shock (H) 2021    Hemorrhagic shock (H)     HTN (hypertension)     Hypotension     Hypotension, unspecified hypotension type 2021    Nonallopathic lesions of cervical region, not else 2001    Obesity 2011    Osteoporosis 2011    Other malaise and fatigue 2002    Renal cyst 2011    Sciatica 2007    Traumatic hematoma of hip, left, sequela     Traumatic hematoma of left upper arm        Past Surgical History:    Past Surgical History:   Procedure Laterality Date    Breast biopsy      LT, benign     SECTION      CHOLECYSTECTOMY      lap    COLONOSCOPY      LUCILLE / BSO         Family History:    Family History   Problem Relation Age of Onset    Myocardial Infarction Mother 59        myocardial infarction, cause of death    Other - See Comments Daughter         fibromyalgia    Thyroid Disease Sister         hypo    Thyroid Disease Sister         hypo    Thyroid Disease Sister         hypo    Myocardial Infarction Brother 63        myocardial infarction, cause of death       Social History:  Marital Status:   [5]  Social History     Tobacco Use    Smoking status: Never    Smokeless tobacco: Never    Tobacco comments:     no passive exposure   Vaping Use    Vaping Use: Never used   Substance Use Topics    Alcohol use: No    Drug use: No        Medications:    acetaminophen (TYLENOL) 325 MG tablet  alum & mag hydroxide-simethicone (MAALOX) 200-200-20 MG/5ML SUSP suspension  aspirin (ASPIRIN LOW DOSE) 81 MG chewable tablet  bismuth subsalicylate (PEPTO BISMOL) 262 MG/15ML  suspension  chlorthalidone (HYGROTON) 25 MG tablet  desonide (DESOWEN) 0.05 % external lotion  digoxin (LANOXIN) 125 MCG tablet  DULoxetine (CYMBALTA) 20 MG capsule  ELIQUIS ANTICOAGULANT 5 MG tablet  fluticasone (FLONASE) 50 MCG/ACT nasal spray  glipiZIDE (GLUCOTROL XL) 10 MG 24 hr tablet  loperamide (IMODIUM) 2 MG capsule  metoprolol tartrate (LOPRESSOR) 100 MG tablet  mirtazapine (REMERON) 7.5 MG tablet  nystatin (MYCOSTATIN) 826246 UNIT/GM external powder  omeprazole (PRILOSEC) 20 MG DR capsule  polyethylene glycol-propylene glycol (SYSTANE) 0.4-0.3 % SOLN ophthalmic solution  psyllium (METAMUCIL/KONSYL) 58.6 % powder  Specialty Vitamins Products (ICAPS LUTEIN & ZEAXANTHIN) TBEC  vitamin D3 (CHOLECALCIFEROL) 50 mcg (2000 units) tablet  white petrolatum gel  Alcohol Swabs (EASY TOUCH ALCOHOL PREP MEDIUM) 70 % PADS  Assure [place]ton Lancets MISC  BD AUTOSHIELD DUO 30G X 5 MM  blood glucose (GLUCOCARD VITAL TEST) test strip  Blood Glucose Monitoring Suppl (GLUCOCARD VITAL MONITOR) w/Device KIT  Continuous Blood Gluc  (FREESTYLE ALYSON 14 DAY READER) MARC  Continuous Blood Gluc  (FREESTYLE ALYSON 2 READER) MARC  Continuous Blood Gluc Sensor (FREESTYLE ALYSON 2 SENSOR) [facility]          Review of Systems   Constitutional: Negative.    HENT:  Positive for facial swelling (Ecchymosis of face and left cheek and upper lip).    Respiratory: Negative.     Cardiovascular: Negative.    Gastrointestinal: Negative.    Endocrine: Negative.    Genitourinary: Negative.    Musculoskeletal: Negative.    Skin:         Ecchymosis of the face.   Allergic/Immunologic: Negative.    Neurological: Negative.    Hematological: Negative.    Psychiatric/Behavioral: Negative.         Physical Exam   BP: 122/70  Pulse: 78  Temp: 99  F (37.2  C)  Resp: 18  SpO2: 97 %      GENERAL APPEARANCE:  The patient is a 89 year old well-developed, well-nourished individual that appears as stated age.  HEENT: Face with periorbital ecchymosis, left  cheek bruising, upper lip bruising.  Tenderness to palpation over left orbit but no crepitus or deformities.  Extraocular movements intact.  Pupils equal, round, and reactive to light.  Subconjunctival hemorrhage present to left eye.  Oropharynx is clear.  Uvula is midline and without swelling.  Mouth revealed no lesions.  Voice is clear and without muffling.  Bilateral nares clear of drainage.  Right TM clear.  Left TM knee with dried blood behind TM noted.  Slight mcgill sign present to the left.  NECK:  Supple.  Trachea is midline.  No carotid bruits present on auscultation.  CHEST:  Symmetric.  Non-tender to palpation.  No crepitus or deformity.  LUNGS:  Breathing is easy.  Breath sounds are equal and clear bilaterally.  No wheezes, rhonchi, or rales.  HEART: Irregular rhythm with normal rate.  No murmurs, gallops, or rubs.  ABDOMEN:  Soft and rotund.  No mass, tenderness, guarding, or rebound.  No organomegaly or hernia.  Bowel sounds are present.  No CVA tenderness or flank mass.  No abdominal bruits or thrills present upon auscultation/palpation.  Negative Odilon sign.  Negative Mora Teixeira sign.  EXTREMITIES:  No cyanosis, clubbing, or edema.  Pedal and post-tibial pulses are 2+ bilaterally.  Radial pulses are 2+ bilaterally.  MUSCULOSKELETAL: No cervical, thoracic, lumbar step-offs, deformities, tenderness noted to palpation.  Cervical range of motion intact.  Strength equal in all extremities.  Range of motion intact to all extremities.  NEUROLOGIC:  No focal sensory or motor deficits are noted.   PSYCHIATRIC:  The patient is awake, alert, and oriented x4.  Recent and remote memory is intact.  Appropriate mood and affect.  Calm and cooperative with history and physical exam.  SKIN:  Warm, dry, and well perfused.  Good turgor.  Periorbital ecchymosis with left cheek swelling and abrasion present.  No active bleeding or foreign body present.  Hematoma to left neck present.  Hematoma of  the upper lip  present.    Comment: Discrepancies between my note and notes on behalf of the nursing team or other care providers are secondary to my findings reflecting my physical examination and questioning of the patient.  Any conflicting information provided is not in line with my examination of the patient.       ED Course              ED Course as of 11/15/23 1433   Wed Nov 15, 2023   1120 In to see patient and history/physical completed.    1128 Labs ordered.  CT of head ordered.  CT IACs without contrast ordered.   1315 Potassium(!): 3.1  Potassium 3.1.  KCl 40 mEq ordered orally.   1325 CTs of head and IAC's showed no new abnormalities.   1336 No acute abnormalities noted.  CT is negative.  No signs of infection noted for patient's confusion.  Due to significant head injury on 11/13/2023, patient likely has concussion syndrome.  For this reason we will discharge back to nursing home to follow-up with PCP.  Return to ER if new or worsening symptoms.  Patient and family in agreement.   1417 Nursing home staff states patient is on the second floor in the assisted living apartment and cannot be observed.  They do not feel comfortable patient coming back.  For this reason  did speak with daughters and patient is able to be transferred to the adult foster building.  Patient be discharged     POC US SOFT TISSUE    Date/Time: 11/15/2023 11:55 AM    Performed by: Montrell Fields APRN CNP  Authorized by: Montrell Fields APRN CNP    Procedure Details & Findings:      POCUS Ocular:    Procedure details:  Indications: Eye pain, eye/orbital trauma  Assessment for: Lens dislocation, retinal detachment, vitreous hemorrhage, foreign body  Technique: Multiple real-time views were obtained with at least 1 saved image    Findings:  Eye affected: Left  Retinal contour: Normal  Lens: Normal  Vitreous body: Anechoic  Optic nerve sheath diameter: Enlarged    Interpretation: No no retinal or vitreous detachment.  No lens  dislocation noted.  No vitreous hemorrhage noted.  Does have increased optic nerve sheath diameter     I have reviewed and captured appropriate images to support the above findings         Results for orders placed or performed during the hospital encounter of 11/15/23 (from the past 24 hour(s))   UA with Microscopic reflex to Culture    Specimen: Urine, Catheter   Result Value Ref Range    Color Urine Yellow Colorless, Straw, Light Yellow, Yellow    Appearance Urine Clear Clear    Glucose Urine Negative Negative mg/dL    Bilirubin Urine Negative Negative    Ketones Urine 20 (A) Negative mg/dL    Specific Gravity Urine 1.021 1.003 - 1.035    Blood Urine Trace (A) Negative    pH Urine 7.0 4.7 - 8.0    Protein Albumin Urine 10 (A) Negative mg/dL    Urobilinogen Urine 3.0 (A) Normal, 2.0 mg/dL    Nitrite Urine Negative Negative    Leukocyte Esterase Urine Negative Negative    Mucus Urine Present (A) None Seen /LPF    RBC Urine 38 (H) <=2 /HPF    WBC Urine 4 <=5 /HPF    Squamous Epithelials Urine 0 <=1 /HPF    Hyaline Casts Urine 1 <=2 /LPF    Narrative    Urine Culture not indicated   CBC with platelets differential    Narrative    The following orders were created for panel order CBC with platelets differential.  Procedure                               Abnormality         Status                     ---------                               -----------         ------                     CBC with platelets and d...[[id number]]  Abnormal            Final result                 Please view results for these tests on the individual orders.   Basic metabolic panel   Result Value Ref Range    Sodium 135 135 - 145 mmol/L    Potassium 3.1 (L) 3.4 - 5.3 mmol/L    Chloride 96 (L) 98 - 107 mmol/L    Carbon Dioxide (CO2) 27 22 - 29 mmol/L    Anion Gap 12 7 - 15 mmol/L    Urea Nitrogen 19.2 8.0 - 23.0 mg/dL    Creatinine 0.88 0.51 - 0.95 mg/dL    GFR Estimate 62 >60 mL/min/1.73m2    Calcium 8.9 8.2 - 9.6 mg/dL    Glucose 170 (H) 70 -  99 mg/dL   CBC with platelets and differential   Result Value Ref Range    WBC Count 12.4 (H) 4.0 - 11.0 10e3/uL    RBC Count 3.98 3.80 - 5.20 10e6/uL    Hemoglobin 11.8 11.7 - 15.7 g/dL    Hematocrit 35.1 35.0 - 47.0 %    MCV 88 78 - 100 fL    MCH 29.6 26.5 - 33.0 pg    MCHC 33.6 31.5 - 36.5 g/dL    RDW 14.1 10.0 - 15.0 %    Platelet Count 176 150 - 450 10e3/uL    % Neutrophils 73 %    % Lymphocytes 17 %    % Monocytes 8 %    % Eosinophils 1 %    % Basophils 0 %    % Immature Granulocytes 1 %    NRBCs per 100 WBC 0 <1 /100    Absolute Neutrophils 9.1 (H) 1.6 - 8.3 10e3/uL    Absolute Lymphocytes 2.1 0.8 - 5.3 10e3/uL    Absolute Monocytes 1.0 0.0 - 1.3 10e3/uL    Absolute Eosinophils 0.1 0.0 - 0.7 10e3/uL    Absolute Basophils 0.0 0.0 - 0.2 10e3/uL    Absolute Immature Granulocytes 0.1 <=0.4 10e3/uL    Absolute NRBCs 0.0 10e3/uL   Extra Tube    Narrative    The following orders were created for panel order Extra Tube.  Procedure                               Abnormality         Status                     ---------                               -----------         ------                     Extra Blue Top Tube[[id number]]                              Final result               Extra Heparinized Syringe[[id number]]                        Final result                 Please view results for these tests on the individual orders.   Extra Blue Top Tube   Result Value Ref Range    Hold Specimen JIC    Extra Heparinized Syringe   Result Value Ref Range    Hold Specimen JIC    XR Chest Port 1 View    Narrative    Procedure:XR CHEST PORT 1 VIEW    Clinical history:Female, 89 years, Confusion    Technique: Single view was obtained.    Comparison: 7/21/2021    Findings: The cardiac silhouette is mildly enlarged, unchanged. The  pulmonary vasculature is mildly distended and not significant  different.    The lungs are clear Bony structures are unremarkable.      Impression    Impression:   No acute abnormality.     Persistent  borderline cardiomegaly and mild central vascular  distention and no evidence of pulmonary edema at this time.    DARON GOLDEN MD         SYSTEM ID:  [id number]   Head CT w/o contrast    Narrative    Exam: CT HEAD W/O CONTRAST      Exam reason: Confusion    Technique:   Axial images of the head obtained without contrast. Coronal and  sagittal reformations were generated. This CT was performed using one  or more of the following dose reduction techniques: automated exposure  control, adjustment of the mA and/or kV according to patient size,  and/or use of iterative reconstruction technique.    Comparison: 11/13/2023       Findings:      Parenchyma: No evidence of intraparenchymal hemorrhage, mass, or acute  cortical infarct. Unchanged old infarcts in the right temporal and  posterior frontal lobes.    There is patchy periventricular deep white matter hypoattenuation,  nonspecific but likely due to chronic microvascular ischemic change.    No midline shift. The basilar cisterns are patent.    Extra-axial spaces: No extra-axial fluid collection or hemorrhage.     Ventricles: Normal.  Paranasal sinuses: There is opacification of the left maxillary sinus.    Mastoid air cells: Clear.    Osseous: No new acute fracture. Fracture of the left inferior orbit is  again noted.  Orbits: Normal.    Soft tissues: Left periorbital soft tissue swelling and hematoma are  again demonstrated.       Impression    Impression:  No acute intracranial abnormality.    The known left inferior orbital fracture as well as lateral soft  tissue swelling and hematoma are again demonstrated.      RUBY HILTON MD         SYSTEM ID:  [id number]   CT Temporal Bones wo Contrast    Narrative    Exam: CT TEMPORAL W/O CONTRAST    Exam reason: Left hemotympanum after trauma    Technique: Helically acquired axial images of the temporal bones were  performed without IV contrast. Targeted straight coronal, oblique  coronal and oblique sagittal  reconstructions of each side were  performed.     Comparison: None.    FINDINGS:    Right Side:    No acute fracture.  External auditory canal: Clear.   Tympanic membrane: No tympanic membrane thickening or definite defect.  Scutum is sharp.    Middle ear/ossicles: Middle ear cavity is clear.  Ossicles are normal  in appearance.  No ossicular erosion.  Cochlea, vestibule, and semicircular canals: Normal.   No erosive or  sclerotic changes.  No dehiscence of the superior semicircular canal.  Vestibular aqueduct:  Normal caliber.   Internal auditory canal: Normal size and symmetric.   Facial nerve canal:  Normal course and caliber.   Vascular: No significant anomaly.   Mastoid air cells: Minimal fluid in the inferior right mastoid air  cells.    Petrous apex: Negative.   Temporomandibular joint: Severe degenerative changes of the  temporomandibular joint.    Left Side:    No acute fracture.  External auditory canal: Clear.    Tympanic membrane: No tympanic membrane thickening or definite defect.  Scutum is sharp.    Middle ear/ossicles: Middle ear cavity is clear.  Ossicles are normal  in appearance.  No ossicular erosion.  Cochlea, vestibule, and semicircular canals: Normal.   No erosive or  sclerotic changes.  No dehiscence of the superior semicircular canal.  Vestibular aqueduct:  Normal caliber.   Internal auditory canal: Normal size and symmetric.   Facial nerve canal:  Normal course and caliber.   Vascular: No significant anomaly.   Mastoid air cells: Minimal fluid in the inferior left mastoid air  cells.    Petrous apex: Negative.   Temporomandibular joint: Severe degenerative changes of the temporal  mandibular joint.      Impression    IMPRESSION:  No acute fracture.     Severe degenerative changes of the temporomandibular joint  bilaterally.    RUBY HILTON MD         SYSTEM ID:  [id number]       Medications   potassium chloride ER (KLOR-CON M) CR tablet 40 mEq (has no administration in time range)        Assessments & Plan (with Medical Decision Making)     I have reviewed the nursing notes.    I have reviewed the findings, diagnosis, plan and need for follow up with the patient.      Summary:  Patient presents to the ER today for confusion.  Potential diagnosis which have been considered and evaluated include intracerebral bleed, UTI, pneumonia, concussion, as well as others. Many of these have been excluded using the various modalities and assessment as noted on the chart. At the present time, the diagnosis given seems to be the most likely postconcussive syndrome.  Upon arrival, vitals signs are normal.  The patient is alert and oriented.  Patient with significant facial trauma from previous with periorbital ecchymosis and left face and cheek ecchymosis.  Subconjunctival hemorrhage is present.  Extraocular movements intact.  Pupils equal and reactive.  Patient is on blood thinners.  For concerns of head bleed, CT of the head was conducted showing no intracerebral bleed but continues to have left infraorbital fracture present that is unchanged.  Patient did have hemotympanum on the left which looked old but CT of IACs was conducted showing no acute fracture.  Chest x-ray personally reviewed showing no acute cardiopulmonary abnormalities.  Lab work obtained showing WBC of 12.4 with hemoglobin 11.8.  Renal functions benign.  UA negative.  Does have sodium 135 and potassium of 3.1 in which 40 mEq of KCl given orally.  With concerns of increased intraocular pressure, did do POCUS ocular ultrasound which did show mild increase in optic nerve sheath of 5.3 mm which is enlarged but this was poor imaging.  As patient has range of motion of the eye, PERRL, and significant hematoma, unlikely that there is increased intraocular pressure at this time.  Patient doing well.  Able to discharge patient back home at this time.  Nursing home staff concerned as patient lives on second floor and was insisted for the facility  and do not feel patient would be a good candidate to come back.  For this reason  did speak with family and staff.  Patient able to be admitted to an adult foster home.  For this reason patient is discharged from the ER and will go to the adult foster home.  Follow-up with PCP and return to ER for new or worsening symptoms.      Critical Care Time: None    Impression and plan discussed with patient. Questions answered, concerns addressed, indications for urgent re-evaluation reviewed, and  given. Patient/Parent/Caregiver agree with treatment plan and have no further questions at this time.  AVS provided at discharge.    This note was created by the Dragon Voice Dictation System. Inadvertent typographical errors, due to software recognition problems, may still exist.             New Prescriptions    No medications on file       Final diagnoses:   Post concussion syndrome   Hypokalemia       11/15/2023   [facility]       Montrell Fields, NAHUN CNP  11/15/23 3188

## 2023-11-15 NOTE — ED NOTES
Patient presents to the emergency room via [facility] from [facility] with complaints of generalized weakness and confusion. Hx dementia and fall on Monday. Pt was seen Monday in the ED for a head and facial injury. Last known well time was Monday am. A nurse from [facility] reports pt's Eliquis was restarted yesterday. Increased confusion, hallucinations, and generalized weakness. On arrival pt is awake. Answering questions appropriately. C/o left eye pain. Significant facial bruising and swelling. Pt able to open right eye. Able to move all extremities. Warm blanket given.

## 2023-11-15 NOTE — TELEPHONE ENCOUNTER
Staff from assisted living called stating patient fell on Monday was seen in ED. Staff was told to hold Eliquis on Monday. Staff states since starting patient back on Eliquis patient has been experiencing confusion.   Advised for patient to be reseen in ED.   Staff verbalized understanding.

## 2023-11-15 NOTE — ED NOTES
Pt declined the need for pain medication at this time. Family at bedside. Awaiting imaging results.

## 2023-11-15 NOTE — DISCHARGE INSTRUCTIONS
Follow-up with your primary care provider for reevaluation.  Contact your primary care provider if you have any questions or concerns.  Do not hesitate to return to the ER if any new or worsening symptoms.     Please read the attached instructions (if any).  They highlight more specific treatments and interventions for you at home.              Thank you for letting me participate in your care and wish you a fast and uneventful recovery,    Montrell GARNICA CNP    Do not hesitate to contact me with questions or concerns.  [email or web address]  [email or web address]

## 2023-11-15 NOTE — ED NOTES
Care Transitions focused note:      Patient has been accepted to [facility].  Provided Medical info to Lianne.  She will also connect with Meghann from [facility] for further personal information.    [place] [facility] asessment completed confirmation number is [id number]    [facility] will transport at 3 pm    ABEBE Odell

## 2023-11-15 NOTE — ED NOTES
Care Transitions focused note:      Chart reviewed, patient here from [facility].  Had a fall a few days ago and bad facial bruising.  Spoke to Meghann SIMMONS at [facility].  She stated since her fall patient has been wandering and she is afraid she will fall down the stairs there and does not feel like they can keep her safe at [facility].    Call to Lianne Sauceda at [facility].  She has an opening and will screen.  I will do [place] choices screening today online.    Will plan on discharge to [facility] today if Lianne feels she can provide care today.    Will follow    ABEBE Odell

## 2023-11-15 NOTE — ED TRIAGE NOTES
Arrived via [facility] for increased confusion, fever and eye pain.  Lives at [facility].  Had a recent fall and restarted on Eliquis this past Tues.

## 2023-11-15 NOTE — ED NOTES
Patient and daughters given verbal and written discharge instructions. Patient and daughters verbalized understanding of discharge instructions. Pt discharged to [facility] via [facility] transportation.

## 2023-11-17 NOTE — TELEPHONE ENCOUNTER
11:29 AM    Reason for Call: Phone Call    Description: would like to do an emergency room after visit on the phone   Was an appointment offered for this call? No  If yes : Appointment type              Date    Preferred method for responding to this message: Telephone Call  What is your phone number ?  [phone]    If we cannot reach you directly, may we leave a detailed response at the number you provided? Yes    Can this message wait until your PCP/provider returns, if available today? Not applicable    Jewell Godfrey

## 2023-12-05 NOTE — PROGRESS NOTES
Rita is a 89 year old who is being evaluated via a billable telephone visit.      What phone number would you like to be contacted at? [phone]   How would you like to obtain your AVS? Lion    Distant Location (provider location):  On-site    Assessment & Plan     Chronic systolic congestive heart failure (H) / Permanent atrial fibrillation (H)  Breathing is stable and pt is back on eliquis. No further bleeding or concerns with worsening cognition, focal neuro signs.     Controlled type 2 diabetes mellitus without complication, without long-term current use of insulin (H)  No concerns with sugars, will need to be back in clinic for follow up labs. Will have PAc call to schedule    Stage 3b chronic kidney disease (H)  Reviewed labs from ED, stable    Personal history of fall / Closed fracture of orbit with routine healing, subsequent encounter  No further falls. Using walker, getting around well. Now in more supportive environment. Per patient and staff, no concerns with vision or eye movements.     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 She mentions this to me and does not understand why she can't get back to her friends. This has improved somewhat since she moved in a few weeks ago. Cotninue remeron and cymbalta for now.       30 minutes spent by me on the date of the encounter doing chart review, interpretation of tests, patient visit, and documentation     No follow-ups on file.    Radha Murray MD  [facility] [place]    Subjective   Rita is a 89 year old, presenting for the following health issues:    No chief complaint on file.        10/3/2023     2:56 PM   Additional Questions   Roomed by Chioma Jay   Accompanied by granddaughter and daughter       HPI     ED/UC Followup:    Facility:  [facility]  Date of visit: 11/13/23 and 11/15/23  Reason for visit: Altered mental  status  Current Status: Improved    Physically doing well. Bruising improving. Still using walker and getting around.     Mentally very confused about the move to the memory care home about 3 weeks ago. Appears to be sleeping fair. Sleeping a lot during the day. Hard to get out of bed. Not argumentative, but goes back to sleep. Very disoriented in the evening. No agitation, but more emotionality.     Pt notes that she is 'so-so.' Wants to be back at school, missing exercise and bingo. It isnt very charming. Reports her face is 'pretty much healed over.'     Review of Systems   Constitutional, HEENT, cardiovascular, pulmonary, gi and gu systems are negative, except as otherwise noted.      Objective       Vitals:  No vitals were obtained today due to virtual visit.    Physical Exam   alert and no distress  PSYCH: Alert and oriented times 3; coherent speech, normal   rate and volume, able to articulate logical thoughts, able   to abstract reason, no tangential thoughts, no hallucinations   or delusions  Her affect is normal  RESP: No cough, no audible wheezing, able to talk in full sentences  Remainder of exam unable to be completed due to telephone visits    No results found for any visits on 12/05/23.      Phone call duration: 12 minutes

## 2023-12-18 NOTE — TELEPHONE ENCOUNTER
Writer spoke with daughter, she states they will need recent hx of physical, signed medication list and signed orders, she thought maybe there was something more but could not remember. Daughter will have new facility fax paperwork they need signed. Please advise.  Silvia Portillo LPN

## 2023-12-18 NOTE — TELEPHONE ENCOUNTER
8:31 AM    Reason for Call: Phone Call    Description: pt daughter called, Cristel wants to talk to Dr Murray about paperwork, pt is [facility]    Was an appointment offered for this call? No  If yes : Appointment type              Date    Preferred method for responding to this message: Telephone Call  What is your phone number ? [phone]     If we cannot reach you directly, may we leave a detailed response at the number you provided? Yes    Can this message wait until your PCP/provider returns, if available today? Linda Dougherty

## 2023-12-18 NOTE — TELEPHONE ENCOUNTER
I am not sure of requirements for this, but likely need H & P in clinic prior to admission unless they will take visit from October?    Tati, do you know the time frame for this?    Mireya Carrero, APRN CNP on 12/18/2023 at 4:09 PM

## 2023-12-19 NOTE — TELEPHONE ENCOUNTER
Writer spoke with Teena at [facility] in [place], they will be faxing over standing orders to be signed, Polst to be signed, which we will fax back along with signed med list, signed order to admit most recent H & P, . Will place fax in provider's bin when received.  Silvia Portillo LPN

## 2023-12-19 NOTE — TELEPHONE ENCOUNTER
Can we check in with patient and make sure this is taken care and they have what is needed for AL?    NAHUN Simmons CNP on 12/19/2023 at 9:29 AM

## 2024-01-01 ENCOUNTER — APPOINTMENT (OUTPATIENT)
Dept: GENERAL RADIOLOGY | Facility: HOSPITAL | Age: 89
End: 2024-01-01
Attending: INTERNAL MEDICINE
Payer: MEDICARE

## 2024-01-01 ENCOUNTER — HOSPITAL ENCOUNTER (EMERGENCY)
Facility: HOSPITAL | Age: 89
End: 2024-01-02
Attending: INTERNAL MEDICINE | Admitting: INTERNAL MEDICINE
Payer: MEDICARE

## 2024-01-01 VITALS
BODY MASS INDEX: 26.98 KG/M2 | SYSTOLIC BLOOD PRESSURE: 88 MMHG | OXYGEN SATURATION: 95 % | RESPIRATION RATE: 40 BRPM | TEMPERATURE: 100.5 F | DIASTOLIC BLOOD PRESSURE: 68 MMHG | HEART RATE: 192 BPM | WEIGHT: 152.3 LBS

## 2024-01-01 DIAGNOSIS — I48.91 ATRIAL FIBRILLATION WITH RAPID VENTRICULAR RESPONSE (H): ICD-10-CM

## 2024-01-01 DIAGNOSIS — J18.9 PNEUMONIA OF RIGHT LOWER LOBE DUE TO INFECTIOUS ORGANISM: ICD-10-CM

## 2024-01-01 DIAGNOSIS — A41.9 SEPSIS, DUE TO UNSPECIFIED ORGANISM, UNSPECIFIED WHETHER ACUTE ORGAN DYSFUNCTION PRESENT (H): ICD-10-CM

## 2024-01-01 LAB
ALBUMIN SERPL BCG-MCNC: 2.7 G/DL (ref 3.5–5.2)
ALBUMIN UR-MCNC: 70 MG/DL
ALP SERPL-CCNC: 78 U/L (ref 40–150)
ALT SERPL W P-5'-P-CCNC: 330 U/L (ref 0–50)
ANION GAP SERPL CALCULATED.3IONS-SCNC: 29 MMOL/L (ref 7–15)
APPEARANCE UR: ABNORMAL
AST SERPL W P-5'-P-CCNC: 833 U/L (ref 0–45)
BASE EXCESS BLDV CALC-SCNC: -10.4 MMOL/L (ref -7.7–1.9)
BASOPHILS # BLD AUTO: 0.2 10E3/UL (ref 0–0.2)
BASOPHILS NFR BLD AUTO: 1 %
BILIRUB SERPL-MCNC: 2.7 MG/DL
BILIRUB UR QL STRIP: NEGATIVE
BUN SERPL-MCNC: 39.7 MG/DL (ref 8–23)
CALCIUM SERPL-MCNC: 8.7 MG/DL (ref 8.2–9.6)
CHLORIDE SERPL-SCNC: 92 MMOL/L (ref 98–107)
COLOR UR AUTO: ABNORMAL
CREAT SERPL-MCNC: 1.84 MG/DL (ref 0.51–0.95)
CRP SERPL-MCNC: 270.32 MG/L
DEPRECATED HCO3 PLAS-SCNC: 13 MMOL/L (ref 22–29)
EGFRCR SERPLBLD CKD-EPI 2021: 26 ML/MIN/1.73M2
EOSINOPHIL # BLD AUTO: 0.2 10E3/UL (ref 0–0.7)
EOSINOPHIL NFR BLD AUTO: 1 %
ERYTHROCYTE [DISTWIDTH] IN BLOOD BY AUTOMATED COUNT: 14.5 % (ref 10–15)
FLUAV RNA SPEC QL NAA+PROBE: NEGATIVE
FLUBV RNA RESP QL NAA+PROBE: NEGATIVE
GLUCOSE BLDC GLUCOMTR-MCNC: 233 MG/DL (ref 70–99)
GLUCOSE SERPL-MCNC: 234 MG/DL (ref 70–99)
GLUCOSE UR STRIP-MCNC: 50 MG/DL
HCO3 BLDV-SCNC: 14 MMOL/L (ref 21–28)
HCT VFR BLD AUTO: 38 % (ref 35–47)
HGB BLD-MCNC: 12.2 G/DL (ref 11.7–15.7)
HGB UR QL STRIP: ABNORMAL
HYALINE CASTS: 363 /LPF
IMM GRANULOCYTES # BLD: 0.3 10E3/UL
IMM GRANULOCYTES NFR BLD: 1 %
KETONES UR STRIP-MCNC: 10 MG/DL
LACTATE SERPL-SCNC: 13.8 MMOL/L (ref 0.7–2)
LEUKOCYTE ESTERASE UR QL STRIP: NEGATIVE
LYMPHOCYTES # BLD AUTO: 0.9 10E3/UL (ref 0.8–5.3)
LYMPHOCYTES NFR BLD AUTO: 3 %
MCH RBC QN AUTO: 29.7 PG (ref 26.5–33)
MCHC RBC AUTO-ENTMCNC: 32.1 G/DL (ref 31.5–36.5)
MCV RBC AUTO: 93 FL (ref 78–100)
MONOCYTES # BLD AUTO: 1.1 10E3/UL (ref 0–1.3)
MONOCYTES NFR BLD AUTO: 3 %
MUCOUS THREADS #/AREA URNS LPF: PRESENT /LPF
NEUTROPHILS # BLD AUTO: 29.5 10E3/UL (ref 1.6–8.3)
NEUTROPHILS NFR BLD AUTO: 91 %
NITRATE UR QL: NEGATIVE
NRBC # BLD AUTO: 0 10E3/UL
NRBC BLD AUTO-RTO: 0 /100
NT-PROBNP SERPL-MCNC: ABNORMAL PG/ML (ref 0–1800)
O2/TOTAL GAS SETTING VFR VENT: 32 %
OXYHGB MFR BLDV: 84 % (ref 70–75)
PCO2 BLDV: 29 MM HG (ref 40–50)
PH BLDV: 7.31 [PH] (ref 7.32–7.43)
PH UR STRIP: 5.5 [PH] (ref 4.7–8)
PLATELET # BLD AUTO: 222 10E3/UL (ref 150–450)
PO2 BLDV: 58 MM HG (ref 25–47)
POTASSIUM SERPL-SCNC: 3.3 MMOL/L (ref 3.4–5.3)
PROT SERPL-MCNC: 6.1 G/DL (ref 6.4–8.3)
RBC # BLD AUTO: 4.11 10E6/UL (ref 3.8–5.2)
RBC URINE: 6 /HPF
RSV RNA SPEC NAA+PROBE: NEGATIVE
SARS-COV-2 RNA RESP QL NAA+PROBE: NEGATIVE
SODIUM SERPL-SCNC: 134 MMOL/L (ref 135–145)
SP GR UR STRIP: 1.02 (ref 1–1.03)
SQUAMOUS EPITHELIAL: 9 /HPF
TROPONIN T SERPL HS-MCNC: 126 NG/L
UROBILINOGEN UR STRIP-MCNC: 4 MG/DL
WBC # BLD AUTO: 32.2 10E3/UL (ref 4–11)
WBC URINE: 8 /HPF

## 2024-01-01 PROCEDURE — 71045 X-RAY EXAM CHEST 1 VIEW: CPT

## 2024-01-01 PROCEDURE — 93010 ELECTROCARDIOGRAM REPORT: CPT | Performed by: INTERNAL MEDICINE

## 2024-01-01 PROCEDURE — 84484 ASSAY OF TROPONIN QUANT: CPT | Performed by: INTERNAL MEDICINE

## 2024-01-01 PROCEDURE — 82805 BLOOD GASES W/O2 SATURATION: CPT | Performed by: INTERNAL MEDICINE

## 2024-01-01 PROCEDURE — 83605 ASSAY OF LACTIC ACID: CPT | Performed by: INTERNAL MEDICINE

## 2024-01-01 PROCEDURE — 80053 COMPREHEN METABOLIC PANEL: CPT | Performed by: INTERNAL MEDICINE

## 2024-01-01 PROCEDURE — 94640 AIRWAY INHALATION TREATMENT: CPT

## 2024-01-01 PROCEDURE — 250N000009 HC RX 250: Performed by: INTERNAL MEDICINE

## 2024-01-01 PROCEDURE — 82962 GLUCOSE BLOOD TEST: CPT

## 2024-01-01 PROCEDURE — 36415 COLL VENOUS BLD VENIPUNCTURE: CPT | Performed by: INTERNAL MEDICINE

## 2024-01-01 PROCEDURE — 250N000011 HC RX IP 250 OP 636: Performed by: INTERNAL MEDICINE

## 2024-01-01 PROCEDURE — 83880 ASSAY OF NATRIURETIC PEPTIDE: CPT | Performed by: INTERNAL MEDICINE

## 2024-01-01 PROCEDURE — 999N000157 HC STATISTIC RCP TIME EA 10 MIN

## 2024-01-01 PROCEDURE — 85025 COMPLETE CBC W/AUTO DIFF WBC: CPT | Performed by: INTERNAL MEDICINE

## 2024-01-01 PROCEDURE — 96375 TX/PRO/DX INJ NEW DRUG ADDON: CPT

## 2024-01-01 PROCEDURE — 87040 BLOOD CULTURE FOR BACTERIA: CPT | Performed by: INTERNAL MEDICINE

## 2024-01-01 PROCEDURE — 87637 SARSCOV2&INF A&B&RSV AMP PRB: CPT | Performed by: INTERNAL MEDICINE

## 2024-01-01 PROCEDURE — 258N000003 HC RX IP 258 OP 636: Performed by: INTERNAL MEDICINE

## 2024-01-01 PROCEDURE — 99285 EMERGENCY DEPT VISIT HI MDM: CPT | Performed by: INTERNAL MEDICINE

## 2024-01-01 PROCEDURE — 99292 CRITICAL CARE ADDL 30 MIN: CPT

## 2024-01-01 PROCEDURE — 93005 ELECTROCARDIOGRAM TRACING: CPT

## 2024-01-01 PROCEDURE — 99291 CRITICAL CARE FIRST HOUR: CPT | Mod: 25

## 2024-01-01 PROCEDURE — 96365 THER/PROPH/DIAG IV INF INIT: CPT

## 2024-01-01 PROCEDURE — 86140 C-REACTIVE PROTEIN: CPT | Performed by: INTERNAL MEDICINE

## 2024-01-01 PROCEDURE — 87086 URINE CULTURE/COLONY COUNT: CPT | Performed by: INTERNAL MEDICINE

## 2024-01-01 PROCEDURE — 94660 CPAP INITIATION&MGMT: CPT

## 2024-01-01 PROCEDURE — 81001 URINALYSIS AUTO W/SCOPE: CPT | Performed by: INTERNAL MEDICINE

## 2024-01-01 RX ORDER — VANCOMYCIN HYDROCHLORIDE 750 MG/150ML
INJECTION, SOLUTION INTRAVENOUS
Status: DISCONTINUED
Start: 2024-01-01 | End: 2024-01-01 | Stop reason: WASHOUT

## 2024-01-01 RX ORDER — IPRATROPIUM BROMIDE AND ALBUTEROL SULFATE 2.5; .5 MG/3ML; MG/3ML
3 SOLUTION RESPIRATORY (INHALATION) ONCE
Status: COMPLETED | OUTPATIENT
Start: 2024-01-01 | End: 2024-01-01

## 2024-01-01 RX ORDER — PIPERACILLIN SODIUM, TAZOBACTAM SODIUM 3; .375 G/15ML; G/15ML
3.38 INJECTION, POWDER, LYOPHILIZED, FOR SOLUTION INTRAVENOUS ONCE
Status: COMPLETED | OUTPATIENT
Start: 2024-01-01 | End: 2024-01-01

## 2024-01-01 RX ORDER — LORAZEPAM 2 MG/ML
0.5 INJECTION INTRAMUSCULAR ONCE
Status: COMPLETED | OUTPATIENT
Start: 2024-01-01 | End: 2024-01-01

## 2024-01-01 RX ORDER — MORPHINE SULFATE 4 MG/ML
2 INJECTION, SOLUTION INTRAMUSCULAR; INTRAVENOUS ONCE
Status: COMPLETED | OUTPATIENT
Start: 2024-01-01 | End: 2024-01-01

## 2024-01-01 RX ORDER — IOPAMIDOL 755 MG/ML
50 INJECTION, SOLUTION INTRAVASCULAR ONCE
Status: DISCONTINUED | OUTPATIENT
Start: 2024-01-01 | End: 2024-01-02 | Stop reason: HOSPADM

## 2024-01-01 RX ORDER — VANCOMYCIN HYDROCHLORIDE 1 G/20ML
INJECTION, POWDER, LYOPHILIZED, FOR SOLUTION INTRAVENOUS
Status: DISCONTINUED
Start: 2024-01-01 | End: 2024-01-01 | Stop reason: HOSPADM

## 2024-01-01 RX ADMIN — SODIUM CHLORIDE 500 ML: 9 INJECTION, SOLUTION INTRAVENOUS at 20:14

## 2024-01-01 RX ADMIN — LORAZEPAM 0.5 MG: 2 INJECTION INTRAMUSCULAR; INTRAVENOUS at 21:39

## 2024-01-01 RX ADMIN — IPRATROPIUM BROMIDE AND ALBUTEROL SULFATE 3 ML: .5; 3 SOLUTION RESPIRATORY (INHALATION) at 19:48

## 2024-01-01 RX ADMIN — MORPHINE SULFATE 2 MG: 4 INJECTION, SOLUTION INTRAMUSCULAR; INTRAVENOUS at 21:17

## 2024-01-01 RX ADMIN — PIPERACILLIN AND TAZOBACTAM 3.38 G: 3; .375 INJECTION, POWDER, FOR SOLUTION INTRAVENOUS at 20:40

## 2024-01-01 RX ADMIN — SODIUM CHLORIDE 500 ML: 9 INJECTION, SOLUTION INTRAVENOUS at 21:02

## 2024-01-01 ASSESSMENT — ENCOUNTER SYMPTOMS
DIAPHORESIS: 0
CHILLS: 0
NECK STIFFNESS: 0
FEVER: 1
ALTERED MENTAL STATUS: 1
NECK PAIN: 0
NUMBNESS: 0
WEAKNESS: 1
ARTHRALGIAS: 0
ANAL BLEEDING: 0
LIGHT-HEADEDNESS: 0
WOUND: 0
HEADACHES: 0
BACK PAIN: 0
FLANK PAIN: 0
MYALGIAS: 0
VOMITING: 0
DIZZINESS: 0
COLOR CHANGE: 0
WHEEZING: 0
VOICE CHANGE: 0
CHEST TIGHTNESS: 0
ABDOMINAL PAIN: 0
ABDOMINAL DISTENTION: 0
HEMATURIA: 0
BLOOD IN STOOL: 0
SHORTNESS OF BREATH: 1
CONFUSION: 0
COUGH: 0
PALPITATIONS: 0
UNRESPONSIVENESS: 1
NAUSEA: 0
DYSURIA: 0

## 2024-01-01 ASSESSMENT — ACTIVITIES OF DAILY LIVING (ADL)
ADLS_ACUITY_SCORE: 35
ADLS_ACUITY_SCORE: 35

## 2024-01-02 LAB
ATRIAL RATE - MUSE: NORMAL BPM
DIASTOLIC BLOOD PRESSURE - MUSE: NORMAL MMHG
INTERPRETATION ECG - MUSE: NORMAL
P AXIS - MUSE: NORMAL DEGREES
PR INTERVAL - MUSE: NORMAL MS
QRS DURATION - MUSE: 98 MS
QT - MUSE: 278 MS
QTC - MUSE: 485 MS
R AXIS - MUSE: -61 DEGREES
SYSTOLIC BLOOD PRESSURE - MUSE: NORMAL MMHG
T AXIS - MUSE: 89 DEGREES
VENTRICULAR RATE- MUSE: 183 BPM

## 2024-01-02 ASSESSMENT — ACTIVITIES OF DAILY LIVING (ADL): ADLS_ACUITY_SCORE: 35

## 2024-01-02 NOTE — ED NOTES
The monitor in ED room 10 is not sending vitals into epic. Staff have tried to fix the issue but unable to. All vitals are put in manually.

## 2024-01-02 NOTE — ED NOTES
Per Dr Ortega pt is now on comfort cares and will be stopping all IV abx, fluids, VS, and Bi PAP.

## 2024-01-02 NOTE — ED PROVIDER NOTES
History     Chief Complaint   Patient presents with    Stroke Symptoms     The history is provided by the patient, the EMS personnel and a relative.   Altered Mental Status  Presenting symptoms: unresponsiveness    Presenting symptoms: no confusion    Severity:  Severe  Most recent episode:  Today  Episode history:  Single  Timing:  Constant  Chronicity:  New  Associated symptoms: fever and weakness    Associated symptoms: no abdominal pain, no headaches, no light-headedness, no nausea, no palpitations, no rash and no vomiting          Allergies:  Allergies   Allergen Reactions    Ampicillin     Desvenlafaxine Other (See Comments)     Desvenlafaxine Succinate  Pristiq      Sertraline Hcl Other (See Comments) and Diarrhea     Zoloft      Sulfa Antibiotics Other (See Comments)     Sulfa(Sulfonamide Antibiotics)       Problem List:    Patient Active Problem List    Diagnosis Date Noted    Atrial fibrillation with rapid ventricular response (H) 01/01/2024     Priority: Medium    Pneumonia of right lower lobe due to infectious organism 01/01/2024     Priority: Medium    Sepsis, due to unspecified organism, unspecified whether acute organ dysfunction present (H) 01/01/2024     Priority: Medium    Bilateral sensorineural hearing loss 12/23/2022     Priority: Medium    Chronic kidney disease, stage 3 (H) 07/21/2021     Priority: Medium    Chronic systolic congestive heart failure (H) 07/13/2021     Priority: Medium    Hematoma of arm, left, initial encounter 06/16/2021     Priority: Medium    Traumatic hematoma of left hip 06/16/2021     Priority: Medium    Chronic anticoagulation 06/13/2021     Priority: Medium    Essential (primary) hypertension 06/13/2021     Priority: Medium    Falls 06/13/2021     Priority: Medium    Generalized muscle weakness 07/02/2019     Priority: Medium    Lung nodule 10/30/2018     Priority: Medium    Controlled type 2 diabetes mellitus without complication, without long-term current use of  insulin (H) 10/24/2017     Priority: Medium    Permanent atrial fibrillation (H) 10/24/2017     Priority: Medium    ACP (advance care planning) 01/04/2017     Priority: Medium     Advance Care Planning 1/4/2017: ACP Review of Chart / Resources Provided:  Reviewed chart for advance care plan.  Annie Arguello has no plan or code status on file. Discussed available resources and provided with information. Confirmed code status reflects current choices pending further ACP discussions.  Confirmed/documented legally designated decision makers.  Added by Kenzie Mancilla          Advanced care planning/counseling discussion 11/02/2012     Priority: Medium    Myalgia and myositis 05/10/2011     Priority: Medium     Problem list name updated by automated process. Provider to review      Diverticulosis of large intestine 05/02/2011     Priority: Medium     Problem list name updated by automated process. Provider to review      Osteoporosis 05/02/2011     Priority: Medium     Problem list name updated by automated process. Provider to review      Obesity 05/02/2011     Priority: Medium     Problem list name updated by automated process. 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 Provider to review      Sciatica 06/26/2007     Priority: Medium    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 05/16/2007     Priority: Medium    Calculus of kidney 03/14/2003     Priority: Medium        Past Medical History:    Past Medical History:   Diagnosis Date    A-fib (H)     Acute hyperglycemia     Acute systolic heart failure (H) 06/17/2021    Acute systolic heart failure (H)     Anemia due to blood loss, acute 06/17/2021    Atrial fibrillation (H) 09/06/2012    Calculus of kidney 03/14/2003    Cerebral infarction (H)     Chronic anticoagulation 06/17/2021    Chronic anticoagulation     Congestive heart failure (H)     Controlled type 2  diabetes mellitus without complication, without long-term current use of insulin (H) 10/24/2017    Depressive disorder     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 2007    Diverticulosis 2011    Encounter for routine screening for malformation using ultrasonics 2021    Essential hypertension 2021    Fall 2021    Falls     Fibromyalgia 05/10/2011    Hematoma     Hemorrhagic shock (H) 2021    Hemorrhagic shock (H)     HTN (hypertension)     Hypotension     Hypotension, unspecified hypotension type 2021    Nonallopathic lesions of cervical region, not else 2001    Obesity 2011    Osteoporosis 2011    Other malaise and fatigue 2002    Renal cyst 2011    Sciatica 2007    Traumatic hematoma of hip, left, sequela     Traumatic hematoma of left upper arm        Past Surgical History:    Past Surgical History:   Procedure Laterality Date    Breast biopsy      LT, benign     SECTION      CHOLECYSTECTOMY      lap    COLONOSCOPY      LUCILLE / BSO         Family History:    Family History   Problem Relation Age of Onset    Myocardial Infarction Mother 59        myocardial infarction, cause of death    Other - See Comments Daughter         fibromyalgia    Thyroid Disease Sister         hypo    Thyroid Disease Sister         hypo    Thyroid Disease Sister         hypo    Myocardial Infarction Brother 63        myocardial infarction, cause of death       Social History:  Marital Status:   [5]  Social History     Tobacco Use    Smoking status: Never    Smokeless tobacco: Never    Tobacco comments:     no passive exposure   Vaping Use    Vaping Use: Never used   Substance Use Topics    Alcohol use: No    Drug use: No        Medications:    acetaminophen (TYLENOL) 325 MG tablet  Alcohol Swabs (EASY TOUCH ALCOHOL PREP MEDIUM) 70 % PADS  alum & mag hydroxide-simethicone (MAALOX) 200-200-20 MG/5ML SUSP suspension  aspirin (ASPIRIN LOW  DOSE) 81 MG chewable tablet  bismuth subsalicylate (PEPTO BISMOL) 262 MG/15ML suspension  chlorthalidone (HYGROTON) 25 MG tablet  desonide (DESOWEN) 0.05 % external lotion  digoxin (LANOXIN) 125 MCG tablet  DULoxetine (CYMBALTA) 20 MG capsule  ELIQUIS ANTICOAGULANT 5 MG tablet  fluticasone (FLONASE) 50 MCG/ACT nasal spray  glipiZIDE (GLUCOTROL XL) 10 MG 24 hr tablet  loperamide (IMODIUM) 2 MG capsule  metoprolol tartrate (LOPRESSOR) 100 MG tablet  mirtazapine (REMERON) 7.5 MG tablet  nystatin (MYCOSTATIN) 376200 UNIT/GM external powder  omeprazole (PRILOSEC) 20 MG DR capsule  psyllium (METAMUCIL/KONSYL) 58.6 % powder  Specialty Vitamins Products (ICAPS LUTEIN & ZEAXANTHIN) TBEC  vitamin D3 (CHOLECALCIFEROL) 50 mcg (2000 units) tablet          Review of Systems   Constitutional:  Positive for fever. Negative for chills and diaphoresis.   HENT:  Negative for voice change.    Eyes:  Negative for visual disturbance.   Respiratory:  Positive for shortness of breath. Negative for cough, chest tightness and wheezing.    Cardiovascular:  Negative for chest pain, palpitations and leg swelling.   Gastrointestinal:  Negative for abdominal distention, abdominal pain, anal bleeding, blood in stool, nausea and vomiting.   Genitourinary:  Negative for decreased urine volume, dysuria, flank pain and hematuria.   Musculoskeletal:  Negative for arthralgias, back pain, gait problem, myalgias, neck pain and neck stiffness.   Skin:  Negative for color change, pallor, rash and wound.   Neurological:  Positive for weakness. Negative for dizziness, syncope, light-headedness, numbness and headaches.   Psychiatric/Behavioral:  Negative for confusion and suicidal ideas.        Physical Exam   BP: (!) 72/56  Pulse: (!) 152  Temp: (!) 100.5  F (38.1  C)  Resp: (!) 44  Weight: 69.1 kg (152 lb 4.8 oz)  SpO2: 94 %      Physical Exam  Vitals and nursing note reviewed.   Constitutional:       General: She is not in acute distress.     Appearance:  Normal appearance. She is well-developed. She is not diaphoretic.   HENT:      Head: Normocephalic and atraumatic.      Mouth/Throat:      Mouth: Mucous membranes are moist.      Pharynx: No oropharyngeal exudate.   Eyes:      General: No scleral icterus.     Conjunctiva/sclera: Conjunctivae normal.      Pupils: Pupils are equal, round, and reactive to light.   Neck:      Thyroid: No thyromegaly.      Vascular: No JVD.      Trachea: No tracheal deviation.   Cardiovascular:      Rate and Rhythm: Tachycardia present. Rhythm irregular.      Heart sounds: Normal heart sounds. No murmur heard.     No friction rub. No gallop.   Pulmonary:      Effort: Respiratory distress present.      Breath sounds: No stridor. Examination of the right-middle field reveals decreased breath sounds and rhonchi. Examination of the left-middle field reveals rhonchi. Examination of the right-lower field reveals decreased breath sounds and rhonchi. Examination of the left-lower field reveals rhonchi. Decreased breath sounds and rhonchi present. No wheezing or rales.   Chest:      Chest wall: No tenderness.   Abdominal:      General: Abdomen is flat. Bowel sounds are normal. There is no distension.      Palpations: Abdomen is soft. There is no mass.      Tenderness: There is no abdominal tenderness. There is no guarding or rebound.   Musculoskeletal:         General: No tenderness. Normal range of motion.      Cervical back: Normal range of motion and neck supple.   Lymphadenopathy:      Cervical: No cervical adenopathy.   Skin:     General: Skin is warm and dry.      Coloration: Skin is not pale.      Findings: No erythema or rash.   Neurological:      Mental Status: She is alert. Mental status is at baseline.   Psychiatric:         Behavior: Behavior normal.         ED Course      [facility]      National Institutes of Health Stroke Scale  Exam Interval: Baseline   Score    Level of consciousness: (0)   Alert, keenly responsive     LOC questions: (1)   Answers one question correctly    LOC commands: (0)   Performs both tasks correctly    Best gaze: (0)   Normal    Visual: (0)   No visual loss    Facial palsy: (0)   Normal symmetrical movements    Motor arm (left): (0)   No drift    Motor arm (right): (0)   No drift    Motor leg (left): (0)   No drift    Motor leg (right): (0)   No drift    Limb ataxia: (0)   Absent    Sensory: (0)   Normal- no sensory loss    Best language: (0)   Normal- no aphasia    Dysarthria: (0)   Normal    Extinction and inattention: (0)   No abnormality        Total Score:  1                       Procedures                  Results for orders placed or performed during the hospital encounter of 01/01/24 (from the past 24 hour(s))   Glucose by meter   Result Value Ref Range    GLUCOSE BY METER POCT 233 (H) 70 - 99 mg/dL   EKG 12 lead   Result Value Ref Range    Systolic Blood Pressure  mmHg    Diastolic Blood Pressure  mmHg    Ventricular Rate 183 BPM    Atrial Rate  BPM    HI Interval  ms    QRS Duration 98 ms     ms    QTc 485 ms    P Axis  degrees    R AXIS -61 degrees    T Axis 89 degrees    Interpretation ECG       Atrial fibrillation with rapid ventricular response  Left axis deviation  Minimal voltage criteria for LVH, may be normal variant ( Remington product )  Septal infarct , age undetermined  Abnormal ECG  When compared with ECG of 13-NOV-2023 08:13,  Vent. rate has increased BY  90 BPM  Septal infarct is now Present     Symptomatic Influenza A/B, RSV, & SARS-CoV2 PCR (COVID-19) Nasopharyngeal    Specimen: Nasopharyngeal; Swab   Result Value Ref Range    Influenza A PCR Negative Negative    Influenza B PCR Negative Negative    RSV PCR Negative Negative    SARS CoV2 PCR Negative Negative    Narrative    Testing was performed using the Xpert Xpress CoV2/Flu/RSV Assay on the Cepheid GeneXpert Instrument. This test should be ordered for the detection of SARS-CoV-2, influenza, and RSV viruses in individuals  who meet clinical and/or epidemiological criteria. Test performance is unknown in asymptomatic patients. This test is for in vitro diagnostic use under the FDA EUA for laboratories certified under CLIA to perform high or moderate complexity testing. This test has not been FDA cleared or approved. A negative result does not rule out the presence of PCR inhibitors in the specimen or target RNA in concentration below the limit of detection for the assay. If only one viral target is positive but coinfection with multiple targets is suspected, the sample should be re-tested with another FDA cleared, approved, or authorized test, if coinfection would change clinical management. This test was validated by the [facility]. These laboratories are certified under the Clinical Laboratory Improvement Amendments of 1988 (CLIA-88) as qualified to perform high complexity laboratory testing.   CBC with Platelets & Differential    Narrative    The following orders were created for panel order CBC with Platelets & Differential.  Procedure                               Abnormality         Status                     ---------                               -----------         ------                     CBC with platelets and d...[[id number]]  Abnormal            Final result                 Please view results for these tests on the individual orders.   Comprehensive metabolic panel   Result Value Ref Range    Sodium 134 (L) 135 - 145 mmol/L    Potassium 3.3 (L) 3.4 - 5.3 mmol/L    Carbon Dioxide (CO2) 13 (L) 22 - 29 mmol/L    Anion Gap 29 (H) 7 - 15 mmol/L    Urea Nitrogen 39.7 (H) 8.0 - 23.0 mg/dL    Creatinine 1.84 (H) 0.51 - 0.95 mg/dL    GFR Estimate 26 (L) >60 mL/min/1.73m2    Calcium 8.7 8.2 - 9.6 mg/dL    Chloride 92 (L) 98 - 107 mmol/L    Glucose 234 (H) 70 - 99 mg/dL    Alkaline Phosphatase 78 40 - 150 U/L     (HH) 0 - 45 U/L     (H) 0 - 50 U/L    Protein Total 6.1 (L) 6.4 - 8.3 g/dL    Albumin  2.7 (L) 3.5 - 5.2 g/dL    Bilirubin Total 2.7 (H) <=1.2 mg/dL   CRP inflammation   Result Value Ref Range    CRP Inflammation 270.32 (H) <5.00 mg/L   Lactic acid whole blood   Result Value Ref Range    Lactic Acid 13.8 (HH) 0.7 - 2.0 mmol/L   Troponin T, High Sensitivity   Result Value Ref Range    Troponin T, High Sensitivity 126 (HH) <=14 ng/L   Nt probnp inpatient   Result Value Ref Range    N terminal Pro BNP Inpatient 46,568 (H) 0 - 1,800 pg/mL   Blood gas venous and oxyhgb   Result Value Ref Range    pH Venous 7.31 (L) 7.32 - 7.43    pCO2 Venous 29 (L) 40 - 50 mm Hg    pO2 Venous 58 (H) 25 - 47 mm Hg    Bicarbonate Venous 14 (L) 21 - 28 mmol/L    FIO2 32     Oxyhemoglobin Venous 84 (H) 70 - 75 %    Base Excess/Deficit -10.4 (L) -7.7 - 1.9 mmol/L   CBC with platelets and differential   Result Value Ref Range    WBC Count 32.2 (H) 4.0 - 11.0 10e3/uL    RBC Count 4.11 3.80 - 5.20 10e6/uL    Hemoglobin 12.2 11.7 - 15.7 g/dL    Hematocrit 38.0 35.0 - 47.0 %    MCV 93 78 - 100 fL    MCH 29.7 26.5 - 33.0 pg    MCHC 32.1 31.5 - 36.5 g/dL    RDW 14.5 10.0 - 15.0 %    Platelet Count 222 150 - 450 10e3/uL    % Neutrophils 91 %    % Lymphocytes 3 %    % Monocytes 3 %    % Eosinophils 1 %    % Basophils 1 %    % Immature Granulocytes 1 %    NRBCs per 100 WBC 0 <1 /100    Absolute Neutrophils 29.5 (H) 1.6 - 8.3 10e3/uL    Absolute Lymphocytes 0.9 0.8 - 5.3 10e3/uL    Absolute Monocytes 1.1 0.0 - 1.3 10e3/uL    Absolute Eosinophils 0.2 0.0 - 0.7 10e3/uL    Absolute Basophils 0.2 0.0 - 0.2 10e3/uL    Absolute Immature Granulocytes 0.3 <=0.4 10e3/uL    Absolute NRBCs 0.0 10e3/uL   UA Macroscopic with reflex to Microscopic and Culture    Specimen: Urine, [place] Catheter   Result Value Ref Range    Color Urine Dark Yellow (A) Colorless, Straw, Light Yellow, Yellow    Appearance Urine Slightly Cloudy (A) Clear    Glucose Urine 50 (A) Negative mg/dL    Bilirubin Urine Negative Negative    Ketones Urine 10 (A) Negative mg/dL     Specific Gravity Urine 1.023 1.003 - 1.035    Blood Urine Moderate (A) Negative    pH Urine 5.5 4.7 - 8.0    Protein Albumin Urine 70 (A) Negative mg/dL    Urobilinogen Urine 4.0 (A) Normal, 2.0 mg/dL    Nitrite Urine Negative Negative    Leukocyte Esterase Urine Negative Negative    Mucus Urine Present (A) None Seen /LPF    RBC Urine 6 (H) <=2 /HPF    WBC Urine 8 (H) <=5 /HPF    Squamous Epithelials Urine 9 (H) <=1 /HPF    Hyaline Casts Urine 363 (H) <=2 /LPF    Narrative    Urine Culture not indicated       Medications   sodium chloride (PF) 0.9% PF flush 100 mL (has no administration in time range)   iopamidol (ISOVUE-370) solution 50 mL (has no administration in time range)   vancomycin (VANCOCIN) 1,500 mg in 0.9% NaCl 250 mL intermittent infusion (0 mg Intravenous Hold 1/1/24 2140)   vancomycin (VANCOCIN) 100 mg/mL injection (has no administration in time range)   ipratropium - albuterol 0.5 mg/2.5 mg/3 mL (DUONEB) neb solution 3 mL (3 mLs Nebulization $Given 1/1/24 1948)   piperacillin-tazobactam (ZOSYN) 3.375 g vial to attach to  mL bag (0 g Intravenous Stopped 1/1/24 2120)   sodium chloride 0.9% BOLUS 500 mL (0 mLs Intravenous Stopped 1/1/24 2102)   sodium chloride 0.9% BOLUS 500 mL (0 mLs Intravenous Stopped 1/1/24 2121)   morphine (PF) injection 2 mg (2 mg Intravenous $Given 1/1/24 2117)   LORazepam (ATIVAN) injection 0.5 mg (0.5 mg Intravenous $Given 1/1/24 2139)       Assessments & Plan (with Medical Decision Making)   Sent from NH for episode of LOC with left sided weakness  In ER, at her baseline , no neuro deficit was noticed   Code stroke activated by EMS but cancelled in ER due to pt hemodynamically unstable, sepsis symptoms that explained the reason of AMS .      EKG : afib RVR  Labs reviewed  CXR :RLL pneumonia  Gentle hydration started, IV zosyn, IV vanco  Initial plan was to admit to ICU but later as per my coversation and also Dr Girard conversation with family, daugthers, they just want  comfort care and no medical intervention.   Morphine given , ativan given for comfort , pt procnouced death at 21: 45.   I have reviewed the nursing notes.    I have reviewed the findings, diagnosis, plan and need for follow up with the patient.          New Prescriptions    No medications on file       Final diagnoses:   Pneumonia of right lower lobe due to infectious organism   Sepsis, due to unspecified organism, unspecified whether acute organ dysfunction present (H)   Atrial fibrillation with rapid ventricular response (H)       1/1/2024   [facility]       Ananth Ortega MD  01/01/24 1426

## 2024-01-02 NOTE — PHARMACY-VANCOMYCIN DOSING SERVICE
Pharmacy Vancomycin Initial Note  Date of Service 2024  Patient's  1933  89 year old, female    Indication: [facility] Pneumonia  No recent scr available:        Plan:  Start vancomycin  1500 mg IV once as LD. (Based off weight only. Update note if continued if patient gets admitted, ordered scr)    Kolton Morley, VinD

## 2024-01-02 NOTE — PROGRESS NOTES
Duoneb given. Pt with increased wob, lungs sound course crackles. Pt on 3l with sats 88%-94%.   Pt placed on Bipap per MD verbal order. Settings: IPAP 12, EPAP 5, backup rate 14 and 30% FiO2. Full face mask medium. Pt tolerating well.     Sonam Arboleda RRT

## 2024-01-02 NOTE — PROGRESS NOTES
89 year old with hx of CHF, A.Fib ( now in RVR) on eliquis, DM2, CKD3, alzheimers coming with transient left hand weakness that was noted at 1:30 pm  Currently no focal deficits.   BP in the 70s and pt in respiratory distress.   Not a TNK candidate 2/2 eliquis and beyond window.  Will need CT head, CTA head and neck when hemodynamically stable.   Please call us back.

## 2024-01-02 NOTE — ED TRIAGE NOTES
[facility] symptoms.  Last well known time was 1300.  They stated she was having left sided weakness.  EMS gave 20mg diltiazem and duoneb.  Tier 2 called overhead and provider assessed patient in hallway to see if appropriate to bring down to scan.  D/T low bp's patient was roomed into ED11.

## 2024-01-02 NOTE — ED NOTES
DATE/TIME OF CALL RECEIVED FROM LAB:  01/01/24 at 8:03 PM   LAB TEST:  Lactic acid   LAB VALUE:  13.8  PROVIDER NOTIFIED?: Yes  PROVIDER NAME: Flygiana  DATE/TIME LAB VALUE REPORTED TO PROVIDER: 1/1/2024 @ 2004  MECHANISM OF PROVIDER NOTIFICATION: Face-To-Face  PROVIDER RESPONSE: OK

## 2024-01-03 LAB — BACTERIA UR CULT: NORMAL

## 2024-01-04 LAB — BACTERIA BLD CULT: ABNORMAL

## 2024-01-08 LAB — BACTERIA BLD CULT: ABNORMAL

## 2024-04-03 NOTE — NURSING NOTE
"Chief Complaint   Patient presents with     Throat Problem     Mouth Problem     Atrial Fib       Initial /70 (BP Location: Right arm, Patient Position: Chair, Cuff Size: Adult Regular)  Pulse 91  Temp 98.2  F (36.8  C) (Tympanic)  Resp 16  Ht 5' 0.5\" (1.537 m)  Wt 160 lb (72.6 kg)  SpO2 100%  BMI 30.73 kg/m2 Estimated body mass index is 30.73 kg/(m^2) as calculated from the following:    Height as of this encounter: 5' 0.5\" (1.537 m).    Weight as of this encounter: 160 lb (72.6 kg).  Medication Reconciliation: complete     Nichole Brown   " Arterial Line Insertion    Performed by: Omkar Velez CRNA  Authorized by: Honorio Rogers MD  Preparation: Patient was prepped and draped in the usual sterile fashion.  Indications: hemodynamic monitoring  Orientation:  Right  Location: radial artery  Procedure Details:  Needle gauge: 20  Seldinger technique: Seldinger technique used  Number of attempts: 3    Post-procedure:  Post-procedure: dressing applied  Waveform: good waveform and waveform confirmed  Patient tolerance: Patient tolerated the procedure well with no immediate complications

## 2024-05-07 NOTE — PATIENT INSTRUCTIONS
Check Blood sugars daily fasting  And prior to supper or bed time   Please document time and what time of the day   Please bring to next appointment with Dr Cox   
Opt out

## 2024-10-26 NOTE — DISCHARGE INSTRUCTIONS
Every other day dressing change to R upper inner buttock wound:  Cleanse with gentle soap (ex. Baby wash) and water, pat or air dry.  Loosely pack with Shannon (the Shannon will usually dissolve).  Cover with Mepilex border dressing    Okay for pt to shower with dressing off.    Nystatin powder (lightly dusted) to areas of groin redness BID.    
No